# Patient Record
Sex: FEMALE | Race: WHITE | Employment: OTHER | URBAN - METROPOLITAN AREA
[De-identification: names, ages, dates, MRNs, and addresses within clinical notes are randomized per-mention and may not be internally consistent; named-entity substitution may affect disease eponyms.]

---

## 2017-07-11 ENCOUNTER — HOSPITAL ENCOUNTER (OUTPATIENT)
Dept: WOMENS IMAGING | Age: 82
Discharge: HOME | End: 2017-07-11
Attending: OBSTETRICS & GYNECOLOGY | Admitting: OBSTETRICS & GYNECOLOGY

## 2017-07-11 DIAGNOSIS — Z12.31 VISIT FOR SCREENING MAMMOGRAM: ICD-10-CM

## 2017-07-17 ENCOUNTER — OFFICE VISIT (OUTPATIENT)
Dept: FAMILY MEDICINE CLINIC | Age: 82
End: 2017-07-17

## 2017-07-17 VITALS
RESPIRATION RATE: 16 BRPM | SYSTOLIC BLOOD PRESSURE: 136 MMHG | OXYGEN SATURATION: 98 % | HEIGHT: 57 IN | BODY MASS INDEX: 22.87 KG/M2 | WEIGHT: 106 LBS | HEART RATE: 65 BPM | DIASTOLIC BLOOD PRESSURE: 70 MMHG

## 2017-07-17 DIAGNOSIS — J31.0 RHINITIS, UNSPECIFIED TYPE: Primary | ICD-10-CM

## 2017-07-17 DIAGNOSIS — M41.25 OTHER IDIOPATHIC SCOLIOSIS, THORACOLUMBAR REGION: ICD-10-CM

## 2017-07-17 DIAGNOSIS — M81.0 OSTEOPOROSIS: ICD-10-CM

## 2017-07-17 PROCEDURE — 99213 OFFICE O/P EST LOW 20 MIN: CPT | Performed by: INTERNAL MEDICINE

## 2017-07-17 PROCEDURE — 1036F TOBACCO NON-USER: CPT | Performed by: INTERNAL MEDICINE

## 2017-07-17 PROCEDURE — G8427 DOCREV CUR MEDS BY ELIG CLIN: HCPCS | Performed by: INTERNAL MEDICINE

## 2017-07-17 PROCEDURE — 4040F PNEUMOC VAC/ADMIN/RCVD: CPT | Performed by: INTERNAL MEDICINE

## 2017-07-17 PROCEDURE — G8399 PT W/DXA RESULTS DOCUMENT: HCPCS | Performed by: INTERNAL MEDICINE

## 2017-07-17 PROCEDURE — 4005F PHARM THX FOR OP RXD: CPT | Performed by: INTERNAL MEDICINE

## 2017-07-17 PROCEDURE — 1090F PRES/ABSN URINE INCON ASSESS: CPT | Performed by: INTERNAL MEDICINE

## 2017-07-17 PROCEDURE — G8420 CALC BMI NORM PARAMETERS: HCPCS | Performed by: INTERNAL MEDICINE

## 2017-07-17 PROCEDURE — 1123F ACP DISCUSS/DSCN MKR DOCD: CPT | Performed by: INTERNAL MEDICINE

## 2017-07-17 RX ORDER — FLUTICASONE PROPIONATE 50 MCG
1 SPRAY, SUSPENSION (ML) NASAL DAILY
Qty: 1 BOTTLE | Refills: 3 | Status: SHIPPED | OUTPATIENT
Start: 2017-07-17 | End: 2017-08-31

## 2017-07-17 ASSESSMENT — PATIENT HEALTH QUESTIONNAIRE - PHQ9
SUM OF ALL RESPONSES TO PHQ QUESTIONS 1-9: 0
1. LITTLE INTEREST OR PLEASURE IN DOING THINGS: 0
SUM OF ALL RESPONSES TO PHQ9 QUESTIONS 1 & 2: 0
2. FEELING DOWN, DEPRESSED OR HOPELESS: 0

## 2017-07-31 ENCOUNTER — OFFICE VISIT (OUTPATIENT)
Dept: FAMILY MEDICINE CLINIC | Age: 82
End: 2017-07-31

## 2017-07-31 VITALS
SYSTOLIC BLOOD PRESSURE: 130 MMHG | HEART RATE: 66 BPM | HEIGHT: 57 IN | DIASTOLIC BLOOD PRESSURE: 70 MMHG | WEIGHT: 105 LBS | BODY MASS INDEX: 22.65 KG/M2

## 2017-07-31 DIAGNOSIS — M81.0 AGE-RELATED OSTEOPOROSIS WITHOUT CURRENT PATHOLOGICAL FRACTURE: ICD-10-CM

## 2017-07-31 DIAGNOSIS — J30.89 NON-SEASONAL ALLERGIC RHINITIS DUE TO OTHER ALLERGIC TRIGGER: Primary | ICD-10-CM

## 2017-07-31 PROCEDURE — G8427 DOCREV CUR MEDS BY ELIG CLIN: HCPCS | Performed by: FAMILY MEDICINE

## 2017-07-31 PROCEDURE — G8399 PT W/DXA RESULTS DOCUMENT: HCPCS | Performed by: FAMILY MEDICINE

## 2017-07-31 PROCEDURE — G8420 CALC BMI NORM PARAMETERS: HCPCS | Performed by: FAMILY MEDICINE

## 2017-07-31 PROCEDURE — 4040F PNEUMOC VAC/ADMIN/RCVD: CPT | Performed by: FAMILY MEDICINE

## 2017-07-31 PROCEDURE — 99213 OFFICE O/P EST LOW 20 MIN: CPT | Performed by: FAMILY MEDICINE

## 2017-07-31 PROCEDURE — 1036F TOBACCO NON-USER: CPT | Performed by: FAMILY MEDICINE

## 2017-07-31 PROCEDURE — 4005F PHARM THX FOR OP RXD: CPT | Performed by: FAMILY MEDICINE

## 2017-07-31 PROCEDURE — 1123F ACP DISCUSS/DSCN MKR DOCD: CPT | Performed by: FAMILY MEDICINE

## 2017-07-31 PROCEDURE — 1090F PRES/ABSN URINE INCON ASSESS: CPT | Performed by: FAMILY MEDICINE

## 2017-07-31 RX ORDER — CETIRIZINE HYDROCHLORIDE 10 MG/1
10 TABLET ORAL DAILY
Qty: 30 TABLET | Refills: 0 | Status: SHIPPED | OUTPATIENT
Start: 2017-07-31 | End: 2017-08-31

## 2017-08-09 ENCOUNTER — HOSPITAL ENCOUNTER (OUTPATIENT)
Dept: WOMENS IMAGING | Age: 82
Discharge: HOME | End: 2017-08-09
Attending: FAMILY MEDICINE | Admitting: FAMILY MEDICINE

## 2017-08-09 DIAGNOSIS — M81.0 AGE-RELATED OSTEOPOROSIS WITHOUT CURRENT PATHOLOGICAL FRACTURE: ICD-10-CM

## 2017-08-10 ENCOUNTER — TELEPHONE (OUTPATIENT)
Dept: FAMILY MEDICINE CLINIC | Age: 82
End: 2017-08-10

## 2017-08-10 DIAGNOSIS — M81.0 AGE-RELATED OSTEOPOROSIS WITHOUT CURRENT PATHOLOGICAL FRACTURE: Primary | ICD-10-CM

## 2017-08-31 ENCOUNTER — OFFICE VISIT (OUTPATIENT)
Dept: ENDOCRINOLOGY | Age: 82
End: 2017-08-31

## 2017-08-31 VITALS
RESPIRATION RATE: 16 BRPM | HEART RATE: 68 BPM | SYSTOLIC BLOOD PRESSURE: 130 MMHG | WEIGHT: 105 LBS | HEIGHT: 55 IN | BODY MASS INDEX: 24.3 KG/M2 | DIASTOLIC BLOOD PRESSURE: 62 MMHG

## 2017-08-31 DIAGNOSIS — M81.0 AGE-RELATED OSTEOPOROSIS WITHOUT CURRENT PATHOLOGICAL FRACTURE: Primary | ICD-10-CM

## 2017-08-31 DIAGNOSIS — R53.83 FATIGUE, UNSPECIFIED TYPE: ICD-10-CM

## 2017-08-31 DIAGNOSIS — M81.0 AGE-RELATED OSTEOPOROSIS WITHOUT CURRENT PATHOLOGICAL FRACTURE: ICD-10-CM

## 2017-08-31 DIAGNOSIS — Z78.0 POSTMENOPAUSAL: ICD-10-CM

## 2017-08-31 LAB
A/G RATIO: 1.7 (ref 1.1–2.2)
ALBUMIN SERPL-MCNC: 4.6 G/DL (ref 3.4–5)
ALP BLD-CCNC: 84 U/L (ref 40–129)
ALT SERPL-CCNC: 23 U/L (ref 10–40)
ANION GAP SERPL CALCULATED.3IONS-SCNC: 17 MMOL/L (ref 3–16)
AST SERPL-CCNC: 26 U/L (ref 15–37)
BILIRUB SERPL-MCNC: 0.6 MG/DL (ref 0–1)
BUN BLDV-MCNC: 25 MG/DL (ref 7–20)
CALCIUM SERPL-MCNC: 10 MG/DL (ref 8.3–10.6)
CHLORIDE BLD-SCNC: 103 MMOL/L (ref 99–110)
CO2: 24 MMOL/L (ref 21–32)
CREAT SERPL-MCNC: 0.8 MG/DL (ref 0.6–1.2)
GFR AFRICAN AMERICAN: >60
GFR NON-AFRICAN AMERICAN: >60
GLOBULIN: 2.7 G/DL
GLUCOSE BLD-MCNC: 90 MG/DL (ref 70–99)
HCT VFR BLD CALC: 43.3 % (ref 36–48)
HEMOGLOBIN: 14.4 G/DL (ref 12–16)
MCH RBC QN AUTO: 30.6 PG (ref 26–34)
MCHC RBC AUTO-ENTMCNC: 33.4 G/DL (ref 31–36)
MCV RBC AUTO: 91.7 FL (ref 80–100)
PARATHYROID HORMONE INTACT: 44.5 PG/ML (ref 14–72)
PDW BLD-RTO: 12.9 % (ref 12.4–15.4)
PLATELET # BLD: 202 K/UL (ref 135–450)
PMV BLD AUTO: 8.8 FL (ref 5–10.5)
POTASSIUM SERPL-SCNC: 4.9 MMOL/L (ref 3.5–5.1)
RBC # BLD: 4.72 M/UL (ref 4–5.2)
SODIUM BLD-SCNC: 144 MMOL/L (ref 136–145)
TOTAL PROTEIN: 7.3 G/DL (ref 6.4–8.2)
TSH REFLEX: 2.17 UIU/ML (ref 0.27–4.2)
VITAMIN D 25-HYDROXY: 42.6 NG/ML
WBC # BLD: 8.4 K/UL (ref 4–11)

## 2017-08-31 PROCEDURE — 4005F PHARM THX FOR OP RXD: CPT | Performed by: INTERNAL MEDICINE

## 2017-08-31 PROCEDURE — G8427 DOCREV CUR MEDS BY ELIG CLIN: HCPCS | Performed by: INTERNAL MEDICINE

## 2017-08-31 PROCEDURE — 99204 OFFICE O/P NEW MOD 45 MIN: CPT | Performed by: INTERNAL MEDICINE

## 2017-08-31 PROCEDURE — G8399 PT W/DXA RESULTS DOCUMENT: HCPCS | Performed by: INTERNAL MEDICINE

## 2017-08-31 PROCEDURE — G8420 CALC BMI NORM PARAMETERS: HCPCS | Performed by: INTERNAL MEDICINE

## 2017-08-31 PROCEDURE — 1090F PRES/ABSN URINE INCON ASSESS: CPT | Performed by: INTERNAL MEDICINE

## 2017-08-31 PROCEDURE — 1123F ACP DISCUSS/DSCN MKR DOCD: CPT | Performed by: INTERNAL MEDICINE

## 2017-08-31 PROCEDURE — 4040F PNEUMOC VAC/ADMIN/RCVD: CPT | Performed by: INTERNAL MEDICINE

## 2017-08-31 PROCEDURE — 1036F TOBACCO NON-USER: CPT | Performed by: INTERNAL MEDICINE

## 2017-09-01 ENCOUNTER — TELEPHONE (OUTPATIENT)
Dept: ENDOCRINOLOGY | Age: 82
End: 2017-09-01

## 2017-10-09 RX ORDER — RISEDRONATE SODIUM 35 MG/1
TABLET, FILM COATED ORAL
Qty: 4 TABLET | Refills: 3 | Status: SHIPPED | OUTPATIENT
Start: 2017-10-09 | End: 2018-01-28 | Stop reason: SDUPTHER

## 2017-12-18 ENCOUNTER — OFFICE VISIT (OUTPATIENT)
Dept: FAMILY MEDICINE CLINIC | Age: 82
End: 2017-12-18

## 2017-12-18 ENCOUNTER — TELEPHONE (OUTPATIENT)
Dept: FAMILY MEDICINE CLINIC | Age: 82
End: 2017-12-18

## 2017-12-18 VITALS
HEIGHT: 57 IN | HEART RATE: 72 BPM | SYSTOLIC BLOOD PRESSURE: 126 MMHG | DIASTOLIC BLOOD PRESSURE: 66 MMHG | RESPIRATION RATE: 16 BRPM | WEIGHT: 103 LBS | BODY MASS INDEX: 22.22 KG/M2

## 2017-12-18 DIAGNOSIS — Z79.1 NSAID LONG-TERM USE: ICD-10-CM

## 2017-12-18 DIAGNOSIS — Z01.818 PRE-OP EVALUATION: Primary | ICD-10-CM

## 2017-12-18 LAB
ANION GAP SERPL CALCULATED.3IONS-SCNC: 15 MMOL/L (ref 3–16)
BUN BLDV-MCNC: 24 MG/DL (ref 7–20)
CALCIUM SERPL-MCNC: 10 MG/DL (ref 8.3–10.6)
CHLORIDE BLD-SCNC: 103 MMOL/L (ref 99–110)
CO2: 25 MMOL/L (ref 21–32)
CREAT SERPL-MCNC: 0.8 MG/DL (ref 0.6–1.2)
GFR AFRICAN AMERICAN: >60
GFR NON-AFRICAN AMERICAN: >60
GLUCOSE BLD-MCNC: 96 MG/DL (ref 70–99)
POTASSIUM SERPL-SCNC: 4.3 MMOL/L (ref 3.5–5.1)
SODIUM BLD-SCNC: 143 MMOL/L (ref 136–145)

## 2017-12-18 PROCEDURE — G8420 CALC BMI NORM PARAMETERS: HCPCS | Performed by: FAMILY MEDICINE

## 2017-12-18 PROCEDURE — 4040F PNEUMOC VAC/ADMIN/RCVD: CPT | Performed by: FAMILY MEDICINE

## 2017-12-18 PROCEDURE — 1090F PRES/ABSN URINE INCON ASSESS: CPT | Performed by: FAMILY MEDICINE

## 2017-12-18 PROCEDURE — 36415 COLL VENOUS BLD VENIPUNCTURE: CPT | Performed by: FAMILY MEDICINE

## 2017-12-18 PROCEDURE — G8484 FLU IMMUNIZE NO ADMIN: HCPCS | Performed by: FAMILY MEDICINE

## 2017-12-18 PROCEDURE — 99214 OFFICE O/P EST MOD 30 MIN: CPT | Performed by: FAMILY MEDICINE

## 2017-12-18 PROCEDURE — G8427 DOCREV CUR MEDS BY ELIG CLIN: HCPCS | Performed by: FAMILY MEDICINE

## 2017-12-18 NOTE — TELEPHONE ENCOUNTER
Please call and inform patient that all blood work is normal.    Please document call and then close encounter.   thanks

## 2017-12-18 NOTE — PROGRESS NOTES
Preoperative Consultation    Cora Hunt MD  [facility]) Physicians  [place] [address] [address]  [phone] office  [phone] fax      [place] Nurse  YOB: 1935    Date of Service:  12/18/2017    Vitals:    12/18/17 1105   BP: 126/66   Pulse: 72   Resp: 16   Weight: 103 lb (46.7 kg)   Height: 4' 9\" (1.448 m)      Wt Readings from Last 2 Encounters:   12/18/17 103 lb (46.7 kg)   08/31/17 105 lb (47.6 kg)     BP Readings from Last 3 Encounters:   12/18/17 126/66   08/31/17 130/62   07/31/17 130/70        Chief Complaint   Patient presents with    Pre-op Exam     [facility] 12/27-cataract on right eye [phone] and fax [phone]     No Known Allergies  Outpatient Prescriptions Marked as Taking for the 12/18/17 encounter (Office Visit) with Bruna Gray MD   Medication Sig Dispense Refill    risedronate (ACTONEL) 35 MG tablet Take once per week in the morning with a full glass of water 4 tablet 3    celecoxib (CELEBREX) 200 MG capsule Take 200 mg by mouth 2 times daily. This patient presents to the office today for a preoperative consultation at the request of surgeon, Dr. Lelo Duncan, who plans on performing right eye cataract on December 27 at [address] [place] [address] at [facility], and left cataract surgery a couple weeks later (not scheduled yet).      Planned anesthesia: IV sedation and Topical anesthesia   Known anesthesia problems: None   Bleeding risk: No recent or remote history of abnormal bleeding  Personal or FH of DVT/PE: No    Patient objection to receiving blood products: No    Patient Active Problem List   Diagnosis    Osteoarthritis    Osteoporosis    Allergic rhinitis    Scoliosis    Inguinal hernia unilateral, non-recurrent    Hearing loss sensory, bilateral       Past Medical History:   Diagnosis Date    Allergic rhinitis     Hearing loss sensory, bilateral     mod--severe on right, mild to reactive to light. Neck: Trachea normal and normal range of motion. Neck supple. No JVD present. Carotid bruit is not present. No mass and no thyromegaly present. Cardiovascular: Normal rate, regular rhythm, normal heart sounds and intact distal pulses. Exam reveals no gallop and no friction rub. No murmur heard. Pulmonary/Chest: Effort normal and breath sounds normal. No respiratory distress. She has no wheezes. She has no rales. Abdominal: Soft. Normal aorta and bowel sounds are normal. She exhibits no distension and no mass. There is no hepatosplenomegaly. No tenderness. Musculoskeletal: She exhibits no edema and no tenderness. Neurological: She is alert and oriented to person, place, and time. She has normal strength. No cranial nerve deficit or sensory deficit. Coordination and gait normal.   Skin: Skin is warm and dry. No rash noted. No erythema. Psychiatric: She has a normal mood and affect. Her behavior is normal.        Assessment:       81 y.o. patient with planned surgery as above. Known risk factors for perioperative complications:       chronic NSAID use:  Ok to continue prior to surgery since its cataract surgery. Checking BMP    Current medications which may produce withdrawal symptoms if withheld perioperatively: none      Plan:     1. Preoperative workup as follows: electrolytes, creatinine, glucose    2. Change in medication regimen before surgery: None    3. Prophylaxis for cardiac events with perioperative beta-blockers: Not indicated    4.   No contraindications to planned surgery/  Cleared for surgery      Ben Oswald MD

## 2017-12-18 NOTE — LETTER
Preoperative Consultation    Jimmy Huerta MD  [facility]) Physicians  [place] [address] [address]  [phone] office  [phone] fax      Rito Vásquez  YOB: 1935    Date of Service:  12/18/2017    Vitals:    12/18/17 1105   BP: 126/66   Pulse: 72   Resp: 16   Weight: 103 lb (46.7 kg)   Height: 4' 9\" (1.448 m)      Wt Readings from Last 2 Encounters:   12/18/17 103 lb (46.7 kg)   08/31/17 105 lb (47.6 kg)     BP Readings from Last 3 Encounters:   12/18/17 126/66   08/31/17 130/62   07/31/17 130/70        Chief Complaint   Patient presents with    Pre-op Exam     [facility] 12/27-cataract on right eye [phone] and fax [phone]     No Known Allergies  Outpatient Prescriptions Marked as Taking for the 12/18/17 encounter (Office Visit) with Sandra Murphy MD   Medication Sig Dispense Refill    risedronate (ACTONEL) 35 MG tablet Take once per week in the morning with a full glass of water 4 tablet 3    celecoxib (CELEBREX) 200 MG capsule Take 200 mg by mouth 2 times daily. This patient presents to the office today for a preoperative consultation at the request of surgeon, Dr. Paulino Wallace, who plans on performing right eye cataract on December 27 at [address] [place] [address] at [facility], and left cataract surgery a couple weeks later (not scheduled yet).      Planned anesthesia: IV sedation and Topical anesthesia   Known anesthesia problems: None   Bleeding risk: No recent or remote history of abnormal bleeding  Personal or FH of DVT/PE: No    Patient objection to receiving blood products: No    Patient Active Problem List   Diagnosis    Osteoarthritis    Osteoporosis    Allergic rhinitis    Scoliosis    Inguinal hernia unilateral, non-recurrent    Hearing loss sensory, bilateral       Past Medical History:   Diagnosis Date    Allergic rhinitis     Hearing loss sensory, bilateral Eyes: Conjunctivae and EOM are normal. Pupils are equal, round, and reactive to light. Neck: Trachea normal and normal range of motion. Neck supple. No JVD present. Carotid bruit is not present. No mass and no thyromegaly present. Cardiovascular: Normal rate, regular rhythm, normal heart sounds and intact distal pulses. Exam reveals no gallop and no friction rub. No murmur heard. Pulmonary/Chest: Effort normal and breath sounds normal. No respiratory distress. She has no wheezes. She has no rales. Abdominal: Soft. Normal aorta and bowel sounds are normal. She exhibits no distension and no mass. There is no hepatosplenomegaly. No tenderness. Musculoskeletal: She exhibits no edema and no tenderness. Neurological: She is alert and oriented to person, place, and time. She has normal strength. No cranial nerve deficit or sensory deficit. Coordination and gait normal.   Skin: Skin is warm and dry. No rash noted. No erythema. Psychiatric: She has a normal mood and affect. Her behavior is normal.        Assessment:       81 y.o. patient with planned surgery as above. Known risk factors for perioperative complications:       chronic NSAID use:  Ok to continue prior to surgery since its cataract surgery. Checking BMP    Current medications which may produce withdrawal symptoms if withheld perioperatively: none      Plan:     1. Preoperative workup as follows: electrolytes, creatinine, glucose    2. Change in medication regimen before surgery: None    3. Prophylaxis for cardiac events with perioperative beta-blockers: Not indicated    4.   No contraindications to planned surgery/  Cleared for surgery            Lalitha Olivera MD

## 2018-01-06 ENCOUNTER — OFFICE VISIT (OUTPATIENT)
Dept: ENDOCRINOLOGY | Age: 83
End: 2018-01-06

## 2018-01-06 VITALS
HEIGHT: 56 IN | HEART RATE: 64 BPM | DIASTOLIC BLOOD PRESSURE: 68 MMHG | RESPIRATION RATE: 16 BRPM | SYSTOLIC BLOOD PRESSURE: 120 MMHG | BODY MASS INDEX: 23.58 KG/M2 | WEIGHT: 104.8 LBS

## 2018-01-06 DIAGNOSIS — M81.0 AGE-RELATED OSTEOPOROSIS WITHOUT CURRENT PATHOLOGICAL FRACTURE: Primary | ICD-10-CM

## 2018-01-06 PROCEDURE — 1036F TOBACCO NON-USER: CPT | Performed by: INTERNAL MEDICINE

## 2018-01-06 PROCEDURE — 99213 OFFICE O/P EST LOW 20 MIN: CPT | Performed by: INTERNAL MEDICINE

## 2018-01-06 PROCEDURE — 1123F ACP DISCUSS/DSCN MKR DOCD: CPT | Performed by: INTERNAL MEDICINE

## 2018-01-06 PROCEDURE — G8484 FLU IMMUNIZE NO ADMIN: HCPCS | Performed by: INTERNAL MEDICINE

## 2018-01-06 PROCEDURE — G8427 DOCREV CUR MEDS BY ELIG CLIN: HCPCS | Performed by: INTERNAL MEDICINE

## 2018-01-06 PROCEDURE — G8399 PT W/DXA RESULTS DOCUMENT: HCPCS | Performed by: INTERNAL MEDICINE

## 2018-01-06 PROCEDURE — G8420 CALC BMI NORM PARAMETERS: HCPCS | Performed by: INTERNAL MEDICINE

## 2018-01-06 PROCEDURE — 1090F PRES/ABSN URINE INCON ASSESS: CPT | Performed by: INTERNAL MEDICINE

## 2018-01-06 PROCEDURE — 4040F PNEUMOC VAC/ADMIN/RCVD: CPT | Performed by: INTERNAL MEDICINE

## 2018-01-06 NOTE — PROGRESS NOTES
will send to get MRI to r/o acoustic neuroma    Mastoiditis     on MRI oct 2014-->dr ortiz states with no clinical findings this is considered an over-sensitivity of MRI results    Osteoarthritis     L1-L5    Osteoporosis     patient was on fosamax x 10 years and drug holiday was started on 6/21/13. (this was date of last DEXA scan)    Scoliosis      Past Surgical History:   Procedure Laterality Date    CARPAL TUNNEL RELEASE      right, 2003    COLONOSCOPY      2013, dr ben Potter       Current Outpatient Prescriptions   Medication Sig Dispense Refill    Calcium Citrate-Vitamin D (CALCIUM + D PO) Take by mouth      Cholecalciferol (VITAMIN D3 PO) Take by mouth      risedronate (ACTONEL) 35 MG tablet Take once per week in the morning with a full glass of water 4 tablet 3    celecoxib (CELEBREX) 200 MG capsule Take 200 mg by mouth 2 times daily. No current facility-administered medications for this visit. Review of Systems  Please see scanned     Objective:    /68 (Site: Right Arm, Position: Sitting, Cuff Size: Medium Adult)   Pulse 64   Resp 16   Ht 4' 7.51\" (1.41 m)   Wt 104 lb 12.8 oz (47.5 kg)   BMI 23.91 kg/m²   Wt Readings from Last 3 Encounters:   01/06/18 104 lb 12.8 oz (47.5 kg)   12/18/17 103 lb (46.7 kg)   08/31/17 105 lb (47.6 kg)       Constitutional: Well-developed, appears stated age, cooperative, in no acute distress  H/E/N/M/T:atraumatic, normocephalic, external ears, nose, lips normal without lesions  Eyes: Lids, lashes, conjunctivae and sclerae normal, No proptosis  Skin and hair texture normal  Psychiatric: Judgement and Insight:  judgement and insight appear normal  Neuro: Normal without focal findings, speech is normal normal, speech is spontaneous  Scoliosis    Lab Review  Lab Results   Component Value Date    TSH 2.63 05/10/2012     No results found for: FREET4      Assessment:     1. Osteoporosis : History

## 2018-01-29 RX ORDER — RISEDRONATE SODIUM 35 MG/1
TABLET, FILM COATED ORAL
Qty: 4 TABLET | Refills: 3 | Status: SHIPPED | OUTPATIENT
Start: 2018-01-29 | End: 2018-05-29 | Stop reason: SDUPTHER

## 2018-01-31 ENCOUNTER — OFFICE VISIT (OUTPATIENT)
Dept: FAMILY MEDICINE CLINIC | Age: 83
End: 2018-01-31

## 2018-01-31 VITALS
HEART RATE: 62 BPM | DIASTOLIC BLOOD PRESSURE: 68 MMHG | WEIGHT: 103 LBS | BODY MASS INDEX: 23.84 KG/M2 | HEIGHT: 55 IN | SYSTOLIC BLOOD PRESSURE: 148 MMHG

## 2018-01-31 DIAGNOSIS — Z51.81 ENCOUNTER FOR MONITORING CHRONIC NSAID THERAPY: ICD-10-CM

## 2018-01-31 DIAGNOSIS — Z01.818 PRE-OP EVALUATION: Primary | ICD-10-CM

## 2018-01-31 DIAGNOSIS — Z79.1 ENCOUNTER FOR MONITORING CHRONIC NSAID THERAPY: ICD-10-CM

## 2018-01-31 LAB
ANION GAP SERPL CALCULATED.3IONS-SCNC: 14 MMOL/L (ref 3–16)
BUN BLDV-MCNC: 27 MG/DL (ref 7–20)
CALCIUM SERPL-MCNC: 9.5 MG/DL (ref 8.3–10.6)
CHLORIDE BLD-SCNC: 103 MMOL/L (ref 99–110)
CO2: 26 MMOL/L (ref 21–32)
CREAT SERPL-MCNC: 0.6 MG/DL (ref 0.6–1.2)
GFR AFRICAN AMERICAN: >60
GFR NON-AFRICAN AMERICAN: >60
GLUCOSE BLD-MCNC: 88 MG/DL (ref 70–99)
POTASSIUM SERPL-SCNC: 5 MMOL/L (ref 3.5–5.1)
SODIUM BLD-SCNC: 143 MMOL/L (ref 136–145)

## 2018-01-31 PROCEDURE — G8484 FLU IMMUNIZE NO ADMIN: HCPCS | Performed by: FAMILY MEDICINE

## 2018-01-31 PROCEDURE — 99213 OFFICE O/P EST LOW 20 MIN: CPT | Performed by: FAMILY MEDICINE

## 2018-01-31 PROCEDURE — 36415 COLL VENOUS BLD VENIPUNCTURE: CPT | Performed by: FAMILY MEDICINE

## 2018-01-31 PROCEDURE — G8427 DOCREV CUR MEDS BY ELIG CLIN: HCPCS | Performed by: FAMILY MEDICINE

## 2018-01-31 PROCEDURE — G8420 CALC BMI NORM PARAMETERS: HCPCS | Performed by: FAMILY MEDICINE

## 2018-01-31 PROCEDURE — 1036F TOBACCO NON-USER: CPT | Performed by: FAMILY MEDICINE

## 2018-01-31 PROCEDURE — 1123F ACP DISCUSS/DSCN MKR DOCD: CPT | Performed by: FAMILY MEDICINE

## 2018-01-31 PROCEDURE — 4040F PNEUMOC VAC/ADMIN/RCVD: CPT | Performed by: FAMILY MEDICINE

## 2018-01-31 PROCEDURE — 1090F PRES/ABSN URINE INCON ASSESS: CPT | Performed by: FAMILY MEDICINE

## 2018-01-31 PROCEDURE — G8399 PT W/DXA RESULTS DOCUMENT: HCPCS | Performed by: FAMILY MEDICINE

## 2018-01-31 NOTE — PROGRESS NOTES
Diagnosis Date    Allergic rhinitis     Hearing loss sensory, bilateral     mod--severe on right, mild to severe on left, due to asymmetry will send to get MRI to r/o acoustic neuroma    Mastoiditis     on MRI oct 2014-->dr ortiz states with no clinical findings this is considered an over-sensitivity of MRI results    Osteoarthritis     L1-L5    Osteoporosis     patient was on fosamax x 10 years and drug holiday was started on 6/21/13. (this was date of last DEXA scan)    Scoliosis      Past Surgical History:   Procedure Laterality Date    CARPAL TUNNEL RELEASE      right, 2003    COLONOSCOPY      2013, dr ben Garcia [address]       Family History   Problem Relation Age of Onset    Diabetes Mother     Heart Disease Mother        Social History    Marital status:       Smoking status: Never Smoker    Smokeless tobacco: Never Used    Alcohol use No    Drug use: No    Sexual activity: Yes     Partners: Male      Comment: , 5 children, 15 grandchildren         Review of Systems  Constitutional:  Negative for activity or appetite change, fever or fatigue  HENT:  Negative for congestion, sinus pressure, or rhinorrhea  Eyes:  Negative for eye pain or visual changes  Resp:  Negative for SOB, chest tightness, cough  Cardiovascular: Negative for CP, palpitations, CARR, orthopnea, PND, LE edema  Gastrointestinal: Negative for abd pain, melena, BRBPR, N/V/D  Endocrine:  Negative for polydipsia and polyuria  :  Negative for dysuria, flank pain or urinary frequency  Musculoskeletal:  Negative for back pain or myalgias  Neuro:  Negative for dizziness or lightheadedness  Psych: negative for depression or anxiety       Physical Exam   Constitutional: She is oriented to person, place, and time. She appears well-developed and well-nourished. No distress. HENT:   Head: Normocephalic and atraumatic.    Mouth/Throat: Uvula is midline, oropharynx is clear and moist today, as there have been no changes, and she does not have very many chronic medical conditions that would impact the surgery.

## 2018-02-01 ENCOUNTER — TELEPHONE (OUTPATIENT)
Dept: FAMILY MEDICINE CLINIC | Age: 83
End: 2018-02-01

## 2018-05-17 ENCOUNTER — OFFICE VISIT (OUTPATIENT)
Dept: FAMILY MEDICINE CLINIC | Age: 83
End: 2018-05-17

## 2018-05-17 VITALS
DIASTOLIC BLOOD PRESSURE: 64 MMHG | HEIGHT: 55 IN | TEMPERATURE: 97.9 F | HEART RATE: 76 BPM | OXYGEN SATURATION: 99 % | WEIGHT: 104 LBS | BODY MASS INDEX: 24.07 KG/M2 | SYSTOLIC BLOOD PRESSURE: 131 MMHG | RESPIRATION RATE: 16 BRPM

## 2018-05-17 DIAGNOSIS — J30.9 ALLERGIC RHINITIS, UNSPECIFIED CHRONICITY, UNSPECIFIED SEASONALITY, UNSPECIFIED TRIGGER: Primary | ICD-10-CM

## 2018-05-17 PROCEDURE — 4040F PNEUMOC VAC/ADMIN/RCVD: CPT | Performed by: INTERNAL MEDICINE

## 2018-05-17 PROCEDURE — G8427 DOCREV CUR MEDS BY ELIG CLIN: HCPCS | Performed by: INTERNAL MEDICINE

## 2018-05-17 PROCEDURE — G8420 CALC BMI NORM PARAMETERS: HCPCS | Performed by: INTERNAL MEDICINE

## 2018-05-17 PROCEDURE — G8399 PT W/DXA RESULTS DOCUMENT: HCPCS | Performed by: INTERNAL MEDICINE

## 2018-05-17 PROCEDURE — 1090F PRES/ABSN URINE INCON ASSESS: CPT | Performed by: INTERNAL MEDICINE

## 2018-05-17 PROCEDURE — 1036F TOBACCO NON-USER: CPT | Performed by: INTERNAL MEDICINE

## 2018-05-17 PROCEDURE — 1123F ACP DISCUSS/DSCN MKR DOCD: CPT | Performed by: INTERNAL MEDICINE

## 2018-05-17 PROCEDURE — 99213 OFFICE O/P EST LOW 20 MIN: CPT | Performed by: INTERNAL MEDICINE

## 2018-05-29 RX ORDER — RISEDRONATE SODIUM 35 MG/1
TABLET, FILM COATED ORAL
Qty: 4 TABLET | Refills: 2 | Status: SHIPPED | OUTPATIENT
Start: 2018-05-29 | End: 2018-08-20 | Stop reason: SDUPTHER

## 2018-08-07 ENCOUNTER — HOSPITAL ENCOUNTER (OUTPATIENT)
Dept: WOMENS IMAGING | Age: 83
Discharge: HOME | End: 2018-08-07
Admitting: SURGERY

## 2018-08-07 DIAGNOSIS — Z12.31 VISIT FOR SCREENING MAMMOGRAM: ICD-10-CM

## 2018-08-20 RX ORDER — RISEDRONATE SODIUM 35 MG/1
TABLET, FILM COATED ORAL
Qty: 4 TABLET | Refills: 3 | Status: SHIPPED | OUTPATIENT
Start: 2018-08-20 | End: 2018-12-10 | Stop reason: SDUPTHER

## 2018-08-29 ENCOUNTER — OFFICE VISIT (OUTPATIENT)
Dept: FAMILY MEDICINE CLINIC | Age: 83
End: 2018-08-29

## 2018-08-29 ENCOUNTER — TELEPHONE (OUTPATIENT)
Dept: FAMILY MEDICINE CLINIC | Age: 83
End: 2018-08-29

## 2018-08-29 VITALS
HEIGHT: 55 IN | WEIGHT: 104 LBS | SYSTOLIC BLOOD PRESSURE: 160 MMHG | HEART RATE: 64 BPM | DIASTOLIC BLOOD PRESSURE: 77 MMHG | BODY MASS INDEX: 24.07 KG/M2 | TEMPERATURE: 97.1 F | RESPIRATION RATE: 16 BRPM

## 2018-08-29 DIAGNOSIS — B02.9 HERPES ZOSTER WITHOUT COMPLICATION: Primary | ICD-10-CM

## 2018-08-29 PROCEDURE — G8510 SCR DEP NEG, NO PLAN REQD: HCPCS | Performed by: FAMILY MEDICINE

## 2018-08-29 PROCEDURE — 99213 OFFICE O/P EST LOW 20 MIN: CPT | Performed by: FAMILY MEDICINE

## 2018-08-29 PROCEDURE — 3288F FALL RISK ASSESSMENT DOCD: CPT | Performed by: FAMILY MEDICINE

## 2018-08-29 PROCEDURE — 1123F ACP DISCUSS/DSCN MKR DOCD: CPT | Performed by: FAMILY MEDICINE

## 2018-08-29 PROCEDURE — 1090F PRES/ABSN URINE INCON ASSESS: CPT | Performed by: FAMILY MEDICINE

## 2018-08-29 PROCEDURE — G8420 CALC BMI NORM PARAMETERS: HCPCS | Performed by: FAMILY MEDICINE

## 2018-08-29 PROCEDURE — 1101F PT FALLS ASSESS-DOCD LE1/YR: CPT | Performed by: FAMILY MEDICINE

## 2018-08-29 PROCEDURE — G8399 PT W/DXA RESULTS DOCUMENT: HCPCS | Performed by: FAMILY MEDICINE

## 2018-08-29 PROCEDURE — G8427 DOCREV CUR MEDS BY ELIG CLIN: HCPCS | Performed by: FAMILY MEDICINE

## 2018-08-29 PROCEDURE — 4040F PNEUMOC VAC/ADMIN/RCVD: CPT | Performed by: FAMILY MEDICINE

## 2018-08-29 PROCEDURE — 1036F TOBACCO NON-USER: CPT | Performed by: FAMILY MEDICINE

## 2018-08-29 RX ORDER — VALACYCLOVIR HYDROCHLORIDE 1 G/1
1000 TABLET, FILM COATED ORAL 3 TIMES DAILY
Qty: 21 TABLET | Refills: 0 | Status: SHIPPED | OUTPATIENT
Start: 2018-08-29 | End: 2018-09-05

## 2018-08-29 ASSESSMENT — PATIENT HEALTH QUESTIONNAIRE - PHQ9
SUM OF ALL RESPONSES TO PHQ9 QUESTIONS 1 & 2: 0
SUM OF ALL RESPONSES TO PHQ QUESTIONS 1-9: 0
1. LITTLE INTEREST OR PLEASURE IN DOING THINGS: 0
SUM OF ALL RESPONSES TO PHQ QUESTIONS 1-9: 0
2. FEELING DOWN, DEPRESSED OR HOPELESS: 0

## 2018-08-29 NOTE — PROGRESS NOTES
Surgical History:   Procedure Laterality Date    CARPAL TUNNEL RELEASE      right, 2003    COLONOSCOPY      2013, dr ben Gonzalez    NASAL SINUS SURGERY         Social History   Substance Use Topics    Smoking status: Never Smoker    Smokeless tobacco: Never Used    Alcohol use No       Family History   Problem Relation Age of Onset    Diabetes Mother     Heart Disease Mother          Review of Systems  No abd pain, no myalgias, arthralgias  Objective:   Physical Exam   Pulmonary/Chest:           Abdominal:       SKIN:  · Erythematous macules and area marked and red on above. No blisters. Pain with touch  · Rash does not cross midline  · No induration, increased heat, or fluctuance      Constitutional:   · Reviewed vitals above  · Well Nourished, well developed, no distress       Neck:  · Symmetric and without masses  · No thyromegaly  Resp:  · Normal effort  Gastrointestinal:  · Nontender, nondistended, and no masses  · No hepatosplenomegaly  Musculoskeletal:  · Normal Gait  · All extremities without clubbing, cyanosis or edema  Psych:  · Normal mood and affect  · Normal insight and judgement    Assessment / Plan:      1. Herpes zoster without complication  Treating with the following:  - valACYclovir (VALTREX) 1 g tablet; Take 1 tablet by mouth 3 times daily for 7 days  Dispense: 21 tablet; Refill: 0    Pt told to Call or return to clinic prn if these symptoms worsen or fail to improve as anticipated.

## 2018-09-04 ENCOUNTER — TELEPHONE (OUTPATIENT)
Dept: FAMILY MEDICINE CLINIC | Age: 83
End: 2018-09-04

## 2018-09-04 NOTE — TELEPHONE ENCOUNTER
As long as patient keeps lesions covered with clothing, she will not be contagious. She needs to wash her hands if she ever touches her skin in this region. It is okay to get the shingles vaccine when it is available. Please document call and then close encounter.   thanks

## 2018-10-03 ENCOUNTER — OFFICE VISIT (OUTPATIENT)
Dept: FAMILY MEDICINE CLINIC | Age: 83
End: 2018-10-03
Payer: MEDICARE

## 2018-10-03 VITALS
RESPIRATION RATE: 16 BRPM | DIASTOLIC BLOOD PRESSURE: 74 MMHG | WEIGHT: 101 LBS | HEIGHT: 55 IN | HEART RATE: 75 BPM | SYSTOLIC BLOOD PRESSURE: 132 MMHG | BODY MASS INDEX: 23.37 KG/M2

## 2018-10-03 DIAGNOSIS — Z00.00 ROUTINE GENERAL MEDICAL EXAMINATION AT A HEALTH CARE FACILITY: Primary | ICD-10-CM

## 2018-10-03 DIAGNOSIS — Z23 NEED FOR INFLUENZA VACCINATION: ICD-10-CM

## 2018-10-03 PROCEDURE — 4040F PNEUMOC VAC/ADMIN/RCVD: CPT | Performed by: FAMILY MEDICINE

## 2018-10-03 PROCEDURE — G8482 FLU IMMUNIZE ORDER/ADMIN: HCPCS | Performed by: FAMILY MEDICINE

## 2018-10-03 PROCEDURE — 90682 RIV4 VACC RECOMBINANT DNA IM: CPT | Performed by: FAMILY MEDICINE

## 2018-10-03 PROCEDURE — G0438 PPPS, INITIAL VISIT: HCPCS | Performed by: FAMILY MEDICINE

## 2018-10-03 PROCEDURE — G0008 ADMIN INFLUENZA VIRUS VAC: HCPCS | Performed by: FAMILY MEDICINE

## 2018-10-03 ASSESSMENT — PATIENT HEALTH QUESTIONNAIRE - PHQ9
SUM OF ALL RESPONSES TO PHQ QUESTIONS 1-9: 1
SUM OF ALL RESPONSES TO PHQ QUESTIONS 1-9: 1

## 2018-10-03 ASSESSMENT — LIFESTYLE VARIABLES
HOW MANY STANDARD DRINKS CONTAINING ALCOHOL DO YOU HAVE ON A TYPICAL DAY: 0
HOW OFTEN DURING THE LAST YEAR HAVE YOU BEEN UNABLE TO REMEMBER WHAT HAPPENED THE NIGHT BEFORE BECAUSE YOU HAD BEEN DRINKING: 0
HOW OFTEN DURING THE LAST YEAR HAVE YOU NEEDED AN ALCOHOLIC DRINK FIRST THING IN THE MORNING TO GET YOURSELF GOING AFTER A NIGHT OF HEAVY DRINKING: 0
HAVE YOU OR SOMEONE ELSE BEEN INJURED AS A RESULT OF YOUR DRINKING: 0
HOW OFTEN DO YOU HAVE A DRINK CONTAINING ALCOHOL: 1
HOW OFTEN DURING THE LAST YEAR HAVE YOU HAD A FEELING OF GUILT OR REMORSE AFTER DRINKING: 0
HOW OFTEN DO YOU HAVE SIX OR MORE DRINKS ON ONE OCCASION: 0
AUDIT-C TOTAL SCORE: 1
HOW OFTEN DURING THE LAST YEAR HAVE YOU FOUND THAT YOU WERE NOT ABLE TO STOP DRINKING ONCE YOU HAD STARTED: 0
HOW OFTEN DURING THE LAST YEAR HAVE YOU FAILED TO DO WHAT WAS NORMALLY EXPECTED FROM YOU BECAUSE OF DRINKING: 0

## 2018-10-03 ASSESSMENT — ANXIETY QUESTIONNAIRES: GAD7 TOTAL SCORE: 0

## 2018-10-03 NOTE — PROGRESS NOTES
Medicare Annual Wellness Visit  Name: Hoda Grewal Date: 10/3/2018   MRN: [id number] Sex: Female   Age: 81 y.o. Ethnicity: Non-/Non    : 1935 Race: Melody Perez is here for Medicare AWV    Screenings for behavioral, psychosocial and functional/safety risks, and cognitive dysfunction are all negative except as indicated below. These results, as well as other patient data from the [address] [facility] form, are documented in Flowsheets linked to this Encounter. No Known Allergies    Prior to Visit Medications    Medication Sig Taking? Authorizing Provider   risedronate (ACTONEL) 35 MG tablet TAKE 1 TABLET BY MOUTH ONCE WEEKLY BEFORE BREAKFAST, ON AN EMPTY STOMACH: REMAIN UPRIGHT FOR 30 MINUTES:TAKE WITH 8 OUNCES OF WATER Yes Tim Fritz MD   Docusate Calcium (STOOL SOFTENER PO) Take by mouth Yes Historical Provider, MD   Calcium Citrate-Vitamin D (CALCIUM + D PO) Take by mouth Yes Historical Provider, MD   Cholecalciferol (VITAMIN D3 PO) Take by mouth Yes Historical Provider, MD   celecoxib (CELEBREX) 200 MG capsule Take 200 mg by mouth 2 times daily.    Yes Historical Provider, MD       Past Medical History:   Diagnosis Date    Allergic rhinitis     Hearing loss sensory, bilateral     mod--severe on right, mild to severe on left, due to asymmetry will send to get MRI to r/o acoustic neuroma    Mastoiditis     on MRI oct 2014-->dr ortiz states with no clinical findings this is considered an over-sensitivity of MRI results    Osteoarthritis     L1-L5    Osteoporosis     patient was on fosamax x 10 years and drug holiday was started on 13. (this was date of last DEXA scan)    Scoliosis      Past Surgical History:   Procedure Laterality Date    CARPAL TUNNEL RELEASE      right,     COLONOSCOPY      , dr Miles Galvez EYE SURGERY      2017 and 2018   [address]          NASAL SINUS SURGERY         Family History Problem Relation Age of Onset    Diabetes Mother     Heart Disease Mother        CareTeam (Including outside providers/suppliers regularly involved in providing care):   Patient Care Team:  Misti Cortes MD as PCP - General (Family Medicine)  Misti Cortes MD as PCP - [facility] Attributed Provider  Alexander Rodriguez MD (Family Medicine)  Jennifer Morfin III (Orthopedic Surgery)  Angelica Lemons MD as Consulting Physician (Obstetrics & Gynecology)    [facility] Readings from Last 3 Encounters:   10/03/18 101 lb (45.8 kg)   08/29/18 104 lb (47.2 kg)   05/17/18 104 lb (47.2 kg)     Vitals:    10/03/18 1140   BP: 132/74   Pulse: 75   Resp: 16   Weight: 101 lb (45.8 kg)   Height: 4' 7\" (1.397 m)     Body mass index is 23.47 kg/m². General Appearance: alert and oriented to person, place and time, well developed and well- nourished, in no acute distress  Skin: warm and dry, no rash or erythema  Head: normocephalic and atraumatic  Neck: supple and non-tender without mass, no thyromegaly or thyroid nodules, no cervical lymphadenopathy  Pulmonary/Chest: clear to auscultation bilaterally- no wheezes, rales or rhonchi, normal air movement, no respiratory distress  Cardiovascular: normal rate, regular rhythm, normal S1 and S2, no murmurs, rubs, clicks, or gallops, distal pulses intact, no carotid bruits  Abdomen: soft, non-tender, non-distended, normal bowel sounds, no masses or organomegaly  Extremities: no cyanosis, clubbing or edema  Musculoskeletal: moderate to severe kyphosis and scoliosis    Patient's complete Health Risk Assessment and screening values have been reviewed and are found in Flowsheets. The following problems were reviewed today and where indicated follow up appointments were made and/or referrals ordered.     Positive Risk Factor Screenings with Interventions:     Hearing/Vision:  Hearing/Vision  Do you or your family notice any trouble with your hearing?: (!) Yes  Do you have difficulty driving,

## 2018-10-18 ENCOUNTER — ANESTHESIA EVENT (OUTPATIENT)
Dept: ENDOSCOPY | Age: 83
End: 2018-10-18
Payer: MEDICARE

## 2018-10-19 ENCOUNTER — ANESTHESIA (OUTPATIENT)
Dept: ENDOSCOPY | Age: 83
End: 2018-10-19
Payer: MEDICARE

## 2018-10-19 ENCOUNTER — HOSPITAL ENCOUNTER (OUTPATIENT)
Age: 83
Setting detail: OUTPATIENT SURGERY
Discharge: HOME | End: 2018-10-19
Attending: INTERNAL MEDICINE | Admitting: INTERNAL MEDICINE
Payer: MEDICARE

## 2018-10-19 VITALS
HEIGHT: 60 IN | RESPIRATION RATE: 16 BRPM | DIASTOLIC BLOOD PRESSURE: 63 MMHG | SYSTOLIC BLOOD PRESSURE: 145 MMHG | WEIGHT: 102.6 LBS | OXYGEN SATURATION: 100 % | HEART RATE: 60 BPM | BODY MASS INDEX: 20.14 KG/M2 | TEMPERATURE: 97.1 F

## 2018-10-19 VITALS — SYSTOLIC BLOOD PRESSURE: 97 MMHG | DIASTOLIC BLOOD PRESSURE: 43 MMHG | TEMPERATURE: 98.6 F | OXYGEN SATURATION: 100 %

## 2018-10-19 DIAGNOSIS — Z86.010 HISTORY OF COLON POLYPS: ICD-10-CM

## 2018-10-19 PROCEDURE — 3609010300 HC COLONOSCOPY W/BIOPSY SINGLE/MULTIPLE: Performed by: INTERNAL MEDICINE

## 2018-10-19 PROCEDURE — 2580000003 HC RX 258: Performed by: ANESTHESIOLOGY

## 2018-10-19 PROCEDURE — 2500000003 HC RX 250 WO HCPCS: Performed by: ANESTHESIOLOGY

## 2018-10-19 PROCEDURE — 2709999900 HC NON-CHARGEABLE SUPPLY: Performed by: INTERNAL MEDICINE

## 2018-10-19 PROCEDURE — S0028 INJECTION, FAMOTIDINE, 20 MG: HCPCS | Performed by: ANESTHESIOLOGY

## 2018-10-19 PROCEDURE — 3700000000 HC ANESTHESIA ATTENDED CARE: Performed by: INTERNAL MEDICINE

## 2018-10-19 PROCEDURE — 2500000003 HC RX 250 WO HCPCS: Performed by: NURSE ANESTHETIST, CERTIFIED REGISTERED

## 2018-10-19 PROCEDURE — 3700000001 HC ADD 15 MINUTES (ANESTHESIA): Performed by: INTERNAL MEDICINE

## 2018-10-19 PROCEDURE — 88305 TISSUE EXAM BY PATHOLOGIST: CPT

## 2018-10-19 PROCEDURE — 7100000011 HC PHASE II RECOVERY - ADDTL 15 MIN: Performed by: INTERNAL MEDICINE

## 2018-10-19 PROCEDURE — 7100000010 HC PHASE II RECOVERY - FIRST 15 MIN: Performed by: INTERNAL MEDICINE

## 2018-10-19 PROCEDURE — 6360000002 HC RX W HCPCS: Performed by: NURSE ANESTHETIST, CERTIFIED REGISTERED

## 2018-10-19 RX ORDER — MORPHINE SULFATE 4 MG/ML
2 INJECTION, SOLUTION INTRAMUSCULAR; INTRAVENOUS EVERY 5 MIN PRN
Status: DISCONTINUED | OUTPATIENT
Start: 2018-10-19 | End: 2018-10-22 | Stop reason: HOSPADM

## 2018-10-19 RX ORDER — ONDANSETRON 2 MG/ML
4 INJECTION INTRAMUSCULAR; INTRAVENOUS
Status: ACTIVE | OUTPATIENT
Start: 2018-10-19 | End: 2018-10-19

## 2018-10-19 RX ORDER — PROPOFOL 10 MG/ML
INJECTION, EMULSION INTRAVENOUS PRN
Status: DISCONTINUED | OUTPATIENT
Start: 2018-10-19 | End: 2018-10-19 | Stop reason: SDUPTHER

## 2018-10-19 RX ORDER — SODIUM CHLORIDE 0.9 % (FLUSH) 0.9 %
10 SYRINGE (ML) INJECTION PRN
Status: DISCONTINUED | OUTPATIENT
Start: 2018-10-19 | End: 2018-10-22 | Stop reason: HOSPADM

## 2018-10-19 RX ORDER — OXYCODONE HYDROCHLORIDE AND ACETAMINOPHEN 5; 325 MG/1; MG/1
2 TABLET ORAL PRN
Status: ACTIVE | OUTPATIENT
Start: 2018-10-19 | End: 2018-10-19

## 2018-10-19 RX ORDER — FENTANYL CITRATE 50 UG/ML
25 INJECTION, SOLUTION INTRAMUSCULAR; INTRAVENOUS EVERY 5 MIN PRN
Status: DISCONTINUED | OUTPATIENT
Start: 2018-10-19 | End: 2018-10-22 | Stop reason: HOSPADM

## 2018-10-19 RX ORDER — MORPHINE SULFATE 4 MG/ML
1 INJECTION, SOLUTION INTRAMUSCULAR; INTRAVENOUS EVERY 5 MIN PRN
Status: DISCONTINUED | OUTPATIENT
Start: 2018-10-19 | End: 2018-10-22 | Stop reason: HOSPADM

## 2018-10-19 RX ORDER — SODIUM CHLORIDE 9 MG/ML
INJECTION, SOLUTION INTRAVENOUS CONTINUOUS
Status: DISCONTINUED | OUTPATIENT
Start: 2018-10-19 | End: 2018-10-22 | Stop reason: HOSPADM

## 2018-10-19 RX ORDER — MEPERIDINE HYDROCHLORIDE 25 MG/ML
12.5 INJECTION INTRAMUSCULAR; INTRAVENOUS; SUBCUTANEOUS EVERY 5 MIN PRN
Status: DISCONTINUED | OUTPATIENT
Start: 2018-10-19 | End: 2018-10-22 | Stop reason: HOSPADM

## 2018-10-19 RX ORDER — LIDOCAINE HYDROCHLORIDE 20 MG/ML
INJECTION, SOLUTION EPIDURAL; INFILTRATION; INTRACAUDAL; PERINEURAL PRN
Status: DISCONTINUED | OUTPATIENT
Start: 2018-10-19 | End: 2018-10-19 | Stop reason: SDUPTHER

## 2018-10-19 RX ORDER — FENTANYL CITRATE 50 UG/ML
50 INJECTION, SOLUTION INTRAMUSCULAR; INTRAVENOUS EVERY 5 MIN PRN
Status: DISCONTINUED | OUTPATIENT
Start: 2018-10-19 | End: 2018-10-22 | Stop reason: HOSPADM

## 2018-10-19 RX ORDER — SODIUM CHLORIDE 0.9 % (FLUSH) 0.9 %
10 SYRINGE (ML) INJECTION EVERY 12 HOURS SCHEDULED
Status: DISCONTINUED | OUTPATIENT
Start: 2018-10-19 | End: 2018-10-22 | Stop reason: HOSPADM

## 2018-10-19 RX ORDER — OXYCODONE HYDROCHLORIDE AND ACETAMINOPHEN 5; 325 MG/1; MG/1
1 TABLET ORAL PRN
Status: ACTIVE | OUTPATIENT
Start: 2018-10-19 | End: 2018-10-19

## 2018-10-19 RX ADMIN — SODIUM CHLORIDE: 9 INJECTION, SOLUTION INTRAVENOUS at 09:11

## 2018-10-19 RX ADMIN — PROPOFOL 50 MG: 10 INJECTION, EMULSION INTRAVENOUS at 09:27

## 2018-10-19 RX ADMIN — FAMOTIDINE 20 MG: 10 INJECTION INTRAVENOUS at 09:11

## 2018-10-19 RX ADMIN — PROPOFOL 150 MG: 10 INJECTION, EMULSION INTRAVENOUS at 09:18

## 2018-10-19 RX ADMIN — LIDOCAINE HYDROCHLORIDE 75 MG: 20 INJECTION, SOLUTION EPIDURAL; INFILTRATION; INTRACAUDAL; PERINEURAL at 09:18

## 2018-10-19 RX ADMIN — LIDOCAINE HYDROCHLORIDE 25 MG: 20 INJECTION, SOLUTION EPIDURAL; INFILTRATION; INTRACAUDAL; PERINEURAL at 09:27

## 2018-10-19 ASSESSMENT — LIFESTYLE VARIABLES: SMOKING_STATUS: 0

## 2018-10-19 ASSESSMENT — PAIN SCALES - WONG BAKER: WONGBAKER_NUMERICALRESPONSE: 0

## 2018-10-19 ASSESSMENT — PAIN - FUNCTIONAL ASSESSMENT: PAIN_FUNCTIONAL_ASSESSMENT: 0-10

## 2018-10-19 ASSESSMENT — PAIN SCALES - GENERAL
PAINLEVEL_OUTOF10: 0
PAINLEVEL_OUTOF10: 0

## 2018-10-19 NOTE — H&P
Never Smoker    Smokeless tobacco: Never Used    Alcohol use No    Drug use: No    Sexual activity: Yes     Partners: Male      Comment: , 5 children, 15 grandchildren     Other Topics Concern    Not on file     Social History Narrative    ** Merged History Encounter **          Family History   Problem Relation Age of Onset    Diabetes Mother     Heart Disease Mother          PHYSICAL EXAM:      Ht 5' (1.524 m)   Wt 102 lb (46.3 kg)   BMI 19.92 kg/m²  I        Heart:normal    Lungs: normal    Abdomen: normal      ASA Grade:  See anesthesia note      ASSESSMENT AND PLAN:    1. Procedure options, risks and benefits reviewed with patient and expresses understanding.

## 2018-10-19 NOTE — PROCEDURES
[facility]  Endoscopy Note    Patient: Nirmal Malloy  : 1935  Acct#: [de-identified]    Procedure: Colonoscopy with biopsy    Date:  10/19/2018    Surgeon:  Coleen Matute MD    Referring Physician:  Ismael Bill    Previous Colonoscopy: Yes  Date: unknown  Greater than 3 years? Yes    Preoperative Diagnosis:  surveillance    Postoperative Diagnosis:  Colon polyps    Anesthesia:  See anesthesia note    Indications: This is a 81y.o. year old female who presents today with previous adenomatous polyp. Procedure: An informed consent was obtained from the patient after explanation of indications, benefits, possible risks and complications of the procedure. The patient was then taken to the endoscopy suite, placed in the left lateral decubitus position, and the above IV anesthesia was administered. A digital rectal examination was performed and revealed negative without mass, lesions or tenderness. The Olympus CFQ-180-AL video colonoscope was placed in the patient's rectum under digital direction and advanced to the cecum. The cecum was identified by characteristic anatomy and ballottment. The ileocecal valve was identified. The preparation was excellent. The scope was then withdrawn back through the cecum, ascending, transverse, descending and sigmoid colons. Carefull circumferential examination of the mucosa in these areas demonstrated a 4 mm polyp in the proximal ascending colon that was biopsied and removed. There was a 2 mm polyp in the rectum that was biopsied and removed. The scope was then withdrawn into the rectum and retroflexed. The retroflexed view of the anal verge and rectum demonstrates no abnormalities. The scope was straightened, the colon was decompressed and the scope was withdrawn from the patient. The patient tolerated the procedure well and was taken to the PACU in good condition.     Estimated Blood Loss:  Minimal.    Impression:  Colon polyps    Recommendations: Await pathology. Repeat colonoscopy in 5 years.     Sung Veloz MD   [facility]  10/19/2018

## 2018-10-19 NOTE — ANESTHESIA PRE PROCEDURE
with patient. Patient verbalized an understanding and agrees to proceed.       Meliton Gandhi MD  October 19, 2018  5:46 AM      Meliton Gandhi MD   10/19/2018

## 2018-12-10 RX ORDER — RISEDRONATE SODIUM 35 MG/1
TABLET, FILM COATED ORAL
Qty: 4 TABLET | Refills: 3 | Status: SHIPPED | OUTPATIENT
Start: 2018-12-10 | End: 2019-03-31 | Stop reason: SDUPTHER

## 2018-12-18 ENCOUNTER — TELEPHONE (OUTPATIENT)
Dept: FAMILY MEDICINE CLINIC | Age: 83
End: 2018-12-18

## 2018-12-18 ENCOUNTER — OFFICE VISIT (OUTPATIENT)
Dept: FAMILY MEDICINE CLINIC | Age: 83
End: 2018-12-18
Payer: MEDICARE

## 2018-12-18 VITALS
BODY MASS INDEX: 20.27 KG/M2 | HEART RATE: 70 BPM | RESPIRATION RATE: 14 BRPM | SYSTOLIC BLOOD PRESSURE: 148 MMHG | OXYGEN SATURATION: 97 % | WEIGHT: 103.8 LBS | DIASTOLIC BLOOD PRESSURE: 70 MMHG | TEMPERATURE: 97.5 F

## 2018-12-18 DIAGNOSIS — L72.3 SEBACEOUS CYST: Primary | ICD-10-CM

## 2018-12-18 PROCEDURE — 99212 OFFICE O/P EST SF 10 MIN: CPT | Performed by: FAMILY MEDICINE

## 2018-12-18 PROCEDURE — G8420 CALC BMI NORM PARAMETERS: HCPCS | Performed by: FAMILY MEDICINE

## 2018-12-18 PROCEDURE — 1036F TOBACCO NON-USER: CPT | Performed by: FAMILY MEDICINE

## 2018-12-18 PROCEDURE — 1123F ACP DISCUSS/DSCN MKR DOCD: CPT | Performed by: FAMILY MEDICINE

## 2018-12-18 PROCEDURE — G8399 PT W/DXA RESULTS DOCUMENT: HCPCS | Performed by: FAMILY MEDICINE

## 2018-12-18 PROCEDURE — 4040F PNEUMOC VAC/ADMIN/RCVD: CPT | Performed by: FAMILY MEDICINE

## 2018-12-18 PROCEDURE — G8482 FLU IMMUNIZE ORDER/ADMIN: HCPCS | Performed by: FAMILY MEDICINE

## 2018-12-18 PROCEDURE — 1101F PT FALLS ASSESS-DOCD LE1/YR: CPT | Performed by: FAMILY MEDICINE

## 2018-12-18 PROCEDURE — 1090F PRES/ABSN URINE INCON ASSESS: CPT | Performed by: FAMILY MEDICINE

## 2018-12-18 PROCEDURE — G8427 DOCREV CUR MEDS BY ELIG CLIN: HCPCS | Performed by: FAMILY MEDICINE

## 2018-12-18 NOTE — PROGRESS NOTES
PROGRESS NOTE     Nelly Olivia MD  [address] [facility]  [address] [place] [address]  [phone] office  [phone] fax    Date of Service:  12/18/2018    Subjective:      Patient ID: . Josh Encinas is a 81 y.o. female      CC: \"i want to make sure I don't have shingles on my head\"    HPI    81-year-old white female presenting with a painful bump on the back of her head. She states she just noticed it today. She cannot see on the back of her head that she can't tell what it looks like. She states it's only mildly tender. It is stable with time. No radiation of pain. No pain elsewhere on her head. She states she's having a lot of people over for [place] and wants to make sure it is not shingles. Vitals:    12/18/18 1334 12/18/18 1349   BP: (!) 151/74 (!) 148/70   Pulse: 70    Resp: 14    Temp: 97.5 °F (36.4 °C)    SpO2: 97%    Weight: 103 lb 12.8 oz (47.1 kg)        Outpatient Prescriptions Marked as Taking for the 12/18/18 encounter (Office Visit) with Oretha Baumgarten, MD   Medication Sig Dispense Refill    risedronate (ACTONEL) 35 MG tablet TAKE 1 TABLET BY MOUTH ONCE WEEKLY BEFORE BREAKFAST, ON AN EMPTY STOMACH: REMAIN UPRIGHT FOR 30 MINUTES:TAKE WITH 8 OUNCES OF WATER 4 tablet 3    Docusate Calcium (STOOL SOFTENER PO) Take by mouth      Calcium Citrate-Vitamin D (CALCIUM + D PO) Take by mouth      Cholecalciferol (VITAMIN D3 PO) Take by mouth      celecoxib (CELEBREX) 200 MG capsule Take 200 mg by mouth 2 times daily.            Past Medical History:   Diagnosis Date    Allergic rhinitis     Hearing loss sensory, bilateral     mod--severe on right, mild to severe on left, due to asymmetry will send to get MRI to r/o acoustic neuroma    Mastoiditis     on MRI oct 2014-->dr ortiz states with no clinical findings this is considered an over-sensitivity of MRI results    Osteoarthritis     L1-L5    Osteoporosis     patient was on fosamax x 10 years and

## 2018-12-18 NOTE — TELEPHONE ENCOUNTER
PT called in thinking she may have shingles on the back of her head and would like to be seen today if possible.      Please call back to adv: [phone]

## 2019-01-31 ENCOUNTER — OFFICE VISIT (OUTPATIENT)
Dept: ENDOCRINOLOGY | Age: 84
End: 2019-01-31
Payer: MEDICARE

## 2019-01-31 VITALS
SYSTOLIC BLOOD PRESSURE: 150 MMHG | BODY MASS INDEX: 23.58 KG/M2 | OXYGEN SATURATION: 97 % | WEIGHT: 104.8 LBS | HEART RATE: 80 BPM | HEIGHT: 56 IN | DIASTOLIC BLOOD PRESSURE: 72 MMHG

## 2019-01-31 DIAGNOSIS — E04.1 THYROID NODULE: ICD-10-CM

## 2019-01-31 DIAGNOSIS — M81.0 AGE-RELATED OSTEOPOROSIS WITHOUT CURRENT PATHOLOGICAL FRACTURE: Primary | ICD-10-CM

## 2019-01-31 PROCEDURE — 99214 OFFICE O/P EST MOD 30 MIN: CPT | Performed by: INTERNAL MEDICINE

## 2019-02-04 ENCOUNTER — OFFICE VISIT (OUTPATIENT)
Dept: FAMILY MEDICINE CLINIC | Age: 84
End: 2019-02-04
Payer: MEDICARE

## 2019-02-04 VITALS
DIASTOLIC BLOOD PRESSURE: 68 MMHG | WEIGHT: 105 LBS | HEIGHT: 56 IN | BODY MASS INDEX: 23.62 KG/M2 | HEART RATE: 60 BPM | SYSTOLIC BLOOD PRESSURE: 133 MMHG

## 2019-02-04 DIAGNOSIS — K21.9 GASTROESOPHAGEAL REFLUX DISEASE WITHOUT ESOPHAGITIS: Primary | ICD-10-CM

## 2019-02-04 PROCEDURE — 99214 OFFICE O/P EST MOD 30 MIN: CPT | Performed by: FAMILY MEDICINE

## 2019-02-04 RX ORDER — OMEPRAZOLE 20 MG/1
20 CAPSULE, DELAYED RELEASE ORAL
Qty: 90 CAPSULE | Refills: 0 | Status: SHIPPED | OUTPATIENT
Start: 2019-02-04 | End: 2019-04-23

## 2019-02-12 ENCOUNTER — TELEPHONE (OUTPATIENT)
Dept: FAMILY MEDICINE CLINIC | Age: 84
End: 2019-02-12

## 2019-03-27 RX ORDER — RANITIDINE 150 MG/1
150 TABLET ORAL NIGHTLY
COMMUNITY
End: 2019-04-23

## 2019-03-27 NOTE — PROGRESS NOTES
[address] [facility] time___0830_________        Surgery time____0930________    Take the following medications with a sip of water:    Do not eat or drink anything after 12:00 midnight prior to your surgery. This includes water chewing gum, mints and ice chips. You may brush your teeth and gargle the morning of your surgery, but do not swallow the water      You may be asked to stop blood thinners such as Coumadin, Plavix, Fragmin, Lovenox, etc., or any anti-inflammatories such as:  Aspirin, Ibuprofen, Advil, Naproxen prior to your surgery. We also ask that you stop any OTC medications such as fish oil, vitamin E, glucosamine, garlic, Multivitamins, COQ 10, etc.    We ask that you do not smoke 24 hours prior to surgery  We ask that you do not  drink any alcoholic beverages 24 hours prior to surgery     You must make arrangements for a responsible adult to take you home after your surgery. For your safety you will not be allowed to leave alone or drive yourself home. Your surgery will be cancelled if you do not have a ride home. Also for your safety, it is strongly suggested that someone stay with you the first 24 hours after your surgery. A parent or legal guardian must accompany a child scheduled for surgery and plan to stay at the hospital until the child is discharged. Please do not bring other children with you. For your comfort, please wear simple loose fitting clothing to the hospital.  Please do not bring valuables. Do not wear any make-up or nail polish on your fingers or toes      For your safety, please do not wear any jewelry or body piercing's on the day of surgery. All jewelry must be removed. If you have dentures, they will be removed before going to operating room. For your convenience, we will provide you with a container. If you wear contact lenses or glasses, they will be removed, please bring a case for them.      If you have a living will and a durable power of  for healthcare, please bring in a copy. As part of our patient safety program to minimize surgical site infections, we ask you to do the following:    · Please notify your surgeon if you develop any illness between         now and the  day of your surgery. · This includes a cough, cold, fever, sore throat, nausea,         or vomiting, and diarrhea, etc.  ·  Please notify your surgeon if you experience dizziness, shortness         of breath or blurred vision between now and the time of your surgery. You may shower the night before surgery or the morning of   your surgery with an antibacterial soap. You will need to bring a photo ID and insurance card    [facility] has an onsite pharmacy, would you like to utilize our pharmacy     If you will be staying overnight and use a C-pap machine, please bring   your C-pap to hospital     Our goal is to provide you with excellent care, therefore, visitors will be limited to two(2) in the room at a time so that we may focus on providing this care for you. Please contact pre-admission testing if you have any further questions. [facility] phone number:  [phone]  Please note these are generalized instructions for all surgical cases, you may be provided with more specific instructions according to your surgery.

## 2019-04-01 ENCOUNTER — TELEPHONE (OUTPATIENT)
Dept: FAMILY MEDICINE CLINIC | Age: 84
End: 2019-04-01

## 2019-04-01 RX ORDER — RISEDRONATE SODIUM 35 MG/1
TABLET, FILM COATED ORAL
Qty: 4 TABLET | Refills: 3 | Status: SHIPPED | OUTPATIENT
Start: 2019-04-01 | End: 2019-04-10

## 2019-04-01 NOTE — TELEPHONE ENCOUNTER
PT called in stating that she's supposed to have an EGD tomorrow and at night she takes Tums for heartburn. PT would like to know if she is able to take that before the procedure tomorrow. She said she spoke to someone at Dr. Lino Lao office but they recommended that she call here. Best call back number: [phone].

## 2019-04-01 NOTE — TELEPHONE ENCOUNTER
I don't think there is any issue, but the GI office should know.   Please check with GI office to confirm

## 2019-04-03 ENCOUNTER — ANESTHESIA EVENT (OUTPATIENT)
Dept: ENDOSCOPY | Age: 84
End: 2019-04-03
Payer: MEDICARE

## 2019-04-04 ENCOUNTER — HOSPITAL ENCOUNTER (OUTPATIENT)
Age: 84
Setting detail: OUTPATIENT SURGERY
Discharge: HOME | End: 2019-04-04
Attending: INTERNAL MEDICINE | Admitting: INTERNAL MEDICINE
Payer: MEDICARE

## 2019-04-04 ENCOUNTER — ANESTHESIA (OUTPATIENT)
Dept: ENDOSCOPY | Age: 84
End: 2019-04-04
Payer: MEDICARE

## 2019-04-04 VITALS
WEIGHT: 104.6 LBS | BODY MASS INDEX: 23.53 KG/M2 | TEMPERATURE: 97.4 F | HEIGHT: 56 IN | OXYGEN SATURATION: 100 % | SYSTOLIC BLOOD PRESSURE: 145 MMHG | RESPIRATION RATE: 18 BRPM | HEART RATE: 83 BPM | DIASTOLIC BLOOD PRESSURE: 59 MMHG

## 2019-04-04 VITALS — DIASTOLIC BLOOD PRESSURE: 80 MMHG | OXYGEN SATURATION: 100 % | SYSTOLIC BLOOD PRESSURE: 157 MMHG

## 2019-04-04 DIAGNOSIS — R12 HEARTBURN: ICD-10-CM

## 2019-04-04 PROBLEM — K29.70 GASTRITIS: Status: ACTIVE | Noted: 2019-04-04

## 2019-04-04 PROCEDURE — 2500000003 HC RX 250 WO HCPCS: Performed by: NURSE ANESTHETIST, CERTIFIED REGISTERED

## 2019-04-04 PROCEDURE — 2580000003 HC RX 258: Performed by: ANESTHESIOLOGY

## 2019-04-04 PROCEDURE — 88305 TISSUE EXAM BY PATHOLOGIST: CPT

## 2019-04-04 PROCEDURE — 3700000001 HC ADD 15 MINUTES (ANESTHESIA): Performed by: INTERNAL MEDICINE

## 2019-04-04 PROCEDURE — 7100000011 HC PHASE II RECOVERY - ADDTL 15 MIN: Performed by: INTERNAL MEDICINE

## 2019-04-04 PROCEDURE — 6360000002 HC RX W HCPCS: Performed by: NURSE ANESTHETIST, CERTIFIED REGISTERED

## 2019-04-04 PROCEDURE — 7100000010 HC PHASE II RECOVERY - FIRST 15 MIN: Performed by: INTERNAL MEDICINE

## 2019-04-04 PROCEDURE — 3700000000 HC ANESTHESIA ATTENDED CARE: Performed by: INTERNAL MEDICINE

## 2019-04-04 PROCEDURE — 3609012400 HC EGD TRANSORAL BIOPSY SINGLE/MULTIPLE: Performed by: INTERNAL MEDICINE

## 2019-04-04 PROCEDURE — 2709999900 HC NON-CHARGEABLE SUPPLY: Performed by: INTERNAL MEDICINE

## 2019-04-04 PROCEDURE — 2580000003 HC RX 258: Performed by: NURSE ANESTHETIST, CERTIFIED REGISTERED

## 2019-04-04 RX ORDER — SODIUM CHLORIDE 9 MG/ML
INJECTION, SOLUTION INTRAVENOUS CONTINUOUS PRN
Status: DISCONTINUED | OUTPATIENT
Start: 2019-04-04 | End: 2019-04-04 | Stop reason: SDUPTHER

## 2019-04-04 RX ORDER — SODIUM CHLORIDE 9 MG/ML
INJECTION, SOLUTION INTRAVENOUS CONTINUOUS
Status: DISCONTINUED | OUTPATIENT
Start: 2019-04-04 | End: 2019-04-04 | Stop reason: HOSPADM

## 2019-04-04 RX ORDER — GLYCOPYRROLATE 0.2 MG/ML
INJECTION INTRAMUSCULAR; INTRAVENOUS PRN
Status: DISCONTINUED | OUTPATIENT
Start: 2019-04-04 | End: 2019-04-04 | Stop reason: SDUPTHER

## 2019-04-04 RX ORDER — SODIUM CHLORIDE 0.9 % (FLUSH) 0.9 %
10 SYRINGE (ML) INJECTION PRN
Status: DISCONTINUED | OUTPATIENT
Start: 2019-04-04 | End: 2019-04-04 | Stop reason: HOSPADM

## 2019-04-04 RX ORDER — LIDOCAINE HYDROCHLORIDE 20 MG/ML
INJECTION, SOLUTION EPIDURAL; INFILTRATION; INTRACAUDAL; PERINEURAL PRN
Status: DISCONTINUED | OUTPATIENT
Start: 2019-04-04 | End: 2019-04-04 | Stop reason: SDUPTHER

## 2019-04-04 RX ORDER — PROPOFOL 10 MG/ML
INJECTION, EMULSION INTRAVENOUS PRN
Status: DISCONTINUED | OUTPATIENT
Start: 2019-04-04 | End: 2019-04-04 | Stop reason: SDUPTHER

## 2019-04-04 RX ORDER — SODIUM CHLORIDE 0.9 % (FLUSH) 0.9 %
10 SYRINGE (ML) INJECTION EVERY 12 HOURS SCHEDULED
Status: DISCONTINUED | OUTPATIENT
Start: 2019-04-04 | End: 2019-04-04 | Stop reason: HOSPADM

## 2019-04-04 RX ORDER — ONDANSETRON 2 MG/ML
4 INJECTION INTRAMUSCULAR; INTRAVENOUS
Status: DISCONTINUED | OUTPATIENT
Start: 2019-04-04 | End: 2019-04-04 | Stop reason: HOSPADM

## 2019-04-04 RX ADMIN — SODIUM CHLORIDE: 9 INJECTION, SOLUTION INTRAVENOUS at 09:19

## 2019-04-04 RX ADMIN — PROPOFOL 50 MG: 10 INJECTION, EMULSION INTRAVENOUS at 09:29

## 2019-04-04 RX ADMIN — GLYCOPYRROLATE 0.2 MG: 0.2 INJECTION, SOLUTION INTRAMUSCULAR; INTRAVENOUS at 09:19

## 2019-04-04 RX ADMIN — SODIUM CHLORIDE: 0.9 INJECTION, SOLUTION INTRAVENOUS at 09:11

## 2019-04-04 RX ADMIN — LIDOCAINE HYDROCHLORIDE 125 MG: 20 INJECTION, SOLUTION EPIDURAL; INFILTRATION; INTRACAUDAL; PERINEURAL at 09:29

## 2019-04-04 ASSESSMENT — LIFESTYLE VARIABLES: SMOKING_STATUS: 0

## 2019-04-04 ASSESSMENT — PAIN SCALES - WONG BAKER: WONGBAKER_NUMERICALRESPONSE: 0

## 2019-04-04 ASSESSMENT — PAIN SCALES - GENERAL
PAINLEVEL_OUTOF10: 0

## 2019-04-04 ASSESSMENT — PAIN - FUNCTIONAL ASSESSMENT: PAIN_FUNCTIONAL_ASSESSMENT: 0-10

## 2019-04-04 ASSESSMENT — ENCOUNTER SYMPTOMS: SHORTNESS OF BREATH: 0

## 2019-04-04 NOTE — ANESTHESIA POSTPROCEDURE EVALUATION
Department of Anesthesiology  Postprocedure Note    Patient: Jamie Matias  MRN: [id number]  YOB: 1935  Date of evaluation: 4/4/2019  Time:  10:02 AM     Procedure Summary     Date:  04/04/19 Room / Location:  [facility] ENDO 02 / [facility] ENDOSCOPY    Anesthesia Start:  0919 Anesthesia Stop:  2229    Procedure:  EGD BIOPSY (N/A ) Diagnosis:       Heartburn      (HEARTBURN)    Surgeon:  Kim England MD Responsible Provider:  Alex Tesfaye MD    Anesthesia Type:  TIVA ASA Status:  2          Anesthesia Type: TIVA    Zoila Phase I: Zoila Score: 10    Zoila Phase II: Zoila Score: 10    Last vitals: Reviewed and per EMR flowsheets.        Anesthesia Post Evaluation    Patient location during evaluation: PACU  Patient participation: complete - patient participated  Level of consciousness: awake and alert  Airway patency: patent  Nausea & Vomiting: no nausea and no vomiting  Complications: no  Cardiovascular status: hemodynamically stable  Respiratory status: acceptable  Hydration status: stable

## 2019-04-04 NOTE — PROCEDURES
[facility]  Endoscopy Note    Patient: Elijah Gaspar  : 1935  Acct#: [de-identified]    Procedure: Esophagogastroduodenoscopy with biopsy    Date:  2019     Surgeon:  Kristin Moran MD    Referring Physician:  Jackelyn Naik    Preoperative Diagnosis:  GERD    Postoperative Diagnosis:  Gastritis and possible Foley's    Anesthesia: see anesthesia note. Indications: This is a 81y.o. year old female who presents today with GERD. Description of Procedure:  Informed consent was obtained from the patient after explanation of indications, benefits and possible risks and complications of the procedure. The patient was then taken to the endoscopy suite, placed in the left lateral decubitus position and the above IV sedation was administrered. The Olympus videoendoscope was placed in the patient's mouth and under direct visualization passed into the esophagus. Visualization of the esophagus demonstrated possible Foley's. This was biopsied in the distal esophagus. .     The scope was then advanced into the stomach. Visualization of the gastric body and antrum demonstrated gastritis. The antrum was biopsied. .  A retroflexed exam of the gastric cardia and fundus demonstrated normal..  The pylorus was patent and the scope was advanced into the duodenum. Visualization of the duodenal bulb demonstrated normal..  The second portion of the duodenum demonstrated normal..    The scope was then withdrawn back into the stomach, it was decompressed, and the scope was completely withdrawn. The patient tolerated the procedure well and was taken to the post anesthesia care unit in good condition. Estimated Blood loss:  Minimal.    Impression: Gastritis and possible Foley's. Recommendations:Await pathology.     Kristin Moran MD  [facility]

## 2019-04-04 NOTE — ANESTHESIA PRE PROCEDURE
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 (this was date of last DEXA scan)    Scoliosis        Past Surgical History:        Procedure Laterality Date    CARPAL TUNNEL RELEASE      right, 2003    COLONOSCOPY      2013, dr Aida Levi N/A 10/19/2018    COLONOSCOPY WITH BIOPSY performed by Maral Greer MD at [address] [place] [address]      December 27 2017 and feb 6 2018   [address]      2011    NASAL SINUS SURGERY         Social History:    Social History     Tobacco Use    Smoking status: Never Smoker    Smokeless tobacco: Never Used   Substance Use Topics    Alcohol use:  No                                Counseling given: Not Answered      Vital Signs (Current):   Vitals:    03/27/19 1340   Weight: 102 lb (46.3 kg)   Height: 4' 8\" (1.422 m)                                              BP Readings from Last 3 Encounters:   02/04/19 133/68   01/31/19 (!) 150/72   12/18/18 (!) 148/70       NPO Status: Time of last liquid consumption: 0730(sip of water)                        Time of last solid consumption: 1830                        Date of last liquid consumption: 04/04/19                        Date of last solid food consumption: 04/03/19    BMI:   Wt Readings from Last 3 Encounters:   03/27/19 102 lb (46.3 kg)   02/04/19 105 lb (47.6 kg)   01/31/19 104 lb 12.8 oz (47.5 kg)     Body mass index is 22.87 kg/m². CBC:   Lab Results   Component Value Date    WBC 8.4 08/31/2017    RBC 4.72 08/31/2017    HGB 14.4 08/31/2017    HCT 43.3 08/31/2017    MCV 91.7 08/31/2017    RDW 12.9 08/31/2017     08/31/2017       CMP:   Lab Results   Component Value Date     01/31/2018    K 5.0 01/31/2018     01/31/2018    CO2 26 01/31/2018    BUN 27 01/31/2018    CREATININE 0.6 01/31/2018    GFRAA >60 01/31/2018    GFRAA >60 05/10/2012    AGRATIO 1.7 08/31/2017    LABGLOM >60 01/31/2018    GLUCOSE 88 01/31/2018    PROT 7.3 08/31/2017    PROT 6.9 05/10/2012    CALCIUM 9.5 01/31/2018    BILITOT 0.6 08/31/2017    ALKPHOS 84 08/31/2017    AST 26 08/31/2017    ALT 23 08/31/2017       POC Tests: No results for input(s): POCGLU, POCNA, POCK, POCCL, POCBUN, POCHEMO, POCHCT in the last 72 hours.     Coags: No results found for: PROTIME, INR, APTT    HCG (If Applicable): No results found for: PREGTESTUR, PREGSERUM, HCG, HCGQUANT     ABGs: No results found for: PHART, PO2ART, MKS5BLO, KON5IDE, BEART, G5FHIGFM     Type & Screen (If Applicable):  No results found for: LABABO, [address]    Anesthesia Evaluation  Patient summary reviewed no history of anesthetic complications:   Airway: Mallampati: II  TM distance: >3 FB   Neck ROM: full  Mouth opening: > = 3 FB Dental:      Comment: bridge    Pulmonary: breath sounds clear to auscultation      (-) COPD, asthma, shortness of breath, recent URI, sleep apnea and not a current smoker                           Cardiovascular:        (-) hypertension, valvular problems/murmurs, past MI, CAD, CABG/stent, dysrhythmias,  angina and murmur      Rhythm: regular  Rate: normal                    Neuro/Psych:      (-) seizures, neuromuscular disease, TIA, CVA, headaches, psychiatric history and depression/anxiety            GI/Hepatic/Renal:   (+) GERD:,      (-) PUD, hepatitis, liver disease, no renal disease and bowel prep       Endo/Other:    (+) : arthritis:., .    (-) diabetes mellitus, hypothyroidism, hyperthyroidism, blood dyscrasia               Abdominal:           Vascular:                                      Anesthesia Plan      TIVA     ASA 2       Induction: intravenous. Anesthetic plan and risks discussed with patient. Plan discussed with CRNA. This pre-anesthesia assessment may be used as a history and physical.    DOS STAFF ADDENDUM:    Pt seen and examined, chart reviewed (including anesthesia, drug and allergy history). No interval changes to history and physical examination. Anesthetic plan, risks, benefits, alternatives, and personnel involved discussed with patient. Patient verbalized an understanding and agrees to proceed.       Cory Nolasco MD  April 4, 2019  9:13 AM        Cory Nolasco MD   4/4/2019

## 2019-04-04 NOTE — H&P
[facility]   Pre-operative History and Physical    Patient: Rakan Gomes  : 1935  Acct#: [de-identified]    History Obtained From: electronic medical record    HISTORY OF PRESENT ILLNESS  Procedure:EGD  Indications:GERD  Past Medical History:        Diagnosis Date    Allergic rhinitis     GERD (gastroesophageal reflux disease)     Hearing loss sensory, bilateral     mod--severe on right, mild to severe on left, due to asymmetry will send to get MRI to r/o acoustic neuroma    Mastoiditis     on MRI oct 2014-->dr ortiz states with no clinical findings this is considered an over-sensitivity of MRI results    Osteoarthritis     L1-L5    Osteoporosis     patient was on fosamax x 10 years and drug holiday was started on 13. (this was date of last DEXA scan)    Scoliosis      Past Surgical History:        Procedure Laterality Date    CARPAL TUNNEL RELEASE      right,     COLONOSCOPY      , dr Porter [place]   Reford Stain N/A 10/19/2018    COLONOSCOPY WITH BIOPSY performed by Rae Morris MD at [address] [place] [address]      2017 and 2018   [address]          NASAL SINUS SURGERY       Medications prior to admission:   Prior to Admission medications    Medication Sig Start Date End Date Taking? Authorizing Provider   risedronate (ACTONEL) 35 MG tablet TAKE ONE TABLET BY MOUTH ONCE WEEKLY BEFORE BREAKFAST, ON AN EMPTY STOMACH. REMAIN UPRIGHT FOR 30 MINUTES.  TAKE WITH 8 OUNCES OF WATER 19  Yes Ida Reyna MD   ranitidine (ZANTAC) 150 MG tablet Take 150 mg by mouth nightly   Yes Historical Provider, MD   Calcium Carbonate Antacid (TUMS E-X PO) Take by mouth   Yes Historical Provider, MD   omeprazole (PRILOSEC) 20 MG delayed release capsule Take 1 capsule by mouth every morning (before breakfast) 19  Yes Garrett Doe MD   Docusate Calcium (STOOL SOFTENER PO) Take by mouth   Yes Historical Provider, MD   Calcium Citrate-Vitamin D (CALCIUM + D PO) Take by mouth   Yes Historical Provider, MD   Cholecalciferol (VITAMIN D3 PO) Take by mouth   Yes Historical Provider, MD   celecoxib (CELEBREX) 200 MG capsule Take 200 mg by mouth 2 times daily. Yes Historical Provider, MD     Allergies:   Patient has no known allergies.     Social History     Socioeconomic History    Marital status:      Spouse name: Not on file    Number of children: Not on file    Years of education: Not on file    Highest education level: Not on file   Occupational History    Not on file   Social Needs    Financial resource strain: Not on file    Food insecurity:     Worry: Not on file     Inability: Not on file    Transportation needs:     Medical: Not on file     Non-medical: Not on file   Tobacco Use    Smoking status: Never Smoker    Smokeless tobacco: Never Used   Substance and Sexual Activity    Alcohol use: No    Drug use: No    Sexual activity: Yes     Partners: Male     Comment: , 5 children, 15 grandchildren   Lifestyle    Physical activity:     Days per week: Not on file     Minutes per session: Not on file    Stress: Not on file   Relationships    Social connections:     Talks on phone: Not on file     Gets together: Not on file     Attends Oriental orthodox service: Not on file     Active member of club or organization: Not on file     Attends meetings of clubs or organizations: Not on file     Relationship status: Not on file    Intimate partner violence:     Fear of current or ex partner: Not on file     Emotionally abused: Not on file     Physically abused: Not on file     Forced sexual activity: Not on file   Other Topics Concern    Not on file   Social History Narrative    ** Merged History Encounter **          Family History   Problem Relation Age of Onset    Diabetes Mother     Heart Disease Mother          PHYSICAL EXAM:      BP (!) 179/71   Pulse 68   Temp 98.6 °F (37 °C) (Tympanic)   Resp 20   Ht 4' 8\" (1.422 m)   Wt 104 lb 9.6 oz (47.4 kg)   SpO2 100%   BMI 23.45 kg/m²  I        Heart:normal    Lungs: normal    Abdomen: normal      ASA Grade:  See anesthesia note      ASSESSMENT AND PLAN:    1. Procedure options, risks and benefits reviewed with patient and expresses understanding.

## 2019-04-06 ENCOUNTER — TELEPHONE (OUTPATIENT)
Dept: FAMILY MEDICINE CLINIC | Age: 84
End: 2019-04-06

## 2019-04-06 NOTE — TELEPHONE ENCOUNTER
Pt of dr. Loli Klein  Pt has a question that she would like to discuss with dr. Dewayne Oakley is risedronate 35mg  She is problems with taking it and is wondering if she should stop taking medication  Please advise

## 2019-04-08 ENCOUNTER — TELEPHONE (OUTPATIENT)
Dept: FAMILY MEDICINE CLINIC | Age: 84
End: 2019-04-08

## 2019-04-08 NOTE — TELEPHONE ENCOUNTER
Please inquire which issues she is having.    If is noticing GI intolerance, she can then hold it and we would have to reassess for Prolia at the visit

## 2019-04-09 ENCOUNTER — TELEPHONE (OUTPATIENT)
Dept: FAMILY MEDICINE CLINIC | Age: 84
End: 2019-04-09

## 2019-04-09 ENCOUNTER — OFFICE VISIT (OUTPATIENT)
Dept: FAMILY MEDICINE CLINIC | Age: 84
End: 2019-04-09
Payer: MEDICARE

## 2019-04-09 VITALS
SYSTOLIC BLOOD PRESSURE: 157 MMHG | HEART RATE: 71 BPM | DIASTOLIC BLOOD PRESSURE: 75 MMHG | WEIGHT: 106 LBS | HEIGHT: 56 IN | BODY MASS INDEX: 23.84 KG/M2

## 2019-04-09 DIAGNOSIS — I10 ESSENTIAL HYPERTENSION: ICD-10-CM

## 2019-04-09 DIAGNOSIS — K29.70 GASTRITIS WITHOUT BLEEDING, UNSPECIFIED CHRONICITY, UNSPECIFIED GASTRITIS TYPE: ICD-10-CM

## 2019-04-09 DIAGNOSIS — R07.9 CHEST PAIN, UNSPECIFIED TYPE: Primary | ICD-10-CM

## 2019-04-09 LAB
ANION GAP SERPL CALCULATED.3IONS-SCNC: 11 MMOL/L (ref 3–16)
BASOPHILS ABSOLUTE: 0.1 K/UL (ref 0–0.2)
BASOPHILS RELATIVE PERCENT: 1.2 %
BUN BLDV-MCNC: 24 MG/DL (ref 7–20)
CALCIUM SERPL-MCNC: 9.6 MG/DL (ref 8.3–10.6)
CHLORIDE BLD-SCNC: 108 MMOL/L (ref 99–110)
CO2: 25 MMOL/L (ref 21–32)
CREAT SERPL-MCNC: 1 MG/DL (ref 0.6–1.2)
EOSINOPHILS ABSOLUTE: 0.1 K/UL (ref 0–0.6)
EOSINOPHILS RELATIVE PERCENT: 1.7 %
GFR AFRICAN AMERICAN: >60
GFR NON-AFRICAN AMERICAN: 53
GLUCOSE BLD-MCNC: 93 MG/DL (ref 70–99)
HCT VFR BLD CALC: 39.5 % (ref 36–48)
HEMOGLOBIN: 13.2 G/DL (ref 12–16)
LYMPHOCYTES ABSOLUTE: 1.1 K/UL (ref 1–5.1)
LYMPHOCYTES RELATIVE PERCENT: 14.5 %
MAGNESIUM: 2.1 MG/DL (ref 1.8–2.4)
MCH RBC QN AUTO: 30.4 PG (ref 26–34)
MCHC RBC AUTO-ENTMCNC: 33.3 G/DL (ref 31–36)
MCV RBC AUTO: 91.3 FL (ref 80–100)
MONOCYTES ABSOLUTE: 0.7 K/UL (ref 0–1.3)
MONOCYTES RELATIVE PERCENT: 10.2 %
NEUTROPHILS ABSOLUTE: 5.2 K/UL (ref 1.7–7.7)
NEUTROPHILS RELATIVE PERCENT: 72.4 %
PDW BLD-RTO: 13.1 % (ref 12.4–15.4)
PLATELET # BLD: 264 K/UL (ref 135–450)
PMV BLD AUTO: 8 FL (ref 5–10.5)
POTASSIUM SERPL-SCNC: 4.2 MMOL/L (ref 3.5–5.1)
PRO-BNP: 1078 PG/ML (ref 0–449)
RBC # BLD: 4.32 M/UL (ref 4–5.2)
SODIUM BLD-SCNC: 144 MMOL/L (ref 136–145)
WBC # BLD: 7.2 K/UL (ref 4–11)

## 2019-04-09 PROCEDURE — G8510 SCR DEP NEG, NO PLAN REQD: HCPCS | Performed by: NURSE PRACTITIONER

## 2019-04-09 PROCEDURE — 36415 COLL VENOUS BLD VENIPUNCTURE: CPT | Performed by: NURSE PRACTITIONER

## 2019-04-09 PROCEDURE — 93000 ELECTROCARDIOGRAM COMPLETE: CPT | Performed by: NURSE PRACTITIONER

## 2019-04-09 PROCEDURE — 99214 OFFICE O/P EST MOD 30 MIN: CPT | Performed by: NURSE PRACTITIONER

## 2019-04-09 RX ORDER — HYDROCHLOROTHIAZIDE 12.5 MG/1
12.5 CAPSULE, GELATIN COATED ORAL EVERY MORNING
Qty: 30 CAPSULE | Refills: 0 | Status: SHIPPED | OUTPATIENT
Start: 2019-04-09 | End: 2019-04-23

## 2019-04-09 ASSESSMENT — PATIENT HEALTH QUESTIONNAIRE - PHQ9
SUM OF ALL RESPONSES TO PHQ QUESTIONS 1-9: 1
2. FEELING DOWN, DEPRESSED OR HOPELESS: 1
SUM OF ALL RESPONSES TO PHQ9 QUESTIONS 1 & 2: 1
1. LITTLE INTEREST OR PLEASURE IN DOING THINGS: 0
SUM OF ALL RESPONSES TO PHQ QUESTIONS 1-9: 1

## 2019-04-09 NOTE — TELEPHONE ENCOUNTER
PT called in stating that she was supposed to follow up with Rhett Seals in a week however she's not getting her echo done until Thursday and then she's going out of town. She would like to know if it's okay to wait for the follow up appointment. Please give PT a call back: [phone].

## 2019-04-09 NOTE — PROGRESS NOTES
PROGRESS NOTE     [facility] ([facility]) [facility]  [address] [address]  [phone] office  [phone] fax    Date of Service:  4/9/2019    Subjective:      Patient ID: . Chrissy [place] is a 81 y.o. female      CC: chest pain    HPI  Patient complains of a 3 month history of chest pain. Pain usually occurs at night when she lays down or she notices it when she wakes up to use the restroom. Usually occurs 2-3 times per week. Associated symptoms: None. Precipitating factors: change of temperature when she walks into a cold store and bending over. Rates the pain 8-10. Tums have been effective. Patient is currently taking both omeprazole 20 mg twice daily and ranitidine 150 mg nightly. She had an EGD yesterday which was normal. She is here today for further workup. Denies shortness of breath, headache, dizziness, nausea, vomiting and diarrhea. Hypertension:   Patient's blood pressure today is elevated and review of record shows it has been elevated on multiple occasions since December. Patient does not monitor blood pressure at home. Vitals:    04/09/19 0835 04/09/19 0940   BP: (!) 151/80 (!) 157/75   Pulse: 59 71   Weight: 106 lb (48.1 kg)    Height: 4' 8\" (1.422 m)        Outpatient Medications Marked as Taking for the 4/9/19 encounter (Office Visit) with RIAZ Huggins CNP   Medication Sig Dispense Refill    hydrochlorothiazide (MICROZIDE) 12.5 MG capsule Take 1 capsule by mouth every morning 30 capsule 0    risedronate (ACTONEL) 35 MG tablet TAKE ONE TABLET BY MOUTH ONCE WEEKLY BEFORE BREAKFAST, ON AN EMPTY STOMACH. REMAIN UPRIGHT FOR 30 MINUTES.  TAKE WITH 8 OUNCES OF WATER 4 tablet 3    ranitidine (ZANTAC) 150 MG tablet Take 150 mg by mouth nightly      Calcium Carbonate Antacid (TUMS E-X PO) Take by mouth      omeprazole (PRILOSEC) 20 MG delayed release capsule Take 1 capsule by mouth every morning (before breakfast) 90 capsule 0    Docusate N/V/D  Endocrine:  Negative for polydipsia and polyuria  :  Negative for dysuria, flank pain or urinary frequency  Musculoskeletal:  Negative for back pain or myalgias  Neuro:  Negative for dizziness or lightheadedness  Psych: negative for depression or anxiety      Objective:   Constitutional:   · Reviewed vitals above  · Well Nourished, well developed, no distress       HENT:  · Normal external nose without lesions  · Bilateral TMs translucent with normal light reflex and bony landmarks  · Normal oropharynx without erythema or exudate  · Normal nasal mucosa without swelling or erythema  Neck:  · Symmetric and without masses  · No thyromegaly  Resp:  · Normal effort  · Clear to auscultation bilaterally without rhonchi, wheezing or crackles  Cardiovascular:  · On auscultation, normal S1 and S2 without murmurs, rubs or gallops  · No bruits of bilateral carotids and no JVD  · +1 pitting edema to lower extremities  Gastrointestinal:  · Nontender, nondistended, and no masses  · No hepatosplenomegaly  Musculoskeletal:  · Normal Gait  · All extremities without clubbing, cyanosis or edema  Skin:  · No rashes on inspection  · No areas of increased heat or induration on palpation  Psych:  · Normal mood and affect  · Normal insight and judgement    Assessment / Plan:     1. Chest pain, unspecified type  Patient is on both omeprazole and ranitidine with little improvement in her symptoms. She still requires Tums as needed when her chest pain occurs and she says it does seem to help. Recent EGD was negative per patient report. Possible that sympoms are related to GERD however will rule out cardiac cause. EKG today shows sinus rhythm with possible anteroseptal ischemia due to negative T waves in leads V1 and V2. No ST elevation. Given patient's symptoms of elevated blood pressure, edema and chest pain will order an Echo to evaluate EF. Patient to follow up in 1 week.  Patient states if she has chest pain that is not relieved by Tums she will call 911.   - EKG 12 Lead  - Basic Metabolic Panel  - BRAIN NATRIURETIC PEPTIDE (BNP); Future  - Magnesium  - CBC Auto Differential  - BRAIN NATRIURETIC PEPTIDE (BNP)  - Echocardiogram complete; Future    2. Essential hypertension  Patient has had elevated blood pressure readings since December. Will start HCTZ at low dose. Follow up in 1 week to ensure patient is tolerating medication and recheck blood pressure. - hydrochlorothiazide (MICROZIDE) 12.5 MG capsule; Take 1 capsule by mouth every morning  Dispense: 30 capsule; Refill: 0    3. GERD  Continue medications for now. Patient to contact GI to see if they want her continued on both omeprazole and ranitidine.

## 2019-04-10 NOTE — TELEPHONE ENCOUNTER
Patient called back and said that she has had numerous test and now they are leaning more toward a heart issue and so they are trying to schedule some more test. She is going to try to talk to her other doctors and see if it is worth stopping the actonel

## 2019-04-11 ENCOUNTER — TELEPHONE (OUTPATIENT)
Dept: FAMILY MEDICINE CLINIC | Age: 84
End: 2019-04-11

## 2019-04-16 ENCOUNTER — OFFICE VISIT (OUTPATIENT)
Dept: FAMILY MEDICINE CLINIC | Age: 84
End: 2019-04-16
Payer: MEDICARE

## 2019-04-16 VITALS
SYSTOLIC BLOOD PRESSURE: 146 MMHG | HEIGHT: 56 IN | WEIGHT: 102 LBS | DIASTOLIC BLOOD PRESSURE: 71 MMHG | HEART RATE: 78 BPM | BODY MASS INDEX: 22.95 KG/M2

## 2019-04-16 DIAGNOSIS — I10 ESSENTIAL HYPERTENSION: Primary | ICD-10-CM

## 2019-04-16 DIAGNOSIS — I50.9 CONGESTIVE HEART FAILURE, UNSPECIFIED HF CHRONICITY, UNSPECIFIED HEART FAILURE TYPE (HCC): ICD-10-CM

## 2019-04-16 LAB
ANION GAP SERPL CALCULATED.3IONS-SCNC: 13 MMOL/L (ref 3–16)
BUN BLDV-MCNC: 43 MG/DL (ref 7–20)
CALCIUM SERPL-MCNC: 9.7 MG/DL (ref 8.3–10.6)
CHLORIDE BLD-SCNC: 101 MMOL/L (ref 99–110)
CO2: 25 MMOL/L (ref 21–32)
CREAT SERPL-MCNC: 2.2 MG/DL (ref 0.6–1.2)
GFR AFRICAN AMERICAN: 26
GFR NON-AFRICAN AMERICAN: 21
GLUCOSE BLD-MCNC: 106 MG/DL (ref 70–99)
POTASSIUM SERPL-SCNC: 4.1 MMOL/L (ref 3.5–5.1)
PRO-BNP: 614 PG/ML (ref 0–449)
SODIUM BLD-SCNC: 139 MMOL/L (ref 136–145)

## 2019-04-16 PROCEDURE — 99214 OFFICE O/P EST MOD 30 MIN: CPT | Performed by: NURSE PRACTITIONER

## 2019-04-16 PROCEDURE — 36415 COLL VENOUS BLD VENIPUNCTURE: CPT | Performed by: NURSE PRACTITIONER

## 2019-04-16 RX ORDER — LISINOPRIL 5 MG/1
5 TABLET ORAL DAILY
Qty: 90 TABLET | Refills: 1 | Status: ON HOLD | OUTPATIENT
Start: 2019-04-16 | End: 2019-04-26 | Stop reason: HOSPADM

## 2019-04-16 NOTE — PROGRESS NOTES
cough  Cardiovascular: Negative for CP, palpitations, CARR, orthopnea, PND, LE edema  Gastrointestinal: Negative for abd pain, melena, BRBPR, N/V/D  Endocrine:  Negative for polydipsia and polyuria  :  Negative for dysuria, flank pain or urinary frequency  Musculoskeletal:  Negative for back pain or myalgias  Neuro:  Negative for dizziness or lightheadedness  Psych: negative for depression or anxiety      Objective:   Constitutional:   · Reviewed vitals above  · Well Nourished, well developed, no distress       HENT:  · Normal external nose without lesions  Neck:  · Symmetric and without masses  Resp:  · Normal effort  · Clear to auscultation bilaterally without rhonchi, wheezing or crackles  Cardiovascular:  · On auscultation, normal S1 and S2 without murmurs, rubs or gallops  · No bruits of bilateral carotids and no JVD  · No edema  Gastrointestinal:  · Nontender, nondistended, and no masses  · No hepatosplenomegaly  Musculoskeletal:  · Normal Gait  · All extremities without clubbing, cyanosis or edema  Skin:  · No rashes on inspection  · No areas of increased heat or induration on palpation  Psych:  · Normal mood and affect  · Normal insight and judgement    Assessment / Plan:     1. Essential hypertension  Slightly improved today but still above goal. Given patient's heart failure will start ACE inhibitor. Patient to follow up in 1 month or sooner. - lisinopril (PRINIVIL;ZESTRIL) 5 MG tablet; Take 1 tablet by mouth daily  Dispense: 90 tablet; Refill: 1    2. Congestive heart failure, unspecified HF chronicity, unspecified heart failure type ([address]  Edema is improved. Symptoms of chest pain have resolved as of now. Continue current dose of HCTZ. Will order labs today to evaluate renal function and trend BNP. If BNP is no better will switch HCTZ to Lasix as long as renal function is stable. Patient is in agreement with plan. - Basic Metabolic Panel  - BRAIN NATRIURETIC PEPTIDE (BNP);  Future

## 2019-04-18 ENCOUNTER — HOSPITAL ENCOUNTER (OUTPATIENT)
Dept: NON INVASIVE DIAGNOSTICS | Age: 84
Discharge: HOME | End: 2019-04-18
Payer: MEDICARE

## 2019-04-18 DIAGNOSIS — R07.9 CHEST PAIN, UNSPECIFIED TYPE: ICD-10-CM

## 2019-04-18 LAB
LV EF: 58 %
LVEF MODALITY: NORMAL

## 2019-04-18 PROCEDURE — C8929 TTE W OR WO FOL WCON,DOPPLER: HCPCS

## 2019-04-18 PROCEDURE — 6360000004 HC RX CONTRAST MEDICATION: Performed by: INTERNAL MEDICINE

## 2019-04-18 RX ADMIN — PERFLUTREN 1.43 MG: 6.52 INJECTION, SUSPENSION INTRAVENOUS at 15:20

## 2019-04-21 ENCOUNTER — TELEPHONE (OUTPATIENT)
Dept: FAMILY MEDICINE CLINIC | Age: 84
End: 2019-04-21

## 2019-04-22 ENCOUNTER — TELEPHONE (OUTPATIENT)
Dept: FAMILY MEDICINE CLINIC | Age: 84
End: 2019-04-22

## 2019-04-22 ENCOUNTER — NURSE ONLY (OUTPATIENT)
Dept: FAMILY MEDICINE CLINIC | Age: 84
End: 2019-04-22
Payer: MEDICARE

## 2019-04-22 ENCOUNTER — HOSPITAL ENCOUNTER (INPATIENT)
Age: 84
LOS: 6 days | Discharge: HOME HEALTH | DRG: 683 | End: 2019-04-29
Attending: INTERNAL MEDICINE | Admitting: INTERNAL MEDICINE
Payer: MEDICARE

## 2019-04-22 DIAGNOSIS — N17.9 AKI (ACUTE KIDNEY INJURY) (HCC): Primary | ICD-10-CM

## 2019-04-22 DIAGNOSIS — R79.89 ELEVATED SERUM CREATININE: Primary | ICD-10-CM

## 2019-04-22 DIAGNOSIS — N39.0 URINARY TRACT INFECTION WITHOUT HEMATURIA, SITE UNSPECIFIED: ICD-10-CM

## 2019-04-22 LAB
A/G RATIO: 1.1 (ref 1.1–2.2)
ALBUMIN SERPL-MCNC: 3.9 G/DL (ref 3.4–5)
ALP BLD-CCNC: 191 U/L (ref 40–129)
ALT SERPL-CCNC: 24 U/L (ref 10–40)
AMORPHOUS: ABNORMAL /HPF
ANION GAP SERPL CALCULATED.3IONS-SCNC: 19 MMOL/L (ref 3–16)
ANION GAP SERPL CALCULATED.3IONS-SCNC: 19 MMOL/L (ref 3–16)
AST SERPL-CCNC: 16 U/L (ref 15–37)
BACTERIA: ABNORMAL /HPF
BASOPHILS ABSOLUTE: 0.1 K/UL (ref 0–0.2)
BASOPHILS RELATIVE PERCENT: 0.9 %
BILIRUB SERPL-MCNC: 0.3 MG/DL (ref 0–1)
BILIRUBIN URINE: NEGATIVE
BLOOD, URINE: NEGATIVE
BUN BLDV-MCNC: 89 MG/DL (ref 7–20)
BUN BLDV-MCNC: 90 MG/DL (ref 7–20)
CALCIUM SERPL-MCNC: 8.8 MG/DL (ref 8.3–10.6)
CALCIUM SERPL-MCNC: 8.9 MG/DL (ref 8.3–10.6)
CASTS 2: ABNORMAL /LPF
CHLORIDE BLD-SCNC: 100 MMOL/L (ref 99–110)
CHLORIDE BLD-SCNC: 102 MMOL/L (ref 99–110)
CLARITY: ABNORMAL
CO2: 16 MMOL/L (ref 21–32)
CO2: 17 MMOL/L (ref 21–32)
COLOR: YELLOW
COMMENT UA: ABNORMAL
CREAT SERPL-MCNC: 4 MG/DL (ref 0.6–1.2)
CREAT SERPL-MCNC: 4.3 MG/DL (ref 0.6–1.2)
CREATININE URINE: 66.1 MG/DL (ref 28–259)
EOSINOPHILS ABSOLUTE: 0.1 K/UL (ref 0–0.6)
EOSINOPHILS RELATIVE PERCENT: 1 %
GFR AFRICAN AMERICAN: 12
GFR AFRICAN AMERICAN: 13
GFR NON-AFRICAN AMERICAN: 10
GFR NON-AFRICAN AMERICAN: 11
GLOBULIN: 3.5 G/DL
GLUCOSE BLD-MCNC: 108 MG/DL (ref 70–99)
GLUCOSE BLD-MCNC: 120 MG/DL (ref 70–99)
GLUCOSE URINE: NEGATIVE MG/DL
HCT VFR BLD CALC: 35.4 % (ref 36–48)
HEMOGLOBIN: 12 G/DL (ref 12–16)
KETONES, URINE: NEGATIVE MG/DL
LEUKOCYTE ESTERASE, URINE: ABNORMAL
LYMPHOCYTES ABSOLUTE: 1.1 K/UL (ref 1–5.1)
LYMPHOCYTES RELATIVE PERCENT: 13.1 %
MCH RBC QN AUTO: 30.1 PG (ref 26–34)
MCHC RBC AUTO-ENTMCNC: 33.8 G/DL (ref 31–36)
MCV RBC AUTO: 88.9 FL (ref 80–100)
MICROSCOPIC EXAMINATION: YES
MONOCYTES ABSOLUTE: 0.9 K/UL (ref 0–1.3)
MONOCYTES RELATIVE PERCENT: 10.2 %
NEUTROPHILS ABSOLUTE: 6.5 K/UL (ref 1.7–7.7)
NEUTROPHILS RELATIVE PERCENT: 74.8 %
NITRITE, URINE: NEGATIVE
PDW BLD-RTO: 12.9 % (ref 12.4–15.4)
PH UA: 6 (ref 5–8)
PLATELET # BLD: 265 K/UL (ref 135–450)
PMV BLD AUTO: 7.3 FL (ref 5–10.5)
POTASSIUM SERPL-SCNC: 4.9 MMOL/L (ref 3.5–5.1)
POTASSIUM SERPL-SCNC: 5.1 MMOL/L (ref 3.5–5.1)
PROTEIN UA: ABNORMAL MG/DL
RBC # BLD: 3.98 M/UL (ref 4–5.2)
RBC UA: ABNORMAL /HPF (ref 0–2)
SODIUM BLD-SCNC: 135 MMOL/L (ref 136–145)
SODIUM BLD-SCNC: 138 MMOL/L (ref 136–145)
SPECIFIC GRAVITY UA: 1.01 (ref 1–1.03)
TOTAL PROTEIN: 7.4 G/DL (ref 6.4–8.2)
URINE REFLEX TO CULTURE: YES
URINE TYPE: ABNORMAL
UROBILINOGEN, URINE: 0.2 E.U./DL
WBC # BLD: 8.7 K/UL (ref 4–11)
WBC UA: ABNORMAL /HPF (ref 0–5)

## 2019-04-22 PROCEDURE — 87205 SMEAR GRAM STAIN: CPT

## 2019-04-22 PROCEDURE — 81001 URINALYSIS AUTO W/SCOPE: CPT

## 2019-04-22 PROCEDURE — 36415 COLL VENOUS BLD VENIPUNCTURE: CPT | Performed by: FAMILY MEDICINE

## 2019-04-22 PROCEDURE — 80053 COMPREHEN METABOLIC PANEL: CPT

## 2019-04-22 PROCEDURE — 51702 INSERT TEMP BLADDER CATH: CPT

## 2019-04-22 PROCEDURE — 85025 COMPLETE CBC W/AUTO DIFF WBC: CPT

## 2019-04-22 PROCEDURE — 87086 URINE CULTURE/COLONY COUNT: CPT

## 2019-04-22 PROCEDURE — 84300 ASSAY OF URINE SODIUM: CPT

## 2019-04-22 PROCEDURE — 82570 ASSAY OF URINE CREATININE: CPT

## 2019-04-22 PROCEDURE — 99285 EMERGENCY DEPT VISIT HI MDM: CPT

## 2019-04-22 RX ORDER — 0.9 % SODIUM CHLORIDE 0.9 %
500 INTRAVENOUS SOLUTION INTRAVENOUS ONCE
Status: COMPLETED | OUTPATIENT
Start: 2019-04-22 | End: 2019-04-23

## 2019-04-22 NOTE — TELEPHONE ENCOUNTER
Vanessa    Can you follow up with BMP tomorrow?   Ioana said she is taking the HCTZ 3 times a week only now, but there was a little confusion of when she was suppose to get BMP, so you might want to clarify when she started the decreased frequency

## 2019-04-22 NOTE — TELEPHONE ENCOUNTER
kay    Patient was suppose to have a nurse visit scheduled for last Thursday to repeat BMP (see notes on last BMP results)      Please have pt come in Monday, (4/22/19), to get BMP. Make sure she understands how important it is      Also please make sure she is only taking her HCTZ 3 times  A week as directed.         Route back with info

## 2019-04-22 NOTE — TELEPHONE ENCOUNTER
No, she was suppose to get a kidney function last Thursday, and  Decrease her HCTZ to 3 times a week    She needs on TODAY!!!

## 2019-04-22 NOTE — TELEPHONE ENCOUNTER
Ioana,    Please see other telephone encounter: Me           4/21/19 9:31 PM   Note      ioana     Patient was suppose to have a nurse visit scheduled for last Thursday to repeat BMP (see notes on last BMP results)        Please have pt come in Monday, (4/22/19), to get BMP.   Make sure she understands how important it is        Also please make sure she is only taking her HCTZ 3 times  A week as directed.           Route back with info

## 2019-04-22 NOTE — TELEPHONE ENCOUNTER
The PT would like a call back regarding her Echo asap.      --Should PT continue taking the 2 dieretics         Best call [facility]---[phone]

## 2019-04-23 ENCOUNTER — APPOINTMENT (OUTPATIENT)
Dept: CT IMAGING | Age: 84
DRG: 683 | End: 2019-04-23
Payer: MEDICARE

## 2019-04-23 ENCOUNTER — APPOINTMENT (OUTPATIENT)
Dept: ULTRASOUND IMAGING | Age: 84
DRG: 683 | End: 2019-04-23
Payer: MEDICARE

## 2019-04-23 ENCOUNTER — TELEPHONE (OUTPATIENT)
Dept: FAMILY MEDICINE CLINIC | Age: 84
End: 2019-04-23

## 2019-04-23 PROBLEM — N17.9 AKI (ACUTE KIDNEY INJURY) (HCC): Status: ACTIVE | Noted: 2019-04-23

## 2019-04-23 LAB
ALBUMIN SERPL-MCNC: 2.8 G/DL (ref 3.4–5)
ANION GAP SERPL CALCULATED.3IONS-SCNC: 12 MMOL/L (ref 3–16)
BUN BLDV-MCNC: 82 MG/DL (ref 7–20)
CALCIUM SERPL-MCNC: 8.1 MG/DL (ref 8.3–10.6)
CHLORIDE BLD-SCNC: 110 MMOL/L (ref 99–110)
CO2: 18 MMOL/L (ref 21–32)
CREAT SERPL-MCNC: 4.1 MG/DL (ref 0.6–1.2)
EOSINOPHIL, URINE: 1 % OF WBC
EOSINOPHIL,URINE: NORMAL
GFR AFRICAN AMERICAN: 13
GFR NON-AFRICAN AMERICAN: 10
GLUCOSE BLD-MCNC: 94 MG/DL (ref 70–99)
MAGNESIUM: 2.6 MG/DL (ref 1.8–2.4)
PHOSPHORUS: 4.5 MG/DL (ref 2.5–4.9)
POTASSIUM SERPL-SCNC: 4.3 MMOL/L (ref 3.5–5.1)
SODIUM BLD-SCNC: 140 MMOL/L (ref 136–145)
SODIUM URINE: 44 MMOL/L

## 2019-04-23 PROCEDURE — 2580000003 HC RX 258: Performed by: INTERNAL MEDICINE

## 2019-04-23 PROCEDURE — 2500000003 HC RX 250 WO HCPCS: Performed by: NURSE PRACTITIONER

## 2019-04-23 PROCEDURE — 2580000003 HC RX 258: Performed by: NURSE PRACTITIONER

## 2019-04-23 PROCEDURE — 6360000002 HC RX W HCPCS: Performed by: INTERNAL MEDICINE

## 2019-04-23 PROCEDURE — 1200000000 HC SEMI PRIVATE

## 2019-04-23 PROCEDURE — 6370000000 HC RX 637 (ALT 250 FOR IP): Performed by: NURSE PRACTITIONER

## 2019-04-23 PROCEDURE — 96367 TX/PROPH/DG ADDL SEQ IV INF: CPT

## 2019-04-23 PROCEDURE — 96361 HYDRATE IV INFUSION ADD-ON: CPT

## 2019-04-23 PROCEDURE — 80069 RENAL FUNCTION PANEL: CPT

## 2019-04-23 PROCEDURE — 76770 US EXAM ABDO BACK WALL COMP: CPT

## 2019-04-23 PROCEDURE — 96365 THER/PROPH/DIAG IV INF INIT: CPT

## 2019-04-23 PROCEDURE — 51702 INSERT TEMP BLADDER CATH: CPT

## 2019-04-23 PROCEDURE — 74176 CT ABD & PELVIS W/O CONTRAST: CPT

## 2019-04-23 PROCEDURE — 83735 ASSAY OF MAGNESIUM: CPT

## 2019-04-23 PROCEDURE — 6370000000 HC RX 637 (ALT 250 FOR IP): Performed by: INTERNAL MEDICINE

## 2019-04-23 PROCEDURE — 36415 COLL VENOUS BLD VENIPUNCTURE: CPT

## 2019-04-23 PROCEDURE — 6360000002 HC RX W HCPCS: Performed by: NURSE PRACTITIONER

## 2019-04-23 RX ORDER — ONDANSETRON 2 MG/ML
4 INJECTION INTRAMUSCULAR; INTRAVENOUS EVERY 6 HOURS PRN
Status: DISCONTINUED | OUTPATIENT
Start: 2019-04-23 | End: 2019-04-29 | Stop reason: HOSPADM

## 2019-04-23 RX ORDER — RISEDRONATE SODIUM 30 MG/1
30 TABLET, FILM COATED ORAL
COMMUNITY
End: 2019-06-23

## 2019-04-23 RX ORDER — HYDROCHLOROTHIAZIDE 12.5 MG/1
12.5 TABLET ORAL
Status: ON HOLD | COMMUNITY
End: 2019-04-26 | Stop reason: HOSPADM

## 2019-04-23 RX ORDER — HEPARIN SODIUM 5000 [USP'U]/ML
5000 INJECTION, SOLUTION INTRAVENOUS; SUBCUTANEOUS EVERY 8 HOURS SCHEDULED
Status: DISCONTINUED | OUTPATIENT
Start: 2019-04-23 | End: 2019-04-29 | Stop reason: HOSPADM

## 2019-04-23 RX ORDER — PANTOPRAZOLE SODIUM 40 MG/1
40 TABLET, DELAYED RELEASE ORAL
Status: DISCONTINUED | OUTPATIENT
Start: 2019-04-23 | End: 2019-04-29 | Stop reason: HOSPADM

## 2019-04-23 RX ORDER — SODIUM CHLORIDE 0.9 % (FLUSH) 0.9 %
10 SYRINGE (ML) INJECTION PRN
Status: DISCONTINUED | OUTPATIENT
Start: 2019-04-23 | End: 2019-04-29 | Stop reason: HOSPADM

## 2019-04-23 RX ORDER — LACTOBACILLUS RHAMNOSUS GG 10B CELL
1 CAPSULE ORAL 2 TIMES DAILY WITH MEALS
Status: DISCONTINUED | OUTPATIENT
Start: 2019-04-23 | End: 2019-04-29 | Stop reason: HOSPADM

## 2019-04-23 RX ORDER — SODIUM CHLORIDE 0.9 % (FLUSH) 0.9 %
10 SYRINGE (ML) INJECTION EVERY 12 HOURS SCHEDULED
Status: DISCONTINUED | OUTPATIENT
Start: 2019-04-23 | End: 2019-04-29 | Stop reason: HOSPADM

## 2019-04-23 RX ORDER — 0.9 % SODIUM CHLORIDE 0.9 %
500 INTRAVENOUS SOLUTION INTRAVENOUS ONCE
Status: COMPLETED | OUTPATIENT
Start: 2019-04-23 | End: 2019-04-23

## 2019-04-23 RX ORDER — OMEPRAZOLE 20 MG/1
20 CAPSULE, DELAYED RELEASE ORAL 2 TIMES DAILY
COMMUNITY
End: 2019-05-10 | Stop reason: SDUPTHER

## 2019-04-23 RX ADMIN — PANTOPRAZOLE SODIUM 40 MG: 40 TABLET, DELAYED RELEASE ORAL at 10:29

## 2019-04-23 RX ADMIN — Medication 1 CAPSULE: at 16:44

## 2019-04-23 RX ADMIN — SODIUM BICARBONATE: 84 INJECTION, SOLUTION INTRAVENOUS at 15:44

## 2019-04-23 RX ADMIN — CEFTRIAXONE 1 G: 1 INJECTION, POWDER, FOR SOLUTION INTRAMUSCULAR; INTRAVENOUS at 01:13

## 2019-04-23 RX ADMIN — Medication 1 CAPSULE: at 10:29

## 2019-04-23 RX ADMIN — SODIUM BICARBONATE: 84 INJECTION, SOLUTION INTRAVENOUS at 02:38

## 2019-04-23 RX ADMIN — SODIUM CHLORIDE 500 ML: 9 INJECTION, SOLUTION INTRAVENOUS at 00:03

## 2019-04-23 RX ADMIN — HEPARIN SODIUM 5000 UNITS: 5000 INJECTION INTRAVENOUS; SUBCUTANEOUS at 20:26

## 2019-04-23 RX ADMIN — Medication 10 ML: at 11:32

## 2019-04-23 RX ADMIN — SODIUM CHLORIDE 500 ML: 9 INJECTION, SOLUTION INTRAVENOUS at 01:16

## 2019-04-23 RX ADMIN — HEPARIN SODIUM 5000 UNITS: 5000 INJECTION INTRAVENOUS; SUBCUTANEOUS at 10:29

## 2019-04-23 ASSESSMENT — PAIN SCALES - GENERAL
PAINLEVEL_OUTOF10: 0
PAINLEVEL_OUTOF10: 0

## 2019-04-23 ASSESSMENT — ENCOUNTER SYMPTOMS
VOMITING: 0
SHORTNESS OF BREATH: 0
DIARRHEA: 0
ABDOMINAL PAIN: 0
NAUSEA: 0
CONSTIPATION: 0
COLOR CHANGE: 0
ABDOMINAL DISTENTION: 0

## 2019-04-23 NOTE — TELEPHONE ENCOUNTER
Call returned to [facility] lab regarding critical lab results.  Patient is currently in the hospital.

## 2019-04-23 NOTE — ED NOTES
Patient assessment unchanged. Cardiac monitor intact and patient is sinus rhythm. Side rails up on bed and call light near.         Mat May RN  04/23/19 9024

## 2019-04-23 NOTE — CONSULTS
Nephrology Attending Progress Note      SUBJECTIVE:  We are following this patient for JERRELL. The patient was admitted with abnormal lab results that indicated worsening kidney function    Scheduled Meds:   pantoprazole  40 mg Oral QAM AC    sodium chloride flush  10 mL Intravenous 2 times per day    heparin (porcine)  5,000 Units Subcutaneous 3 times per day    [START ON 2019] cefTRIAXone (ROCEPHIN) IV  1 g Intravenous Q24H    lactobacillus  1 capsule Oral BID WC     Continuous Infusions:   IV infusion builder 100 mL/hr at 19 0238     PRN Meds:.sodium chloride flush, ondansetron    Physical Exam:    TEMPERATURE:  Current - Temp: 98 °F (36.7 °C);  Max - Temp  Av.8 °F (36.6 °C)  Min: 97.6 °F (36.4 °C)  Max: 98 °F (36.7 °C)  RESPIRATIONS RANGE: Resp  Av.9  Min: 11  Max: 20  PULSE RANGE: Pulse  Av.6  Min: 57  Max: 74  BLOOD PRESSURE RANGE:  Systolic (33NRT), NGF:765 , Min:105 , ZYB:896   ; Diastolic (76QTW), URH:38, Min:40, Max:84    24HR INTAKE/OUTPUT:      Intake/Output Summary (Last 24 hours) at 2019 1118  Last data filed at 2019 0103  Gross per 24 hour   Intake 500 ml   Output --   Net 500 ml         LAB DATA:    CBC:   Lab Results   Component Value Date    WBC 8.7 2019    RBC 3.98 2019    HGB 12.0 2019    HCT 35.4 2019    MCV 88.9 2019    MCH 30.1 2019    MCHC 33.8 2019    RDW 12.9 2019     2019    MPV 7.3 2019     BMP:    Lab Results   Component Value Date     2019    K 4.3 2019     2019    CO2 18 2019    BUN 82 2019    CREATININE 4.1 2019    CALCIUM 8.1 2019    GFRAA 13 2019    GFRAA >60 05/10/2012    LABGLOM 10 2019    GLUCOSE 94 2019     Ionized Calcium:  No results found for: IONCA  Magnesium:    Lab Results   Component Value Date    MG 2.60 2019     Phosphorus:    Lab Results   Component Value Date    PHOS 4.5 04/23/2019       IMPRESSION/RECOMMENDATIONS:      Active Problems:    JERRELL (acute kidney injury) ([facility] [address]  Resolved Problems:    * No resolved hospital problems. *    1. JERRELL - Etiology likely secondary to concurrent use of ACE/Diuretic/Celebrex. Continue IVF and monitor kidney function for recovery off offending agents. Excellent urine output - FeNa 1.9 - Prerenal vs non oliguric ATN    2.  Acidosis - continue HCO in IVF

## 2019-04-23 NOTE — ED NOTES
Patient resting in bed. Side rails up and call light near. Patient denies further needs at this time.       Mary Niño RN  04/23/19 2015

## 2019-04-23 NOTE — ED PROVIDER NOTES
**EVALUATED BY ADVANCED PRACTICE PROVIDER**        [facility]  eMERGENCY dEPARTMENT eNCOUnter      Pt Name: Ivonen GARCIAZ:[id number]  [place] 1935  Date of evaluation: 4/22/2019  Provider: RIAZ Burger CNP      Chief Complaint:    Chief Complaint   Patient presents with    Acute Renal Failure     PCP sent pt to ER for abnormal bun and creat       Nursing Notes, Past Medical Hx, Past Surgical Hx, Social Hx, Allergies, and Family Hx were all reviewed and agreed with or any disagreements were addressed in the HPI.    HPI:  (Location, Duration, Timing, Severity,Quality, Assoc Sx, Context, Modifying factors)  This is a  81 y.o. female who presents to the emergency department today with family due to acute kidney injury. She was called by her PCP tonight and advised to come to the emergency Department due to this. Patient denies any complaints. She was started on hydrochlorothiazide earlier this month, and she also reports that she took a trip to [place] last week and did not drink much because she did not want to be going to the bathroom. She denies chest pain and shortness of breath. No swelling in her legs.     PastMedical/Surgical History:      Diagnosis Date    Allergic rhinitis     GERD (gastroesophageal reflux disease)     Hearing loss sensory, bilateral     mod--severe on right, mild to severe on left, due to asymmetry will send to get MRI to r/o acoustic neuroma    Mastoiditis     on MRI oct 2014-->dr ortiz states with no clinical findings this is considered an over-sensitivity of MRI results    Osteoarthritis     L1-L5    Osteoporosis     patient was on fosamax x 10 years and drug holiday was started on 6/21/13. (this was date of last DEXA scan)    Scoliosis          Procedure Laterality Date    CARPAL TUNNEL RELEASE      right, 2003    COLONOSCOPY      2013, dr Rik Gibson Flies N/A 10/19/2018    COLONOSCOPY WITH BIOPSY performed by Cora Goldstein MD at [address] [place] [address]      December 27 2017 and feb 6 2018   [address]    NASAL SINUS SURGERY      UPPER GASTROINTESTINAL ENDOSCOPY N/A 4/4/2019    EGD BIOPSY performed by Cora Goldstein MD at [facility] ENDOSCOPY       Medications:  Previous Medications    CALCIUM CARBONATE ANTACID (TUMS E-X PO)    Take by mouth    CALCIUM CITRATE-VITAMIN D (CALCIUM + D PO)    Take by mouth    CELECOXIB (CELEBREX) 200 MG CAPSULE    Take 200 mg by mouth 2 times daily. CHOLECALCIFEROL (VITAMIN D3 PO)    Take by mouth    DOCUSATE CALCIUM (STOOL SOFTENER PO)    Take by mouth    HYDROCHLOROTHIAZIDE (MICROZIDE) 12.5 MG CAPSULE    Take 1 capsule by mouth every morning    LISINOPRIL (PRINIVIL;ZESTRIL) 5 MG TABLET    Take 1 tablet by mouth daily    OMEPRAZOLE (PRILOSEC) 20 MG DELAYED RELEASE CAPSULE    Take 1 capsule by mouth every morning (before breakfast)    RANITIDINE (ZANTAC) 150 MG TABLET    Take 150 mg by mouth nightly         Review of Systems:  Review of Systems   Constitutional: Negative for appetite change, chills, diaphoresis, fatigue, fever and unexpected weight change. Respiratory: Negative for shortness of breath. Cardiovascular: Negative for chest pain. Gastrointestinal: Negative for abdominal distention, abdominal pain, constipation, diarrhea, nausea and vomiting. Genitourinary: Negative for dysuria, hematuria and urgency. Skin: Negative for color change and rash. Allergic/Immunologic: Negative for immunocompromised state. Neurological: Negative for dizziness, syncope, weakness, light-headedness, numbness and headaches. Hematological: Negative for adenopathy. Psychiatric/Behavioral: Negative for confusion. All other systems reviewed and are negative. Positives and Pertinent negatives as per HPI. Except as noted above in the ROS, problem specific ROS was completed and is negative.     Physical Exam:  Physical Exam   Constitutional: She is oriented to person, place, and time. Vital signs are normal. She appears well-developed and well-nourished. Non-toxic appearance. No distress. HENT:   Head: Normocephalic and atraumatic. Eyes: Pupils are equal, round, and reactive to light. Conjunctivae and EOM are normal. No scleral icterus. Neck: Normal range of motion. Neck supple. No JVD present. Cardiovascular: Normal rate and regular rhythm. Exam reveals no gallop and no friction rub. No murmur heard. Pulmonary/Chest: Effort normal and breath sounds normal. No respiratory distress. Abdominal: Soft. Normal appearance. She exhibits no distension. There is no tenderness. There is no rigidity. Musculoskeletal: Normal range of motion. Neurological: She is alert and oriented to person, place, and time. No cranial nerve deficit. Skin: Skin is warm, dry and intact. Capillary refill takes less than 2 seconds. No rash noted. Psychiatric: She has a normal mood and affect. Nursing note and vitals reviewed.       MEDICAL DECISION MAKING    Vitals:    Vitals:    04/22/19 2200   BP: (!) 147/84   Pulse: 69   Resp: 16   Temp: 97.6 °F (36.4 °C)   TempSrc: Oral   SpO2: 99%   Weight: 106 lb 4.2 oz (48.2 kg)   Height: 4' 11\" (1.499 m)       LABS:  Labs Reviewed   COMPREHENSIVE METABOLIC PANEL - Abnormal; Notable for the following components:       Result Value    Sodium 135 (*)     CO2 16 (*)     Anion Gap 19 (*)     Glucose 120 (*)     BUN 89 (*)     CREATININE 4.0 (*)     GFR Non- 11 (*)     GFR African American 13 (*)     Alkaline Phosphatase 191 (*)     All other components within normal limits    Narrative:     [facility] tel. [phone],  Chemistry results called to and read back by Renard Lara, 04/22/2019 23:07,  by [employer]  Performed at:  [address]   Phone [phone]   URINE RT REFLEX TO CULTURE - Abnormal; Notable for the following components: Clarity, UA CLOUDY (*)     Protein, UA TRACE (*)     Leukocyte Esterase, Urine SMALL (*)     All other components within normal limits    Narrative:     Performed at:  [place] [address]   Phone [phone]   CBC WITH AUTO DIFFERENTIAL - Abnormal; Notable for the following components:    RBC 3.98 (*)     Hematocrit 35.4 (*)     All other components within normal limits    Narrative:     Performed at:  [place] [address]   Phone [phone]   MICROSCOPIC URINALYSIS - Abnormal; Notable for the following components:    CASTS 2 1-3 Hyaline (*)     WBC, UA 10-20 (*)     Bacteria, UA 3+ (*)     Amorphous, UA 1+ (*)     All other components within normal limits    Narrative:     Performed at:  [place] [address]   Phone [phone]   URINE CULTURE   CREATININE, RANDOM URINE    Narrative:     Performed at:  [place] [address]   Phone [phone]   BASIC METABOLIC PANEL   EOSINOPHIL SMEAR URINE   SODIUM, URINE, RANDOM        Remainder of labs reviewed and werenegative at this time or not returned at the time of this note. RADIOLOGY:   Non-plain film images such as CT, Ultrasound and MRI are read by the radiologist. RIAZ Fountain CNP have directly visualized the radiologic plain film image(s) with the below findings:        Interpretation per the Radiologist below, if available at the time of thisnote:    US RENAL COMPLETE    (Results Pending)        No results found.      MEDICAL DECISION MAKING / ED COURSE:      PROCEDURES:   Procedures    None    Patient was given:     Medications   0.9 % sodium chloride bolus (500 mLs Intravenous New Bag 4/23/19 0003)   cefTRIAXone (ROCEPHIN) 1 g IVPB in 50 mL D5W minibag (has no

## 2019-04-23 NOTE — ED NOTES
Patient c/o being cold and warm blanket placed on patient. Side rails up and call light near. Lights in room off. Denies further needs at present.       Ayan Berger RN  04/23/19 2106

## 2019-04-23 NOTE — ED NOTES
Nephrology called to check on lab results from 0400 BMP. Lab called to find out why lab was not drawn on admitted patient and state they will send someone to draw.       Stormy Gtz RN  04/23/19 0722

## 2019-04-23 NOTE — TELEPHONE ENCOUNTER
Spoke with patient about critical BMP results    Told her to go to [address] [place] [address],[address] ED. She verified that her son can take her    Pt also verified that she stopped the HCTZ for 2 days as instructed last week, then went to taking it every other day. She had only 2 doses since prior BMP.

## 2019-04-23 NOTE — H&P
[address]                              HISTORY AND PHYSICAL    PATIENT NAME: Yaneli Marinelli                   :        1935  MED REC NO:   [id number]                          ROOM:       214  ACCOUNT NO:   [de-identified]                           ADMIT DATE: 2019  PROVIDER:     Torsten Antoine MD    I obtained the history and performed the physical exam on the patient in  the emergency room on 2019. CHIEF COMPLAINT:  \"My doctor asked me to come in. \"    HISTORY OF PRESENT ILLNESS:  This 60-year-old  female who has  no complaints whatsoever, states that her doctor has been monitoring her  kidneys and asked her to come in to the hospital to the emergency room  \"immediately\" because apparently her kidney function was not well. The  patient denies any fevers or chills or weakness, or nausea or vomiting. The patient once again provides a rather diverse history and has to be  continuously redirected to provide pertinent history because does not  seem to be having any physical issues at this point in time. PAST MEDICAL/PAST SURGICAL HISTORY:  Hypertension. ALLERGIC HISTORY:  No known drug allergies. FAMILY HISTORY:  Reviewed by me and is currently noncontributory. SOCIAL HISTORY:  Lives at home. Nonsmoker. No illicit substance use. MEDICATIONS:  The patient's home medication list has been reviewed. The  patient is on calcium carbonate, vitamin D, hydrochlorothiazide,  lisinopril, omeprazole, and ranitidine. REVIEW OF SYSTEMS:  The patient's review of systems is per the history  of present illness. All other systems have been reviewed and are  negative except for the history of present illness. PHYSICAL EXAMINATION:  GENERAL:  The patient was examined by me in the emergency room.   VITAL SIGNS:  Temperature 97.6, respiratory rate 16, pulse 69, blood  pressure [email or web address]  Job#: [id number]     Doc#: [id number]    CC:  <>

## 2019-04-23 NOTE — PROGRESS NOTES
allergies. Past medical and surgical history reviewed. Any changes have been noted. Scheduled and prn Medications:    Scheduled Meds:   pantoprazole  40 mg Oral QAM AC    sodium chloride flush  10 mL Intravenous 2 times per day    heparin (porcine)  5,000 Units Subcutaneous 3 times per day    cefTRIAXone (ROCEPHIN) IV  1 g Intravenous Q24H    lactobacillus  1 capsule Oral BID WC     Continuous Infusions:   IV infusion builder 100 mL/hr at 04/23/19 0238     PRN Meds:.sodium chloride flush, ondansetron    PHYSICAL EXAM:  BP (!) 116/44   Pulse 57   Temp 98 °F (36.7 °C) (Oral)   Resp 14   Ht 4' 11\" (1.499 m)   Wt 106 lb 4.2 oz (48.2 kg)   SpO2 99%   BMI 21.46 kg/m²       Intake/Output Summary (Last 24 hours) at 4/23/2019 0910  Last data filed at 4/23/2019 0103  Gross per 24 hour   Intake 500 ml   Output --   Net 500 ml       General: Alert and oriented. Up at bedside in NAD. Pleasant and cooperative. Thin. [place]. Family at bedside. HEENT: Normocephalic. Atraumatic. Pupils equal and reactive. EOM intact. Oral mucosa pink/moist/intact. Neck: Supple. Symmetrical. Trachea midline. Lungs: Clear to auscultation bilaterally. Respirations even and unlabored. Chest: Exam unremarkable. Cardiac: S1/S2 noted. Regular Rhythm and rate. Abdomen/GI: Soft. Non-tender. Non-distended. BS+. Extremities: PP+. Atraumatic. No redness/cyanosis/edema noted. Brisk cap refill. Skin: Dry and intact. No lesions noted. Neuro: Grossly intact. No focal deficits noted.      LABS:    Lab Results   Component Value Date    WBC 8.7 04/22/2019    HGB 12.0 04/22/2019    HCT 35.4 (L) 04/22/2019    MCV 88.9 04/22/2019     04/22/2019    LYMPHOPCT 13.1 04/22/2019    RBC 3.98 (L) 04/22/2019    MCH 30.1 04/22/2019    MCHC 33.8 04/22/2019    RDW 12.9 04/22/2019       Lab Results   Component Value Date    CREATININE 4.1 (H) 04/23/2019    BUN 82 (HH) 04/23/2019     04/23/2019    K 4.3 04/23/2019     04/23/2019    CO2 18 Other:    Code Status: Full Code    ELOS: TBEDWARD Swartz, APRN - NP  04/23/19

## 2019-04-23 NOTE — PROGRESS NOTES
[facility]    Consult received. Chart reviewed. Case discussed with ED NP. Full consult to be done in am. NS 1L IVF bolus then 1/2NS with 75meq bicarb @100cc/hr. Urine lytes and urine eosinophil ordered with renal ultrasound. Please do not hesitate to call [phone] if with questions or concerns. Thank you for the consult. Nirmal Stone MD

## 2019-04-23 NOTE — ED NOTES
Patient asleep in bed with side rails up and call light near. Sinus rhythm on cardiac monitor.       Stacey Medrano RN  04/23/19 2124

## 2019-04-23 NOTE — ED NOTES
Patient resting in bed at this time, breakfast tray ordered. Daughter at bedside.       Eugenia Jaffe RN  04/23/19 0237

## 2019-04-23 NOTE — PROGRESS NOTES
Medication Reconciliation    List of medications patient is currently taking is complete. Source of information: 1. Conversation with patient and family at bedside                                      2. EPIC records      Allergies  Patient has no known allergies. Notes regarding home medications:   1. Patient's HCTZ was recently changed to 12.5 mg po Three Times Weekly on MON/WED/FRI.     Danya DelgadoD, BCPS  4/23/2019 11:54 AM

## 2019-04-24 LAB
ANION GAP SERPL CALCULATED.3IONS-SCNC: 12 MMOL/L (ref 3–16)
BASOPHILS ABSOLUTE: 0.1 K/UL (ref 0–0.2)
BASOPHILS RELATIVE PERCENT: 1.1 %
BUN BLDV-MCNC: 53 MG/DL (ref 7–20)
CALCIUM SERPL-MCNC: 7.9 MG/DL (ref 8.3–10.6)
CHLORIDE BLD-SCNC: 109 MMOL/L (ref 99–110)
CO2: 22 MMOL/L (ref 21–32)
CREAT SERPL-MCNC: 2.7 MG/DL (ref 0.6–1.2)
EOSINOPHILS ABSOLUTE: 0.1 K/UL (ref 0–0.6)
EOSINOPHILS RELATIVE PERCENT: 1.6 %
GFR AFRICAN AMERICAN: 20
GFR NON-AFRICAN AMERICAN: 17
GLUCOSE BLD-MCNC: 105 MG/DL (ref 70–99)
HCT VFR BLD CALC: 31.6 % (ref 36–48)
HEMOGLOBIN: 11 G/DL (ref 12–16)
LYMPHOCYTES ABSOLUTE: 1.4 K/UL (ref 1–5.1)
LYMPHOCYTES RELATIVE PERCENT: 19 %
MCH RBC QN AUTO: 30.7 PG (ref 26–34)
MCHC RBC AUTO-ENTMCNC: 34.7 G/DL (ref 31–36)
MCV RBC AUTO: 88.4 FL (ref 80–100)
MONOCYTES ABSOLUTE: 0.7 K/UL (ref 0–1.3)
MONOCYTES RELATIVE PERCENT: 9.8 %
NEUTROPHILS ABSOLUTE: 4.9 K/UL (ref 1.7–7.7)
NEUTROPHILS RELATIVE PERCENT: 68.5 %
PDW BLD-RTO: 12.7 % (ref 12.4–15.4)
PLATELET # BLD: 251 K/UL (ref 135–450)
PMV BLD AUTO: 7.2 FL (ref 5–10.5)
POTASSIUM REFLEX MAGNESIUM: 3.7 MMOL/L (ref 3.5–5.1)
RBC # BLD: 3.58 M/UL (ref 4–5.2)
SODIUM BLD-SCNC: 143 MMOL/L (ref 136–145)
URINE CULTURE, ROUTINE: NORMAL
WBC # BLD: 7.2 K/UL (ref 4–11)

## 2019-04-24 PROCEDURE — 85025 COMPLETE CBC W/AUTO DIFF WBC: CPT

## 2019-04-24 PROCEDURE — 6370000000 HC RX 637 (ALT 250 FOR IP): Performed by: NURSE PRACTITIONER

## 2019-04-24 PROCEDURE — 2500000003 HC RX 250 WO HCPCS: Performed by: NURSE PRACTITIONER

## 2019-04-24 PROCEDURE — 2580000003 HC RX 258: Performed by: NURSE PRACTITIONER

## 2019-04-24 PROCEDURE — 94760 N-INVAS EAR/PLS OXIMETRY 1: CPT

## 2019-04-24 PROCEDURE — 2580000003 HC RX 258: Performed by: INTERNAL MEDICINE

## 2019-04-24 PROCEDURE — 80048 BASIC METABOLIC PNL TOTAL CA: CPT

## 2019-04-24 PROCEDURE — 6360000002 HC RX W HCPCS: Performed by: INTERNAL MEDICINE

## 2019-04-24 PROCEDURE — 36415 COLL VENOUS BLD VENIPUNCTURE: CPT

## 2019-04-24 PROCEDURE — 94640 AIRWAY INHALATION TREATMENT: CPT

## 2019-04-24 PROCEDURE — 6370000000 HC RX 637 (ALT 250 FOR IP): Performed by: INTERNAL MEDICINE

## 2019-04-24 PROCEDURE — 6360000002 HC RX W HCPCS: Performed by: NURSE PRACTITIONER

## 2019-04-24 PROCEDURE — 1200000000 HC SEMI PRIVATE

## 2019-04-24 RX ORDER — ALBUTEROL SULFATE 90 UG/1
2 AEROSOL, METERED RESPIRATORY (INHALATION) EVERY 6 HOURS PRN
Status: DISCONTINUED | OUTPATIENT
Start: 2019-04-24 | End: 2019-04-29 | Stop reason: HOSPADM

## 2019-04-24 RX ORDER — ACETAMINOPHEN 500 MG
1000 TABLET ORAL EVERY 8 HOURS SCHEDULED
Status: DISCONTINUED | OUTPATIENT
Start: 2019-04-24 | End: 2019-04-29 | Stop reason: HOSPADM

## 2019-04-24 RX ORDER — DULOXETIN HYDROCHLORIDE 60 MG/1
60 CAPSULE, DELAYED RELEASE ORAL DAILY
Status: DISCONTINUED | OUTPATIENT
Start: 2019-04-24 | End: 2019-04-29 | Stop reason: HOSPADM

## 2019-04-24 RX ORDER — BISACODYL 10 MG
10 SUPPOSITORY, RECTAL RECTAL DAILY PRN
Status: DISCONTINUED | OUTPATIENT
Start: 2019-04-24 | End: 2019-04-29 | Stop reason: HOSPADM

## 2019-04-24 RX ORDER — DOCUSATE SODIUM 100 MG/1
100 CAPSULE, LIQUID FILLED ORAL DAILY
Status: DISCONTINUED | OUTPATIENT
Start: 2019-04-24 | End: 2019-04-29 | Stop reason: HOSPADM

## 2019-04-24 RX ADMIN — HEPARIN SODIUM 5000 UNITS: 5000 INJECTION INTRAVENOUS; SUBCUTANEOUS at 13:49

## 2019-04-24 RX ADMIN — ACETAMINOPHEN 1000 MG: 500 TABLET ORAL at 16:08

## 2019-04-24 RX ADMIN — Medication 10 ML: at 21:35

## 2019-04-24 RX ADMIN — ALBUTEROL SULFATE 2 PUFF: 90 AEROSOL, METERED RESPIRATORY (INHALATION) at 22:24

## 2019-04-24 RX ADMIN — Medication 10 ML: at 08:44

## 2019-04-24 RX ADMIN — SODIUM BICARBONATE: 84 INJECTION, SOLUTION INTRAVENOUS at 02:03

## 2019-04-24 RX ADMIN — Medication 1 CAPSULE: at 16:08

## 2019-04-24 RX ADMIN — HEPARIN SODIUM 5000 UNITS: 5000 INJECTION INTRAVENOUS; SUBCUTANEOUS at 06:22

## 2019-04-24 RX ADMIN — DULOXETINE HYDROCHLORIDE 60 MG: 60 CAPSULE, DELAYED RELEASE ORAL at 16:08

## 2019-04-24 RX ADMIN — Medication 1 CAPSULE: at 08:43

## 2019-04-24 RX ADMIN — SODIUM BICARBONATE: 84 INJECTION, SOLUTION INTRAVENOUS at 14:07

## 2019-04-24 RX ADMIN — HEPARIN SODIUM 5000 UNITS: 5000 INJECTION INTRAVENOUS; SUBCUTANEOUS at 21:35

## 2019-04-24 RX ADMIN — ACETAMINOPHEN 1000 MG: 500 TABLET ORAL at 21:35

## 2019-04-24 RX ADMIN — CEFTRIAXONE 1 G: 1 INJECTION, POWDER, FOR SOLUTION INTRAMUSCULAR; INTRAVENOUS at 00:20

## 2019-04-24 RX ADMIN — PANTOPRAZOLE SODIUM 40 MG: 40 TABLET, DELAYED RELEASE ORAL at 06:22

## 2019-04-24 RX ADMIN — DOCUSATE SODIUM 100 MG: 100 CAPSULE, LIQUID FILLED ORAL at 13:49

## 2019-04-24 ASSESSMENT — PAIN SCALES - GENERAL
PAINLEVEL_OUTOF10: 0
PAINLEVEL_OUTOF10: 3

## 2019-04-24 ASSESSMENT — PAIN DESCRIPTION - ORIENTATION: ORIENTATION: OTHER (COMMENT)

## 2019-04-24 ASSESSMENT — PAIN DESCRIPTION - PAIN TYPE: TYPE: CHRONIC PAIN

## 2019-04-24 ASSESSMENT — PAIN DESCRIPTION - PROGRESSION: CLINICAL_PROGRESSION: GRADUALLY WORSENING

## 2019-04-24 ASSESSMENT — PAIN DESCRIPTION - DESCRIPTORS: DESCRIPTORS: ACHING

## 2019-04-24 ASSESSMENT — PAIN DESCRIPTION - FREQUENCY: FREQUENCY: CONTINUOUS

## 2019-04-24 ASSESSMENT — PAIN DESCRIPTION - ONSET: ONSET: GRADUAL

## 2019-04-24 ASSESSMENT — PAIN DESCRIPTION - LOCATION: LOCATION: GENERALIZED

## 2019-04-24 NOTE — CONSULTS
continue the IV fluids  with the bicarb. We need to address the concomitant acidosis and  continue to hold the suspected offending agents which include Celebrex,  lisinopril, and the hydrochlorothiazide. Ultrasound and CAT scan have  been ordered, the results of which are pending. Her fractional  excretion of sodium is 1.9. RECOMMENDATIONS:  1. Continue IV fluids. 2.  Continue to hold the offending agents. 3.  No immediate indication for renal replacement therapy at this point  in time. 4.  Maintain Win. Thank you for the consultation.         Keke Watters MD    D: 04/23/2019 11:37:10       T: 04/23/2019 20:34:37     [email or web address]  Job#: [id number]     Doc#: [id number]    CC:  <>

## 2019-04-24 NOTE — PLAN OF CARE
Pain/discomfort being managed with PRN analgesics per MD orders. Pt able to express presence and absence of pain and rate pain appropriately using numerical scale. Skin assessment performed each shift per protocol. Skin care protocol in place. Pt turned and repositioned every two hours and prn with pillow support.

## 2019-04-24 NOTE — PROGRESS NOTES
Nephrology Attending Progress Note      SUBJECTIVE:  We are following this patient for JERRELL. The patient was admitted with abnormal labs. Resting in bed; NAD; No SOB; UO more than adequate. Has been having chest pain through out the night    Scheduled Meds:   pantoprazole  40 mg Oral QAM AC    sodium chloride flush  10 mL Intravenous 2 times per day    heparin (porcine)  5,000 Units Subcutaneous 3 times per day    lactobacillus  1 capsule Oral BID WC     Continuous Infusions:   IV infusion builder 100 mL/hr at 19 0203     PRN Meds:.sodium chloride flush, ondansetron    Physical Exam:    TEMPERATURE:  Current - Temp: 98.6 °F (37 °C); Max - Temp  Av.5 °F (36.9 °C)  Min: 97.9 °F (36.6 °C)  Max: 98.9 °F (37.2 °C)  RESPIRATIONS RANGE: Resp  Av.2  Min: 14  Max: 24  PULSE RANGE: Pulse  Av.4  Min: 59  Max: 90  BLOOD PRESSURE RANGE:  Systolic (52JQE), QZX:759 , Min:110 , TPE:712   ; Diastolic (72FGE), YJN:06, Min:50, Max:79    24HR INTAKE/OUTPUT:      Intake/Output Summary (Last 24 hours) at 2019 1024  Last data filed at 2019 6885  Gross per 24 hour   Intake 3769 ml   Output 5350 ml   Net -1581 ml       GENERAL:  In no apparent distress, conversant  HEENT:  Ears and nose appear externally without deformity. MMM. Normocephalic, atraumatic. Eyes:  Anicteric sclera. Moist conjunctiva. No lid lag. KIERRA  NECK:  Free range of motion. Supple. No thyromegaly. CHEST:  Clear to auscultation bilaterally with no intercostal retractions. CARDIOVASCULAR:  Regular rate and rhythm. No rubs. ABDOMEN:  Soft, nontender. Bowel sounds present. No organomegaly. EXTREMITIES:  Lower extremities:  No lower extremity edema. No extremity lymphadenopathy. PSYCHIATRIC:  Appropriate affect. Alert and oriented to time, place,  and person. Does not appear to be in any distress. NEUROLOGIC:  No asterixis.   No focal motor neurological

## 2019-04-24 NOTE — PROGRESS NOTES
[facility] Medicine Progress Note      Admit Date: 4/22/2019         Overnight Events: No    CC: F/U for Abnormal labs    HPI: The patient is an 81year old female who presented to [facility] ER after her doctor contacted her regarding abnormal labs. The patient's provider had been monitoring her renal function and notified her to report to the ER immediately, as she was in acute on chronic stage III kidney disease. The patient was admitted for further workup and treatment. Nephrology was consulted. Sodium bicarb was initiated. Creatinine began to trend down. Interval History/Subjective: No new complaints. She did have another episode of chest tightness last evening.     Review of Systems:     _____________________________________________________________________  General ROS:  []  fevers  []  chills  []  fatigue  []  weakness  []  night sweats  []  body aches [] Other:  _____________________________________________________________________  HEENT ROS:  []  trauma  []  headache  []  visual changes  []  double vision  []  blurred vision  []  tinnitus  []  vertigo  []  ear ache  []  drainage  []  bleeding gums  []  hoarseness  []  voice change  []  difficult/painful swallowing  []  stuffiness  []  rhinorrhea  []  sneezing  []  epistaxis [] Other:  _____________________________________________________________________  Respiratory ROS:  []  cough  []  SOB  []  wheezing  []  changes in sputum production or quality  []  hemoptysis  []  pleurisy  []  snoring [x] Other: Chest tightness  _____________________________________________________________________  Cardiovascular ROS:  [] palpitations  []  pain  []  CARR  []  orthopnea  []  syncope  []  lower extremity edema [] Other:  _____________________________________________________________________  Gastrointestinal ROS:  []  Dysphagia  [] ABD pain  []  nausea  []  vomiting  []  indigestion  []  diarrhea  []  constipation [] Other:  _____________________________________________________________________  Genitourinary:  []  frequency  []  polyuria  []  nocturia  []  hesitancy  []  urgency  []  hematuria  []  incontinence [] Other:  _____________________________________________________________________  Musculoskeletal ROS:  []  muscle or joint pain  []  stiffness  [] arthritis  []  gout  []  weakness  []  redness  []  swelling  []  instability [] Other:  _____________________________________________________________________  Endocrine ROS:  []  heat/cold intolerance  []  sweating  []  excessive thirst or hunger [] Other:  _____________________________________________________________________  Neurological ROS:  []  Seizures  []  numbness  []  tingling  []  fainting  []  burning  []  tremors [] Other:  _____________________________________________________________________  Psych ROS:  []  Anxiety  []  depression  []  abnormal thoughts [] Other:  _____________________________________________________________________  Dermatological ROS:  []  Rash  []  sores  []  lumps  []  skin changes  []  changes to hair or nails [] Other:  _____________________________________________________________________      Comprehensive ROS negative except as mentioned above.       Past Medical History:        Diagnosis Date    Allergic rhinitis     GERD (gastroesophageal reflux disease)     Hearing loss sensory, bilateral     mod--severe on right, mild to severe on left, due to asymmetry will send to get MRI to r/o acoustic neuroma    Mastoiditis     on MRI oct 2014-->dr ortiz states with no clinical findings this is considered an over-sensitivity of MRI results    Osteoarthritis     L1-L5    Osteoporosis     patient was on fosamax x 10 years and drug holiday was started on 6/21/13. (this was date of last DEXA scan)    Scoliosis        Past Surgical History:        Procedure Laterality Date    CARPAL TUNNEL RELEASE      right, 2003  COLONOSCOPY 2013,  ben    COLONOSCOPY N/A 10/19/2018    COLONOSCOPY WITH BIOPSY performed by Gabriela Bautista MD at [facility]      December 27 2017 and feb 6 2018   Wali Garcia      2011    NASAL SINUS SURGERY      UPPER GASTROINTESTINAL ENDOSCOPY N/A 4/4/2019    EGD BIOPSY performed by Gabriela Bautista MD at [address]:  Patient has no known allergies. Past medical and surgical history reviewed. Any changes have been noted. Scheduled and prn Medications:    Scheduled Meds:   pantoprazole  40 mg Oral QAM AC    sodium chloride flush  10 mL Intravenous 2 times per day    heparin (porcine)  5,000 Units Subcutaneous 3 times per day    lactobacillus  1 capsule Oral BID WC     Continuous Infusions:   IV infusion builder 100 mL/hr at 04/24/19 0203     PRN Meds:.sodium chloride flush, ondansetron    PHYSICAL EXAM:  /69   Pulse 59   Temp 98.6 °F (37 °C) (Oral)   Resp 16   Ht 4' 11\" (1.499 m)   Wt 114 lb 10.2 oz (52 kg)   SpO2 97%   BMI 23.15 kg/m²       Intake/Output Summary (Last 24 hours) at 4/24/2019 0935  Last data filed at 4/24/2019 2309  Gross per 24 hour   Intake 3769 ml   Output 5350 ml   Net -1581 ml       General: Alert and oriented. Up at bedside in NAD. Pleasant and cooperative. Thin. [place]. Family at bedside. HEENT: Normocephalic. Atraumatic. Pupils equal and reactive. EOM intact. Oral mucosa pink/moist/intact. Neck: Supple. Symmetrical. Trachea midline. Lungs: Clear to auscultation bilaterally. Respirations even and unlabored. Chest: Exam unremarkable. Cardiac: S1/S2 noted. Regular Rhythm and rate. Abdomen/GI: Soft. Non-tender. Non-distended. BS+. Extremities: PP+. Atraumatic. No redness/cyanosis/edema noted. Brisk cap refill. Skin: Dry and intact. No lesions noted. Neuro: Grossly intact. No focal deficits noted.      LABS:    Lab Results   Component Value Date    WBC 7.2 04/24/2019    HGB 11.0 (L) 04/24/2019    HCT 31.6 (L) 04/24/2019    MCV

## 2019-04-24 NOTE — CARE COORDINATION
INITIAL CASE MANAGEMENT ASSESSMENT    Reviewed chart, met with patient to assess possible discharge needs. Explained Case Management role/services. Living Situation: Patient lives alone in a multilevel condo with 1 step to enter from front door and 2 steps to enter from garage. Stair lift inside of home. ADLs: Independent     DME: grab bars in shower    PT/OT Recs: Ordered     Active Services: None     Transportation: Active . Reports family will transport at discharge. Medications: No barriers to taking/obtaining medications. Uses [facility] pharmacy and a pill box    PCP: Dr. Shamir Romo      HD/PD: N/A    PLAN/COMMENTS: PT/OT pending. No additional needs identified    SW/CM provided contact information for patient or family to call with any questions. SW/CM will follow and assist as needed.       Electronically signed by GIA Caballero on 4/24/2019 at 12:28 PM

## 2019-04-25 LAB
ANION GAP SERPL CALCULATED.3IONS-SCNC: 11 MMOL/L (ref 3–16)
BASOPHILS ABSOLUTE: 0 K/UL (ref 0–0.2)
BASOPHILS RELATIVE PERCENT: 0.5 %
BUN BLDV-MCNC: 31 MG/DL (ref 7–20)
CALCIUM SERPL-MCNC: 7.5 MG/DL (ref 8.3–10.6)
CHLORIDE BLD-SCNC: 102 MMOL/L (ref 99–110)
CO2: 25 MMOL/L (ref 21–32)
CREAT SERPL-MCNC: 1.9 MG/DL (ref 0.6–1.2)
EOSINOPHILS ABSOLUTE: 0 K/UL (ref 0–0.6)
EOSINOPHILS RELATIVE PERCENT: 0.3 %
GFR AFRICAN AMERICAN: 31
GFR NON-AFRICAN AMERICAN: 25
GLUCOSE BLD-MCNC: 129 MG/DL (ref 70–99)
HCT VFR BLD CALC: 32.2 % (ref 36–48)
HEMOGLOBIN: 11.1 G/DL (ref 12–16)
LYMPHOCYTES ABSOLUTE: 1.2 K/UL (ref 1–5.1)
LYMPHOCYTES RELATIVE PERCENT: 12.3 %
MAGNESIUM: 1.9 MG/DL (ref 1.8–2.4)
MCH RBC QN AUTO: 30.2 PG (ref 26–34)
MCHC RBC AUTO-ENTMCNC: 34.6 G/DL (ref 31–36)
MCV RBC AUTO: 87.4 FL (ref 80–100)
MONOCYTES ABSOLUTE: 0.9 K/UL (ref 0–1.3)
MONOCYTES RELATIVE PERCENT: 9.6 %
NEUTROPHILS ABSOLUTE: 7.4 K/UL (ref 1.7–7.7)
NEUTROPHILS RELATIVE PERCENT: 77.3 %
PDW BLD-RTO: 12.6 % (ref 12.4–15.4)
PLATELET # BLD: 257 K/UL (ref 135–450)
PMV BLD AUTO: 7.4 FL (ref 5–10.5)
POTASSIUM REFLEX MAGNESIUM: 3.3 MMOL/L (ref 3.5–5.1)
RBC # BLD: 3.69 M/UL (ref 4–5.2)
SODIUM BLD-SCNC: 138 MMOL/L (ref 136–145)
WBC # BLD: 9.5 K/UL (ref 4–11)

## 2019-04-25 PROCEDURE — 97162 PT EVAL MOD COMPLEX 30 MIN: CPT

## 2019-04-25 PROCEDURE — 6370000000 HC RX 637 (ALT 250 FOR IP): Performed by: NURSE PRACTITIONER

## 2019-04-25 PROCEDURE — 97530 THERAPEUTIC ACTIVITIES: CPT

## 2019-04-25 PROCEDURE — 97166 OT EVAL MOD COMPLEX 45 MIN: CPT

## 2019-04-25 PROCEDURE — 85025 COMPLETE CBC W/AUTO DIFF WBC: CPT

## 2019-04-25 PROCEDURE — 2500000003 HC RX 250 WO HCPCS: Performed by: NURSE PRACTITIONER

## 2019-04-25 PROCEDURE — 6370000000 HC RX 637 (ALT 250 FOR IP): Performed by: INTERNAL MEDICINE

## 2019-04-25 PROCEDURE — 2580000003 HC RX 258: Performed by: INTERNAL MEDICINE

## 2019-04-25 PROCEDURE — 6360000002 HC RX W HCPCS: Performed by: INTERNAL MEDICINE

## 2019-04-25 PROCEDURE — 36415 COLL VENOUS BLD VENIPUNCTURE: CPT

## 2019-04-25 PROCEDURE — 97116 GAIT TRAINING THERAPY: CPT

## 2019-04-25 PROCEDURE — 1200000000 HC SEMI PRIVATE

## 2019-04-25 PROCEDURE — 93971 EXTREMITY STUDY: CPT

## 2019-04-25 PROCEDURE — 83735 ASSAY OF MAGNESIUM: CPT

## 2019-04-25 PROCEDURE — 80048 BASIC METABOLIC PNL TOTAL CA: CPT

## 2019-04-25 PROCEDURE — 97535 SELF CARE MNGMENT TRAINING: CPT

## 2019-04-25 PROCEDURE — 2580000003 HC RX 258: Performed by: NURSE PRACTITIONER

## 2019-04-25 RX ORDER — POTASSIUM CHLORIDE 20 MEQ/1
20 TABLET, EXTENDED RELEASE ORAL 2 TIMES DAILY WITH MEALS
Status: DISCONTINUED | OUTPATIENT
Start: 2019-04-25 | End: 2019-04-29 | Stop reason: HOSPADM

## 2019-04-25 RX ORDER — LANOLIN ALCOHOL/MO/W.PET/CERES
400 CREAM (GRAM) TOPICAL 2 TIMES DAILY
Status: DISCONTINUED | OUTPATIENT
Start: 2019-04-25 | End: 2019-04-29 | Stop reason: HOSPADM

## 2019-04-25 RX ORDER — POLYETHYLENE GLYCOL 3350 17 G/17G
17 POWDER, FOR SOLUTION ORAL 2 TIMES DAILY
Status: DISCONTINUED | OUTPATIENT
Start: 2019-04-25 | End: 2019-04-29 | Stop reason: HOSPADM

## 2019-04-25 RX ADMIN — Medication 1 CAPSULE: at 18:16

## 2019-04-25 RX ADMIN — SODIUM BICARBONATE: 84 INJECTION, SOLUTION INTRAVENOUS at 11:49

## 2019-04-25 RX ADMIN — POLYETHYLENE GLYCOL 3350 17 G: 17 POWDER, FOR SOLUTION ORAL at 21:27

## 2019-04-25 RX ADMIN — SODIUM BICARBONATE: 84 INJECTION, SOLUTION INTRAVENOUS at 01:13

## 2019-04-25 RX ADMIN — HEPARIN SODIUM 5000 UNITS: 5000 INJECTION INTRAVENOUS; SUBCUTANEOUS at 06:53

## 2019-04-25 RX ADMIN — MAGNESIUM OXIDE TAB 400 MG (240 MG ELEMENTAL MG) 400 MG: 400 (240 MG) TAB at 11:49

## 2019-04-25 RX ADMIN — Medication 10 ML: at 21:27

## 2019-04-25 RX ADMIN — POTASSIUM CHLORIDE 20 MEQ: 20 TABLET, EXTENDED RELEASE ORAL at 18:16

## 2019-04-25 RX ADMIN — DILTIAZEM HYDROCHLORIDE 30 MG: 30 TABLET, FILM COATED ORAL at 14:14

## 2019-04-25 RX ADMIN — POLYETHYLENE GLYCOL 3350 17 G: 17 POWDER, FOR SOLUTION ORAL at 16:02

## 2019-04-25 RX ADMIN — SODIUM BICARBONATE: 84 INJECTION, SOLUTION INTRAVENOUS at 22:43

## 2019-04-25 RX ADMIN — ACETAMINOPHEN 1000 MG: 500 TABLET ORAL at 21:27

## 2019-04-25 RX ADMIN — ACETAMINOPHEN 1000 MG: 500 TABLET ORAL at 14:14

## 2019-04-25 RX ADMIN — DULOXETINE HYDROCHLORIDE 60 MG: 60 CAPSULE, DELAYED RELEASE ORAL at 08:17

## 2019-04-25 RX ADMIN — DOCUSATE SODIUM 100 MG: 100 CAPSULE, LIQUID FILLED ORAL at 08:17

## 2019-04-25 RX ADMIN — Medication 1 CAPSULE: at 08:17

## 2019-04-25 RX ADMIN — HEPARIN SODIUM 5000 UNITS: 5000 INJECTION INTRAVENOUS; SUBCUTANEOUS at 21:28

## 2019-04-25 RX ADMIN — PANTOPRAZOLE SODIUM 40 MG: 40 TABLET, DELAYED RELEASE ORAL at 06:53

## 2019-04-25 RX ADMIN — HEPARIN SODIUM 5000 UNITS: 5000 INJECTION INTRAVENOUS; SUBCUTANEOUS at 14:14

## 2019-04-25 RX ADMIN — DILTIAZEM HYDROCHLORIDE 30 MG: 30 TABLET, FILM COATED ORAL at 21:27

## 2019-04-25 RX ADMIN — POTASSIUM CHLORIDE 20 MEQ: 20 TABLET, EXTENDED RELEASE ORAL at 11:49

## 2019-04-25 RX ADMIN — ACETAMINOPHEN 1000 MG: 500 TABLET ORAL at 06:53

## 2019-04-25 RX ADMIN — MAGNESIUM OXIDE TAB 400 MG (240 MG ELEMENTAL MG) 400 MG: 400 (240 MG) TAB at 21:27

## 2019-04-25 ASSESSMENT — PAIN SCALES - GENERAL
PAINLEVEL_OUTOF10: 0
PAINLEVEL_OUTOF10: 2
PAINLEVEL_OUTOF10: 0

## 2019-04-25 ASSESSMENT — PAIN DESCRIPTION - LOCATION: LOCATION: NECK

## 2019-04-25 ASSESSMENT — PAIN DESCRIPTION - DESCRIPTORS: DESCRIPTORS: DISCOMFORT

## 2019-04-25 ASSESSMENT — PAIN - FUNCTIONAL ASSESSMENT: PAIN_FUNCTIONAL_ASSESSMENT: ACTIVITIES ARE NOT PREVENTED

## 2019-04-25 ASSESSMENT — PAIN DESCRIPTION - ORIENTATION: ORIENTATION: POSTERIOR

## 2019-04-25 ASSESSMENT — PAIN DESCRIPTION - PAIN TYPE: TYPE: ACUTE PAIN

## 2019-04-25 NOTE — PROGRESS NOTES
Physical Therapy     Initial Assessment / Treatment Note    Room Number: A8W-1239/9950-47  NAME: Bianca Griffith  : Medical Record Number: [id number]  MRN: [id number]    ASSESSMENT: Joseph Mattson is an 81 y.o. F admit 19 with JERRELL -- per nephrology \"Acute kidney injury, likely prerenal in nature although nonoliguric ATN could not be excluded at this point. \" Prior to admit pt was living in  a multi-level condo alone and ambulating with no device -- pt does all of her own IADLs and still drives. Today patient was guarded with her movements and had some tremor/gastroc clonus with ambulation, however she tolerated ambulating short distances with no device and SBA. She appears to be functioning slightly below her baseline at this time. Pt and pt's dtr expressed concern/reservation about patient returning to her condo with her present functional status -- dtr expressed interest in SNF or IP rehab (she also expressed thoughts about pt moving into assisted living unit but states family is not in agreement on this). Recommend continued skilled PT to optimize progress patient back to independent transfers and ambulation with no device. Discharge Recommendations: 3-5 sessions per week, Patient would benefit from continued therapy after discharge  Equipment Needs: Equipment Needed: No    Date of Service: 19       Patient Diagnosis(es): The primary encounter diagnosis was JERRELL (acute kidney injury) ([facility] [address] A diagnosis of Urinary tract infection without hematuria, site unspecified was also pertinent to this visit. Past Medical History:  has a past medical history of Allergic rhinitis, GERD (gastroesophageal reflux disease), Hearing loss sensory, bilateral, Mastoiditis, Osteoarthritis, Osteoporosis, and Scoliosis. Past Surgical History:  has a past surgical history that includes Carpal tunnel release; Nasal sinus surgery; hernia repair; Colonoscopy; eye surgery;  Colonoscopy (N/A, 10/19/2018); and Upper gastrointestinal endoscopy (N/A, 4/4/2019). Restrictions  Restrictions/Precautions: Fall Risk             Vision/Hearing  Vision: Within Functional Limits  Hearing: Exceptions to [facility]  Hearing Exceptions: Hard of hearing/hearing concerns, Bilateral hearing aid    SUBJECTIVE:  Chart Reviewed: Yes  Patient assessed for rehabilitation services?: Yes  Additional Pertinent Hx: Alphonso Vitale is an 81 y.o. F admit 4/23/19 with JERRELL -- per nephrology \"Acute kidney injury, likely prerenal in nature although nonoliguric ATN could not be excluded at this point. \"  Referring Practitioner: RIAZ Fallon NP  Diagnosis: JERRELL     Subjective: Pt supine in bed resting with dtr in room visiting. Pt willing to get up; pt anxious about multiple concerns (foot pain, chest \"spasms\", etc). Pt denies any pain with bed mobility, transfers or gait. Patient Currently in Pain: No  Pain Assessment: 0-10  Pain Level: 0  Patient's Stated Pain Goal: No pain  Pain Type: Chronic pain  Pain Location: Generalized  Pain Orientation: Other (Comment)(gerneralized discomfort)  Pain Descriptors: Aching  Pain Frequency: Continuous  Pain Onset: Gradual  Clinical Progression: Gradually worsening  Non-Pharmaceutical Pain Intervention(s): Rest  Response to Pain Intervention: Asleep with RR greater than 10     Orientation  Overall Orientation Status: Within Functional Limits  Cognition  WFL     Social/Functional History  Social/Functional History  Lives With: Alone  Type of Home: (condo)  Home Layout: Multi-level  Home Access: (1 step to enter from front door and 2 steps to enter from garage.)  Bathroom Shower/Tub: Tub/Shower unit, Walk-in shower(pt uses tub shower in hallway, has walk-in shower in bedroom)  Bathroom Toilet: Standard  Bathroom Equipment: Grab bars in shower(no grab bars in tub shower that pt uses, grab bars in walk-in shower.  Pt had shower chair, but it is currently at daughter's house)  Bathroom Accessibility: Not accessible  Home Equipment: Reacher, Long-handled shoehorn  ADL Assistance: Independent  Homemaking Assistance: Independent  Ambulation Assistance: Independent  Transfer Assistance: Independent  Active : Yes       OBJECTIVE:  OBSERVATIONS  Observation: Tremor in BLE with gait. ROM  RLE PROM: WFL     LLE PROM: [facility]       STRENGTH  Strength RLE: WNL  Comment: 5/5 hip flexion, 5/5 knee ext/flex, 5/5 ankle PF/DF     Strength LLE: WNL  Comment: 5/5 hip flexion, 5/5 knee ext/flex, 5/5 ankle PF/DF       Bed mobility  Supine to Sit: Stand by assistance(HOB flat)  Sit to Supine: Unable to assess(pt up in chair at end of session)       Transfers  Sit to Stand: Stand by assistance  Stand to sit: Stand by assistance    Ambulation  Ambulation  Ambulation?: Yes  More Ambulation?: Yes  Ambulation 1  Device: Hand-Held Assist  Assistance: Contact guard assistance  Quality of Gait: short steps, pt reaching for UE support with free hand, rigid trunk  Distance: ~40'  Ambulation 2  Device 2: Rolling Walker, No device  Assistance 2: Stand by assistance  Quality of Gait 2: short steps, steady, arms in low to mid-guard, no trunk rotation  Distance: 120'  Comments: rolling walker for 1st half of this distance and no device for last half with no significant change in gait quality    Stairs/Curb  Stairs/Curb  Stairs?: No     Balance  Balance  Sitting - Static: Good  Standing - Static: Fair, +        Treatment included transfer training, gait training, and patient education. This note also serves as a D/C Summary in the event that this patient is discharged prior to the next therapy session. Please refer to last PT note for goal status, discharge recommendations and functional status. ASSESSMENT:   Assessment: Joaquim Adams is an 81 y.o. F admit 4/23/19 with JERRELL -- per nephrology \"Acute kidney injury, likely prerenal in nature although nonoliguric ATN could not be excluded at this point. \" Prior to admit pt was living in  a multi-level condo alone and ambulating with no device -- pt does all of her own IADLs and still drives. Today patient was guarded with her movements and had some tremor/gastroc clonus with ambulation, however she tolerated ambulating short distances with no device and SBA. She appears to be functioning slightly below her baseline at this time. Pt and pt's dtr expressed concern/reservation about patient returning to her condo with her present functional status -- dtr expressed interest in SNF or IP rehab (she also expressed thoughts about pt moving into assisted living unit but states family is not in agreement on this). Recommend continued skilled PT to optimize progress patient back to independent transfers and ambulation with no device. Treatment Diagnosis: Decreased activity tolerance, abnormal gait  Prognosis: Good  Decision Making: Medium Complexity  History: Allergic rhinitis, GERD (gastroesophageal reflux disease), Hearing loss sensory, bilateral, Mastoiditis, Osteoarthritis, Osteoporosis, Scoliosis. Exam: Decreased activity tolerance, abnormal gait  Clinical Presentation: Evolving activity tolerance  Patient Education: Role of PT, d/c recs. She verb understanding. Barriers to Learning: Emotional  REQUIRES PT FOLLOW UP: Yes  Discharge Recommendations: 3-5 sessions per week, Patient would benefit from continued therapy after discharge    Fiona Patel scored a 20/24 on the AM-PAC short mobility form. Initial research suggests that an AM-PAC score of 18 or greater may be associated with a discharge to patient's home setting. However in this initial research, cut off scores do not perfectly predict discharge location: 25% of patients with a score of 18 or greater actually went to a rehab facility and 20% of people with a score less than 18 actually went home. Based on my clinical judgement I recommend that the patient have 3-5 sessions per week of Physical Therapy at d/c to increase the patients independence.      Safety devices:   Type of devices:  All fall risk precautions in place, Call light within reach, Chair alarm in place, Patient at risk for falls, Nurse notified, Left in chair        PLAN:  Times per week: 3-5x/wk while in the hospital;    Current Treatment Recommendations: Functional Mobility Training     OutComes Score  How much difficulty turning over in bed?: None  How much difficulty sitting down on / standing up from a chair with arms?: A Little  How much difficulty moving from lying on back to sitting on side of bed?: None  How much help from another person moving to and from a bed to a chair?: A Little  How much help from another person needed to walk in hospital room?: A Little  How much help from another person for climbing 3-5 steps with a railing?: A Little  AM-[facility] Inpatient Mobility Raw Score : 20  AM-PAC Inpatient T-Scale Score : 47.67  Mobility Inpatient CMS 0-100% Score: 35.83  Mobility Inpatient CMS G-Code Modifier : CJ       Goals  Patient Goals   Patient goals : to return home to [facility] at discharge  Time Frame for Short term goals: upon d/c  Short term goal 1: sup<>sit with Ind   Short term goal 2: sit<>stand Ind   Short term goal 3: amb 150' with no device and supervision              Therapy Time    Individual Concurrent Group Co-treatment   Time In 0815            Time Out 0910          Minutes 55             Timed Code Treatment Minutes: [address] [place]

## 2019-04-25 NOTE — CARE COORDINATION
Received call from family. Facility preferences are [facility], [facility], and [facility]. Referrals initiated via Epic. If unable to accept at these 3 locations, additional preference would be . [address]. Electronically signed by GIA Monge on 4/25/2019 at 1:52 PM        Received call from Jae Tucker ([phone]) at [facility]. They are able to accept patient. Will start [facility] precert today. HENS done.     Electronically signed by GIA Monge on 4/25/2019 at 3:22 PM

## 2019-04-25 NOTE — CARE COORDINATION
SW met with patient and family regarding therapy recs. Discussed options of ARU vs SNF. [employer] likely to deny ARU. Informed family of this and would like to pursue SNF. List of facilities provided. Advised them to explore Medicare. gov regarding specific questions about quality ratings. They will review list and inform SW of preferred facilities to initiate referrals. Also provided list of local Assisted Living facilities as a future resource. Will need [employer] precert.  Will need HENS    Electronically signed by GIA Xavier on 4/25/2019 at 12:27 PM

## 2019-04-25 NOTE — PLAN OF CARE
Problem: Falls - Risk of:  Goal: Will remain free from falls  Description  Will remain free from falls  2/73/8087 [id number] by Hortensia Nova RN  Outcome: Ongoing  4/25/2019 0054 by Suzan Corral RN  Outcome: Ongoing  Goal: Absence of physical injury  Description  Absence of physical injury  Outcome: Ongoing     Problem: SAFETY  Goal: Free from accidental physical injury  Outcome: Ongoing  Goal: Free from intentional harm  Outcome: Ongoing     Problem: DAILY CARE  Goal: Daily care needs are met  8/32/0616 6836 by Hortensia Nova RN  Outcome: Ongoing  4/25/2019 0054 by Suzan Corral RN  Outcome: Ongoing     Problem: PAIN  Goal: Patient's pain/discomfort is manageable  5/36/9016 3238 by Hortensia Nova RN  Outcome: Ongoing  4/25/2019 0054 by Suzan Corral RN  Outcome: Ongoing     Problem: SKIN INTEGRITY  Goal: Skin integrity is maintained or improved  Outcome: Ongoing     Problem: KNOWLEDGE DEFICIT  Goal: Patient/S.O. demonstrates understanding of disease process, treatment plan, medications, and discharge instructions.   5/34/9758 4628 by Hortensia Nova RN  Outcome: Ongoing  4/25/2019 0054 by Suzan Corral RN  Outcome: Ongoing     Problem: DISCHARGE BARRIERS  Goal: Patient's continuum of care needs are met  Outcome: Ongoing

## 2019-04-25 NOTE — DISCHARGE INSTR - COC
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    Thyroid nodule E04.1    Gastritis K29.70    Congestive heart failure (HCC) I50.9    JERRELL (acute kidney injury) ([facility] [address] N17.9       Isolation/Infection:   Isolation          No Isolation            Nurse Assessment:  Last Vital Signs: BP (!) 151/69   Pulse 79   Temp 97.8 °F (36.6 °C)   Resp 18   Ht 4' 11\" (1.499 m)   Wt 117 lb 11.6 oz (53.4 kg)   SpO2 95%   BMI 23.78 kg/m²     Last documented pain score (0-10 scale): Pain Level: 0  Last Weight:   Wt Readings from Last 1 Encounters:   04/25/19 117 lb 11.6 oz (53.4 kg)     Mental Status:  oriented and alert    IV Access:  - None    Nursing Mobility/ADLs:  Walking   Independent  Transfer  Independent  Bathing  Independent  Dressing  Independent  Toileting  Independent  Feeding  Independent  Med Admin  Independent  Med Delivery   whole    Wound Care Documentation and Therapy:        Elimination:  Continence:   · Bowel: Yes  · Bladder: Yes  Urinary Catheter: None   Colostomy/Ileostomy/Ileal Conduit: No       Date of Last BM: 4/29/19    Intake/Output Summary (Last 24 hours) at 4/25/2019 1525  Last data filed at 4/25/2019 1156  Gross per 24 hour   Intake 1540 ml   Output 1625 ml   Net -85 ml     I/O last 3 completed shifts: In: 6523 [P.O.:240; I.V.:1300]  Out: 1625 [Urine:1625]    Safety Concerns:      At Risk for Falls    Impairments/Disabilities:      None    Nutrition Therapy:  Current Nutrition Therapy: - Oral Diet:  Renal    Routes of Feeding: Oral  Liquids: Thin Liquids  Daily Fluid Restriction: no  Last Modified Barium Swallow with Video (Video Swallowing Test): not done    Treatments at the Time of Hospital Discharge:   Respiratory Treatments: none  Oxygen Therapy:  is not on home oxygen therapy. Ventilator:    - No ventilator support    Rehab Therapies: Physical Therapy and Occupational Therapy  Weight Bearing Status/Restrictions: No weight bearing restirctions  Other Medical Equipment (for information only, NOT a DME order):  walker  Other Treatments: ***    Patient's personal belongings (please select all that are sent with patient):  None    RN SIGNATURE:  Electronically signed by Tran Contreras RN on 4/29/19 at 11:42 AM      CASE MANAGEMENT/SOCIAL WORK SECTION    Inpatient Status Date: 4/23/19    Readmission Risk Assessment Score:  Readmission Risk              Risk of Unplanned Readmission:        12           Discharging to Facility/ Agency   · Name: [facility]  · Address:  [address] [place] [address], [address] [place] [address]  · Phone: [phone]  · Fax: [phone]      / signature: Electronically signed by GIA Andrews on 4/29/19 at 10:31 AM            PHYSICIAN SECTION    Prognosis: Fair    Condition at Discharge: Stable    Rehab Potential (if transferring to Rehab): Fair    Recommended Labs or Other Treatments After Discharge: PT/OT. Check BMP within 1 week. Physician Certification: I certify the above information and transfer of Rakan Gomes  is necessary for the continuing treatment of the diagnosis listed and that she requires Home Care for more than 30 days.      Update Admission H&P: No change in H&P    PHYSICIAN SIGNATURE:   Electronically signed by RIAZ Myles NP on 4/26/19 at 11:31 Kendall Marquez MD

## 2019-04-25 NOTE — PROGRESS NOTES
Occupational Therapy   Occupational Therapy Initial Assessment  Date: 2019   Patient Name: Rajan Solorio  MRN: [id number]     : 1935    Date of Service: 2019    Discharge Recommendations:  Patient would benefit from continued therapy after discharge, 5-7 sessions per week  OT Equipment Recommendations  Other: TBD    Assessment   Performance deficits / Impairments: Decreased functional mobility ; Decreased ADL status; Decreased endurance;Decreased balance;Decreased fine motor control;Decreased high-level IADLs;Decreased safe awareness  Assessment: Pt is a 81 y.o. female admitted with JERRELL on CKD III, Metabolic Acidosis. At baseline, pt lives alone in [facility] and independent ADLs, IADLs, and fxl mobility. Pt currently functioning below baseline, limited by generalized weakness, decreased balance/fxl mobility, decreased endurance. This date, pt needed CGA toileting and grooming, CGA fxl mobility with no AD, and anticipate pt would require overall min A for ADLs. Pt mildly unsteady and c/o feeling shaky/tremulous B LE's with standing activity. Will cont to see on acute to address the above limitations and maximize pt's independence. Pt would be unsafe to return home alone, and would benefit from skilled OT at d/c to progress pt towards independent level of function. Daughter expressing interest in IP rehab or SNF. Prognosis: Good  Decision Making: Medium Complexity  History: see above  Exam: decreased ADL status, balance/fxl mobility, endurance, strength, insight/safety judgement  Assistance / Modification: anticipate overall min A for ADLs  Patient Education: Pt educated on the role of OT, POC, safety/transfers, d/c planning  Barriers to Learning: hearing  REQUIRES OT FOLLOW UP: Yes  Activity Tolerance  Activity Tolerance: Patient limited by fatigue;Patient Tolerated treatment well  Safety Devices  Safety Devices in place: Yes  Type of devices: Call light within reach; Chair alarm in place;Gait belt;Left in chair;Nurse notified; Patient at risk for falls           Patient Diagnosis(es): The primary encounter diagnosis was JERRELL (acute kidney injury) ([facility] [address] A diagnosis of Urinary tract infection without hematuria, site unspecified was also pertinent to this visit. has a past medical history of Allergic rhinitis, GERD (gastroesophageal reflux disease), Hearing loss sensory, bilateral, Mastoiditis, Osteoarthritis, Osteoporosis, and Scoliosis. has a past surgical history that includes Carpal tunnel release; Nasal sinus surgery; hernia repair; Colonoscopy; eye surgery; Colonoscopy (N/A, 10/19/2018); and Upper gastrointestinal endoscopy (N/A, 4/4/2019). Restrictions  Restrictions/Precautions  Restrictions/Precautions: Fall Risk    Subjective   General  Chart Reviewed: Yes  Patient assessed for rehabilitation services?: Yes  Additional Pertinent Hx: Per RIAZ Finnegan NP's note 4/24/19: \"The patient is an 81year old female who presented to [facility] ER after her doctor contacted her regarding abnormal labs. The patient's provider had been monitoring her renal function and notified her to report to the ER immediately, as she was in acute on chronic stage III kidney disease. The patient was admitted for further workup and treatment. \"  Family / Caregiver Present: Yes(daughter )  Referring Practitioner: Disser  Diagnosis: JERRELL on CKD III, Metabolic Acidosis  Subjective  Subjective: Pt met b/s for OT eval/tx. Pt ambulating in hallway with PT on arrival, agreeable to participate in therapy for overlap. Pt denies pain.       Social/Functional History  Social/Functional History  Lives With: Alone  Type of Home: (condo)  Home Layout: Multi-level  Home Access: (1 step to enter from front door and 2 steps to enter from garage.)  Bathroom Shower/Tub: Tub/Shower unit, Walk-in shower(pt uses tub shower in hallway, has walk-in shower in bedroom)  Bathroom Toilet: Standard  Bathroom Equipment: Grab bars in shower(no grab bars in tub shower that pt uses, grab bars in walk-in shower. Pt had shower chair, but it is currently at daughter's house)  Bathroom Accessibility: Not accessible  Home Equipment: Reacher, Long-handled shoehorn  ADL Assistance: Independent  Homemaking Assistance: Independent  Ambulation Assistance: Independent  Transfer Assistance: Independent  Active : Yes       Objective   Vision: Within Functional Limits  Hearing: Exceptions to [facility]  Hearing Exceptions: Hard of hearing/hearing concerns;Bilateral hearing aid      Orientation  Overall Orientation Status: Within Functional Limits     Observation/Palpation  Observation: Tremor in BLE with gait. Balance  Sitting Balance: Stand by assistance  Standing Balance: Contact guard assistance  Functional Mobility  Functional - Mobility Device: No device  Activity: To/from bathroom  Assist Level: Contact guard assistance  Functional Mobility Comments: Pt completed fxl mobility back to room from hallway, to/from BR with no AD and CGA. Pt slow, guarded, reaching for handrail in hallway and objects around room for balance. Pt c/o B LE's feeling shaky/tremulous during standing grooming at sink. ADL  Feeding: Independent  Grooming: Stand by assistance(standing at sink to wash hands)  Toileting: Contact guard assistance(to void/have small BM, no clothing management, pt completed pericare in standing with CGA)  Additional Comments: Anticipate pt is CGA UB dressing/bathing, min A LB dressing/bathing based on ROM/strength, balance, endurance.       Coordination  Movements Are Fluid And Coordinated: No  Coordination and Movement description: Fine motor impairments;Right UE;Left UE;Tremors     Bed mobility  Comment: ISAI-pt seated in recliner at start/end of session      Transfers  Sit to stand: Stand by assistance  Stand to sit: Stand by assistance   Toilet Transfers  Toilet - Technique: Ambulating  Equipment Used: Grab bars  Toilet Transfer: Stand by

## 2019-04-25 NOTE — PROGRESS NOTES
[facility] Medicine Progress Note      Admit Date: 4/22/2019         Overnight Events: No    CC: F/U for Abnormal labs    HPI: The patient is an 81year old female who presented to [facility] ER after her doctor contacted her regarding abnormal labs. The patient's provider had been monitoring her renal function and notified her to report to the ER immediately, as she was in acute on chronic stage III kidney disease. The patient was admitted for further workup and treatment. Nephrology was consulted. Sodium bicarb was initiated. Creatinine trended down. It is suspected that her prior home HTN medications (lisinopril, HCTZ) in addition to celebrex were the offending agents precipitating JERRELL. The patient periodically mentioned chest pain, discribed as tightness to the center of her chest (has been occurring at home), precipitated by abrupt temperature changes. Albuterol PRN was initiated to treat for possible reactive airway. Diltiazem was initiated to treat for possible esophageal spasm. Interval History/Subjective: No new complaints. She is feeling well. Family is at bedside. She and her family did note tremor to BUE.         Review of Systems:     _____________________________________________________________________  General ROS:  []  fevers  []  chills  []  fatigue  []  weakness  []  night sweats  []  body aches [] Other:  _____________________________________________________________________  HEENT ROS:  []  trauma  []  headache  []  visual changes  []  double vision  []  blurred vision  []  tinnitus  []  vertigo  []  ear ache  []  drainage  []  bleeding gums  []  hoarseness  []  voice change  []  difficult/painful swallowing  []  stuffiness  []  rhinorrhea  []  sneezing  []  epistaxis [] Other:  _____________________________________________________________________  Respiratory ROS:  []  cough  []  SOB  []  wheezing  []  changes in sputum production or quality  []  hemoptysis  []  pleurisy  []  snoring [x] oct 2014-->dr ortiz states with no clinical findings this is considered an over-sensitivity of MRI results    Osteoarthritis     L1-L5    Osteoporosis     patient was on fosamax x 10 years and drug holiday was started on 6/21/13. (this was date of last DEXA scan)    Scoliosis        Past Surgical History:        Procedure Laterality Date    CARPAL TUNNEL RELEASE      right, 2003    COLONOSCOPY      2013, dr Aida Soto [place] N/A 10/19/2018    COLONOSCOPY WITH BIOPSY performed by Maral Greer MD at [facility]      December 27 2017 and feb 6 2018   [address]      2011    NASAL SINUS SURGERY      UPPER GASTROINTESTINAL ENDOSCOPY N/A 4/4/2019    [facility]SY [facility]:  Patient has no known allergies. Past medical and surgical history reviewed. Any changes have been noted. Scheduled and prn Medications:    Scheduled Meds:   docusate sodium  100 mg Oral Daily    DULoxetine  60 mg Oral Daily    acetaminophen  1,000 mg Oral 3 times per day    pantoprazole  40 mg Oral QAM AC    sodium chloride flush  10 mL Intravenous 2 times per day    heparin (porcine)  5,000 Units Subcutaneous 3 times per day    lactobacillus  1 capsule Oral BID WC     Continuous Infusions:   IV infusion builder 100 mL/hr at 04/25/19 0659     PRN Meds:.albuterol sulfate HFA, bisacodyl, sodium chloride flush, ondansetron    PHYSICAL EXAM:  BP (!) 157/72   Pulse 71   Temp 98 °F (36.7 °C) (Oral)   Resp 16   Ht 4' 11\" (1.499 m)   Wt 117 lb 11.6 oz (53.4 kg)   SpO2 95%   BMI 23.78 kg/m²       Intake/Output Summary (Last 24 hours) at 4/25/2019 0840  Last data filed at 4/25/2019 0702  Gross per 24 hour   Intake 640 ml   Output 1625 ml   Net -985 ml       General: Alert and oriented. Up at bedside in NAD. Pleasant and cooperative. Thin. [place]. Family at bedside. HEENT: Normocephalic. Atraumatic. Pupils equal and reactive. EOM intact.  Oral mucosa pink/moist/intact. Neck: Supple. Symmetrical. Trachea midline. Lungs: Clear to auscultation bilaterally. Respirations even and unlabored. Chest: Exam unremarkable. Cardiac: S1/S2 noted. Regular Rhythm and rate. Abdomen/GI: Soft. Non-tender. Non-distended. BS+. Extremities: PP+. Atraumatic. No redness/cyanosis. Trace edema RLE. Brisk cap refill. Skin: Dry and intact. No lesions noted. Neuro: Grossly intact. No focal deficits noted. Tremor to BUE. LABS:    Lab Results   Component Value Date    WBC 9.5 04/25/2019    HGB 11.1 (L) 04/25/2019    HCT 32.2 (L) 04/25/2019    MCV 87.4 04/25/2019     04/25/2019    LYMPHOPCT 12.3 04/25/2019    RBC 3.69 (L) 04/25/2019    MCH 30.2 04/25/2019    MCHC 34.6 04/25/2019    RDW 12.6 04/25/2019       Lab Results   Component Value Date    CREATININE 1.9 (H) 04/25/2019    BUN 31 (H) 04/25/2019     04/25/2019    K 3.3 (L) 04/25/2019     04/25/2019    CO2 25 04/25/2019       Lab Results   Component Value Date    MG 1.90 04/25/2019       Lab Results   Component Value Date    ALT 24 04/22/2019    AST 16 04/22/2019    ALKPHOS 191 (H) 04/22/2019    BILITOT 0.3 04/22/2019        No flowsheet data found. Imaging:  US RENAL COMPLETE   Final Result   Negative for obstructive uropathy. CT ABDOMEN PELVIS WO CONTRAST Additional Contrast? None   Final Result   No acute process identified. Fecalized small bowel is consistent with   prolonged small bowel transit time. VL Extremity Venous Right    (Results Pending)       Assessment & Plan:        JERRELL on CKD III  Likely 2/2 use of ACE, diuretic, celebrex. - All currently being held  Pre renal v non-oligouric ATN  Creatinine trending down  IVF - bicarb per nephrology   Avoid nephrotoxic medications (ACE-I, ARB, NSAIDs). Renally dose medications.   Monitor for electrolyte abnormalities (i.e. hyperkalemia)    Chest tightness   Worsened with exertion or sudden changes in temperature  Resolves if symptoms are noticed early enough and rest is initiated  Reactive airway? Will try albuterol inhaler  Esophageal spasm? Diltiazem. Arthritis  Celebrex discontinued on admission  Cymbalta initiated  Tylenol    BUE tremor  2/2 cymbalta? Muscular deconditioning? Monitor for now. HTN  Low dose diltiazem  Monitor BP  Initiate medications as needed. Abnormal UA  UC resulted with mixed shelby  IV rocephin discontinued  Adjust abx as indicated. Metabolic Acidosis  Bicarb in IVF  Follow labs     RLE edema  Doppler negative for DVT    Body mass index is 23.78 kg/m². The patient and / or the family were informed of the results of any tests, a time was given to answer questions, a plan was proposed and they agreed with plan.     DVT prophylaxis: [] Lovenox  [x] SQ Heparin  [] SCDs because of  [] warfarin/oral direct thrombin inhibitor [] Encourage ambulation    GI prophylaxis: [x] PPI/[id number]  [] not indicated    Probiotic if on abx: [x] Yes [] No [] Not Indicated    Diet: DIET RENAL;    Consults:  IP CONSULT TO NEPHROLOGY  IP CONSULT TO HOSPITALIST    Disposition:  [x] Home  [] Home with home health [] Rehab [] Psych [] SNF  [] LTAC  [] Long term nursing home or group home [] Transfer to ICU  [] Transfer to PCU [] Other:    Code Status: Full Code    ELOS: TBD      RIAZ Shelton NP  04/25/19

## 2019-04-25 NOTE — PROGRESS NOTES
extremity lymphadenopathy. PSYCHIATRIC:  Appropriate affect. Alert and oriented to time, place,  and person. Does not appear to be in any distress. NEUROLOGIC:  No asterixis. No focal motor neurological deficits. SKIN:  Loss of turgor. No rash. LAB DATA:    CBC:   Lab Results   Component Value Date    WBC 9.5 04/25/2019    RBC 3.69 04/25/2019    HGB 11.1 04/25/2019    HCT 32.2 04/25/2019    MCV 87.4 04/25/2019    MCH 30.2 04/25/2019    MCHC 34.6 04/25/2019    RDW 12.6 04/25/2019     04/25/2019    MPV 7.4 04/25/2019     BMP:    Lab Results   Component Value Date     04/25/2019    K 3.3 04/25/2019     04/25/2019    CO2 25 04/25/2019    BUN 31 04/25/2019    CREATININE 1.9 04/25/2019    CALCIUM 7.5 04/25/2019    GFRAA 31 04/25/2019    GFRAA >60 05/10/2012    LABGLOM 25 04/25/2019    GLUCOSE 129 04/25/2019     Magnesium:    Lab Results   Component Value Date    MG 1.90 04/25/2019     Phosphorus:    Lab Results   Component Value Date    PHOS 4.5 04/23/2019       IMPRESSION/RECOMMENDATIONS:      Active Problems:    JERRELL (acute kidney injury) ([address]  Resolved Problems:    * No resolved hospital problems. *    1. JERRELL - likely prerenal in nature although nonoliguric ATN could not be excluded at this point. The patient is making an excellent amount of urine. We will continue the IV fluids with the bicarb. We need to address the concomitant acidosis and continue to hold the suspected offending agents which include Celebrex,  lisinopril, and the hydrochlorothiazide. Ultrasound and CAT scan revealed no Hydronephrosis - Her fractional  excretion of sodium is 1.9 while on diuretic. 2. HTN - controlled - off antihypertensive medication    3. Chest pain - ? Esophageal spasm - start Diltiazem - monitor BP (holding parameters)    4.  Edema Right LE - Duplex revealed No evidence of deep vein or superficial vein thrombosis

## 2019-04-25 NOTE — PROGRESS NOTES
Notified tonight by patient's nurse regarding R foot edema, new today. Patient seen and evaluated. She reports she had difficulty ambulating earlier today due to pain but that pain improved with tylenol. She denies SOB. She does report a long car ride on 4/21/19. Right foot trace edema. Negative ameya's sign. Full range of motion of BLE without deformity. +2 palpable pulses, equal bilaterally. Capillary refill brisk,< 3 seconds. No rashes or lesions. Skin warm/dry. Venous Doppler RLE in AM. Low suspicion for DVT.      Hoda Pollock PA-C

## 2019-04-25 NOTE — PROGRESS NOTES
Chest pain resolved. Pt resting quietly in bed. RR easy on room air, lungs clear. IVF infusing at 100 cc/hr. Call light in reach. Bed alarm on. Will continue to monitor.

## 2019-04-26 LAB
ANION GAP SERPL CALCULATED.3IONS-SCNC: 9 MMOL/L (ref 3–16)
BASOPHILS ABSOLUTE: 0.1 K/UL (ref 0–0.2)
BASOPHILS RELATIVE PERCENT: 0.8 %
BUN BLDV-MCNC: 19 MG/DL (ref 7–20)
CALCIUM SERPL-MCNC: 7.2 MG/DL (ref 8.3–10.6)
CHLORIDE BLD-SCNC: 104 MMOL/L (ref 99–110)
CO2: 26 MMOL/L (ref 21–32)
CREAT SERPL-MCNC: 1.4 MG/DL (ref 0.6–1.2)
EOSINOPHILS ABSOLUTE: 0.2 K/UL (ref 0–0.6)
EOSINOPHILS RELATIVE PERCENT: 2.2 %
GFR AFRICAN AMERICAN: 43
GFR NON-AFRICAN AMERICAN: 36
GLUCOSE BLD-MCNC: 112 MG/DL (ref 70–99)
HCT VFR BLD CALC: 33.2 % (ref 36–48)
HEMOGLOBIN: 11.4 G/DL (ref 12–16)
LYMPHOCYTES ABSOLUTE: 1.6 K/UL (ref 1–5.1)
LYMPHOCYTES RELATIVE PERCENT: 17.9 %
MAGNESIUM: 1.8 MG/DL (ref 1.8–2.4)
MCH RBC QN AUTO: 30.2 PG (ref 26–34)
MCHC RBC AUTO-ENTMCNC: 34.3 G/DL (ref 31–36)
MCV RBC AUTO: 87.9 FL (ref 80–100)
MONOCYTES ABSOLUTE: 0.7 K/UL (ref 0–1.3)
MONOCYTES RELATIVE PERCENT: 7.6 %
NEUTROPHILS ABSOLUTE: 6.4 K/UL (ref 1.7–7.7)
NEUTROPHILS RELATIVE PERCENT: 71.5 %
PDW BLD-RTO: 12.9 % (ref 12.4–15.4)
PLATELET # BLD: 264 K/UL (ref 135–450)
PMV BLD AUTO: 7.2 FL (ref 5–10.5)
POTASSIUM REFLEX MAGNESIUM: 3.3 MMOL/L (ref 3.5–5.1)
RBC # BLD: 3.78 M/UL (ref 4–5.2)
SODIUM BLD-SCNC: 139 MMOL/L (ref 136–145)
WBC # BLD: 8.9 K/UL (ref 4–11)

## 2019-04-26 PROCEDURE — 6370000000 HC RX 637 (ALT 250 FOR IP): Performed by: INTERNAL MEDICINE

## 2019-04-26 PROCEDURE — 1200000000 HC SEMI PRIVATE

## 2019-04-26 PROCEDURE — 6360000002 HC RX W HCPCS: Performed by: INTERNAL MEDICINE

## 2019-04-26 PROCEDURE — 83735 ASSAY OF MAGNESIUM: CPT

## 2019-04-26 PROCEDURE — 94760 N-INVAS EAR/PLS OXIMETRY 1: CPT

## 2019-04-26 PROCEDURE — 2580000003 HC RX 258: Performed by: INTERNAL MEDICINE

## 2019-04-26 PROCEDURE — 36415 COLL VENOUS BLD VENIPUNCTURE: CPT

## 2019-04-26 PROCEDURE — 85025 COMPLETE CBC W/AUTO DIFF WBC: CPT

## 2019-04-26 PROCEDURE — 2580000003 HC RX 258: Performed by: NURSE PRACTITIONER

## 2019-04-26 PROCEDURE — 80048 BASIC METABOLIC PNL TOTAL CA: CPT

## 2019-04-26 PROCEDURE — 6370000000 HC RX 637 (ALT 250 FOR IP): Performed by: NURSE PRACTITIONER

## 2019-04-26 PROCEDURE — 2500000003 HC RX 250 WO HCPCS: Performed by: NURSE PRACTITIONER

## 2019-04-26 RX ORDER — DILTIAZEM HYDROCHLORIDE 60 MG/1
60 TABLET, FILM COATED ORAL EVERY 8 HOURS SCHEDULED
Status: DISCONTINUED | OUTPATIENT
Start: 2019-04-26 | End: 2019-04-29 | Stop reason: HOSPADM

## 2019-04-26 RX ORDER — ALBUTEROL SULFATE 90 UG/1
2 AEROSOL, METERED RESPIRATORY (INHALATION) EVERY 6 HOURS PRN
Qty: 1 INHALER | Refills: 1 | Status: SHIPPED | OUTPATIENT
Start: 2019-04-26 | End: 2019-06-20

## 2019-04-26 RX ORDER — DILTIAZEM HYDROCHLORIDE 60 MG/1
60 TABLET, FILM COATED ORAL EVERY 8 HOURS SCHEDULED
Qty: 120 TABLET | Refills: 1 | Status: ON HOLD | OUTPATIENT
Start: 2019-04-26 | End: 2019-05-06 | Stop reason: SDUPTHER

## 2019-04-26 RX ORDER — POTASSIUM CHLORIDE 20 MEQ/1
20 TABLET, EXTENDED RELEASE ORAL 2 TIMES DAILY WITH MEALS
Qty: 60 TABLET | Refills: 0 | Status: SHIPPED | OUTPATIENT
Start: 2019-04-26 | End: 2019-05-10

## 2019-04-26 RX ORDER — DULOXETIN HYDROCHLORIDE 60 MG/1
60 CAPSULE, DELAYED RELEASE ORAL DAILY
Qty: 30 CAPSULE | Refills: 0 | Status: SHIPPED | OUTPATIENT
Start: 2019-04-27 | End: 2019-05-10

## 2019-04-26 RX ADMIN — HEPARIN SODIUM 5000 UNITS: 5000 INJECTION INTRAVENOUS; SUBCUTANEOUS at 15:02

## 2019-04-26 RX ADMIN — Medication 1 CAPSULE: at 08:08

## 2019-04-26 RX ADMIN — HEPARIN SODIUM 5000 UNITS: 5000 INJECTION INTRAVENOUS; SUBCUTANEOUS at 06:34

## 2019-04-26 RX ADMIN — MAGNESIUM OXIDE TAB 400 MG (240 MG ELEMENTAL MG) 400 MG: 400 (240 MG) TAB at 21:16

## 2019-04-26 RX ADMIN — Medication 10 ML: at 21:19

## 2019-04-26 RX ADMIN — POTASSIUM CHLORIDE 20 MEQ: 20 TABLET, EXTENDED RELEASE ORAL at 08:08

## 2019-04-26 RX ADMIN — DULOXETINE HYDROCHLORIDE 60 MG: 60 CAPSULE, DELAYED RELEASE ORAL at 08:08

## 2019-04-26 RX ADMIN — DILTIAZEM HYDROCHLORIDE 60 MG: 60 TABLET, FILM COATED ORAL at 21:16

## 2019-04-26 RX ADMIN — DILTIAZEM HYDROCHLORIDE 60 MG: 60 TABLET, FILM COATED ORAL at 15:21

## 2019-04-26 RX ADMIN — POTASSIUM CHLORIDE 20 MEQ: 20 TABLET, EXTENDED RELEASE ORAL at 16:48

## 2019-04-26 RX ADMIN — DILTIAZEM HYDROCHLORIDE 30 MG: 30 TABLET, FILM COATED ORAL at 06:34

## 2019-04-26 RX ADMIN — Medication 1 CAPSULE: at 16:48

## 2019-04-26 RX ADMIN — HEPARIN SODIUM 5000 UNITS: 5000 INJECTION INTRAVENOUS; SUBCUTANEOUS at 21:16

## 2019-04-26 RX ADMIN — ACETAMINOPHEN 1000 MG: 500 TABLET ORAL at 15:02

## 2019-04-26 RX ADMIN — ACETAMINOPHEN 1000 MG: 500 TABLET ORAL at 21:15

## 2019-04-26 RX ADMIN — POLYETHYLENE GLYCOL 3350 17 G: 17 POWDER, FOR SOLUTION ORAL at 21:20

## 2019-04-26 RX ADMIN — SODIUM BICARBONATE: 84 INJECTION, SOLUTION INTRAVENOUS at 08:27

## 2019-04-26 RX ADMIN — DOCUSATE SODIUM 100 MG: 100 CAPSULE, LIQUID FILLED ORAL at 08:08

## 2019-04-26 RX ADMIN — ACETAMINOPHEN 1000 MG: 500 TABLET ORAL at 06:34

## 2019-04-26 RX ADMIN — PANTOPRAZOLE SODIUM 40 MG: 40 TABLET, DELAYED RELEASE ORAL at 06:34

## 2019-04-26 RX ADMIN — MAGNESIUM OXIDE TAB 400 MG (240 MG ELEMENTAL MG) 400 MG: 400 (240 MG) TAB at 08:08

## 2019-04-26 ASSESSMENT — PAIN SCALES - GENERAL
PAINLEVEL_OUTOF10: 0
PAINLEVEL_OUTOF10: 1
PAINLEVEL_OUTOF10: 0

## 2019-04-26 NOTE — DISCHARGE SUMMARY
Hospital Medicine Discharge Summary      Patient ID: Elijah Gaspar      Patient's PCP: Steven Fan MD    Admit Date: 4/22/2019     Discharge Date: 4/26/19    Admitting Physician: Verito Villarreal MD     Discharge Provider: RIAZ Shelton NP     Discharge Diagnoses: Active Hospital Problems    Diagnosis Date Noted    JERRELL (acute kidney injury) ([facility] [address] [N17.9] 04/23/2019       Disposition:  [] Home  [] Home with home health [] Rehab [] Psych [x] SNF  [] LTAC  [] Long term nursing home or group home [] Transfer to ICU  [] Transfer to PCU [] Other:    Discharge Instructions/Follow-up:      Please call and schedule an appointment with your Primary Care Provider Dr. Steven Fan MD at [phone] for a follow up visit within 1 week. - Please check BMP within 1 week of discharge  - NO ACE/ARB/Diuretic    SNF for continued therapies prior to return home. Take medications as prescribed. Return to the emergency room for fever, cough, chest pain or worsening symptoms.     PCP/SNF to follow up: As above    Discharge Condition: Stable      Code Status:  Full Code     Activity: activity as tolerated    Diet: DIET RENAL;  Dietary Nutrition Supplements: Renal Oral Supplement     Discharge Medications:     Current Discharge Medication List           Details   albuterol sulfate  (90 Base) MCG/ACT inhaler Inhale 2 puffs into the lungs every 6 hours as needed for Wheezing (Chest tightness)  Qty: 1 Inhaler, Refills: 1      DULoxetine (CYMBALTA) 60 MG extended release capsule Take 1 capsule by mouth daily  Qty: 30 capsule, Refills: 0      diltiazem (CARDIZEM) 60 MG tablet Take 1 tablet by mouth every 8 hours  Qty: 120 tablet, Refills: 1      potassium chloride (KLOR-CON M) 20 MEQ extended release tablet Take 1 tablet by mouth 2 times daily (with meals)  Qty: 60 tablet, Refills: 0              Details   omeprazole (PRILOSEC) 20 MG delayed release capsule Take 20 mg by mouth 2 times daily      risedronate (ACTONEL) 30 MG tablet Take 30 mg by mouth every 7 days      vitamin D (CHOLECALCIFEROL) 1000 units TABS tablet Take 2,000 Units by mouth daily       Docusate Calcium (STOOL SOFTENER PO) Take 100 mg by mouth 2 times daily       Calcium Citrate-Vitamin D (CALCIUM + D PO) Take 500 mg by mouth daily              The patient was seen and examined on day of discharge and this discharge summary is in conjunction with any daily progress note from day of discharge. Hospital Course: The patient is an 81year old female who presented to [facility] ER after her doctor contacted her regarding abnormal labs. The patient's provider had been monitoring her renal function and notified her to report to the ER immediately, as she was in acute on chronic stage III kidney disease. The patient was admitted for further workup and treatment. Nephrology was consulted. Sodium bicarb was initiated. Creatinine trended down. It is suspected that her prior home HTN medications (lisinopril, HCTZ) in addition to celebrex were the offending agents precipitating JERRELL.      The patient periodically mentioned chest pain, discribed as tightness to the center of her chest (has been occurring at home), precipitated by abrupt temperature changes. Albuterol PRN was initiated to treat for possible reactive airway. Diltiazem was initiated to treat for possible esophageal spasm. At this time, the patient is feeling well. Creatinine has continued to improve. She is able to discharge to SNF in stable condition for continued therapies prior to discharge home. Exam:     BP (!) 151/74   Pulse 60   Temp 98.4 °F (36.9 °C) (Oral)   Resp 16   Ht 4' 11\" (1.499 m)   Wt 116 lb 13.5 oz (53 kg)   SpO2 94%   BMI 23.60 kg/m²     General: Alert and oriented. Up at bedside in NAD. Pleasant and cooperative. Thin. [place]. Family at bedside. HEENT: Normocephalic. Atraumatic. Pupils equal and reactive. EOM intact.  Oral mucosa pink/moist/intact. Neck: Supple. Symmetrical. Trachea midline. Lungs: Clear to auscultation bilaterally. Respirations even and unlabored. Chest: Exam unremarkable. Cardiac: S1/S2 noted. Regular Rhythm and rate. Abdomen/GI: Soft. Non-tender. Non-distended. BS+. Extremities: PP+. Atraumatic. No redness/cyanosis. Trace edema RLE. Brisk cap refill. Skin: Dry and intact. No lesions noted. Neuro: Grossly intact. No focal deficits noted. Consults:     IP CONSULT TO NEPHROLOGY  IP CONSULT TO HOSPITALIST    Significant Diagnostic Studies:       VL Extremity Venous Right         US RENAL COMPLETE   Final Result   Negative for obstructive uropathy. CT ABDOMEN PELVIS WO CONTRAST Additional Contrast? None   Final Result   No acute process identified. Fecalized small bowel is consistent with   prolonged small bowel transit time. Labs: For convenience and continuity at follow-up the following most recent labs are provided:    CBC:    Lab Results   Component Value Date    WBC 8.9 04/26/2019    HGB 11.4 04/26/2019    HCT 33.2 04/26/2019     04/26/2019       Renal:    Lab Results   Component Value Date     04/26/2019    K 3.3 04/26/2019     04/26/2019    CO2 26 04/26/2019    BUN 19 04/26/2019    CREATININE 1.4 04/26/2019    CALCIUM 7.2 04/26/2019    PHOS 4.5 04/23/2019       Future Appointments   Date Time Provider Nataliia Arnold   5/14/2019  9:30 AM RIAZ Mcmullen - CNP [facility]   8/8/2019  2:20 PM Suzen Krabbe, MD  [facility]       Time Spent on discharge is more than 45 minutes in the examination, evaluation, counseling and review of medications and discharge plan. RX monitoring reviewed N/A    Signed:    RIAZ Lund NP   4/26/2019      Thank you Candace Guevara MD for the opportunity to be involved in this patient's care. If you have any questions or concerns please feel free to contact me at [phone].

## 2019-04-26 NOTE — PROGRESS NOTES
Pt alert and oriented. Morning mediations given. Denies pain at this time. Eating breakfast. Family at bedside. Resting in bed with call light in reach.

## 2019-04-26 NOTE — PLAN OF CARE
Problem: Falls - Risk of:  Goal: Will remain free from falls  Description  Will remain free from falls  Outcome: Ongoing  Note:   Pt remains free from falls this shift. Goal: Absence of physical injury  Description  Absence of physical injury  Outcome: Ongoing     Problem: SAFETY  Goal: Free from accidental physical injury  Outcome: Ongoing  Goal: Free from intentional harm  Outcome: Ongoing     Problem: DAILY CARE  Goal: Daily care needs are met  Outcome: Ongoing     Problem: PAIN  Goal: Patient's pain/discomfort is manageable  Outcome: Ongoing     Problem: SKIN INTEGRITY  Goal: Skin integrity is maintained or improved  Outcome: Ongoing     Problem: KNOWLEDGE DEFICIT  Goal: Patient/S.O. demonstrates understanding of disease process, treatment plan, medications, and discharge instructions.   Outcome: Ongoing     Problem: DISCHARGE BARRIERS  Goal: Patient's continuum of care needs are met  Outcome: Ongoing

## 2019-04-27 PROCEDURE — 6370000000 HC RX 637 (ALT 250 FOR IP): Performed by: NURSE PRACTITIONER

## 2019-04-27 PROCEDURE — 6370000000 HC RX 637 (ALT 250 FOR IP): Performed by: INTERNAL MEDICINE

## 2019-04-27 PROCEDURE — 6360000002 HC RX W HCPCS: Performed by: INTERNAL MEDICINE

## 2019-04-27 PROCEDURE — 1200000000 HC SEMI PRIVATE

## 2019-04-27 PROCEDURE — 2580000003 HC RX 258: Performed by: INTERNAL MEDICINE

## 2019-04-27 RX ADMIN — ACETAMINOPHEN 1000 MG: 500 TABLET ORAL at 16:06

## 2019-04-27 RX ADMIN — HEPARIN SODIUM 5000 UNITS: 5000 INJECTION INTRAVENOUS; SUBCUTANEOUS at 16:06

## 2019-04-27 RX ADMIN — MAGNESIUM OXIDE TAB 400 MG (240 MG ELEMENTAL MG) 400 MG: 400 (240 MG) TAB at 21:26

## 2019-04-27 RX ADMIN — DILTIAZEM HYDROCHLORIDE 60 MG: 60 TABLET, FILM COATED ORAL at 06:30

## 2019-04-27 RX ADMIN — PANTOPRAZOLE SODIUM 40 MG: 40 TABLET, DELAYED RELEASE ORAL at 06:30

## 2019-04-27 RX ADMIN — HEPARIN SODIUM 5000 UNITS: 5000 INJECTION INTRAVENOUS; SUBCUTANEOUS at 06:30

## 2019-04-27 RX ADMIN — Medication 10 ML: at 08:53

## 2019-04-27 RX ADMIN — HEPARIN SODIUM 5000 UNITS: 5000 INJECTION INTRAVENOUS; SUBCUTANEOUS at 21:29

## 2019-04-27 RX ADMIN — DILTIAZEM HYDROCHLORIDE 60 MG: 60 TABLET, FILM COATED ORAL at 16:06

## 2019-04-27 RX ADMIN — Medication 1 CAPSULE: at 08:50

## 2019-04-27 RX ADMIN — POLYETHYLENE GLYCOL 3350 17 G: 17 POWDER, FOR SOLUTION ORAL at 08:50

## 2019-04-27 RX ADMIN — POTASSIUM CHLORIDE 20 MEQ: 20 TABLET, EXTENDED RELEASE ORAL at 08:50

## 2019-04-27 RX ADMIN — DOCUSATE SODIUM 100 MG: 100 CAPSULE, LIQUID FILLED ORAL at 08:50

## 2019-04-27 RX ADMIN — DULOXETINE HYDROCHLORIDE 60 MG: 60 CAPSULE, DELAYED RELEASE ORAL at 08:50

## 2019-04-27 RX ADMIN — MAGNESIUM OXIDE TAB 400 MG (240 MG ELEMENTAL MG) 400 MG: 400 (240 MG) TAB at 08:50

## 2019-04-27 RX ADMIN — ACETAMINOPHEN 1000 MG: 500 TABLET ORAL at 21:26

## 2019-04-27 RX ADMIN — ACETAMINOPHEN 1000 MG: 500 TABLET ORAL at 06:34

## 2019-04-27 RX ADMIN — POTASSIUM CHLORIDE 20 MEQ: 20 TABLET, EXTENDED RELEASE ORAL at 18:04

## 2019-04-27 RX ADMIN — Medication 1 CAPSULE: at 18:04

## 2019-04-27 RX ADMIN — DILTIAZEM HYDROCHLORIDE 60 MG: 60 TABLET, FILM COATED ORAL at 21:26

## 2019-04-27 ASSESSMENT — PAIN DESCRIPTION - DESCRIPTORS
DESCRIPTORS: ACHING
DESCRIPTORS: DISCOMFORT

## 2019-04-27 ASSESSMENT — PAIN - FUNCTIONAL ASSESSMENT
PAIN_FUNCTIONAL_ASSESSMENT: PREVENTS OR INTERFERES SOME ACTIVE ACTIVITIES AND ADLS
PAIN_FUNCTIONAL_ASSESSMENT: ACTIVITIES ARE NOT PREVENTED

## 2019-04-27 ASSESSMENT — PAIN SCALES - GENERAL
PAINLEVEL_OUTOF10: 0
PAINLEVEL_OUTOF10: 1
PAINLEVEL_OUTOF10: 1
PAINLEVEL_OUTOF10: 0
PAINLEVEL_OUTOF10: 0
PAINLEVEL_OUTOF10: 3

## 2019-04-27 ASSESSMENT — PAIN DESCRIPTION - LOCATION
LOCATION: NECK
LOCATION: GENERALIZED

## 2019-04-27 ASSESSMENT — PAIN DESCRIPTION - PAIN TYPE
TYPE: CHRONIC PAIN
TYPE: ACUTE PAIN

## 2019-04-27 ASSESSMENT — PAIN DESCRIPTION - ONSET
ONSET: ON-GOING
ONSET: ON-GOING

## 2019-04-27 ASSESSMENT — PAIN DESCRIPTION - PROGRESSION
CLINICAL_PROGRESSION: NOT CHANGED
CLINICAL_PROGRESSION: NOT CHANGED

## 2019-04-27 ASSESSMENT — PAIN DESCRIPTION - ORIENTATION: ORIENTATION: POSTERIOR

## 2019-04-27 ASSESSMENT — PAIN DESCRIPTION - FREQUENCY
FREQUENCY: INTERMITTENT
FREQUENCY: INTERMITTENT

## 2019-04-27 NOTE — PLAN OF CARE
Constantine Olivier pre-admission called and Patient needs cardiac clearance. Patient has an appointment today and he needs cardiac clearance faxed to [phone]. He is having a precautions nephrolithotomy. Its a 2 hour procedure. Pt free from falls this shift. Fall precautions in place at all times. Call light always within reach. Pt able and agreeable to contact for safety appropriately. Pt free from falls this shift. Fall precautions in place at all times. Call light always withinreach. Pt able and agreeable to contact for safety appropriately. Bed alarm on.

## 2019-04-27 NOTE — PLAN OF CARE
Problem: Falls - Risk of:  Goal: Will remain free from falls  Description  Will remain free from falls  4/27/2019 1013 by Columba Reno RN  Outcome: Ongoing  4/27/2019 0007 by Amisha Gant RN  Outcome: Ongoing  4/26/2019 2256 by Gali Barnhart RN  Outcome: Ongoing  Note:   D: PT. Alert and oriented, calls for assist as needed, call light in reach and bed alarm on   A: encouraged to continue calling for assist as needed  R: without falls  Goal: Absence of physical injury  Description  Absence of physical injury  Outcome: Ongoing

## 2019-04-27 NOTE — PROGRESS NOTES
Nephrology Attending Progress Note      SUBJECTIVE:  We are following this patient for JERRELL. The patient was admitted with abnormal labs. Resting in recliner; NAD. No SOB; UO record incomplete. Notes she is feeling better. Visitor at bedside    Scheduled Meds:   diltiazem  60 mg Oral 3 times per day    magnesium oxide  400 mg Oral BID    potassium chloride  20 mEq Oral BID WC    polyethylene glycol  17 g Oral BID    docusate sodium  100 mg Oral Daily    DULoxetine  60 mg Oral Daily    acetaminophen  1,000 mg Oral 3 times per day    pantoprazole  40 mg Oral QAM AC    sodium chloride flush  10 mL Intravenous 2 times per day    heparin (porcine)  5,000 Units Subcutaneous 3 times per day    lactobacillus  1 capsule Oral BID WC     Continuous Infusions:    PRN Meds:.albuterol sulfate HFA, bisacodyl, sodium chloride flush, ondansetron    Physical Exam:    TEMPERATURE:  Current - Temp: 98.1 °F (36.7 °C); Max - Temp  Av °F (36.7 °C)  Min: 97.8 °F (36.6 °C)  Max: 98.3 °F (36.8 °C)  RESPIRATIONS RANGE: Resp  Av  Min: 16  Max: 18  PULSE RANGE: Pulse  Av.3  Min: 52  Max: 86  BLOOD PRESSURE RANGE:  Systolic (45LQZ), ORV:874 , Min:131 , EUC:299   ; Diastolic (37OET), FBN:50, Min:52, Max:69    24HR INTAKE/OUTPUT:      Intake/Output Summary (Last 24 hours) at 2019 1555  Last data filed at 2019 1616  Gross per 24 hour   Intake 100 ml   Output --   Net 100 ml       GENERAL:  In no apparent distress, conversant  HEENT: Normocephalic, atraumatic. Eyes:  Anicteric sclera. Ears and nose appear externally without deformity. NECK:  Free range of motion. Supple. No thyromegaly. CHEST:  Clear to auscultation bilaterally with no intercostal retractions. CARDIOVASCULAR:  Regular rate and rhythm. No rubs. ABDOMEN:  Soft, nontender. Bowel sounds present. No organomegaly. EXTREMITIES:  Lower extremities:  Trace edema right lower extremity. No extremity lymphadenopathy. PSYCHIATRIC:  Appropriate affect. Alert and oriented to time, place,  and person. NEUROLOGIC:  No asterixis. No focal motor neurological deficits. SKIN:  Loss of turgor. No rash. LAB DATA:    CBC:   Lab Results   Component Value Date    WBC 8.9 04/26/2019    RBC 3.78 04/26/2019    HGB 11.4 04/26/2019    HCT 33.2 04/26/2019    MCV 87.9 04/26/2019    MCH 30.2 04/26/2019    MCHC 34.3 04/26/2019    RDW 12.9 04/26/2019     04/26/2019    MPV 7.2 04/26/2019     BMP:    Lab Results   Component Value Date     04/26/2019    K 3.3 04/26/2019     04/26/2019    CO2 26 04/26/2019    BUN 19 04/26/2019    CREATININE 1.4 04/26/2019    CALCIUM 7.2 04/26/2019    GFRAA 43 04/26/2019    GFRAA >60 05/10/2012    LABGLOM 36 04/26/2019    GLUCOSE 112 04/26/2019     Magnesium:    Lab Results   Component Value Date    MG 1.80 04/26/2019     Phosphorus:    Lab Results   Component Value Date    PHOS 4.5 04/23/2019       IMPRESSION/RECOMMENDATIONS:      Active Problems:    JERRELL (acute kidney injury) ([address]  Resolved Problems:    * No resolved hospital problems. *    1. JERRELL (baseline 1-1.1 mg/dl) - likely prerenal in nature although nonoliguric ATN could not be excluded at this point. The patient is making an excellent amount of urine;; off IVF. Ultrasound and CAT scan revealed no hydronephrosis - Her fractional excretion of sodium is 1.9 while on diuretic.  - Creat 2.7--> 1.9 --> 1.4 (4/26)  - Check BMP in am    2. HTN   - BP nl to mildly high  - On diltiazem alone    3. Chest pain   - No recurrence    4. Edema Right LE   - Duplex revealed no evidence of deep vein or superficial vein thrombosis     5.  Hypokalemia   - K+ 3.3 (4/26)  - Receiving KCl supplement  - Check K+ and Mg++ in am    No ACE/ARB/Diuretics or NSAIDS/Celebrex on discharge

## 2019-04-27 NOTE — PLAN OF CARE
Problem: Falls - Risk of:  Goal: Will remain free from falls  Description  Will remain free from falls  Outcome: Ongoing  Note:   D: PT. Alert and oriented, calls for assist as needed, call light in reach and bed alarm on   A: encouraged to continue calling for assist as needed  R: without falls

## 2019-04-27 NOTE — PROGRESS NOTES
Other:  _____________________________________________________________________  Genitourinary:  [x] N/A [] Other:  _____________________________________________________________________  Musculoskeletal ROS:  [x] N/A [] Other:  _____________________________________________________________________  Endocrine ROS:  [x] N/A [] Other:  _____________________________________________________________________  Neurological ROS:  [x] N/A [] Other:  _____________________________________________________________________  Psych ROS:  [x] N/A [] Other:  _____________________________________________________________________  Dermatological ROS:  [x] N/A [] Other:  _____________________________________________________________________      Comprehensive ROS negative except as mentioned above. Past Medical History:        Diagnosis Date    Allergic rhinitis     GERD (gastroesophageal reflux disease)     Hearing loss sensory, bilateral     mod--severe on right, mild to severe on left, due to asymmetry will send to get MRI to r/o acoustic neuroma    Mastoiditis     on MRI oct 2014-->dr ortiz states with no clinical findings this is considered an over-sensitivity of MRI results    Osteoarthritis     L1-L5    Osteoporosis     patient was on fosamax x 10 years and drug holiday was started on 6/21/13. (this was date of last DEXA scan)    Scoliosis        Past Surgical History:        Procedure Laterality Date    CARPAL TUNNEL RELEASE      right, 2003    COLONOSCOPY      2013, dr Beni Love Loser N/A 10/19/2018    COLONOSCOPY WITH BIOPSY performed by Kristin Moran MD at [address] [place] [address]      December 27 2017 and feb 6 2018   [address]      2011    NASAL SINUS SURGERY      UPPER GASTROINTESTINAL ENDOSCOPY N/A 4/4/2019    EGD BIOPSY performed by Kristin Moran MD at [address] [place], [place] [address]leif:  Patient has no known allergies. Past medical and surgical history reviewed.  Any changes have Component Value Date    CREATININE 1.4 (H) 04/26/2019    BUN 19 04/26/2019     04/26/2019    K 3.3 (L) 04/26/2019     04/26/2019    CO2 26 04/26/2019       Lab Results   Component Value Date    MG 1.80 04/26/2019       Lab Results   Component Value Date    ALT 24 04/22/2019    AST 16 04/22/2019    ALKPHOS 191 (H) 04/22/2019    BILITOT 0.3 04/22/2019        No flowsheet data found. Imaging:  VL Extremity Venous Right   Final Result      US RENAL COMPLETE   Final Result   Negative for obstructive uropathy. CT ABDOMEN PELVIS WO CONTRAST Additional Contrast? None   Final Result   No acute process identified. Fecalized small bowel is consistent with   prolonged small bowel transit time. Assessment & Plan:        JERRELL on CKD III  Likely 2/2 use of ACE, diuretic, celebrex. - All currently being held  Pre renal v non-oligouric ATN  Creatinine noted to be trending down at last lab check  IVF - bicarb per nephrology   Avoid nephrotoxic medications (ACE-I, ARB, NSAIDs). Renally dose medications. Monitor for electrolyte abnormalities (i.e. hyperkalemia)    Chest tightness   Worsened with exertion or sudden changes in temperature  Resolves if symptoms are noticed early enough and rest is initiated  Reactive airway? Will try albuterol inhaler  Esophageal spasm? Diltiazem. No further complaints after initiation of diltiazem    Arthritis  Celebrex discontinued on admission  Cymbalta initiated  Tylenol    BUE tremor  2/2 cymbalta? Muscular deconditioning? Now improved    HTN  Low dose diltiazem  Monitor BP  Initiate medications as needed. Abnormal UA  UC resulted with mixed shelby  IV rocephin discontinued  Adjust abx as indicated. Metabolic Acidosis  Bicarb in IVF  Follow labs     RLE edema  Doppler negative for DVT    Body mass index is 22.26 kg/m².     The patient and / or the family were informed of the results of any tests, a time was given to answer questions, a plan was proposed

## 2019-04-27 NOTE — PROGRESS NOTES
Patient in bed resting comfortably. Respirations steady and unlabored. No signs of respiratory or cardiac distress. No complaints of pain at this time. No needs at this time. Call light in reach and bed alarm in place. Will continue to monitor.   Electronically signed by Sunny Alex RN on 4/27/2019 at 1:58 AM

## 2019-04-28 LAB
ANION GAP SERPL CALCULATED.3IONS-SCNC: 10 MMOL/L (ref 3–16)
BUN BLDV-MCNC: 18 MG/DL (ref 7–20)
CALCIUM SERPL-MCNC: 8.5 MG/DL (ref 8.3–10.6)
CHLORIDE BLD-SCNC: 108 MMOL/L (ref 99–110)
CO2: 23 MMOL/L (ref 21–32)
CREAT SERPL-MCNC: 1.2 MG/DL (ref 0.6–1.2)
GFR AFRICAN AMERICAN: 52
GFR NON-AFRICAN AMERICAN: 43
GLUCOSE BLD-MCNC: 100 MG/DL (ref 70–99)
MAGNESIUM: 1.8 MG/DL (ref 1.8–2.4)
POTASSIUM SERPL-SCNC: 3.7 MMOL/L (ref 3.5–5.1)
SODIUM BLD-SCNC: 141 MMOL/L (ref 136–145)

## 2019-04-28 PROCEDURE — 6360000002 HC RX W HCPCS: Performed by: INTERNAL MEDICINE

## 2019-04-28 PROCEDURE — 6370000000 HC RX 637 (ALT 250 FOR IP): Performed by: INTERNAL MEDICINE

## 2019-04-28 PROCEDURE — 80048 BASIC METABOLIC PNL TOTAL CA: CPT

## 2019-04-28 PROCEDURE — 83735 ASSAY OF MAGNESIUM: CPT

## 2019-04-28 PROCEDURE — 6370000000 HC RX 637 (ALT 250 FOR IP): Performed by: NURSE PRACTITIONER

## 2019-04-28 PROCEDURE — 1200000000 HC SEMI PRIVATE

## 2019-04-28 PROCEDURE — 36415 COLL VENOUS BLD VENIPUNCTURE: CPT

## 2019-04-28 RX ADMIN — HEPARIN SODIUM 5000 UNITS: 5000 INJECTION INTRAVENOUS; SUBCUTANEOUS at 06:11

## 2019-04-28 RX ADMIN — ACETAMINOPHEN 1000 MG: 500 TABLET ORAL at 13:58

## 2019-04-28 RX ADMIN — Medication 1 CAPSULE: at 18:08

## 2019-04-28 RX ADMIN — HEPARIN SODIUM 5000 UNITS: 5000 INJECTION INTRAVENOUS; SUBCUTANEOUS at 14:00

## 2019-04-28 RX ADMIN — Medication 1 CAPSULE: at 08:41

## 2019-04-28 RX ADMIN — MAGNESIUM OXIDE TAB 400 MG (240 MG ELEMENTAL MG) 400 MG: 400 (240 MG) TAB at 08:41

## 2019-04-28 RX ADMIN — DOCUSATE SODIUM 100 MG: 100 CAPSULE, LIQUID FILLED ORAL at 08:41

## 2019-04-28 RX ADMIN — POTASSIUM CHLORIDE 20 MEQ: 20 TABLET, EXTENDED RELEASE ORAL at 08:41

## 2019-04-28 RX ADMIN — MAGNESIUM OXIDE TAB 400 MG (240 MG ELEMENTAL MG) 400 MG: 400 (240 MG) TAB at 21:19

## 2019-04-28 RX ADMIN — ACETAMINOPHEN 1000 MG: 500 TABLET ORAL at 21:19

## 2019-04-28 RX ADMIN — POLYETHYLENE GLYCOL 3350 17 G: 17 POWDER, FOR SOLUTION ORAL at 21:20

## 2019-04-28 RX ADMIN — DULOXETINE HYDROCHLORIDE 60 MG: 60 CAPSULE, DELAYED RELEASE ORAL at 08:41

## 2019-04-28 RX ADMIN — DILTIAZEM HYDROCHLORIDE 60 MG: 60 TABLET, FILM COATED ORAL at 21:20

## 2019-04-28 RX ADMIN — DILTIAZEM HYDROCHLORIDE 60 MG: 60 TABLET, FILM COATED ORAL at 13:59

## 2019-04-28 RX ADMIN — HEPARIN SODIUM 5000 UNITS: 5000 INJECTION INTRAVENOUS; SUBCUTANEOUS at 21:37

## 2019-04-28 RX ADMIN — PANTOPRAZOLE SODIUM 40 MG: 40 TABLET, DELAYED RELEASE ORAL at 06:11

## 2019-04-28 RX ADMIN — ACETAMINOPHEN 1000 MG: 500 TABLET ORAL at 06:10

## 2019-04-28 RX ADMIN — DILTIAZEM HYDROCHLORIDE 60 MG: 60 TABLET, FILM COATED ORAL at 06:11

## 2019-04-28 RX ADMIN — POTASSIUM CHLORIDE 20 MEQ: 20 TABLET, EXTENDED RELEASE ORAL at 18:08

## 2019-04-28 ASSESSMENT — PAIN DESCRIPTION - ONSET
ONSET: ON-GOING
ONSET: ON-GOING

## 2019-04-28 ASSESSMENT — PAIN DESCRIPTION - DESCRIPTORS
DESCRIPTORS: ACHING

## 2019-04-28 ASSESSMENT — PAIN - FUNCTIONAL ASSESSMENT
PAIN_FUNCTIONAL_ASSESSMENT: ACTIVITIES ARE NOT PREVENTED
PAIN_FUNCTIONAL_ASSESSMENT: ACTIVITIES ARE NOT PREVENTED

## 2019-04-28 ASSESSMENT — PAIN DESCRIPTION - LOCATION
LOCATION: NECK

## 2019-04-28 ASSESSMENT — PAIN DESCRIPTION - PROGRESSION: CLINICAL_PROGRESSION: NOT CHANGED

## 2019-04-28 ASSESSMENT — PAIN SCALES - GENERAL
PAINLEVEL_OUTOF10: 1
PAINLEVEL_OUTOF10: 1
PAINLEVEL_OUTOF10: 0
PAINLEVEL_OUTOF10: 3

## 2019-04-28 ASSESSMENT — PAIN DESCRIPTION - ORIENTATION
ORIENTATION: POSTERIOR

## 2019-04-28 ASSESSMENT — PAIN DESCRIPTION - PAIN TYPE
TYPE: CHRONIC PAIN

## 2019-04-28 ASSESSMENT — PAIN DESCRIPTION - FREQUENCY
FREQUENCY: INTERMITTENT
FREQUENCY: INTERMITTENT

## 2019-04-28 ASSESSMENT — PAIN SCALES - WONG BAKER: WONGBAKER_NUMERICALRESPONSE: 0

## 2019-04-28 NOTE — PROGRESS NOTES
[facility] Medicine Progress Note      Admit Date: 4/22/2019         Overnight Events: No    CC: F/U for Abnormal labs    HPI: The patient is an 81year old female who presented to [facility] ER after her doctor contacted her regarding abnormal labs. The patient's provider had been monitoring her renal function and notified her to report to the ER immediately, as she was in acute on chronic stage III kidney disease. The patient was admitted for further workup and treatment. Nephrology was consulted. Sodium bicarb was initiated. Creatinine trended down. It is suspected that her prior home HTN medications (lisinopril, HCTZ) in addition to celebrex were the offending agents precipitating JERRELL.      The patient periodically mentioned chest pain, discribed as tightness to the center of her chest (has been occurring at home), precipitated by abrupt temperature changes. Albuterol PRN was initiated to treat for possible reactive airway. Diltiazem was initiated to treat for possible esophageal spasm.     At this time, the patient is feeling well. Creatinine has continued to improve. She is able to discharge to SNF in stable condition for continued therapies prior to discharge home. Discharge has been delayed due to pending pre-cert. Interval History/Subjective: No new complaints. The patient is feeling well.       Review of Systems:     _____________________________________________________________________  General ROS:  [x] N/A [] Other:  _____________________________________________________________________  HEENT ROS:  [x] N/A [] Other:  _____________________________________________________________________  Respiratory ROS:  [x] N/A [] Other:  _____________________________________________________________________  Cardiovascular ROS:  [x] N/A [] Other:  _____________________________________________________________________  Gastrointestinal ROS:  [x] N/A [] been noted. Scheduled and prn Medications:    Scheduled Meds:   diltiazem  60 mg Oral 3 times per day    magnesium oxide  400 mg Oral BID    potassium chloride  20 mEq Oral BID WC    polyethylene glycol  17 g Oral BID    docusate sodium  100 mg Oral Daily    DULoxetine  60 mg Oral Daily    acetaminophen  1,000 mg Oral 3 times per day    pantoprazole  40 mg Oral QAM AC    sodium chloride flush  10 mL Intravenous 2 times per day    heparin (porcine)  5,000 Units Subcutaneous 3 times per day    lactobacillus  1 capsule Oral BID WC     Continuous Infusions:    PRN Meds:.albuterol sulfate HFA, bisacodyl, sodium chloride flush, ondansetron    PHYSICAL EXAM:  BP (!) 145/68   Pulse 69   Temp 97.9 °F (36.6 °C) (Oral)   Resp 16   Ht 4' 11\" (1.499 m)   Wt 106 lb 14.8 oz (48.5 kg)   SpO2 96%   BMI 21.60 kg/m²       Intake/Output Summary (Last 24 hours) at 4/28/2019 0918  Last data filed at 4/27/2019 1524  Gross per 24 hour   Intake 600 ml   Output --   Net 600 ml       General: Alert and oriented. Sitting up at bedside in NAD. Pleasant and cooperative. Thin. Inaja. HEENT: Normocephalic. Atraumatic. Pupils equal and reactive. EOM intact. Oral mucosa pink/moist/intact. Neck: Supple. Symmetrical. Trachea midline. Lungs: Clear to auscultation bilaterally. Respirations even and unlabored. Chest: Exam unremarkable. Cardiac: S1/S2 noted. Regular Rhythm and rate. Abdomen/GI: Soft. Non-tender. Non-distended. BS+. Extremities: PP+. Atraumatic. No redness/cyanosis/edema. Compression stocking to RLE. Brisk cap refill. Skin: Dry and intact. No lesions noted. Neuro: Grossly intact. No focal deficits noted.      LABS:    Lab Results   Component Value Date    WBC 8.9 04/26/2019    HGB 11.4 (L) 04/26/2019    HCT 33.2 (L) 04/26/2019    MCV 87.9 04/26/2019     04/26/2019    LYMPHOPCT 17.9 04/26/2019    RBC 3.78 (L) 04/26/2019    MCH 30.2 04/26/2019    MCHC 34.3 04/26/2019    RDW 12.9 04/26/2019       Lab Results   Component Value Date    CREATININE 1.2 04/28/2019    BUN 18 04/28/2019     04/28/2019    K 3.7 04/28/2019     04/28/2019    CO2 23 04/28/2019       Lab Results   Component Value Date    MG 1.80 04/28/2019       Lab Results   Component Value Date    ALT 24 04/22/2019    AST 16 04/22/2019    ALKPHOS 191 (H) 04/22/2019    BILITOT 0.3 04/22/2019        No flowsheet data found. Imaging:  VL Extremity Venous Right   Final Result      US RENAL COMPLETE   Final Result   Negative for obstructive uropathy. CT ABDOMEN PELVIS WO CONTRAST Additional Contrast? None   Final Result   No acute process identified. Fecalized small bowel is consistent with   prolonged small bowel transit time. Assessment & Plan:        JERRELL on CKD III  - Now resolved  Likely 2/2 use of ACE, diuretic, celebrex. - All currently being held  Pre renal v non-oligouric ATN  Creatinine noted to be trending down at last lab check  IVF - bicarb per nephrology   Avoid nephrotoxic medications (ACE-I, ARB, NSAIDs). Renally dose medications. Monitor for electrolyte abnormalities (i.e. hyperkalemia)    Chest tightness   - No further complaints after initiation of diltiazem  Worsened with exertion or sudden changes in temperature  Resolves if symptoms are noticed early enough and rest is initiated  Reactive airway? PRN albuterol inhaler  Esophageal spasm? Diltiazem. Arthritis  Celebrex discontinued on admission  Cymbalta initiated  Tylenol    BUE tremor  Resolved    HTN  Diltiazem  Monitor BP  Adjust medications as needed. Abnormal UA  UC resulted with mixed shelby  IV rocephin discontinued    Metabolic Acidosis  Resolved    RLE edema  Doppler negative for DVT  Compression stocking    Body mass index is 21.6 kg/m². The patient and / or the family were informed of the results of any tests, a time was given to answer questions, a plan was proposed and they agreed with plan.     DVT prophylaxis: [] Lovenox  [x] SQ Heparin  [] SCDs because of  [] warfarin/oral direct thrombin inhibitor [] Encourage ambulation    GI prophylaxis: [x] PPI/[id number]  [] not indicated    Probiotic if on abx: [x] Yes [] No [] Not Indicated    Diet: Dietary Nutrition Supplements: Renal Oral Supplement  DIET GENERAL;    Consults:  IP CONSULT TO NEPHROLOGY  IP CONSULT TO HOSPITALIST    Disposition:  [] Home  [] Home with home health [] Rehab [] Psych [x] SNF  [] LTAC  [] Long term nursing home or group home [] Transfer to ICU  [] Transfer to PCU [] Other:    Code Status: Full Code    ELOS: TBD      RIAZ Ramos NP  04/28/19

## 2019-04-28 NOTE — PROGRESS NOTES
lower extremity. No extremity lymphadenopathy. PSYCHIATRIC:  Appropriate affect. Alert and oriented to time, place,  and person. NEUROLOGIC:  No asterixis. No focal motor neurological deficits. SKIN:  Loss of turgor. No rash. LAB DATA:    CBC:   Lab Results   Component Value Date    WBC 8.9 04/26/2019    RBC 3.78 04/26/2019    HGB 11.4 04/26/2019    HCT 33.2 04/26/2019    MCV 87.9 04/26/2019    MCH 30.2 04/26/2019    MCHC 34.3 04/26/2019    RDW 12.9 04/26/2019     04/26/2019    MPV 7.2 04/26/2019     BMP:    Lab Results   Component Value Date     04/28/2019    K 3.7 04/28/2019    K 3.3 04/26/2019     04/28/2019    CO2 23 04/28/2019    BUN 18 04/28/2019    CREATININE 1.2 04/28/2019    CALCIUM 8.5 04/28/2019    GFRAA 52 04/28/2019    GFRAA >60 05/10/2012    LABGLOM 43 04/28/2019    GLUCOSE 100 04/28/2019     Magnesium:    Lab Results   Component Value Date    MG 1.80 04/28/2019     Phosphorus:    Lab Results   Component Value Date    PHOS 4.5 04/23/2019       IMPRESSION/RECOMMENDATIONS:      Active Problems:    JERRELL (acute kidney injury) ([facility] [address]  Resolved Problems:    * No resolved hospital problems. *    1. JERRELL (baseline 1-1.1 mg/dl) - likely prerenal in nature although nonoliguric ATN could not be excluded at this point. The patient is making an excellent amount of urine;; off IVF. Ultrasound and CAT scan revealed no hydronephrosis - Her fractional excretion of sodium was 1.9 while on diuretic.  - Creat 2.7--> 1.9 --> 1.4 --> 1.2    2. HTN   - BP nl to mildly high  - On diltiazem alone    3. Chest pain   - No recurrence    4. Edema Right LE   - Duplex revealed no evidence of deep vein or superficial vein thrombosis     5.  Hypokalemia   - K+ 3.7  - Mg 1.8  - Receiving KCl supplement (may need to decrease)    No ACE/ARB or NSAIDS/Celebrex on discharge

## 2019-04-28 NOTE — PLAN OF CARE
Problem: PAIN  Goal: Patient's pain/discomfort is manageable  Outcome: Met This Shift  Note:   Pt rates generalized pain 1/10, states that she has various intermittent pains, but that nothing is really bothering her right now. Scheduled Tylenol given per MAR.

## 2019-04-28 NOTE — PLAN OF CARE
Problem: Falls - Risk of:  Goal: Will remain free from falls  Description  Will remain free from falls  4/28/2019 0943 by Monica Mclain RN  Outcome: Ongoing  4/28/2019 0101 by Christ Adhikari RN  Outcome: Ongoing  Note:   D: Pt at risk for falls d/t weakness. A: Instructed pt to use call button to request assistance with ambulation. Fall sign posted. Non-skid socks on. Bed alarm on.   R: Pt verbalized understanding to call for assistance with ambulation, demonstrates understanding. Pt remains free from falls.     Goal: Absence of physical injury  Description  Absence of physical injury  Outcome: Ongoing

## 2019-04-29 VITALS
BODY MASS INDEX: 21.64 KG/M2 | HEART RATE: 70 BPM | WEIGHT: 107.36 LBS | OXYGEN SATURATION: 97 % | RESPIRATION RATE: 16 BRPM | TEMPERATURE: 98.1 F | HEIGHT: 59 IN | DIASTOLIC BLOOD PRESSURE: 74 MMHG | SYSTOLIC BLOOD PRESSURE: 152 MMHG

## 2019-04-29 PROCEDURE — 6370000000 HC RX 637 (ALT 250 FOR IP): Performed by: NURSE PRACTITIONER

## 2019-04-29 PROCEDURE — 6360000002 HC RX W HCPCS: Performed by: INTERNAL MEDICINE

## 2019-04-29 PROCEDURE — 97530 THERAPEUTIC ACTIVITIES: CPT

## 2019-04-29 PROCEDURE — 97116 GAIT TRAINING THERAPY: CPT

## 2019-04-29 PROCEDURE — 6370000000 HC RX 637 (ALT 250 FOR IP): Performed by: INTERNAL MEDICINE

## 2019-04-29 PROCEDURE — 97535 SELF CARE MNGMENT TRAINING: CPT

## 2019-04-29 RX ORDER — LANOLIN ALCOHOL/MO/W.PET/CERES
400 CREAM (GRAM) TOPICAL 2 TIMES DAILY
Qty: 30 TABLET | Refills: 0 | Status: SHIPPED | OUTPATIENT
Start: 2019-04-29 | End: 2019-05-10

## 2019-04-29 RX ADMIN — HEPARIN SODIUM 5000 UNITS: 5000 INJECTION INTRAVENOUS; SUBCUTANEOUS at 05:27

## 2019-04-29 RX ADMIN — DULOXETINE HYDROCHLORIDE 60 MG: 60 CAPSULE, DELAYED RELEASE ORAL at 08:32

## 2019-04-29 RX ADMIN — ACETAMINOPHEN 1000 MG: 500 TABLET ORAL at 05:27

## 2019-04-29 RX ADMIN — Medication 1 CAPSULE: at 08:32

## 2019-04-29 RX ADMIN — POTASSIUM CHLORIDE 20 MEQ: 20 TABLET, EXTENDED RELEASE ORAL at 08:32

## 2019-04-29 RX ADMIN — DILTIAZEM HYDROCHLORIDE 60 MG: 60 TABLET, FILM COATED ORAL at 15:02

## 2019-04-29 RX ADMIN — PANTOPRAZOLE SODIUM 40 MG: 40 TABLET, DELAYED RELEASE ORAL at 05:26

## 2019-04-29 RX ADMIN — DOCUSATE SODIUM 100 MG: 100 CAPSULE, LIQUID FILLED ORAL at 08:33

## 2019-04-29 RX ADMIN — MAGNESIUM OXIDE TAB 400 MG (240 MG ELEMENTAL MG) 400 MG: 400 (240 MG) TAB at 08:32

## 2019-04-29 RX ADMIN — DILTIAZEM HYDROCHLORIDE 60 MG: 60 TABLET, FILM COATED ORAL at 05:27

## 2019-04-29 ASSESSMENT — PAIN SCALES - GENERAL
PAINLEVEL_OUTOF10: 0
PAINLEVEL_OUTOF10: 2

## 2019-04-29 NOTE — PROGRESS NOTES
Physical Therapy    Daily Treatment Note    Patient Name: [facility] Record Number: [id number]  Room Number: V2J-2148/9515-03    ASSESSMENT: Pt ambulating much steadier today: able to cover 100' with no device and SBA steadily. Pt no longer has LE tremors and expresses confidence in her ambulation. Pt does express some concern about being home alone but her sister was in the room talking with her about staying for the weekend. Recommend home with assist from family and level 1 home PT. Will follow. Discharge Recommendations: Patient would benefit from continued therapy after discharge, S Level 1   [facility]: LEVEL 1 STANDARD   -Initial home health evaluation to occur within 24-48 hours, in patient home    -Home health agency to establish plan of care for patient over 60 day period    -Medication Reconciliation    -PCP Visit scheduled within seven days of discharge    -PT/OT to evaluate with goal of regaining prior level of functioning    -OT to evaluate if patient has [address] needs for personal care   Equipment Needs: Equipment Needed: No    Admission Date: 4/22/2019  9:57 PM    Additional Pertinent Hx: Keo Escobar is an 81 y.o. F admit 4/23/19 with JERRELL -- per nephrology \"Acute kidney injury, likely prerenal in nature although nonoliguric ATN could not be excluded at this point. \"  Diagnosis: JERRELL  Restrictions/Precautions: Fall Risk       PMHx:  has a past medical history of Allergic rhinitis, GERD (gastroesophageal reflux disease), Hearing loss sensory, bilateral, Mastoiditis, Osteoarthritis, Osteoporosis, and Scoliosis.   PSgHx:   Past Surgical History:   Procedure Laterality Date    CARPAL TUNNEL RELEASE      right, 2003    COLONOSCOPY      2013, dr Talib Tillman    COLONOSCOPY N/A 10/19/2018    COLONOSCOPY WITH BIOPSY performed by Vinay Solis MD at [address] [place] [address]      December 27 2017 and feb 6 2018   [address]    NASAL SINUS SURGERY      UPPER GASTROINTESTINAL ENDOSCOPY N/A 4/4/2019    EGD BIOPSY performed by Kim England MD at [address] / Functional History  Social/Functional History  Lives With: Alone  Type of Home: (condo)  Home Layout: Multi-level  Home Access: (1 step to enter from front door and 2 steps to enter from garage.)  Bathroom Shower/Tub: Tub/Shower unit, Walk-in shower(pt uses tub shower in hallway, has walk-in shower in bedroom)  Bathroom Toilet: Standard  Bathroom Equipment: Grab bars in shower(no grab bars in tub shower that pt uses, grab bars in walk-in shower. Pt had shower chair, but it is currently at daughter's house)  Bathroom Accessibility: Not accessible  Home Equipment: Reacher, Long-handled shoehorn  ADL Assistance: Independent  Homemaking Assistance: Independent  Ambulation Assistance: Independent  Transfer Assistance: Independent  Active : Yes    Restrictions  Restrictions/Precautions: Fall Risk    SUBJECTIVE: Pt semi-fowlers in bed resting quietly. Pt pleasant adn willing to get up. Pt states she feels much better. Pain: Patient Currently in Pain: No      OBJECTIVE:    OBSERVATIONS  Observation: No longer having tremors in BLE. Bed mobility  Supine to Sit: Supervision  Sit to Supine: Supervision    Transfers  Sit to Stand: Supervision  Stand to sit: Supervision    Ambulation  Ambulation  Ambulation?: Yes  More Ambulation?: Yes  Ambulation 1  Device: Rolling Walker  Assistance: Supervision  Quality of Gait: short steps, rigid trunk, steady  Distance: 100'  Ambulation 2  Device 2: No device  Assistance 2: Stand by assistance  Quality of Gait 2: short steps, rigid trunk, steady, kophyscolotic posture (left rib hump)  Distance: 100'  Comments: .    Stairs  Stairs/Curb  Stairs?: No    Neuro/balance  Balance  Sitting - Static: Good  Standing - Static: Fair, +      Patient Education: Role of PT, d/c recs. She verb understanding. Safety Devices: Type of devices:  All fall risk precautions in place, Call light within reach, Chair alarm in place, Patient at risk for falls, Nurse notified, Left in chair      ASSESSMENT:   Pt ambulating much steadier today: able to cover 100' with no device and SBA steadily. Pt no longer has LE tremors and expresses confidence in her ambulation. Pt does express some concern about being home alone but her sister was in the room talking with her about staying for the weekend. Recommend home with assist from family and level 1 home PT. Will follow. Fiona Patel scored a 20/24 on the AM-PAC short mobility form. Initial research suggests that an AM-PAC score of 18 or greater may be associated with a discharge to patient's home setting. However in this initial research, cut off scores do not perfectly predict discharge location: 25% of patients with a score of 18 or greater actually went to a rehab facility and 20% of people with a score less than 18 actually went home. Based on my clinical judgement I recommend that the patient have level 1 home PT sessions per week of Physical Therapy at d/c to increase the patients independence. Goals:  Time Frame for Short term goals: upon d/c - all goals ongoing 04/29/19 except as noted below  Short term goal 1: sup<>sit with Ind   Short term goal 2: sit<>stand Ind   Short term goal 3: amb 150' with no device and supervision            Safety devices:   Type of devices: All fall risk precautions in place, Call light within reach, Chair alarm in place, Patient at risk for falls, Nurse notified, Left in chair        PLAN:  Times per week: 3-5x/wk while in the hospital;    Current Treatment Recommendations: Functional Mobility Training    If patient is discharged prior to next treatment, this note will serve as the discharge summary.     Therapy Time    Individual Concurrent Group Co-treatment   Time In 2998            Time Out 0935          Minutes 26             Timed Code Treatment Minutes: [address], PT

## 2019-04-29 NOTE — CARE COORDINATION
Call to Naomi Remy at [facility] for update regarding precert. Left VM requesting call back.     Electronically signed by GIA Anderson on 4/29/2019 at 8:40 AM

## 2019-04-29 NOTE — PROGRESS NOTES
Occupational Therapy  Facility/Department: [address] MED SURG  Daily Treatment Note  Let this serve as discharge note if patient is discharged prior to next OT session  NAME: Alex Bah  : 1935  MRN: [id number]    Date of Service: 2019    Discharge Recommendations:  Home with assist PRN S Level 1(family is coordinating initial 24 hour supervision/assist for patient )    [address] [place] [address]: LEVEL 1 STANDARD   -Initial home health evaluation to occur within 24-48 hours, in patient home    -Home health agency to establish plan of care for patient over 60 day period    -Medication Reconciliation    -PCP Visit scheduled within seven days of discharge    -PT/OT to evaluate with goal of regaining prior level of functioning    -OT to evaluate if patient has [address] needs for personal care       AM-PAC Score  Alex Bah scored a 21/24 on the AM-PAC ADL Inpatient form. Current research shows that an AM-PAC score of 18 or greater is typically associated with a discharge to the patient's home setting. Based on the patients AM-PAC score and their current ADL deficits, it is recommended that the patient have 2-3 sessions per week of Occupational Therapy at d/c to increase the patients independence. Assessment   Performance deficits / Impairments: Decreased functional mobility ; Decreased ADL status; Decreased endurance;Decreased balance;Decreased fine motor control;Decreased high-level IADLs;Decreased safe awareness  Assessment: Patient pleasant and motivated. Patient has made significant improvements since eval. Patient completed ADLs and transfers with no more than Supervision and fxl mobility without AD with SBA. Cont per POC.    Prognosis: Good  Patient Education: ADL retraining, safety for home-patient going to use shower chair (arriving on Friday) and grab bars, progress made   REQUIRES OT FOLLOW UP: Yes  Activity Tolerance: Patient Tolerated treatment well  Safety Devices in place: Yes  Type of devices: Call light within reach; Left in chair;Nurse notified(RN Cathy Rivas notified, family has been assisting with mobility in room )         Patient Diagnosis(es): The primary encounter diagnosis was JERRELL (acute kidney injury) ([facility] [address] A diagnosis of Urinary tract infection without hematuria, site unspecified was also pertinent to this visit. has a past medical history of Allergic rhinitis, GERD (gastroesophageal reflux disease), Hearing loss sensory, bilateral, Mastoiditis, Osteoarthritis, Osteoporosis, and Scoliosis. has a past surgical history that includes Carpal tunnel release; Nasal sinus surgery; hernia repair; Colonoscopy; eye surgery; Colonoscopy (N/A, 10/19/2018); and Upper gastrointestinal endoscopy (N/A, 4/4/2019). Restrictions  Restrictions/Precautions: Fall Risk    Subjective   Chart Reviewed: Yes  Patient assessed for rehabilitation services?: Yes  Additional Pertinent Hx: Per RIAZ Gaviria NP's note 4/24/19: \"The patient is an 81year old female who presented to [facility] ER after her doctor contacted her regarding abnormal labs. The patient's provider had been monitoring her renal function and notified her to report to the ER immediately, as she was in acute on chronic stage III kidney disease. The patient was admitted for further workup and treatment.    Response to previous treatment: Patient with no complaints from previous session  Family / Caregiver Present: Yes(sister present )  Referring Practitioner: Miguel  Diagnosis: JERRELL on CKD III, Metabolic Acidosis  Subjective: Patient met b/s, sitting in recliner, pleasant and agreeable to OT ADL shower session, does not report or indicate any pain     Orientation  Overall Orientation Status: Within Normal Limits    Objective    ADL  Grooming: Supervision;Modified independent (seated and standing )  UE Bathing: Modified independent (seated )  LE Bathing: Supervision(use of grab bars when standing for ramona-area/buttocks )  UE

## 2019-04-29 NOTE — CARE COORDINATION
Received call from [facility] at [facility]. Aetludmila denied SNF stay. PT saw patient this AM and rec home with home care. Discussed with patient's sister in room and call to son Julia Grossman ([phone]). Home care agency list, COA, and private pay info left in room. Son would like to review with patient's dtg before determining home care preference. Family to transport. Electronically signed by GIA Deleon on 4/29/2019 at 10:35 AM      Received call from son requesting [facility]. Referral made and [facility] is out of network with insurance. He would like referral made to [facility] and [facility] as second options. Referral initiated via [facility] and call to [facility]. They are able to accept. Perfect serve to NP requesting Home Care orders and DEVON complete.     Electronically signed by GIA Deleon on 4/29/2019 at 11:22 AM

## 2019-04-29 NOTE — PLAN OF CARE
Problem: Falls - Risk of:  Goal: Will remain free from falls  Description  Will remain free from falls  4/29/2019 0900 by Irma Anthony RN  Outcome: Ongoing  4/29/2019 0023 by Jai Guallpa RN  Outcome: Ongoing     Problem: PAIN  Goal: Patient's pain/discomfort is manageable  4/29/2019 0900 by Irma Anthony RN  Outcome: Ongoing  4/29/2019 0023 by Jai Guallpa RN  Outcome: Ongoing     Problem: SKIN INTEGRITY  Goal: Skin integrity is maintained or improved  4/29/2019 0900 by Irma Anthony RN  Outcome: Ongoing  4/29/2019 0023 by Jai Guallpa RN  Outcome: Ongoing

## 2019-04-29 NOTE — DISCHARGE SUMMARY
Details   omeprazole (PRILOSEC) 20 MG delayed release capsule Take 20 mg by mouth 2 times daily      risedronate (ACTONEL) 30 MG tablet Take 30 mg by mouth every 7 days      vitamin D (CHOLECALCIFEROL) 1000 units TABS tablet Take 2,000 Units by mouth daily       Docusate Calcium (STOOL SOFTENER PO) Take 100 mg by mouth 2 times daily       Calcium Citrate-Vitamin D (CALCIUM + D PO) Take 500 mg by mouth daily              The patient was seen and examined on day of discharge and this discharge summary is in conjunction with any daily progress note from day of discharge. Hospital Course: This is a n 82 y/o female with a h/o OA, GERD, DHF, Osteoporosis who presented to the ED w/ c/o abnormal blood test. She was told her kidney function was not well. She was found to have JERRELL. Likely d/t recent medication adjustments. She was started on fluids and hydrated. Nephrology was consulted. U/s of kidneys benign and renal function responded to IV fluids. Lisinopril and HCTZ were stopped at discharge. She also c/o cp that was thought to be related to GERD with spasms. She was started on Diltiazem with resolution in her sx. Her electrolytes were also noted to be low and replaced. These were given at d/c to have repeat blood studies performed later this week. Please have them sent to her PCP. ECHO showing diastolic dysfunction and normal EF. If her cp returns she was instructed to follow up with PCP regarding possible stress testing. PT/OT were ordered d/t decrease use in her right foot however, her insurance company did not approve rehab and she was d/c with home care. She will follow up with PCP in 1 week and get blood tests drawn later this week regarding ongoing supplementing with Mg and K. Exam:     BP (!) 152/74   Pulse 70   Temp 98.1 °F (36.7 °C) (Oral)   Resp 16   Ht 4' 11\" (1.499 m)   Wt 107 lb 5.8 oz (48.7 kg)   SpO2 97%   BMI 21.68 kg/m²     General: Resting in bed in no apparent distress.    HEENT: Normocephalic. Atraumatic. Pupils equal and reactive. EOM intact. Oral mucosa pink/moist/intact. Neck: Supple. Symmetrical. Trachea midline. Lungs: Clear to auscultation bilaterally. Respirations even and unlabored. Chest: Exam unremarkable. Cardiac: S1/S2 noted. Regular Rhythm and rate. Abdomen/GI: Soft. Non-tender. Non-distended. BS+. Extremities: PP+. Atraumatic. No redness/cyanosis/edema noted. Brisk cap refill. Skin: Dry and intact. No lesions noted. Neuro: Grossly intact. No focal deficits noted. Consults:     IP CONSULT TO NEPHROLOGY  IP CONSULT TO HOSPITALIST  IP CONSULT TO HOME CARE NEEDS    Significant Diagnostic Studies:   ECHO      Labs: For convenience and continuity at follow-up the following most recent labs are provided:    CBC:    Lab Results   Component Value Date    WBC 8.9 04/26/2019    HGB 11.4 04/26/2019    HCT 33.2 04/26/2019     04/26/2019       Renal:    Lab Results   Component Value Date     04/28/2019    K 3.7 04/28/2019    K 3.3 04/26/2019     04/28/2019    CO2 23 04/28/2019    BUN 18 04/28/2019    CREATININE 1.2 04/28/2019    CALCIUM 8.5 04/28/2019    PHOS 4.5 04/23/2019       Future Appointments   Date Time Provider Nataliia Arnold   5/2/2019 12:30 PM RIAZ Tapia CNP [facility]   5/14/2019  9:30 AM RIAZ Tapia CNP [facility]   8/8/2019  2:20 PM Alanna Potter MD [facility] Eugenio Fune[facility]       Time Spent on discharge is more than 30 minutes in the examination, evaluation, counseling and review of medications and discharge plan. RX monitoring reviewed     Signed:    RIAZ Ryan CNP   4/29/2019      Thank you Valencia Byrd MD for the opportunity to be involved in this patient's care. If you have any questions or concerns please feel free to contact me at [phone].

## 2019-05-01 ENCOUNTER — TELEPHONE (OUTPATIENT)
Dept: FAMILY MEDICINE CLINIC | Age: 84
End: 2019-05-01

## 2019-05-02 ENCOUNTER — TELEPHONE (OUTPATIENT)
Dept: FAMILY MEDICINE CLINIC | Age: 84
End: 2019-05-02

## 2019-05-02 ENCOUNTER — OFFICE VISIT (OUTPATIENT)
Dept: FAMILY MEDICINE CLINIC | Age: 84
End: 2019-05-02
Payer: MEDICARE

## 2019-05-02 VITALS
HEART RATE: 67 BPM | WEIGHT: 103.6 LBS | DIASTOLIC BLOOD PRESSURE: 60 MMHG | BODY MASS INDEX: 23.3 KG/M2 | HEIGHT: 56 IN | SYSTOLIC BLOOD PRESSURE: 144 MMHG

## 2019-05-02 DIAGNOSIS — N17.9 AKI (ACUTE KIDNEY INJURY) (HCC): Primary | ICD-10-CM

## 2019-05-02 DIAGNOSIS — E83.42 HYPOMAGNESEMIA: ICD-10-CM

## 2019-05-02 DIAGNOSIS — K29.70 GASTRITIS WITHOUT BLEEDING, UNSPECIFIED CHRONICITY, UNSPECIFIED GASTRITIS TYPE: ICD-10-CM

## 2019-05-02 DIAGNOSIS — I10 ESSENTIAL HYPERTENSION: ICD-10-CM

## 2019-05-02 LAB
ALBUMIN SERPL-MCNC: 3.7 G/DL (ref 3.4–5)
ANION GAP SERPL CALCULATED.3IONS-SCNC: 12 MMOL/L (ref 3–16)
BUN BLDV-MCNC: 39 MG/DL (ref 7–20)
CALCIUM SERPL-MCNC: 9.8 MG/DL (ref 8.3–10.6)
CHLORIDE BLD-SCNC: 106 MMOL/L (ref 99–110)
CO2: 20 MMOL/L (ref 21–32)
CREAT SERPL-MCNC: 2.2 MG/DL (ref 0.6–1.2)
GFR AFRICAN AMERICAN: 26
GFR NON-AFRICAN AMERICAN: 21
GLUCOSE BLD-MCNC: 113 MG/DL (ref 70–99)
MAGNESIUM: 2.2 MG/DL (ref 1.8–2.4)
PHOSPHORUS: 3.2 MG/DL (ref 2.5–4.9)
POTASSIUM SERPL-SCNC: 5.4 MMOL/L (ref 3.5–5.1)
SODIUM BLD-SCNC: 138 MMOL/L (ref 136–145)

## 2019-05-02 PROCEDURE — 1111F DSCHRG MED/CURRENT MED MERGE: CPT | Performed by: NURSE PRACTITIONER

## 2019-05-02 PROCEDURE — 99214 OFFICE O/P EST MOD 30 MIN: CPT | Performed by: NURSE PRACTITIONER

## 2019-05-02 NOTE — PROGRESS NOTES
PROGRESS NOTE     [facility]  [address] [facility]  [place] [address]  [phone] office  [phone] fax    Date of Service:  5/2/2019    Subjective:      Patient ID: . Marcela Webber is a 81 y.o. female      CC: Hospital follow up    HPI  Patient was admitted to the hospital with JERRELL. She was directed to go to the ER when lab results from this office showed Bun/creat of 90/4.3. Renal failure was likely related to recent medication adjustments. Patient was recently started on lisinopril and hydrochlorothiazide related to hypertension and elevated BNP. Patient was treated with IV fluids and nephrology was consulted. Renal function returned to normal with IV fluids. Ultrasound of kidneys was unremarkable. Patient also had complaints of chest pain which was thought to be related to GERD with spasms. Patient was started on diltiazem which has relieved her symptoms. Echo was done during hospitalization which showed diastolic dysfunction with normal EF. It was recommended if patient continues to have chest pain to have stress testing done. Patient was discharged home with home health and PT. Of note patient was also started on Cymbalta as a new medication during her hospitalization related to anxiety. Upon discharge patient's hydrochlorothiazide, lisinopril and Celebrex were all discontinued. Patient is following up today for repeat lab work. Patient has magnesium and BMP order pending. Patient is tolerating Cymbalta and Cardizem well. No reported side effects. Hypertension:  Home blood pressure monitoring: No.  She is adherent to a low sodium diet. Patient denies chest pain, shortness of breath, headache, lightheadedness, palpitations and fatigue. Antihypertensive medication side effects: no medication side effects noted. Use of agents associated with hypertension: none.                                         Sodium (mmol/L)   Date Value   04/28/2019 141    BUN (mg/dL)   Date Value   04/28/2019 18    Glucose (mg/dL)   Date Value   04/28/2019 100 (H)      Potassium (mmol/L)   Date Value   04/28/2019 3.7     Potassium reflex Magnesium (mmol/L)   Date Value   04/26/2019 3.3 (L)    CREATININE (mg/dL)   Date Value   04/28/2019 1.2                 Vitals:    05/02/19 1230   BP: (!) 144/60   Pulse: 67   Weight: 103 lb 9.6 oz (47 kg)   Height: 4' 8\" (1.422 m)       Outpatient Medications Marked as Taking for the 5/2/19 encounter (Office Visit) with Dorena Merlin, APRN - CNP   Medication Sig Dispense Refill    magnesium oxide (MAG-OX) 400 (240 Mg) MG tablet Take 1 tablet by mouth 2 times daily 30 tablet 0    albuterol sulfate  (90 Base) MCG/ACT inhaler Inhale 2 puffs into the lungs every 6 hours as needed for Wheezing (Chest tightness) 1 Inhaler 1    DULoxetine (CYMBALTA) 60 MG extended release capsule Take 1 capsule by mouth daily 30 capsule 0    diltiazem (CARDIZEM) 60 MG tablet Take 1 tablet by mouth every 8 hours 120 tablet 1    potassium chloride (KLOR-CON M) 20 MEQ extended release tablet Take 1 tablet by mouth 2 times daily (with meals) 60 tablet 0    omeprazole (PRILOSEC) 20 MG delayed release capsule Take 20 mg by mouth 2 times daily      risedronate (ACTONEL) 30 MG tablet Take 30 mg by mouth every 7 days      vitamin D (CHOLECALCIFEROL) 1000 units TABS tablet Take 2,000 Units by mouth daily       Docusate Calcium (STOOL SOFTENER PO) Take 100 mg by mouth 2 times daily       Calcium Citrate-Vitamin D (CALCIUM + D PO) Take 500 mg by mouth daily          Past Medical History:   Diagnosis Date    Allergic rhinitis     GERD (gastroesophageal reflux disease)     Hearing loss sensory, bilateral     mod--severe on right, mild to severe on left, due to asymmetry will send to get MRI to r/o acoustic neuroma    Mastoiditis     on MRI oct 2014-->dr ortiz states with no clinical findings this is considered an over-sensitivity of MRI results    Osteoarthritis L1-L5    Osteoporosis     patient was on fosamax x 10 years and drug holiday was started on 6/21/13. (this was date of last DEXA scan)    Scoliosis        Past Surgical History:   Procedure Laterality Date    CARPAL TUNNEL RELEASE      right, 2003    COLONOSCOPY      2013, dr German [place]   Reford Stain N/A 10/19/2018    COLONOSCOPY WITH BIOPSY performed by Rae Morris MD at [address] [place] [address]      December 27 2017 and feb 6 2018   [address]      2011    NASAL SINUS SURGERY      UPPER GASTROINTESTINAL ENDOSCOPY N/A 4/4/2019    EGD BIOPSY performed by Rae Morris MD at [address] [place] [address] History     Tobacco Use    Smoking status: Never Smoker    Smokeless tobacco: Never Used   Substance Use Topics    Alcohol use: No       Family History   Problem Relation Age of Onset    Diabetes Mother     Heart Disease Mother            Review of Systems  Constitutional:  Negative for activity or appetite change, fever or fatigue  HENT:  Negative for congestion,sinus pressure, or rhinorrhea  Eyes:  Negative for eye pain or visual changes  Resp:  Negative for SOB, chest tightness, cough  Cardiovascular: Negative for CP, palpitations, CARR, orthopnea, PND, LE edema  Gastrointestinal: Negative for abd pain, melena, BRBPR, N/V/D  Endocrine:  Negative for polydipsia and polyuria  :  Negative for dysuria, flank pain or urinary frequency  Musculoskeletal:  Negative for back pain or myalgias  Neuro:  Negative for dizziness or lightheadedness  Psych: negative for depression or anxiety      Objective:   Constitutional:   · Reviewed vitals above  · Well Nourished, well developed, no distress       HENT:  · Normal external nose without lesions  · Bilateral TMs translucent with normal light reflex and bony landmarks  · Normal oropharynx without erythema or exudate  · Normal nasal mucosa without swelling or erythema  Neck:  · Symmetric and without masses  · No thyromegaly  Resp:  · Normal

## 2019-05-02 NOTE — TELEPHONE ENCOUNTER
Zhane with [facility] called stating that she saw PT today for an OT evaluation only, Pt has no OT concerns at this time. Best call back number: [phone].

## 2019-05-03 ENCOUNTER — HOSPITAL ENCOUNTER (INPATIENT)
Age: 84
LOS: 3 days | Discharge: HOME HEALTH | DRG: 683 | End: 2019-05-06
Attending: INTERNAL MEDICINE | Admitting: INTERNAL MEDICINE
Payer: MEDICARE

## 2019-05-03 ENCOUNTER — TELEPHONE (OUTPATIENT)
Dept: FAMILY MEDICINE CLINIC | Age: 84
End: 2019-05-03

## 2019-05-03 ENCOUNTER — TELEPHONE (OUTPATIENT)
Dept: OTHER | Facility: CLINIC | Age: 84
End: 2019-05-03

## 2019-05-03 DIAGNOSIS — E87.5 HYPERKALEMIA: ICD-10-CM

## 2019-05-03 DIAGNOSIS — N17.9 ACUTE KIDNEY INJURY (HCC): Primary | ICD-10-CM

## 2019-05-03 PROBLEM — R53.1 GENERALIZED WEAKNESS: Status: ACTIVE | Noted: 2019-05-03

## 2019-05-03 PROBLEM — I50.32 CHRONIC DIASTOLIC (CONGESTIVE) HEART FAILURE (HCC): Status: ACTIVE | Noted: 2019-05-03

## 2019-05-03 PROBLEM — N18.30 ACUTE RENAL FAILURE SUPERIMPOSED ON STAGE 3 CHRONIC KIDNEY DISEASE (HCC): Status: ACTIVE | Noted: 2019-05-03

## 2019-05-03 PROBLEM — E86.0 DEHYDRATION: Status: ACTIVE | Noted: 2019-05-03

## 2019-05-03 PROBLEM — N19 ACUTE PRERENAL AZOTEMIA: Status: ACTIVE | Noted: 2019-05-03

## 2019-05-03 LAB
ANION GAP SERPL CALCULATED.3IONS-SCNC: 11 MMOL/L (ref 3–16)
ANION GAP SERPL CALCULATED.3IONS-SCNC: 13 MMOL/L (ref 3–16)
BASOPHILS ABSOLUTE: 0.1 K/UL (ref 0–0.2)
BASOPHILS RELATIVE PERCENT: 0.9 %
BILIRUBIN URINE: NEGATIVE
BLOOD, URINE: NEGATIVE
BUN BLDV-MCNC: 35 MG/DL (ref 7–20)
BUN BLDV-MCNC: 38 MG/DL (ref 7–20)
CALCIUM SERPL-MCNC: 10.3 MG/DL (ref 8.3–10.6)
CALCIUM SERPL-MCNC: 9.1 MG/DL (ref 8.3–10.6)
CHLORIDE BLD-SCNC: 103 MMOL/L (ref 99–110)
CHLORIDE BLD-SCNC: 108 MMOL/L (ref 99–110)
CLARITY: CLEAR
CO2: 18 MMOL/L (ref 21–32)
CO2: 21 MMOL/L (ref 21–32)
COLOR: YELLOW
CREAT SERPL-MCNC: 1.8 MG/DL (ref 0.6–1.2)
CREAT SERPL-MCNC: 2 MG/DL (ref 0.6–1.2)
EOSINOPHILS ABSOLUTE: 0.1 K/UL (ref 0–0.6)
EOSINOPHILS RELATIVE PERCENT: 1.4 %
GFR AFRICAN AMERICAN: 29
GFR AFRICAN AMERICAN: 33
GFR NON-AFRICAN AMERICAN: 24
GFR NON-AFRICAN AMERICAN: 27
GLUCOSE BLD-MCNC: 122 MG/DL (ref 70–99)
GLUCOSE BLD-MCNC: 97 MG/DL (ref 70–99)
GLUCOSE URINE: NEGATIVE MG/DL
HCT VFR BLD CALC: 36.9 % (ref 36–48)
HEMOGLOBIN: 12.2 G/DL (ref 12–16)
KETONES, URINE: NEGATIVE MG/DL
LEUKOCYTE ESTERASE, URINE: NEGATIVE
LYMPHOCYTES ABSOLUTE: 1 K/UL (ref 1–5.1)
LYMPHOCYTES RELATIVE PERCENT: 10.6 %
MAGNESIUM: 2.2 MG/DL (ref 1.8–2.4)
MCH RBC QN AUTO: 29.8 PG (ref 26–34)
MCHC RBC AUTO-ENTMCNC: 33.1 G/DL (ref 31–36)
MCV RBC AUTO: 89.8 FL (ref 80–100)
MICROSCOPIC EXAMINATION: NORMAL
MONOCYTES ABSOLUTE: 0.8 K/UL (ref 0–1.3)
MONOCYTES RELATIVE PERCENT: 8.6 %
NEUTROPHILS ABSOLUTE: 7.5 K/UL (ref 1.7–7.7)
NEUTROPHILS RELATIVE PERCENT: 78.5 %
NITRITE, URINE: NEGATIVE
PDW BLD-RTO: 13.1 % (ref 12.4–15.4)
PH UA: 6.5 (ref 5–8)
PLATELET # BLD: 323 K/UL (ref 135–450)
PMV BLD AUTO: 7.5 FL (ref 5–10.5)
POTASSIUM REFLEX MAGNESIUM: 6 MMOL/L (ref 3.5–5.1)
POTASSIUM SERPL-SCNC: 5.2 MMOL/L (ref 3.5–5.1)
PROTEIN UA: NEGATIVE MG/DL
RBC # BLD: 4.11 M/UL (ref 4–5.2)
SODIUM BLD-SCNC: 137 MMOL/L (ref 136–145)
SODIUM BLD-SCNC: 137 MMOL/L (ref 136–145)
SPECIFIC GRAVITY UA: 1.02 (ref 1–1.03)
URINE REFLEX TO CULTURE: NORMAL
URINE TYPE: NORMAL
UROBILINOGEN, URINE: 0.2 E.U./DL
WBC # BLD: 9.6 K/UL (ref 4–11)

## 2019-05-03 PROCEDURE — 93005 ELECTROCARDIOGRAM TRACING: CPT | Performed by: PHYSICIAN ASSISTANT

## 2019-05-03 PROCEDURE — 96360 HYDRATION IV INFUSION INIT: CPT

## 2019-05-03 PROCEDURE — 6370000000 HC RX 637 (ALT 250 FOR IP): Performed by: INTERNAL MEDICINE

## 2019-05-03 PROCEDURE — 36415 COLL VENOUS BLD VENIPUNCTURE: CPT

## 2019-05-03 PROCEDURE — 2580000003 HC RX 258: Performed by: INTERNAL MEDICINE

## 2019-05-03 PROCEDURE — 99284 EMERGENCY DEPT VISIT MOD MDM: CPT

## 2019-05-03 PROCEDURE — 93010 ELECTROCARDIOGRAM REPORT: CPT | Performed by: INTERNAL MEDICINE

## 2019-05-03 PROCEDURE — 85025 COMPLETE CBC W/AUTO DIFF WBC: CPT

## 2019-05-03 PROCEDURE — 2580000003 HC RX 258: Performed by: PHYSICIAN ASSISTANT

## 2019-05-03 PROCEDURE — 6360000002 HC RX W HCPCS: Performed by: INTERNAL MEDICINE

## 2019-05-03 PROCEDURE — 80048 BASIC METABOLIC PNL TOTAL CA: CPT

## 2019-05-03 PROCEDURE — 1200000000 HC SEMI PRIVATE

## 2019-05-03 PROCEDURE — 83735 ASSAY OF MAGNESIUM: CPT

## 2019-05-03 PROCEDURE — 96361 HYDRATE IV INFUSION ADD-ON: CPT

## 2019-05-03 PROCEDURE — 81003 URINALYSIS AUTO W/O SCOPE: CPT

## 2019-05-03 RX ORDER — DILTIAZEM HYDROCHLORIDE 120 MG/1
120 CAPSULE, COATED, EXTENDED RELEASE ORAL DAILY
Status: DISCONTINUED | OUTPATIENT
Start: 2019-05-03 | End: 2019-05-06

## 2019-05-03 RX ORDER — SODIUM CHLORIDE 9 MG/ML
INJECTION, SOLUTION INTRAVENOUS CONTINUOUS
Status: DISCONTINUED | OUTPATIENT
Start: 2019-05-03 | End: 2019-05-03

## 2019-05-03 RX ORDER — ACETAMINOPHEN 325 MG/1
650 TABLET ORAL EVERY 4 HOURS PRN
Status: DISCONTINUED | OUTPATIENT
Start: 2019-05-03 | End: 2019-05-06 | Stop reason: HOSPADM

## 2019-05-03 RX ORDER — DULOXETIN HYDROCHLORIDE 60 MG/1
60 CAPSULE, DELAYED RELEASE ORAL DAILY
Status: DISCONTINUED | OUTPATIENT
Start: 2019-05-04 | End: 2019-05-04

## 2019-05-03 RX ORDER — SODIUM CHLORIDE 0.9 % (FLUSH) 0.9 %
10 SYRINGE (ML) INJECTION PRN
Status: DISCONTINUED | OUTPATIENT
Start: 2019-05-03 | End: 2019-05-06 | Stop reason: HOSPADM

## 2019-05-03 RX ORDER — ONDANSETRON 2 MG/ML
4 INJECTION INTRAMUSCULAR; INTRAVENOUS EVERY 4 HOURS PRN
Status: DISCONTINUED | OUTPATIENT
Start: 2019-05-03 | End: 2019-05-06 | Stop reason: HOSPADM

## 2019-05-03 RX ORDER — ALBUTEROL SULFATE 90 UG/1
2 AEROSOL, METERED RESPIRATORY (INHALATION) EVERY 6 HOURS PRN
Status: DISCONTINUED | OUTPATIENT
Start: 2019-05-03 | End: 2019-05-06 | Stop reason: HOSPADM

## 2019-05-03 RX ORDER — SODIUM CHLORIDE 0.9 % (FLUSH) 0.9 %
10 SYRINGE (ML) INJECTION EVERY 12 HOURS SCHEDULED
Status: DISCONTINUED | OUTPATIENT
Start: 2019-05-03 | End: 2019-05-06 | Stop reason: HOSPADM

## 2019-05-03 RX ORDER — DILTIAZEM HYDROCHLORIDE 60 MG/1
60 TABLET, FILM COATED ORAL EVERY 8 HOURS SCHEDULED
Status: DISCONTINUED | OUTPATIENT
Start: 2019-05-03 | End: 2019-05-03

## 2019-05-03 RX ORDER — PANTOPRAZOLE SODIUM 40 MG/1
40 TABLET, DELAYED RELEASE ORAL
Status: DISCONTINUED | OUTPATIENT
Start: 2019-05-04 | End: 2019-05-06 | Stop reason: HOSPADM

## 2019-05-03 RX ORDER — HEPARIN SODIUM 5000 [USP'U]/ML
5000 INJECTION, SOLUTION INTRAVENOUS; SUBCUTANEOUS EVERY 8 HOURS SCHEDULED
Status: DISCONTINUED | OUTPATIENT
Start: 2019-05-03 | End: 2019-05-06 | Stop reason: HOSPADM

## 2019-05-03 RX ORDER — SODIUM CHLORIDE 9 MG/ML
INJECTION, SOLUTION INTRAVENOUS CONTINUOUS
Status: ACTIVE | OUTPATIENT
Start: 2019-05-03 | End: 2019-05-03

## 2019-05-03 RX ADMIN — SODIUM CHLORIDE: 9 INJECTION, SOLUTION INTRAVENOUS at 20:14

## 2019-05-03 RX ADMIN — ACETAMINOPHEN 650 MG: 325 TABLET ORAL at 20:24

## 2019-05-03 RX ADMIN — HEPARIN SODIUM 5000 UNITS: 5000 INJECTION INTRAVENOUS; SUBCUTANEOUS at 20:14

## 2019-05-03 RX ADMIN — SODIUM CHLORIDE: 9 INJECTION, SOLUTION INTRAVENOUS at 17:35

## 2019-05-03 RX ADMIN — SODIUM CHLORIDE: 9 INJECTION, SOLUTION INTRAVENOUS at 12:15

## 2019-05-03 RX ADMIN — DILTIAZEM HYDROCHLORIDE 60 MG: 60 TABLET, FILM COATED ORAL at 17:35

## 2019-05-03 ASSESSMENT — ENCOUNTER SYMPTOMS
SORE THROAT: 0
VOMITING: 0
SHORTNESS OF BREATH: 0
BACK PAIN: 0
ABDOMINAL PAIN: 0
NAUSEA: 0

## 2019-05-03 ASSESSMENT — PAIN DESCRIPTION - ORIENTATION: ORIENTATION: POSTERIOR

## 2019-05-03 ASSESSMENT — PAIN DESCRIPTION - PROGRESSION: CLINICAL_PROGRESSION: NOT CHANGED

## 2019-05-03 ASSESSMENT — PAIN SCALES - GENERAL
PAINLEVEL_OUTOF10: 0
PAINLEVEL_OUTOF10: 3
PAINLEVEL_OUTOF10: 0

## 2019-05-03 ASSESSMENT — PAIN DESCRIPTION - ONSET: ONSET: ON-GOING

## 2019-05-03 ASSESSMENT — PAIN - FUNCTIONAL ASSESSMENT: PAIN_FUNCTIONAL_ASSESSMENT: ACTIVITIES ARE NOT PREVENTED

## 2019-05-03 ASSESSMENT — PAIN DESCRIPTION - LOCATION: LOCATION: FOOT;NECK

## 2019-05-03 ASSESSMENT — PAIN SCALES - WONG BAKER: WONGBAKER_NUMERICALRESPONSE: 0

## 2019-05-03 ASSESSMENT — PAIN DESCRIPTION - FREQUENCY: FREQUENCY: INTERMITTENT

## 2019-05-03 ASSESSMENT — PAIN DESCRIPTION - PAIN TYPE: TYPE: CHRONIC PAIN

## 2019-05-03 ASSESSMENT — PAIN DESCRIPTION - DESCRIPTORS: DESCRIPTORS: ACHING

## 2019-05-03 NOTE — TELEPHONE ENCOUNTER
We were able to get appointment for patient with Dr. Jessie España on 10am on Monday    I did have Dr. Jessie España paged and discussed case with him. He advised me to have patient had straight to the emergency room because he is concerned the kidney function will continue to decline over the weekend, and is not safe to wait until Monday. Patient confirmed that she has not had any fluid loss: No vomiting, diarrhea, excessive sweating. She has remained off her lisinopril, hydrochlorothiazide, and Celebrex. She's not had any other over-the-counter NSAIDs. She is still taking potassium supplementation. Patient told to go straight to [facility] ED. She expressed understanding and states she will do so today. We'll call ED to inform them we are sending her back.

## 2019-05-03 NOTE — TELEPHONE ENCOUNTER
Patient was just in the hospital for kidney failure. Unfortunately her kidney function has decreased again on blood work. Notified patient already. Please call to kidney doctor that fall or the hospital, Dr Esther Mustafa, [phone] and get an appointment for her early next week. Route back to confirm appointment, and call patient with information as well.

## 2019-05-03 NOTE — ED NOTES
Pharmacy Medication Reconciliation Note     List of medications patient is currently taking is complete. Source of information:   1. Patient, daugther, and list brought from home. 2. EMR    No Known Allergies    1. Patient was recently taken off Celebrex, Zantac, hydrochlorothiazide, and lisinopril when she was discharged from her recent hospital stay here at [facility] 4/22/19-4/29/-19.     Kristie Melgar PharmD, BCPS  5/3/2019  3:42 PM

## 2019-05-03 NOTE — ED NOTES
Report given to [facility] on 3W. No further questions at this time.       Cydney Aponte RN  05/03/19 1921

## 2019-05-03 NOTE — H&P
Hospital Medicine  History and Physical    PCP: Yazan Giron MD  Patient Name: Hali Arguelles    Date of Service: Pt seen/examined on 05/03/2019 and Admitted to Inpatient with expected LOS greater than two midnights due to medical therapy    CHIEF COMPLAINT:  Pt c/o abnormal labs  HISTORY OF PRESENT ILLNESS: Patient is an 52-year-old woman with a history of chronic renal failure and chronic diastolic congestive heart failure. She was discharged from this facility on 4/29/2019 after being treated for acute renal failure which was thought to be secondary to recent medication adjustments. At that time, lisinopril and hydrochlorothiazide were discontinued. She presents to the emergency room at the request of her PCP after lab test today revealed dehydration and acute on chronic kidney failure. A family member in the room states that she has been keeping up with her fluid intake as instructed, and has been weighing herself daily but has not been eating much at all. Pt reports a 3 lb weight loss in the past day. She is being admitted for further evaluation and treatment. Associated signs and symptoms do not include fever or chills, nausea, vomiting, abdominal pain, hemoptysis, hematochezia, diarrhea, constipation or urinary symptoms.         Past Medical History:        Diagnosis Date    Allergic rhinitis     CHF (congestive heart failure) (HCC)     GERD (gastroesophageal reflux disease)     Hearing loss sensory, bilateral     mod--severe on right, mild to severe on left, due to asymmetry will send to get MRI to r/o acoustic neuroma    Mastoiditis     on MRI oct 2014-->dr ortiz states with no clinical findings this is considered an over-sensitivity of MRI results    Osteoarthritis     L1-L5    Osteoporosis     patient was on fosamax x 10 years and drug holiday was started on 6/21/13. (this was date of last DEXA scan)    Scoliosis        Past Surgical History:        Procedure Laterality Date    CARPAL TUNNEL RELEASE      right, 2003    COLONOSCOPY      2013, dr Martin Guerra    COLONOSCOPY N/A 10/19/2018    COLONOSCOPY WITH BIOPSY performed by Luis Brantley MD at [facility]      December 27 2017 and feb 6 2018   [address]      2011    NASAL SINUS SURGERY      UPPER GASTROINTESTINAL ENDOSCOPY N/A 4/4/2019    [facility]  Patient has no known allergies. Medications Prior to Admission:    Prior to Admission medications    Medication Sig Start Date End Date Taking?  Authorizing Provider   magnesium oxide (MAG-OX) 400 (240 Mg) MG tablet Take 1 tablet by mouth 2 times daily 4/29/19  Yes RIAZ Valenzuela CNP   DULoxetine (CYMBALTA) 60 MG extended release capsule Take 1 capsule by mouth daily 4/27/19  Yes RIAZ Degroot NP   diltiazem (CARDIZEM) 60 MG tablet Take 1 tablet by mouth every 8 hours 4/26/19  Yes RIAZ Degroot NP   potassium chloride (KLOR-CON M) 20 MEQ extended release tablet Take 1 tablet by mouth 2 times daily (with meals) 4/26/19  Yes RIAZ Degroot NP   omeprazole (PRILOSEC) 20 MG delayed release capsule Take 20 mg by mouth 2 times daily   Yes Historical Provider, MD   risedronate (ACTONEL) 30 MG tablet Take 30 mg by mouth every 7 days Saturdays   Yes Historical Provider, MD   vitamin D (CHOLECALCIFEROL) 1000 units TABS tablet Take 2,000 Units by mouth daily    Yes Historical Provider, MD   Docusate Calcium (STOOL SOFTENER PO) Take 100 mg by mouth daily    Yes Historical Provider, MD   Calcium Citrate-Vitamin D (CALCIUM + D PO) Take 500 mg by mouth daily    Yes Historical Provider, MD   albuterol sulfate  (90 Base) MCG/ACT inhaler Inhale 2 puffs into the lungs every 6 hours as needed for Wheezing (Chest tightness) 4/26/19   RIAZ Degroot NP       Family History:       Problem Relation Age of Onset    Diabetes Mother     Heart Disease Mother      Social History: TOBACCO:   reports that she has never smoked. She has never used smokeless tobacco.  ETOH:   reports that she does not drink alcohol. OCCUPATION:      REVIEW OF SYSTEMS:  A full review of systems was performed and is negative except for that which appears in the HPI    Physical Exam:    Vitals: /83   Pulse 62   Temp 97.9 °F (36.6 °C) (Oral)   Resp 16   Ht 4' 8\" (1.422 m) Comment: stated  Wt 105 lb (47.6 kg)   SpO2 98%   BMI 23.54 kg/m²   General appearance: WD/WN 81y.o. year-old  female who is alert, appears stated age and is cooperative  HEENT: Head: Normocephalic, no lesions, without obvious abnormality. Eye: Normal external eye, conjunctiva, lids cornea, KIERRA. Ears: Normal external ears. Non-tender. Nose: Normal external nose, mucus membranes and septum. Pharynx: Dental Hygiene adequate. Normal buccal mucosa. Normal pharynx. Neck: no adenopathy, no carotid bruit, no JVD, supple, symmetrical, trachea midline and thyroid not enlarged, symmetric, no tenderness/mass/nodules  Lungs: clear to auscultation bilaterally and no use of accessory muscles. Heart: regular rate and rhythm, S1, S2 normal, no murmur, click, rub or gallop and normal apical impulse  Abdomen: soft, non-tender; bowel sounds normal; no masses,  no organomegaly  Extremities: extremities atraumatic, no cyanosis or edema and Homans sign is negative, no sign of DVT. Capillary Refill: Acceptable < 3 seconds  Peripheral Pulses: +3 easily felt, not easily obliterated with pressures  Skin: Skin color, texture, turgor normal. No rashes or lesions on exposed skin  Neurologic: Neurovascularly intact without any focal sensory/motor deficits. Cranial nerves: II-XII intact, grossly non-focal. Gait was not tested.   Psychiatric: Alert and oriented, thought content appropriate, normal insight    CBC:   Recent Labs     05/03/19  1209   WBC 9.6   HGB 12.2        BMP:    Recent Labs     05/02/19  1303 05/03/19  1209    137 K 5.4* 6.0*    103   CO2 20* 21   BUN 39* 38*   CREATININE 2.2* 1.8*   GLUCOSE 113* 97     Troponin: No results for input(s): TROPONINI in the last 72 hours. PT/INR:  No results found for: PTINR  U/A:    Lab Results   Component Value Date    LEUKOCYTESUR Negative 05/03/2019    RBCUA 0-2 04/22/2019    SPECGRAV 1.016 05/03/2019    UROBILINOGEN 0.2 05/03/2019    BILIRUBINUR Negative 05/03/2019    BLOODU Negative 05/03/2019    GLUCOSEU Negative 05/03/2019    PROTEINU Negative 05/03/2019         RAD:   I have independently reviewed and interpreted the imaging studies below and based my recommendations to the patient on those findings. Ct Abdomen Pelvis Wo Contrast Additional Contrast? None    Result Date: 4/23/2019  EXAMINATION: CT OF THE ABDOMEN AND PELVIS WITHOUT CONTRAST 4/23/2019 12:46 am TECHNIQUE: CT of the abdomen and pelvis was performed without the administration of intravenous contrast. Multiplanar reformatted images are provided for review. Dose modulation, iterative reconstruction, and/or weight based adjustment of the mA/kV was utilized to reduce the radiation dose to as low as reasonably achievable. COMPARISON: 03/27/2013 HISTORY: ORDERING SYSTEM PROVIDED HISTORY: lola. TECHNOLOGIST PROVIDED HISTORY: Additional Contrast?->None Ordering Physician Provided Reason for Exam: lola. Acuity: Acute Relevant Medical/Surgical History: hx gerd, hernia repair FINDINGS: Lower Chest: There is bibasilar atelectasis. Organs: The liver, spleen, pancreas, gallbladder, adrenal glands and kidneys are grossly negative given the limitations of the noncontrast technique. GI/Bowel: Small bowel caliber is normal.  Fecalization of small bowel is noted. The appendix is normal.  The colon is unremarkable. Pelvis: A bladder catheter is in place. The uterus is grossly negative. Peritoneum/Retroperitoneum: No adenopathy, mesenteric stranding or free fluid. Aortic caliber is normal. Bones/Soft Tissues: No acute findings. Lumbar scoliosis is identified with secondary spondylosis. No acute process identified. Fecalized small bowel is consistent with prolonged small bowel transit time. Us Renal Complete    Result Date: 4/23/2019  EXAMINATION: RETROPERITONEAL ULTRASOUND OF THE KIDNEYS AND URINARY BLADDER 4/23/2019 COMPARISON: None HISTORY: ORDERING SYSTEM PROVIDED HISTORY: RENAL FAILURE, ACUTE (KIDNEY INJURY) FINDINGS: Kidneys: The right kidney measures 11 cm in length and the left kidney measures 11.1 cm in length. Kidneys demonstrate normal cortical echogenicity. No evidence of hydronephrosis or intrarenal stones. A small amount of nonspecific right perinephric fluid is noted. Bladder: The bladder is decompressed by Win catheter and thus not evaluated. Negative for obstructive uropathy. Vl Extremity Venous Right    Result Date: 4/26/2019  Lower Extremities DVT Study  Demographics   Patient Name       Cydney Cabrales   Date of Study      04/25/2019         Gender              Female   Patient Number     [id number]         Date of Birth       1935   Visit Number       [id number]          Age                 81 year(s)   Accession Number   [id number]          Room Number         5260   Corporate ID       [id number]            Jameson Siu RVT                                                            CCT   Ordering Physician Juwan West, PA             Physician           MD  Procedure Type of Study:   Veins:Lower Extremities DVT Study, VL EXTREMITY VENOUS DUPLEX RIGHT. Vascular Sonographer Report  Indications for Study:Swelling. Impressions Right Impression No evidence of deep vein or superficial vein thrombosis involving the right lower extremity and the left common femoral vein.  Conclusions   Summary   No evidence of deep vein or superficial vein thrombosis involving the right  lower extremity and the left common femoral

## 2019-05-03 NOTE — CONSULTS
Nephrology ([facility]) Consult Note    Holley Suazo is a 81 y.o. female whom we were asked to see for hyperkalemia. We had seen the patient ~ 2 weeks ago for JERRELL attributed to celebrex, lisinopril, and diuretics. Her lisinopril was switched to diltiazem; the short-acting version was elected because the literature recommends it for esophageal spasms. At the time of discharge, her serum creatinine has improved to 1.2. She reports only taking the new medications, avoiding NSAIDS, and eating/drining/urinating fine. She does report increased urinating yesterday, but I suspect she had a high sodium diet on Tuesday (I.e. pizza), and she likely retained volume, and her kidneys were correcting the situation yesterday. She did labs work for PCP yesterday, which showed hyperkalemia and JERRELL. The only possible risk factor that I can identify is that she may have been hypotensive. She was not taking the diltiazem q8h with the afternoon and evening doses too close together. She was also noted to have  earlier today. Her weight was 103 lbs usually, and she is 105 lbs today, so she is not volume depleted. Past Medical History:  HTN  scoliosis  GERD  osteoporosis    Review of System:  Otherwise unremarkable    Allergies:  Patient has no known allergies. Medications:  Current medications reviewed. Social History:  no tobacco  Family Medical History:  Negative for kidney disase    Physical Exam:  Blood pressure 103/69, pulse 67, temperature 97.4 °F (36.3 °C), temperature source Oral, resp. rate 20, weight 105 lb 2.6 oz (47.7 kg), SpO2 96 %, not currently breastfeeding.     General:  NAD, A+Ox3, ill-appearing, normal body habitus  HEENT:  PERRL, EOMI  Neck:  Supple, normal range of movement, thyroid unremarkable  Chest:  CTAB, good respiratory effort, good air movement  CV:  RRR, no murmurs or rubs, no carotid bruit, no abdominal bruits  Abdomen:  NTND, soft, +BS, no hepatosplenomegaly  Extremities:  No peripheral edema  Neurological:  Moving all four extremities, CN II-XII grossly intact  Lymphatics:  No palpable lymph nodes  Skin:  No rash, no jaundice  Psychiatric:  Normal insight and judgement, good recall    Laboratory Studies:  Lab Results   Component Value Date/Time     05/03/2019 12:09 PM    K 6.0 (HH) 05/03/2019 12:09 PM     05/03/2019 12:09 PM    CO2 21 05/03/2019 12:09 PM    BUN 38 (H) 05/03/2019 12:09 PM    CREATININE 1.8 (H) 05/03/2019 12:09 PM    CALCIUM 10.3 05/03/2019 12:09 PM    PHOS 3.2 05/02/2019 01:03 PM    MG 2.20 05/02/2019 01:03 PM     Lab Results   Component Value Date/Time    WBC 9.6 05/03/2019 12:09 PM    HGB 12.2 05/03/2019 12:09 PM    HCT 36.9 05/03/2019 12:09 PM     05/03/2019 12:09 PM       Assessment/Plan:  Reviewed old records and labs.     1) hyperkalemia   - suspect secondary to potassium supplementation and JERRELL   - will stop potassium supplement   - no EKG changes   - low potassium diet   - observe    2) JERRELL on CKD   - secondary to renal underperfusion due to low SBP?   - she is eating/drinking, so I will d/c IVF   - check urine studies    3) CV   - will switch diltiazem to diltiazem  mg (lower dose)   - will need to see if diltiazem CD works for her suspected esophageal spasms    4) HTN   - goal -130's

## 2019-05-03 NOTE — ED PROVIDER NOTES
are negative. Except as noted above the remainder ofthe review of systems was reviewed and negative.        PAST MEDICALHISTORY         Diagnosis Date    Allergic rhinitis     GERD (gastroesophageal reflux disease)     Hearing loss sensory, bilateral     mod--severe on right, mild to severe on left, due to asymmetry will send to get MRI to r/o acoustic neuroma    Mastoiditis     on MRI oct 2014-->dr ortiz states with no clinical findings this is considered an over-sensitivity of MRI results    Osteoarthritis     L1-L5    Osteoporosis     patient was on fosamax x 10 years and drug holiday was started on 6/21/13. (this was date of last DEXA scan)    Scoliosis        SURGICAL HISTORY           Procedure Laterality Date    CARPAL TUNNEL RELEASE      right, 2003    COLONOSCOPY      2013, dr Itzel Busby Apple N/A 10/19/2018    COLONOSCOPY WITH BIOPSY performed by Evelio Byrd MD at [address] [place] [address]      December 27 2017 and feb 6 2018   [address]      2011    NASAL SINUS SURGERY      UPPER GASTROINTESTINAL ENDOSCOPY N/A 4/4/2019    EGD BIOPSY performed by Eveilo Byrd MD at [facility]       Previous Medications    ALBUTEROL SULFATE  (90 BASE) MCG/ACT INHALER    Inhale 2 puffs into the lungs every 6 hours as needed for Wheezing (Chest tightness)    CALCIUM CITRATE-VITAMIN D (CALCIUM + D PO)    Take 500 mg by mouth daily     DILTIAZEM (CARDIZEM) 60 MG TABLET    Take 1 tablet by mouth every 8 hours    DOCUSATE CALCIUM (STOOL SOFTENER PO)    Take 100 mg by mouth 2 times daily     DULOXETINE (CYMBALTA) 60 MG EXTENDED RELEASE CAPSULE    Take 1 capsule by mouth daily    MAGNESIUM OXIDE (MAG-OX) 400 (240 MG) MG TABLET    Take 1 tablet by mouth 2 times daily    OMEPRAZOLE (PRILOSEC) 20 MG DELAYED RELEASE CAPSULE    Take 20 mg by mouth 2 times daily    POTASSIUM CHLORIDE (KLOR-CON M) 20 MEQ EXTENDED RELEASE TABLET    Take 1 tablet by mouth 2 times daily (with meals)    RISEDRONATE (ACTONEL) 30 MG TABLET    Take 30 mg by mouth every 7 days    VITAMIN D (CHOLECALCIFEROL) 1000 UNITS TABS TABLET    Take 2,000 Units by mouth daily        ALLERGIES     Patient has no known allergies. FAMILY HISTORY           Problem Relation Age of Onset    Diabetes Mother     Heart Disease Mother      Family Status   Relation Name Status    Mother      Father          SOCIAL HISTORY    reports that she has never smoked. She has never used smokeless tobacco. She reports that she does not drink alcohol or use drugs. PHYSICAL EXAM    (up to 7 for level 4, 8 or more for level 5)     ED Triage Vitals [19 1131]   BP Temp Temp Source Pulse Resp SpO2 Height Weight   125/60 97.4 °F (36.3 °C) Oral 57 16 99 % -- 105 lb 2.6 oz (47.7 kg)       Physical Exam   Constitutional: She is oriented to person, place, and time. She appears well-developed and well-nourished. No distress. HENT:   Head: Normocephalic and atraumatic. Neck: Neck supple. Cardiovascular: Normal rate, regular rhythm and normal heart sounds. Pulmonary/Chest: Effort normal and breath sounds normal. No respiratory distress. Abdominal: Soft. There is no tenderness. Musculoskeletal: Normal range of motion. Neurological: She is alert and oriented to person, place, and time. Skin: Skin is warm and dry. Psychiatric: She has a normal mood and affect. Her behavior is normal.   Nursing note and vitals reviewed.          DIAGNOSTIC RESULTS       LABS:  Labs Reviewed   BASIC METABOLIC PANEL W/ REFLEX TO MG FOR LOW K - Abnormal; Notable for the following components:       Result Value    Potassium reflex Magnesium 6.0 (*)     BUN 38 (*)     CREATININE 1.8 (*)     GFR Non- 27 (*)     GFR  33 (*)     All other components within normal limits    Narrative:     Maren Devine tel. [id number],  Chemistry results called to and read back by Eduardo Lagos 05/03/2019 13:03,  by MAEVE  Performed at:  [facility]  [address] [facility] [address]   Phone [phone]   CBC WITH AUTO DIFFERENTIAL    Narrative:     Performed at:  [facility]  [address] [place], [place] [address]   Phone [phone]   URINE RT REFLEX TO CULTURE    Narrative:     Performed at:  [facility]  [address] [facility] [address]   Phone [phone]   MAGNESIUM   BASIC METABOLIC PANEL       All other labs were within normal range or not returned as of this dictation. EMERGENCY DEPARTMENT COURSE and DIFFERENTIAL DIAGNOSIS/MDM:   Vitals:    Vitals:    05/03/19 1131 05/03/19 1358   BP: 125/60 (!) 146/52   Pulse: 57 55   Resp: 16 18   Temp: 97.4 °F (36.3 °C)    TempSrc: Oral    SpO2: 99% 95%   Weight: 105 lb 2.6 oz (47.7 kg)         I have evaluated this patient. My supervising physician was available for consultation. The patient is nontoxic and afebrile. Urinalysis is clear without evidence for infection. The patient's BUN and creatinine are slightly improved from yesterday but her potassium has worsened and was 6. I discussed presentation and workup with Dr. Sarah Christie, nephrologist, who advised no urgent intervention for hyperkalemia as the patient has no acute EKG changes and he has recommended admission for further inpatient management. I did start the patient on fluids at 125 an hour. Results and plan for admission discussed the patient and her daughter who expressed understanding. Dr. Jaiden Young has agreed to admit for further acre. PROCEDURES:  None    FINAL IMPRESSION      1. Acute kidney injury ([facility] [address]    2.  Hyperkalemia          DISPOSITION/PLAN   DISPOSITION Admitted 05/03/2019 02:33:52 PM      PATIENT REFERRED TO:  Garrett Doe MD  [address] [place] [id number]  [phone]            DISCHARGE MEDICATIONS:  New Prescriptions

## 2019-05-03 NOTE — PROGRESS NOTES
Clinical Pharmacy Note  Renal Dose Adjustment  Mac Parker was ordered  Magnesium Hydroxide. This medication is renally eliminated. Based on the patient's estimated crcl = 18ml/min and urine output, the medication has been discontinued per protocol. Pharmacy will continue to monitor and adjust dose as needed for changes in renal function.    Marya Weldon RPh 5/3/2019 4:24 PM

## 2019-05-03 NOTE — PROGRESS NOTES
Pt arrived to floor from ER at 1604 via stretcher. Pt oriented to room, call light, policies and procedures, the menu and ordering. Call light within reach. Bed in lowest position, bed alarm on, and wheels locked. Pt verbalized understanding. No complaints, questions or concerns at this time.   Electronically signed by Alexander Thurman RN on 5/3/2019

## 2019-05-03 NOTE — PROGRESS NOTES
Patient A&O. Tolerating PO. Patient denies pain at this time. Call light within reach. Will continue to monitor and reassess.  Electronically signed by Hallie Rodgers RN on 5/3/2019

## 2019-05-04 LAB
ANION GAP SERPL CALCULATED.3IONS-SCNC: 10 MMOL/L (ref 3–16)
BASOPHILS ABSOLUTE: 0.1 K/UL (ref 0–0.2)
BASOPHILS RELATIVE PERCENT: 0.8 %
BUN BLDV-MCNC: 39 MG/DL (ref 7–20)
CALCIUM SERPL-MCNC: 9.1 MG/DL (ref 8.3–10.6)
CHLORIDE BLD-SCNC: 109 MMOL/L (ref 99–110)
CO2: 19 MMOL/L (ref 21–32)
CREAT SERPL-MCNC: 2.3 MG/DL (ref 0.6–1.2)
CREATININE URINE: 20.3 MG/DL (ref 28–259)
EOSINOPHILS ABSOLUTE: 0.2 K/UL (ref 0–0.6)
EOSINOPHILS RELATIVE PERCENT: 2.6 %
GFR AFRICAN AMERICAN: 25
GFR NON-AFRICAN AMERICAN: 20
GLUCOSE BLD-MCNC: 99 MG/DL (ref 70–99)
HCT VFR BLD CALC: 32.3 % (ref 36–48)
HEMOGLOBIN: 10.8 G/DL (ref 12–16)
LYMPHOCYTES ABSOLUTE: 1.3 K/UL (ref 1–5.1)
LYMPHOCYTES RELATIVE PERCENT: 18.4 %
MCH RBC QN AUTO: 30.2 PG (ref 26–34)
MCHC RBC AUTO-ENTMCNC: 33.3 G/DL (ref 31–36)
MCV RBC AUTO: 90.6 FL (ref 80–100)
MONOCYTES ABSOLUTE: 0.8 K/UL (ref 0–1.3)
MONOCYTES RELATIVE PERCENT: 11 %
NEUTROPHILS ABSOLUTE: 4.9 K/UL (ref 1.7–7.7)
NEUTROPHILS RELATIVE PERCENT: 67.2 %
PDW BLD-RTO: 13.6 % (ref 12.4–15.4)
PLATELET # BLD: 304 K/UL (ref 135–450)
PMV BLD AUTO: 7.3 FL (ref 5–10.5)
POTASSIUM REFLEX MAGNESIUM: 5.2 MMOL/L (ref 3.5–5.1)
RBC # BLD: 3.57 M/UL (ref 4–5.2)
SODIUM BLD-SCNC: 138 MMOL/L (ref 136–145)
SODIUM URINE: 59 MMOL/L
UREA NITROGEN, UR: 194.5 MG/DL (ref 800–1666)
WBC # BLD: 7.3 K/UL (ref 4–11)

## 2019-05-04 PROCEDURE — 80048 BASIC METABOLIC PNL TOTAL CA: CPT

## 2019-05-04 PROCEDURE — 6360000002 HC RX W HCPCS: Performed by: INTERNAL MEDICINE

## 2019-05-04 PROCEDURE — 84540 ASSAY OF URINE/UREA-N: CPT

## 2019-05-04 PROCEDURE — 97530 THERAPEUTIC ACTIVITIES: CPT

## 2019-05-04 PROCEDURE — 6370000000 HC RX 637 (ALT 250 FOR IP): Performed by: INTERNAL MEDICINE

## 2019-05-04 PROCEDURE — 97535 SELF CARE MNGMENT TRAINING: CPT

## 2019-05-04 PROCEDURE — 84300 ASSAY OF URINE SODIUM: CPT

## 2019-05-04 PROCEDURE — 1200000000 HC SEMI PRIVATE

## 2019-05-04 PROCEDURE — 97166 OT EVAL MOD COMPLEX 45 MIN: CPT

## 2019-05-04 PROCEDURE — 36415 COLL VENOUS BLD VENIPUNCTURE: CPT

## 2019-05-04 PROCEDURE — 82570 ASSAY OF URINE CREATININE: CPT

## 2019-05-04 PROCEDURE — 2580000003 HC RX 258: Performed by: INTERNAL MEDICINE

## 2019-05-04 PROCEDURE — 97116 GAIT TRAINING THERAPY: CPT

## 2019-05-04 PROCEDURE — 97162 PT EVAL MOD COMPLEX 30 MIN: CPT

## 2019-05-04 PROCEDURE — 85025 COMPLETE CBC W/AUTO DIFF WBC: CPT

## 2019-05-04 RX ORDER — SODIUM BICARBONATE 650 MG/1
650 TABLET ORAL DAILY
Status: COMPLETED | OUTPATIENT
Start: 2019-05-04 | End: 2019-05-06

## 2019-05-04 RX ADMIN — HEPARIN SODIUM 5000 UNITS: 5000 INJECTION INTRAVENOUS; SUBCUTANEOUS at 05:18

## 2019-05-04 RX ADMIN — HEPARIN SODIUM 5000 UNITS: 5000 INJECTION INTRAVENOUS; SUBCUTANEOUS at 13:56

## 2019-05-04 RX ADMIN — ACETAMINOPHEN 650 MG: 325 TABLET ORAL at 21:01

## 2019-05-04 RX ADMIN — HEPARIN SODIUM 5000 UNITS: 5000 INJECTION INTRAVENOUS; SUBCUTANEOUS at 21:00

## 2019-05-04 RX ADMIN — DILTIAZEM HYDROCHLORIDE 120 MG: 120 CAPSULE, COATED, EXTENDED RELEASE ORAL at 09:28

## 2019-05-04 RX ADMIN — ACETAMINOPHEN 650 MG: 325 TABLET ORAL at 05:20

## 2019-05-04 RX ADMIN — Medication 10 ML: at 21:02

## 2019-05-04 RX ADMIN — DULOXETINE HYDROCHLORIDE 60 MG: 60 CAPSULE, DELAYED RELEASE ORAL at 09:29

## 2019-05-04 RX ADMIN — PANTOPRAZOLE SODIUM 40 MG: 40 TABLET, DELAYED RELEASE ORAL at 05:18

## 2019-05-04 RX ADMIN — SODIUM BICARBONATE 650 MG: 650 TABLET ORAL at 17:31

## 2019-05-04 RX ADMIN — Medication 10 ML: at 09:29

## 2019-05-04 ASSESSMENT — PAIN DESCRIPTION - PAIN TYPE
TYPE: CHRONIC PAIN
TYPE: CHRONIC PAIN

## 2019-05-04 ASSESSMENT — PAIN DESCRIPTION - ORIENTATION
ORIENTATION: POSTERIOR
ORIENTATION: POSTERIOR

## 2019-05-04 ASSESSMENT — PAIN SCALES - GENERAL
PAINLEVEL_OUTOF10: 0
PAINLEVEL_OUTOF10: 1
PAINLEVEL_OUTOF10: 4
PAINLEVEL_OUTOF10: 0
PAINLEVEL_OUTOF10: 0

## 2019-05-04 ASSESSMENT — PAIN DESCRIPTION - DESCRIPTORS
DESCRIPTORS: ACHING
DESCRIPTORS: ACHING

## 2019-05-04 ASSESSMENT — PAIN DESCRIPTION - PROGRESSION: CLINICAL_PROGRESSION: GRADUALLY WORSENING

## 2019-05-04 ASSESSMENT — PAIN DESCRIPTION - FREQUENCY
FREQUENCY: INTERMITTENT
FREQUENCY: INTERMITTENT

## 2019-05-04 ASSESSMENT — PAIN DESCRIPTION - LOCATION
LOCATION: NECK
LOCATION: NECK

## 2019-05-04 ASSESSMENT — PAIN DESCRIPTION - ONSET: ONSET: GRADUAL

## 2019-05-04 NOTE — PROGRESS NOTES
Nephrology ([facility]) Progress Note    CC: JERRELL and hyperkalemia    Subjective:    HPI:  Breathing comfortably. No CP. Renal function worse. Potassium level better. ROS:  In bed. No fever. [address]:  medications reviewed. Objective:  Blood pressure (!) 163/69, pulse 65, temperature 97.8 °F (36.6 °C), temperature source Oral, resp. rate 18, height 4' 8\" (1.422 m), weight 105 lb 13.1 oz (48 kg), SpO2 96 %, not currently breastfeeding. Intake/Output Summary (Last 24 hours) at 5/4/2019 1635  Last data filed at 5/4/2019 1354  Gross per 24 hour   Intake 1522 ml   Output 500 ml   Net 1022 ml     General:  NAD, A+OX3  Chest:  CTAB  CVS:  RRR  Abdominal:  NTND, soft, +BS  Extremities:  no edema  Skin:  no rash    Labs:  Renal panel:  Lab Results   Component Value Date/Time     05/04/2019 05:28 AM    K 5.2 (H) 05/04/2019 05:28 AM    CO2 19 (L) 05/04/2019 05:28 AM    BUN 39 (H) 05/04/2019 05:28 AM    CREATININE 2.3 (H) 05/04/2019 05:28 AM    CALCIUM 9.1 05/04/2019 05:28 AM    PHOS 3.2 05/02/2019 01:03 PM    MG 2.20 05/03/2019 12:09 PM     CBC:  Lab Results   Component Value Date/Time    WBC 7.3 05/04/2019 05:29 AM    HGB 10.8 (L) 05/04/2019 05:29 AM    HCT 32.3 (L) 05/04/2019 05:29 AM     05/04/2019 05:29 AM       Assessment/Plan:  Reviewed old records and labs.     1) hyperkalemia              - suspect secondary to potassium supplementation and JERRELL              - off potassium supplement              - low potassium diet              - observe      2) JERRELL on CKD              - secondary to renal underperfusion due to low SBP?              - suspect mild ATN      3) CV              - switched diltiazem to diltiazem  mg (lower dose)              - will need to see if diltiazem CD works for her suspected esophageal spasms       4) HTN              - goal minimum -130's   - would not adjust anti-hypertensive medication to achieve tighter control for now    5) metabolic acidosis   - will start sodium bicarbonate

## 2019-05-04 NOTE — PROGRESS NOTES
a condo and was independent with ADLs, IADLs, fxl transfers/mobility without device, (though slight difficulty getting in/out of bed but family has made adjustments for this). PMH includes: CHF, GERD, allergic rhinitis, bilat hearing loss sensory, mastoiditis, OA L1-L5, osteoporosis, scoliosis, right carpal tunnel release 2003, eye surgery, hernia repair, nasal sinus surgery, upper GI endoscopy 4/4/19, colonoscopy 2013, 10/2018. Exam: impaired ADLs, IADLs, fxl transfers/mobility, endurance  Assistance / Modification: SBA ADLs, CGA-SBA fxl mobility without device, SBA fxl transfers. Patient Education: OT role, POC, d/c needs, ADL retraining, fxl transfers/mobility without device, family can transfer pt with gait belt (RN aware)  Barriers to Learning: hearing  REQUIRES OT FOLLOW UP: Yes  Activity Tolerance  Activity Tolerance: Patient Tolerated treatment well  Safety Devices  Safety Devices in place: Yes  Type of devices: Call light within reach; Chair alarm in place;Gait belt;Nurse notified; Left in chair(RN Cecille Cowden) aware, 2 daughters in room. RN aware family can transfer pt)           Patient Diagnosis(es): The primary encounter diagnosis was Acute kidney injury ([address] A diagnosis of Hyperkalemia was also pertinent to this visit. has a past medical history of Allergic rhinitis, CHF (congestive heart failure) ([address] GERD (gastroesophageal reflux disease), Hearing loss sensory, bilateral, Mastoiditis, Osteoarthritis, Osteoporosis, and Scoliosis. has a past surgical history that includes Carpal tunnel release; Nasal sinus surgery; hernia repair; Colonoscopy; eye surgery; Colonoscopy (N/A, 10/19/2018); and Upper gastrointestinal endoscopy (N/A, 4/4/2019).     Treatment Diagnosis: impaired ADLs, IADLs, fxl transfers/mobility, endurance      Restrictions  Restrictions/Precautions  Restrictions/Precautions: Fall Risk  Position Activity Restriction  Other position/activity restrictions: diet: renal. IV (currently from garage  Bathroom Shower/Tub: Tub only, Walk-in shower, Doors(pt uses tub shower in hallway, has walk-in shower in bedroom. Trying to transition to walk-in shower, daughter purchased shower chair)  Bathroom Toilet: Standard(family considering bars around toilet)  Bathroom Equipment: Grab bars in shower(no grab bars in tub shower that pt uses, grab bars in walk-in shower. Daughter just purchased shower chair. Plans to get low  for soap to decrease reaching)  Bathroom Accessibility: Walker accessible  Home Equipment: Reacher, Long-handled shoehorn(possibly has [facility], [address], needs to look. 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 Pt had home PT 1x, was going to come back 1 more time to evaluate stair lift. Pt seen by home OT 1x and d/c'd. Objective   Vision: Impaired  Vision Exceptions: Wears glasses at all times  Hearing: Exceptions to [facility]  Hearing Exceptions: Bilateral hearing aid;Hard of hearing/hearing concerns(mod--severe on right, mild to severe on left, due to asymmetry will send to get MRI to r/o acoustic neuroma)            Balance  Sitting Balance: (independent static, SBA/supervision dynamic)  Standing Balance: (CGA-SBA)    Functional Mobility  Functional - Mobility Device: No device  Activity: Other; To/from bathroom  Assist Level: (cga-sba)  Functional Mobility Comments: short distances in room bed > toilet > sink > bed, then longer distances in hallway to couch in solarium > recliner in room ~75' x2.  Pt with tendency to lean to the left (h/o scoliosis), yet no LOB. Pt reports she has tried a RW in the past and would veer to the left d/t scoliosis and feels more comfortable without device. Toilet Transfers  Toilet - Technique: Ambulating  Equipment Used: Grab bars  Toilet Transfer: Stand by assistance  Toilet Transfers Comments: no AD: comfort height commode, grab bar on right    Wheelchair Bed Transfers  Wheelchair/Bed - Technique: Ambulating  Equipment Used: Other;Bed  Level of Asssistance: Stand by assistance  Wheelchair Transfers Comments: no AD: sit>stand from bed, sit<>stand low couch, stand>sit recliner    ADL  Feeding: Independent(breakfast tray arrived end of session)  Grooming: Stand by assistance(stance at sink for hand hygiene)  LE Dressing: Stand by assistance(seated EOB to doff/don socks & don pants, seated on toilet to doff depends & don personal underwear)  Toileting: Stand by assistance(voids urine)  Additional Comments: Anticipate CGA-SBA LB bathing, setup UB bathing, setup UB dressing as demo'd by pt's PLOF, endurance, ROM, strength, and motivation.      Tone RUE  RUE Tone: Normotonic  Tone LUE  LUE Tone: Normotonic  Coordination  Movements Are Fluid And Coordinated: Yes        Bed mobility  Supine to Sit: Stand by assistance(HOB elevated, used rail)  Sit to Supine: Unable to assess(up in chair at end of session)        Vision - Basic Assessment  Prior Vision: Wears glasses all the time  Visual History: Surgical intervention(December 27 2017 and feb 6 2018)     Cognition  Overall Cognitive Status: WFL     Perception  Overall Perceptual Status: WFL        Sensation  Overall Sensation Status: WFL(denies N/T to BUEs)           LUE AROM (degrees)  LUE AROM : WFL  Left Hand AROM (degrees)  Left Hand AROM: WFL  RUE AROM (degrees)  RUE AROM : WFL  Right Hand AROM (degrees)  Right Hand AROM: WFL     LUE Strength  Gross LUE Strength: WFL(generalized weakness, but WFL for age grossly 4- to 4/5 throughout)  L Hand Time Out 0845         Minutes 50         Timed Code Treatment Minutes: 35 Minutes        Let this serve as discharge note if discharged prior to next OT session      Nan Sever, OTR/L #[id number]

## 2019-05-04 NOTE — PROGRESS NOTES
Physical Therapy    Facility/Department: [address] [facility]THOPEDICS  Initial Assessment  This note serves as D/C summary if patient is discharged prior to next treatment session. NAME: Chrissy Washington  : 1935  MRN: [id number]    Date of Service: 2019    Discharge Recommendations:  Home with Home health PT, S Level 1, Patient would benefit from continued therapy after discharge, Home with assist PRN(family plans to provide initial 24/7 sup for transition to home)   PT Equipment Recommendations  Equipment Needed: No     HOME HEALTH CARE: LEVEL 1 STANDARD     -Initial home health evaluation to occur within 24-48 hours, in patient home    -Home health agency to establish plan of care for patient over 60 day period    -Medication Reconciliation    -PCP Visit scheduled within seven days of discharge    -PT/OT to evaluate with goal of regaining prior level of functioning    -OT to evaluate if patient has [address] needs for personal care   Assessment   Body structures, Functions, Activity limitations: Decreased functional mobility ; Decreased endurance  Assessment: Per Dr. Luann Reynolds H&P 5/3/19: \"Patient is an 28-year-old woman with a history of chronic renal failure and chronic diastolic congestive heart failure. She was discharged from this facility on 2019 after being treated for acute renal failure which was thought to be secondary to recent medication adjustments. At that time, lisinopril and hydrochlorothiazide were discontinued. She presents to the emergency room at the request of her PCP after lab test today revealed dehydration and acute on chronic kidney failure. A family member in the room states that she has been keeping up with her fluid intake as instructed, and has been weighing herself daily but has not been eating much at all. Pt reports a 3 lb weight loss in the past day. She is being admitted for further evaluation and treatment.  Associated signs and symptoms do not include fever or chills, nausea, vomiting, abdominal pain, hemoptysis, hematochezia, diarrhea, constipation or urinary symptoms. \" PMH includes: CHF, GERD, allergic rhinitis, bilat hearing loss sensory, mastoiditis, OA L1-L5, osteoporosis, scoliosis, right carpal tunnel release 2003, eye surgery, hernia repair, nasal sinus surgery, upper GI endoscopy 4/4/19, colonoscopy 2013, 10/2018. Exam: functional mobility, ROM  Clinical Presentation: evolving  Patient Education: role of acute care PT, POC, d/c rec, use of call light for OOB, okay for family to ambulate with pt in hallway with gait belt  Barriers to Learning: hearing  REQUIRES PT FOLLOW UP: Yes  Activity Tolerance  Activity Tolerance: Patient Tolerated treatment well       Patient Diagnosis(es): The primary encounter diagnosis was Acute kidney injury ([facility] A diagnosis of Hyperkalemia was also pertinent to this visit. has a past medical history of Allergic rhinitis, CHF (congestive heart failure) ([address] GERD (gastroesophageal reflux disease), Hearing loss sensory, bilateral, Mastoiditis, Osteoarthritis, Osteoporosis, and Scoliosis. has a past surgical history that includes Carpal tunnel release; Nasal sinus surgery; hernia repair; Colonoscopy; eye surgery; Colonoscopy (N/A, 10/19/2018); and Upper gastrointestinal endoscopy (N/A, 4/4/2019).     Restrictions  Restrictions/Precautions  Restrictions/Precautions: Fall Risk  Position Activity Restriction  Other position/activity restrictions: diet: renal. IV (currently disconnected), telemetry     Vision/Hearing  Vision: Impaired  Vision Exceptions: Wears glasses at all times  Hearing: Exceptions to [facility]  Hearing Exceptions: Bilateral hearing aid;Hard of hearing/hearing concerns(mod--severe on right, mild to severe on left, due to asymmetry will send to get MRI to r/o acoustic neuroma)       Subjective  General  Chart Reviewed: Yes  Patient assessed for rehabilitation services?: Yes  Additional Pertinent Hx: Per Dr. Fabiola Hickman H&P 5/3/19: \"Patient is an 23-year-old woman with a history of chronic renal failure and chronic diastolic congestive heart failure. She was discharged from this facility on 4/29/2019 after being treated for acute renal failure which was thought to be secondary to recent medication adjustments. At that time, lisinopril and hydrochlorothiazide were discontinued. She presents to the emergency room at the request of her PCP after lab test today revealed dehydration and acute on chronic kidney failure. A family member in the room states that she has been keeping up with her fluid intake as instructed, and has been weighing herself daily but has not been eating much at all. Pt reports a 3 lb weight loss in the past day. She is being admitted for further evaluation and treatment. Associated signs and symptoms do not include fever or chills, nausea, vomiting, abdominal pain, hemoptysis, hematochezia, diarrhea, constipation or urinary symptoms. \" PMH includes: CHF, GERD, allergic rhinitis, bilat hearing loss sensory, mastoiditis, OA L1-L5, osteoporosis, scoliosis, right carpal tunnel release 2003, eye surgery, hernia repair, nasal sinus surgery, upper GI endoscopy 4/4/19, colonoscopy 2013, 10/2018. Response To Previous Treatment: Not applicable  Family / Caregiver Present: Yes(daughter)  Referring Practitioner: Kalen Garay MD  Referral Date : 05/03/19  Follows Commands: Within Functional Limits  Subjective  Subjective: Pt supine in bed sleeping upon arrival sleeping. Awoken easily and agreeable to PT eval/treat.   Pain Screening  Patient Currently in Pain: No        Orientation  Orientation  Overall Orientation Status: Within Functional Limits     Social/Functional History  Social/Functional History  Lives With: Alone  Type of Home: (condo)  Home Layout: Two level, Able to Live on Main level with bedroom/bathroom, Laundry in basement  Home Access: Stairs to enter without rails(has stairlift to basement for laundry)  Entrance Ambulation  Ambulation?: Yes  More Ambulation?: No  Ambulation 1  Surface: level tile  Device: No Device  Assistance: Stand by assistance;Contact guard assistance  Quality of Gait: slow pace, decreased foot clearance and step length, severe kyphosis, occasional mild L lean but no LOB  Distance: multiple short distances in the room; 75'x2 in hallway with seated rest break between trials  Stairs/Curb  Stairs?: No            Plan   Plan  Times per week: 3-5x/wk  Specific instructions for Next Treatment: increase activity as tolerated  Current Treatment Recommendations: Strengthening, ROM, Balance Training, Functional Mobility Training, Transfer Training, Endurance Training, Gait Training, Stair training, Equipment Evaluation, Education, & procurement, Home Exercise Program, Safety Education & Training  Safety Devices  Type of devices: All fall risk precautions in place, Call light within reach, Chair alarm in place, Gait belt, Left in chair, Nurse notified(ANALY Suarez notified that family ok to ambulate pt with gait belt and no device)  Restraints  Initially in place: No       OutComes Score    Duy Sanabria scored a 19/24 on the -PAC short mobility form. Current research shows that an AM-PAC score of 18 or greater is typically associated with a discharge to the patient's home setting. Based on the patients AM-PAC score and their current functional mobility deficits, it is recommended that the patient have 2-3 sessions per week of Physical Therapy at d/c to increase the patients independence.         AM-PAC Score  AM-PAC Inpatient Mobility Raw Score : 19  AM-PAC Inpatient T-Scale Score : 45.44  Mobility Inpatient CMS 0-100% Score: 41.77  Mobility Inpatient CMS G-Code Modifier : CK          Goals  Short term goals  Time Frame for Short term goals: upon d/c  Short term goal 1: bed mobility mod I  Short term goal 2: transfers mod I  Short term goal 3: ambulate 100' indep  Long term goals  Time Frame for Long term goals : LTG=STG  Patient Goals   Patient goals : \"to go home\"       Therapy Time   Individual Concurrent Group Co-treatment   Time In 6716         Time Out 0845         Minutes 50         Timed Code Treatment Minutes: 50 Minutes         Electronically signed by Juliann Dai, [place] [id number] on 5/4/2019 at 8:56 AM

## 2019-05-04 NOTE — PROGRESS NOTES
Patient is resting in bed, eating dinner. Family is at bedside. Room air. Side rails are up x2. Fall precautions are in place. Bed is in lowest position. Call light is in reach. Patient denies any needs at this time. Will continue to monitor patient per unit protocols.  Electronically signed by Mariposa Wright RN on 5/4/2019 at 5:41 PM

## 2019-05-04 NOTE — PLAN OF CARE
Problem: Falls - Risk of:  Goal: Will remain free from falls  Description  Will remain free from falls  5/3/2019 2327 by Mars Heath RN  Outcome: Ongoing  5/3/2019 1703 by Familia Heart RN  Outcome: Ongoing  Note:   Fall risk assessment completed. Fall precautions in place. Call light within reach. Pt educated on calling for assistance before getting up. Walkway free of clutter. Will continue to monitor. Electronically signed by Familia Heart RN on 5/3/2019 at 5:03 PM    Goal: Absence of physical injury  Description  Absence of physical injury  5/3/2019 2327 by Mars Heath RN  Outcome: Ongoing  5/3/2019 1703 by Familia Heart RN  Outcome: Ongoing  Note:   Fall risk assessment completed. Fall precautions in place. Call light within reach. Pt educated on calling for assistance before getting up. Walkway free of clutter. Will continue to monitor.   Electronically signed by Familia Heart RN on 5/3/2019 at 5:03 PM

## 2019-05-04 NOTE — PLAN OF CARE
Problem: Falls - Risk of:  Goal: Will remain free from falls  Description  Will remain free from falls  5/4/2019 0726 by Marjorie Mejía RN  Outcome: Ongoing   Will remain free from falls   Problem: Falls - Risk of:  Goal: Absence of physical injury  Description  Absence of physical injury  5/4/2019 0726 by Marjorie Mejía RN  Outcome: Ongoing   Absence of physical injury   Problem: Risk for Impaired Skin Integrity  Goal: Tissue integrity - skin and mucous membranes  Description  Structural intactness and normal physiological function of skin and  mucous membranes. Outcome: Ongoing   Will monitor skin and mucous members. Will turn patient every 2 hours, monitor for friction and sheering, and change dressings as needed. Will preform skin assessment every shift.

## 2019-05-04 NOTE — PROGRESS NOTES
Hospitalist Progress Note      PCP: Bouchra Avila MD    Date of Admission: 5/3/2019    Chief Complaint: hyperkalemia, JERRELL    Subjective: Pt seen and examined. Feels well overall, has no acute complaints. Denies fever, chills, chest pain, shortness of breath, abdominal pain, nausea, vomiting, constipation, diarrhea, and dysuria. Medications:  Reviewed    Infusion Medications   Scheduled Medications    pantoprazole  40 mg Oral QAM AC    DULoxetine  60 mg Oral Daily    sodium chloride flush  10 mL Intravenous 2 times per day    heparin (porcine)  5,000 Units Subcutaneous 3 times per day    diltiazem  120 mg Oral Daily     PRN Meds: albuterol sulfate HFA, sodium chloride flush, ondansetron, acetaminophen      Intake/Output Summary (Last 24 hours) at 5/4/2019 0925  Last data filed at 5/4/2019 0413  Gross per 24 hour   Intake 1162 ml   Output 500 ml   Net 662 ml       Exam:    BP (!) 163/69   Pulse 76   Temp 97.8 °F (36.6 °C) (Oral)   Resp 18   Ht 4' 8\" (1.422 m) Comment: stated  Wt 105 lb 13.1 oz (48 kg)   SpO2 96%   BMI 23.72 kg/m²     General appearance: No apparent distress, appears stated age and cooperative. HEENT: Pupils equal, round, and reactive to light. Conjunctivae/corneas clear. Neck: Supple, with full range of motion. No jugular venous distention. Trachea midline. Respiratory:  Normal respiratory effort. Clear to auscultation, bilaterally without Rales/Wheezes/Rhonchi. Cardiovascular: Regular rate and rhythm with normal S1/S2 without murmurs, rubs or gallops. Abdomen: Soft, non-tender, non-distended with normal bowel sounds. Musculoskeletal: No clubbing, cyanosis or edema bilaterally. Full range of motion without deformity. Skin: Skin color, texture, turgor normal.  No rashes or lesions. Neurologic:  Neurovascularly intact without any focal sensory/motor deficits.  Cranial nerves: II-XII intact, grossly non-focal.  Psychiatric: Alert and oriented, thought content appropriate, normal insight  Capillary Refill: Brisk,< 3 seconds   Peripheral Pulses: +2 palpable, equal bilaterally       Labs:   Recent Labs     05/03/19  1209 05/04/19  0529   WBC 9.6 7.3   HGB 12.2 10.8*   HCT 36.9 32.3*    304     Recent Labs     05/02/19  1303 05/03/19  1209 05/03/19  1915 05/04/19  0528    137 137 138   K 5.4* 6.0* 5.2* 5.2*    103 108 109   CO2 20* 21 18* 19*   BUN 39* 38* 35* 39*   CREATININE 2.2* 1.8* 2.0* 2.3*   CALCIUM 9.8 10.3 9.1 9.1   PHOS 3.2  --   --   --      No results for input(s): AST, ALT, BILIDIR, BILITOT, ALKPHOS in the last 72 hours. No results for input(s): INR in the last 72 hours. No results for input(s): Delcie Lerna in the last 72 hours. Urinalysis:      Lab Results   Component Value Date    NITRU Negative 05/03/2019    WBCUA 10-20 04/22/2019    BACTERIA 3+ 04/22/2019    RBCUA 0-2 04/22/2019    BLOODU Negative 05/03/2019    SPECGRAV 1.016 05/03/2019    GLUCOSEU Negative 05/03/2019       Radiology:  No orders to display           Assessment/Plan:    Active Hospital Problems    Diagnosis Date Noted    Hyperkalemia [E87.5] 05/03/2019    Acute renal failure superimposed on stage 3 chronic kidney disease ([facility] [address] [N17.9, N18.3] 05/03/2019    Chronic diastolic (congestive) heart failure (HCC) [I50.32] 05/03/2019    Dehydration [E86.0] 05/03/2019    Acute prerenal azotemia [R79.89] 05/03/2019    Generalized weakness [R53.1] 05/03/2019       Hyperkalemia - likely hemoconcentration due to dehydration and the patient was also taking potassium supplementation from prior hospitalization. She also has an JERRELL. - Nephrology consult appreciated  - avoid nephrotoxic meds  - urine studies     Dehydration - likely secondary to poor oral intake and/or increased stool output possibly from magnesium supplementation.  Hydrated with IV Normal Saline.   - nephrology consulted, further fluid management per nephrology     Acute renal failure superimposed on stage 3 chronic kidney disease ([address] - due to dehydration. Will hydrate, consult Nephrology to help in his evaluation and treatment and avoid nephrotoxic agents.      Prerenal azotemia - likely due to dehydration and ARF. Will monitor and expect to improve with hydration.       Chronic diastolic (congestive) heart failure (HCC) - A 2D Echocardiogram on 04/18/2019 shows an EF of 55-60% and grade I diastolic dysfunction has been ordered to assess cardiac function. Not on home diuretics.  Monitor strict I&Os and daily weights     Generalized weakness - Will ask PT/OT to evaluate and treat patient, and if necessary to provide recommendations for post hospital therapy    Essential hypertension  -continue PO diltiazem        DVT Prophylaxis: heparin  Diet: DIET RENAL;  Code Status: Full Code    PT/OT Eval Status: ordered    Dispo - continue care    Eli Godwin MD

## 2019-05-05 LAB
ANION GAP SERPL CALCULATED.3IONS-SCNC: 14 MMOL/L (ref 3–16)
BASOPHILS ABSOLUTE: 0.1 K/UL (ref 0–0.2)
BASOPHILS RELATIVE PERCENT: 1.1 %
BUN BLDV-MCNC: 40 MG/DL (ref 7–20)
CALCIUM SERPL-MCNC: 9.4 MG/DL (ref 8.3–10.6)
CHLORIDE BLD-SCNC: 107 MMOL/L (ref 99–110)
CO2: 19 MMOL/L (ref 21–32)
CREAT SERPL-MCNC: 2.2 MG/DL (ref 0.6–1.2)
EOSINOPHILS ABSOLUTE: 0.2 K/UL (ref 0–0.6)
EOSINOPHILS RELATIVE PERCENT: 2.5 %
GFR AFRICAN AMERICAN: 26
GFR NON-AFRICAN AMERICAN: 21
GLUCOSE BLD-MCNC: 101 MG/DL (ref 70–99)
HCT VFR BLD CALC: 34.5 % (ref 36–48)
HEMOGLOBIN: 11.4 G/DL (ref 12–16)
LYMPHOCYTES ABSOLUTE: 1.6 K/UL (ref 1–5.1)
LYMPHOCYTES RELATIVE PERCENT: 22.4 %
MCH RBC QN AUTO: 29.8 PG (ref 26–34)
MCHC RBC AUTO-ENTMCNC: 33.1 G/DL (ref 31–36)
MCV RBC AUTO: 89.9 FL (ref 80–100)
MONOCYTES ABSOLUTE: 0.6 K/UL (ref 0–1.3)
MONOCYTES RELATIVE PERCENT: 8.6 %
NEUTROPHILS ABSOLUTE: 4.5 K/UL (ref 1.7–7.7)
NEUTROPHILS RELATIVE PERCENT: 65.4 %
PDW BLD-RTO: 13 % (ref 12.4–15.4)
PLATELET # BLD: 328 K/UL (ref 135–450)
PMV BLD AUTO: 6.9 FL (ref 5–10.5)
POTASSIUM REFLEX MAGNESIUM: 4.5 MMOL/L (ref 3.5–5.1)
RBC # BLD: 3.84 M/UL (ref 4–5.2)
SODIUM BLD-SCNC: 140 MMOL/L (ref 136–145)
WBC # BLD: 7 K/UL (ref 4–11)

## 2019-05-05 PROCEDURE — 2580000003 HC RX 258: Performed by: INTERNAL MEDICINE

## 2019-05-05 PROCEDURE — 36415 COLL VENOUS BLD VENIPUNCTURE: CPT

## 2019-05-05 PROCEDURE — 6370000000 HC RX 637 (ALT 250 FOR IP): Performed by: INTERNAL MEDICINE

## 2019-05-05 PROCEDURE — 85025 COMPLETE CBC W/AUTO DIFF WBC: CPT

## 2019-05-05 PROCEDURE — 6360000002 HC RX W HCPCS: Performed by: INTERNAL MEDICINE

## 2019-05-05 PROCEDURE — 80048 BASIC METABOLIC PNL TOTAL CA: CPT

## 2019-05-05 PROCEDURE — 1200000000 HC SEMI PRIVATE

## 2019-05-05 RX ORDER — DOCUSATE SODIUM 100 MG/1
100 CAPSULE, LIQUID FILLED ORAL DAILY
Status: DISCONTINUED | OUTPATIENT
Start: 2019-05-05 | End: 2019-05-06 | Stop reason: HOSPADM

## 2019-05-05 RX ADMIN — DOCUSATE SODIUM 100 MG: 100 CAPSULE, LIQUID FILLED ORAL at 10:29

## 2019-05-05 RX ADMIN — Medication 10 ML: at 22:27

## 2019-05-05 RX ADMIN — HEPARIN SODIUM 5000 UNITS: 5000 INJECTION INTRAVENOUS; SUBCUTANEOUS at 06:46

## 2019-05-05 RX ADMIN — ACETAMINOPHEN 650 MG: 325 TABLET ORAL at 10:29

## 2019-05-05 RX ADMIN — SODIUM BICARBONATE 650 MG: 650 TABLET ORAL at 10:13

## 2019-05-05 RX ADMIN — Medication 10 ML: at 10:13

## 2019-05-05 RX ADMIN — HEPARIN SODIUM 5000 UNITS: 5000 INJECTION INTRAVENOUS; SUBCUTANEOUS at 13:35

## 2019-05-05 RX ADMIN — PANTOPRAZOLE SODIUM 40 MG: 40 TABLET, DELAYED RELEASE ORAL at 06:46

## 2019-05-05 RX ADMIN — HEPARIN SODIUM 5000 UNITS: 5000 INJECTION INTRAVENOUS; SUBCUTANEOUS at 22:26

## 2019-05-05 RX ADMIN — DILTIAZEM HYDROCHLORIDE 120 MG: 120 CAPSULE, COATED, EXTENDED RELEASE ORAL at 10:13

## 2019-05-05 RX ADMIN — ACETAMINOPHEN 650 MG: 325 TABLET ORAL at 19:47

## 2019-05-05 ASSESSMENT — PAIN DESCRIPTION - DESCRIPTORS
DESCRIPTORS: ACHING;CONSTANT
DESCRIPTORS: ACHING;CONSTANT
DESCRIPTORS: ACHING
DESCRIPTORS: ACHING

## 2019-05-05 ASSESSMENT — PAIN DESCRIPTION - ORIENTATION
ORIENTATION: OTHER (COMMENT)
ORIENTATION: LEFT
ORIENTATION: OTHER (COMMENT)
ORIENTATION: LEFT

## 2019-05-05 ASSESSMENT — PAIN DESCRIPTION - PROGRESSION
CLINICAL_PROGRESSION: GRADUALLY IMPROVING
CLINICAL_PROGRESSION: NOT CHANGED
CLINICAL_PROGRESSION: NOT CHANGED
CLINICAL_PROGRESSION: GRADUALLY WORSENING

## 2019-05-05 ASSESSMENT — PAIN DESCRIPTION - ONSET
ONSET: ON-GOING
ONSET: GRADUAL
ONSET: ON-GOING
ONSET: ON-GOING

## 2019-05-05 ASSESSMENT — PAIN DESCRIPTION - LOCATION
LOCATION: GENERALIZED
LOCATION: NECK
LOCATION: NECK
LOCATION: GENERALIZED

## 2019-05-05 ASSESSMENT — PAIN SCALES - GENERAL
PAINLEVEL_OUTOF10: 0
PAINLEVEL_OUTOF10: 2
PAINLEVEL_OUTOF10: 0
PAINLEVEL_OUTOF10: 0
PAINLEVEL_OUTOF10: 5
PAINLEVEL_OUTOF10: 0
PAINLEVEL_OUTOF10: 1

## 2019-05-05 ASSESSMENT — PAIN DESCRIPTION - FREQUENCY
FREQUENCY: CONTINUOUS
FREQUENCY: INTERMITTENT
FREQUENCY: INTERMITTENT
FREQUENCY: CONTINUOUS

## 2019-05-05 ASSESSMENT — PAIN DESCRIPTION - PAIN TYPE
TYPE: ACUTE PAIN

## 2019-05-05 ASSESSMENT — PAIN - FUNCTIONAL ASSESSMENT
PAIN_FUNCTIONAL_ASSESSMENT: ACTIVITIES ARE NOT PREVENTED

## 2019-05-05 ASSESSMENT — PAIN SCALES - WONG BAKER
WONGBAKER_NUMERICALRESPONSE: 0
WONGBAKER_NUMERICALRESPONSE: 0

## 2019-05-05 NOTE — PROGRESS NOTES
Pt A&O, doing well this morning. Pt up ambulating through the halls with her daughter. Pt is up as tolerated, SBA. Able to perform most ADL's independently. Pt denies pain. No questions or concerns at this time. Will continue to monitor.  Electronically signed by Mar Banegas RN on 5/5/2019 at 11:58 AM

## 2019-05-05 NOTE — PLAN OF CARE
Problem: Falls - Risk of:  Goal: Will remain free from falls  Description  Will remain free from falls  Outcome: Ongoing  Note:   Fall risk assessment completed. Fall precautions in place. Call light within reach. Pt educated on calling for assistance before getting up. Walkway free of clutter. Will continue to monitor. Electronically signed by Bronson Black RN on 5/5/2019 at 7:36 AM       Problem: Falls - Risk of:  Goal: Absence of physical injury  Description  Absence of physical injury  Outcome: Ongoing  Note:   Pt is free of injury. No injury noted. Fall precautions in place. Call light within reach. Will monitor. Electronically signed by Bronson Black RN on 5/5/2019 at 7:37 AM       Problem: Risk for Impaired Skin Integrity  Goal: Tissue integrity - skin and mucous membranes  Description  Structural intactness and normal physiological function of skin and  mucous membranes. Outcome: Ongoing  Note:   Skin assessment completed. No skin breakdown noted. Preventative dressing placed on sacrum. Pt reminded to turn and reposition frequently. Will continue to monitor and reassess.  Electronically signed by Bronson Black RN on 5/5/2019 at 7:40 AM

## 2019-05-05 NOTE — PROGRESS NOTES
Tele monitor show 8 beats of VT. Pt found up to bathroom. Denied complaint of chest pain. No sob noted.

## 2019-05-05 NOTE — PROGRESS NOTES
Hospitalist Progress Note      PCP: Yazan Giron MD    Date of Admission: 5/3/2019    Chief Complaint: hyperkalemia, JERRELL    Subjective: Pt seen and examined. Feels well overall, has no acute complaints. Denies fever, chills, chest pain, shortness of breath, abdominal pain, nausea, vomiting, constipation, diarrhea, and dysuria. Medications:  Reviewed    Infusion Medications   Scheduled Medications    sodium bicarbonate  650 mg Oral Daily    pantoprazole  40 mg Oral QAM AC    sodium chloride flush  10 mL Intravenous 2 times per day    heparin (porcine)  5,000 Units Subcutaneous 3 times per day    diltiazem  120 mg Oral Daily     PRN Meds: albuterol sulfate HFA, sodium chloride flush, ondansetron, acetaminophen      Intake/Output Summary (Last 24 hours) at 5/5/2019 1021  Last data filed at 5/5/2019 0955  Gross per 24 hour   Intake 920 ml   Output 1075 ml   Net -155 ml       Exam:    BP (!) 155/73   Pulse 66   Temp 97.7 °F (36.5 °C) (Oral)   Resp 18   Ht 4' 8\" (1.422 m) Comment: stated  Wt 104 lb 0.9 oz (47.2 kg)   SpO2 96%   BMI 23.33 kg/m²     General appearance: No apparent distress, appears stated age and cooperative. HEENT: Pupils equal, round, and reactive to light. Conjunctivae/corneas clear. Neck: Supple, with full range of motion. No jugular venous distention. Trachea midline. Respiratory:  Normal respiratory effort. Clear to auscultation, bilaterally without Rales/Wheezes/Rhonchi. Cardiovascular: Regular rate and rhythm with normal S1/S2 without murmurs, rubs or gallops. Abdomen: Soft, non-tender, non-distended with normal bowel sounds. Musculoskeletal: No clubbing, cyanosis or edema bilaterally. Full range of motion without deformity. Skin: Skin color, texture, turgor normal.  No rashes or lesions. Neurologic:  Neurovascularly intact without any focal sensory/motor deficits.  Cranial nerves: II-XII intact, grossly non-focal.  Psychiatric: Alert and oriented, thought Saline.   - nephrology consulted, further fluid management per nephrology     Acute renal failure superimposed on stage 3 chronic kidney disease ([facility] [address] - due to dehydration. Will hydrate, consult Nephrology to help in his evaluation and treatment and avoid nephrotoxic agents. Stable today.     Prerenal azotemia - likely due to dehydration and ARF. Will monitor and expect to improve with hydration.       Chronic diastolic (congestive) heart failure (HCC) - A 2D Echocardiogram on 04/18/2019 shows an EF of 55-60% and grade I diastolic dysfunction has been ordered to assess cardiac function. Not on home diuretics.  Monitor strict I&Os and daily weights     Generalized weakness - Will ask PT/OT to evaluate and treat patient, and if necessary to provide recommendations for post hospital therapy    Essential hypertension  -continue PO diltiazem        DVT Prophylaxis: heparin  Diet: DIET RENAL;  Code Status: Full Code    PT/OT Eval Status: ordered    Dispo - continue care, anticipate discharge in 1-2 days    Renny Romero MD

## 2019-05-05 NOTE — PROGRESS NOTES
Pt medicated with tylenol for complaints of back of the neck pain. She states this pain is not new, and tylenol helps.

## 2019-05-05 NOTE — PROGRESS NOTES
Pt resting in bed this afternoon. Pt was active for most of the day, ambulating and exercising. Pt tolerating renal diet. Daughters at bedside. No questions or concerns at this time. Will continue to monitor.  Electronically signed by Jamin Leroy RN on 5/5/2019 at 6:55 PM

## 2019-05-06 ENCOUNTER — TELEPHONE (OUTPATIENT)
Dept: FAMILY MEDICINE CLINIC | Age: 84
End: 2019-05-06

## 2019-05-06 VITALS
TEMPERATURE: 97.7 F | HEART RATE: 59 BPM | SYSTOLIC BLOOD PRESSURE: 112 MMHG | WEIGHT: 101.4 LBS | DIASTOLIC BLOOD PRESSURE: 72 MMHG | HEIGHT: 56 IN | OXYGEN SATURATION: 96 % | RESPIRATION RATE: 16 BRPM | BODY MASS INDEX: 22.81 KG/M2

## 2019-05-06 LAB
ANION GAP SERPL CALCULATED.3IONS-SCNC: 10 MMOL/L (ref 3–16)
BASOPHILS ABSOLUTE: 0.1 K/UL (ref 0–0.2)
BASOPHILS RELATIVE PERCENT: 1 %
BUN BLDV-MCNC: 45 MG/DL (ref 7–20)
CALCIUM SERPL-MCNC: 9.2 MG/DL (ref 8.3–10.6)
CHLORIDE BLD-SCNC: 108 MMOL/L (ref 99–110)
CO2: 20 MMOL/L (ref 21–32)
CREAT SERPL-MCNC: 1.7 MG/DL (ref 0.6–1.2)
EOSINOPHILS ABSOLUTE: 0.2 K/UL (ref 0–0.6)
EOSINOPHILS RELATIVE PERCENT: 2.5 %
GFR AFRICAN AMERICAN: 35
GFR NON-AFRICAN AMERICAN: 29
GLUCOSE BLD-MCNC: 98 MG/DL (ref 70–99)
HCT VFR BLD CALC: 33.5 % (ref 36–48)
HEMOGLOBIN: 11.3 G/DL (ref 12–16)
LYMPHOCYTES ABSOLUTE: 1.7 K/UL (ref 1–5.1)
LYMPHOCYTES RELATIVE PERCENT: 25 %
MCH RBC QN AUTO: 30.2 PG (ref 26–34)
MCHC RBC AUTO-ENTMCNC: 33.7 G/DL (ref 31–36)
MCV RBC AUTO: 89.8 FL (ref 80–100)
MONOCYTES ABSOLUTE: 0.7 K/UL (ref 0–1.3)
MONOCYTES RELATIVE PERCENT: 9.7 %
NEUTROPHILS ABSOLUTE: 4.2 K/UL (ref 1.7–7.7)
NEUTROPHILS RELATIVE PERCENT: 61.8 %
PDW BLD-RTO: 13.2 % (ref 12.4–15.4)
PLATELET # BLD: 356 K/UL (ref 135–450)
PMV BLD AUTO: 6.9 FL (ref 5–10.5)
POTASSIUM REFLEX MAGNESIUM: 4.2 MMOL/L (ref 3.5–5.1)
RBC # BLD: 3.73 M/UL (ref 4–5.2)
SODIUM BLD-SCNC: 138 MMOL/L (ref 136–145)
WBC # BLD: 6.8 K/UL (ref 4–11)

## 2019-05-06 PROCEDURE — 6370000000 HC RX 637 (ALT 250 FOR IP): Performed by: INTERNAL MEDICINE

## 2019-05-06 PROCEDURE — 36415 COLL VENOUS BLD VENIPUNCTURE: CPT

## 2019-05-06 PROCEDURE — 85025 COMPLETE CBC W/AUTO DIFF WBC: CPT

## 2019-05-06 PROCEDURE — 97535 SELF CARE MNGMENT TRAINING: CPT

## 2019-05-06 PROCEDURE — 80048 BASIC METABOLIC PNL TOTAL CA: CPT

## 2019-05-06 PROCEDURE — 6360000002 HC RX W HCPCS: Performed by: INTERNAL MEDICINE

## 2019-05-06 PROCEDURE — 97530 THERAPEUTIC ACTIVITIES: CPT

## 2019-05-06 RX ADMIN — HEPARIN SODIUM 5000 UNITS: 5000 INJECTION INTRAVENOUS; SUBCUTANEOUS at 07:02

## 2019-05-06 RX ADMIN — SODIUM BICARBONATE 650 MG: 650 TABLET ORAL at 09:35

## 2019-05-06 RX ADMIN — PANTOPRAZOLE SODIUM 40 MG: 40 TABLET, DELAYED RELEASE ORAL at 07:01

## 2019-05-06 RX ADMIN — ACETAMINOPHEN 650 MG: 325 TABLET ORAL at 07:06

## 2019-05-06 RX ADMIN — DOCUSATE SODIUM 100 MG: 100 CAPSULE, LIQUID FILLED ORAL at 09:35

## 2019-05-06 RX ADMIN — DILTIAZEM HYDROCHLORIDE 120 MG: 120 CAPSULE, COATED, EXTENDED RELEASE ORAL at 09:35

## 2019-05-06 ASSESSMENT — PAIN DESCRIPTION - ORIENTATION: ORIENTATION: LEFT

## 2019-05-06 ASSESSMENT — PAIN DESCRIPTION - FREQUENCY: FREQUENCY: INTERMITTENT

## 2019-05-06 ASSESSMENT — PAIN SCALES - GENERAL
PAINLEVEL_OUTOF10: 0
PAINLEVEL_OUTOF10: 4
PAINLEVEL_OUTOF10: 2
PAINLEVEL_OUTOF10: 0

## 2019-05-06 ASSESSMENT — PAIN DESCRIPTION - PAIN TYPE: TYPE: ACUTE PAIN

## 2019-05-06 ASSESSMENT — PAIN SCALES - WONG BAKER
WONGBAKER_NUMERICALRESPONSE: 0
WONGBAKER_NUMERICALRESPONSE: 0

## 2019-05-06 ASSESSMENT — PAIN DESCRIPTION - LOCATION: LOCATION: NECK

## 2019-05-06 ASSESSMENT — PAIN DESCRIPTION - PROGRESSION: CLINICAL_PROGRESSION: GRADUALLY IMPROVING

## 2019-05-06 ASSESSMENT — PAIN DESCRIPTION - ONSET: ONSET: ON-GOING

## 2019-05-06 ASSESSMENT — PAIN - FUNCTIONAL ASSESSMENT: PAIN_FUNCTIONAL_ASSESSMENT: ACTIVITIES ARE NOT PREVENTED

## 2019-05-06 ASSESSMENT — PAIN DESCRIPTION - DESCRIPTORS: DESCRIPTORS: ACHING

## 2019-05-06 NOTE — CARE COORDINATION
Fran spoke with patient and daughter, Iam Bradford, present in room regarding dc needs and IMM letter. Patient reported that she lives alone and is pretty independent. She has a stair list and a walk in shower. She has a supportive family she is active with [facility]. Patient plans to return home and resume home care. Fran sent message to attending Md requesting home care orders/DEVON. Fran called [facility] and spoke with Pérez Fletcher regarding dc and requested she pull orders from epic once completed. Rn aware of above.    Electronically signed by Tanmay Nunn on 5/6/2019 at 11:29 AM

## 2019-05-06 NOTE — DISCHARGE INSTR - COC
Continuity of Care Form    Patient Name: Bianca Griffith   :  1935  MRN:  [id number]    Admit date:  5/3/2019  Discharge date:  19    Code Status Order: Full Code   Advance Directives:   [facility] Documentation     Date/Time Healthcare Directive Type of Healthcare Directive Copy in [address] [facility] [address] Agent's Name Healthcare Agent's Phone Number    [phone]  Yes, patient has an advance directive for healthcare treatment -- -- --  --  --          Admitting Physician:  Juan Gutierrez MD  PCP: Debra Minaya MD    Discharging Nurse: [address] [place] [address] Unit/Room#: X6X-8225/3113-01  Discharging Unit Phone Number: [phone]    Emergency Contact:   Extended Emergency Contact Information  [facility] [address]  Phone: [phone]  Relation: Child  Secondary Emergency Contact: Yanna Green  Address: (DAUGHTER IN LAW)   [address] Phone: [phone]  Relation: Other   needed?  No    Past Surgical History:  Past Surgical History:   Procedure Laterality Date    CARPAL TUNNEL RELEASE      right,     COLONOSCOPY      , dr Alex Da Silva N/A 10/19/2018    COLONOSCOPY WITH BIOPSY performed by Cora Goldstein MD at [address] [place] [address]      2017 and 2018   [facility]      2011    NASAL SINUS SURGERY      UPPER GASTROINTESTINAL ENDOSCOPY N/A 2019    EGD BIOPSY performed by Cora Goldstein MD at [place] [address]       Immunization History:   Immunization History   Administered Date(s) Administered    Influenza Vaccine, unspecified formulation 10/08/2015    Influenza, High Dose (Fluzone 65 yrs and older) 10/25/2016, 10/12/2017    Influenza, Clifm Gens, Recombinant, IM PF (Flublok 18 yrs and older) 10/03/2018    Pneumococcal 13-valent Conjugate ([id number]) 10/05/2014    Pneumococcal Polysaccharide ([id number])

## 2019-05-06 NOTE — TELEPHONE ENCOUNTER
Schedule with myself either on Wednesday morning for hospital f/u. If she can't make that, ok to schedule with vero on Thursday    Please document call and then close encounter.   thanks

## 2019-05-06 NOTE — PLAN OF CARE
Problem: Falls - Risk of:  Goal: Will remain free from falls  Description  Will remain free from falls  5/6/2019 0745 by Lindsay Carrasco RN  Outcome: Ongoing  Note:   Will remain free from falls. Electronically signed by Lindsay Carrasco RN on 5/6/2019 at 7:45 AM    5/6/2019 0108 by Alexandre Edwards RN  Outcome: Ongoing  Note:   Fall risk assessment completed every shift. All precautions in place. Pt has call light within reach at all times. Room clear of clutter. Pt aware to call for assistance when getting up. Pt calls appropriately. Goal: Absence of physical injury  Description  Absence of physical injury  5/6/2019 0745 by Lindsay Carrasco RN  Outcome: Ongoing  Note:   Absence of physical injury. Electronically signed by Lindsay Carrasco RN on 5/6/2019 at 7:45 AM    5/6/2019 0108 by Alexandre Edwards RN  Outcome: Ongoing     Problem: Risk for Impaired Skin Integrity  Goal: Tissue integrity - skin and mucous membranes  Description  Structural intactness and normal physiological function of skin and  mucous membranes. 5/6/2019 0745 by Lindsay Carrasco RN  Outcome: Ongoing  Note:   Structural intactness of skin and mucous membranes. Electronically signed by Lindsay Carrasco RN on 5/6/2019 at 7:45 AM    5/6/2019 0108 by Alexandre Edwards RN  Outcome: Ongoing  Note:   Skin assessment complete. No new signs of skin breakdown noted. Assistance provided with repositioning while in bed. Problem: Pain:  Goal: Pain level will decrease  Description  Pain level will decrease  5/6/2019 0745 by Lindsay Carrasco RN  Outcome: Ongoing  Note:   Pain level will decrease. Electronically signed by Lindsay Carrasco RN on 5/6/2019 at 7:45 AM    5/6/2019 0108 by Alexandre Edwards RN  Outcome: Ongoing  Note:   Pt educated on informing staff when pain is present and requires interventions.  Pt educated on rating pain on 0-10 scale, educated to reach out to staff when pain in greater than or equal to a 5/10, or no longer tolerable Goal: Control of acute pain  Description  Control of acute pain  5/6/2019 0745 by Dee Dickey RN  Outcome: Ongoing  Note:   Control of acute pain. Electronically signed by Dee Dickey RN on 5/6/2019 at 7:45 AM    5/6/2019 0108 by Sudeep Booth RN  Outcome: Ongoing     Problem: OXYGENATION/RESPIRATORY FUNCTION  Goal: Patient will maintain patent airway  Outcome: Ongoing  Note:   Patient will maintain patent airway. Electronically signed by Dee Dickey RN on 5/6/2019 at 7:46 AM    Goal: Patient will achieve/maintain normal respiratory rate/effort  Description  Respiratory rate and effort will be within normal limits for the patient  Outcome: Ongoing  Note:   Patient will achieve/maintain normal respiratory rate/effort. Electronically signed by Dee Dickey RN on 5/6/2019 at 7:46 AM       Problem: FLUID AND ELECTROLYTE IMBALANCE  Goal: Fluid and electrolyte balance are achieved/maintained  Outcome: Ongoing  Note:   Fluid and electrolyte balance are achieved/maintained. Electronically signed by Dee Dickey RN on 5/6/2019 at 7:46 AM       Problem: ACTIVITY INTOLERANCE/IMPAIRED MOBILITY  Goal: Mobility/activity is maintained at optimum level for patient  Outcome: Ongoing  Note:   Mobility/activity is maintained at optimum level for patient.   Electronically signed by Dee Dickey RN on 5/6/2019 at 7:47 AM

## 2019-05-06 NOTE — PLAN OF CARE
Problem: Risk for Impaired Skin Integrity  Goal: Tissue integrity - skin and mucous membranes  Description  Structural intactness and normal physiological function of skin and  mucous membranes. Outcome: Ongoing  Note:   Skin assessment complete. No new signs of skin breakdown noted. Assistance provided with repositioning while in bed. Problem: Pain:  Goal: Pain level will decrease  Description  Pain level will decrease  Outcome: Ongoing  Note:   Pt educated on informing staff when pain is present and requires interventions.  Pt educated on rating pain on 0-10 scale, educated to reach out to staff when pain in greater than or equal to a 5/10, or no longer tolerable      Problem: Pain:  Goal: Control of acute pain  Description  Control of acute pain  Outcome: Ongoing

## 2019-05-06 NOTE — PROGRESS NOTES
Occupational Therapy  Facility/Department: [address] ORTHOPEDICS  Daily Treatment Note  NAME: Fiona Patel  : 1935  MRN: [id number]    Date of Service: 2019    Discharge Recommendations:  24 hour supervision or assist       Assessment: Discussed with OTR am pac score is 21. Anticipate patient is safe for discharge to home with family to provide 24 hour assistance. Completes bed mobility with Supervision, functional mobility without device and transfer with Supervision. Continue with POC. Patient Diagnosis(es): The primary encounter diagnosis was Acute kidney injury ([address] A diagnosis of Hyperkalemia was also pertinent to this visit. has a past medical history of Allergic rhinitis, CHF (congestive heart failure) ([address] GERD (gastroesophageal reflux disease), Hearing loss sensory, bilateral, Mastoiditis, Osteoarthritis, Osteoporosis, and Scoliosis. has a past surgical history that includes Carpal tunnel release; Nasal sinus surgery; hernia repair; Colonoscopy; eye surgery; Colonoscopy (N/A, 10/19/2018); and Upper gastrointestinal endoscopy (N/A, 2019). Restrictions  Restrictions/Precautions  Restrictions/Precautions: Fall Risk  Position Activity Restriction  Other position/activity restrictions: diet: renal. IV (currently disconnected), telemetry  Subjective   General  Chart Reviewed: Yes  Patient assessed for rehabilitation services?: Yes  Additional Pertinent Hx: Per Dr. Ge Carnes H&P 5/3/19: \"Patient is an 62-year-old woman with a history of chronic renal failure and chronic diastolic congestive heart failure. She was discharged from this facility on 2019 after being treated for acute renal failure which was thought to be secondary to recent medication adjustments. At that time, lisinopril and hydrochlorothiazide were discontinued. She presents to the emergency room at the request of her PCP after lab test today revealed dehydration and acute on chronic kidney failure.  A with SBA/Supervision           Cognition  Overall Cognitive Status: WFL     Assessment   Performance deficits / Impairments: Decreased functional mobility ; Decreased ADL status; Decreased endurance;Decreased balance;Decreased high-level IADLs  Assessment: Discussed with OTR am pac score is 21. Anticipate patient is safe for discharge to home with family to provide 24 hour assistance. Completes bed mobility with Supervision, functional mobility without device and transfer with Supervision. Continue with POC. Patient Education: Role of OT and POC. REQUIRES OT FOLLOW UP: Yes  Activity Tolerance  Activity Tolerance: Patient Tolerated treatment well  Safety Devices  Safety Devices in place: Yes(Patient with PCA at end of session)          Plan   Plan  Times per week: 3-5x  Times per day: Daily  Current Treatment Recommendations: Strengthening, Balance Training, Functional Mobility Training, Endurance Training, Safety Education & Training, Patient/Caregiver Education & Training, Equipment Evaluation, Education, & procurement, Home Management Training, Self-Care / ADL    AM-PAC Score        AM-[facility] Inpatient Daily Activity Raw Score: 21  AM-PAC Inpatient ADL T-Scale Score : 44.27  ADL Inpatient CMS 0-100% Score: 32.79  ADL Inpatient CMS G-Code Modifier : CJ    Goals  Short term goals  Time Frame for Short term goals: to be met by d/c: All goals ongoing   Short term goal 1: Pt will complete fxl transfers/mobility mod I to participate in ADLs. Short term goal 2: Pt will complete toileting mod I. Short term goal 3: Pt will complete bathing with supervision/mod I. Short term goal 4: Pt will complete dressing mod I. Short term goal 5: Pt will tolerate standing x5-6 mins to increase endurance/balance to participate in ADLs.   Long term goals  Time Frame for Long term goals : same as STGs  Patient Goals   Patient goals : \"to get home and get back to walking outside\"       Therapy Time   Individual Concurrent Group Co-treatment   Time In 0815         Time Out 0840         Minutes 25                 Electronically signed by Ngozi Mancuso, [id number] on 5/6/2019 at 9:00 AM    If discharged prior to next OT session please see last daily note for discharge status

## 2019-05-06 NOTE — PROGRESS NOTES
Pt resting in bed, family in room, no s/s distress, denies pain at this time, call light in reach.   Electronically signed by Tia Kaur RN on 5/6/2019 at 12:38 PM

## 2019-05-06 NOTE — PROGRESS NOTES
Patient alert and oriented x4. Pt daughter at bedside. Pt denies needs at this time. Pt up ambulating, no sob noted with this activity. Will continue to monitor.

## 2019-05-06 NOTE — DISCHARGE SUMMARY
Hospital Medicine Discharge Summary    Patient ID: Klever Bhakta      Patient's PCP: Janice Olivia MD    Admit Date: 5/3/2019     Discharge Date: 5/6/2019     Admitting Physician: Yan Guerrero MD     Discharge Physician: Lindsey Dubois MD     Discharge Diagnoses: Active Hospital Problems    Diagnosis Date Noted    Hyperkalemia [E87.5] 05/03/2019    Acute renal failure superimposed on stage 3 chronic kidney disease ([facility] [address] [N17.9, N18.3] 05/03/2019    Chronic diastolic (congestive) heart failure (HCC) [I50.32] 05/03/2019    Dehydration [E86.0] 05/03/2019    Acute prerenal azotemia [R79.89] 05/03/2019    Generalized weakness [R53.1] 05/03/2019       The patient was seen and examined on day of discharge and this discharge summary is in conjunction with any daily progress note from day of discharge. Hospital Course: 80yo woman w hx of CKDIII, chronic dCHF, presented referred by PCP due to abnormal lab. Hyperkalemia - resolved - due to JERRELL and possibly Potassium supplement at home   - Nephrology consult appreciated  - avoid nephrotoxic meds  - urine studies       Dehydration - resolved with IVF - likely secondary to poor oral intake and/or increased stool output possibly from magnesium supplementation. Acute renal failure superimposed on stage 3 chronic kidney disease (HCC) - due to dehydration. Improved  Cr 1.7 today. Cr 2.2 on admit.         Chronic diastolic (congestive) heart failure (HCC) - stable. A 2D Echocardiogram on 04/18/2019 shows an EF of 55-60% and grade I diastolic dysfunction      Generalized weakness - Will ask PT/OT to evaluate and treat patient, and if necessary to provide recommendations for post hospital therapy       Essential hypertension - actually borderline low at times with HR running mid 50s. Per family BP low at home after she took diltiazem 60mg back to back. Off dilt scheduled - see below.        Diffuse Esophageal Spasm Chest Pain.   Has been on dilt for this - chest pain has resolved ever since dilt was started. Plan:    - switch diltiazem to 30mg PRN for esophageal spasm pain at the first onset of symptoms. May take up to 3 times a day. Physical Exam Performed:     /72   Pulse 59   Temp 97.7 °F (36.5 °C) (Oral)   Resp 16   Ht 4' 8\" (1.422 m) Comment: stated  Wt 101 lb 6.4 oz (46 kg)   SpO2 96%   BMI 22.73 kg/m²       General appearance:  No apparent distress, appears stated age and cooperative. HEENT:  Normal cephalic, atraumatic without obvious deformity. Pupils equal, round, and reactive to light. Extra ocular muscles intact. Conjunctivae/corneas clear. Neck: Supple, with full range of motion. No jugular venous distention. Trachea midline. Respiratory:  Normal respiratory effort. Clear to auscultation, bilaterally without Rales/Wheezes/Rhonchi. Cardiovascular:  Regular rate and rhythm with normal S1/S2 without murmurs, rubs or gallops. Abdomen: Soft, non-tender, non-distended with normal bowel sounds. Musculoskeletal:  No clubbing, cyanosis or edema bilaterally. Full range of motion without deformity. Skin: Skin color, texture, turgor normal.  No rashes or lesions. Neurologic:  Neurovascularly intact without any focal sensory/motor deficits. Cranial nerves: II-XII intact, grossly non-focal.  Psychiatric:  Alert and oriented, thought content appropriate, normal insight  Capillary Refill: Brisk,< 3 seconds   Peripheral Pulses: +2 palpable, equal bilaterally       Labs:  For convenience and continuity at follow-up the following most recent labs are provided:      CBC:    Lab Results   Component Value Date    WBC 6.8 05/06/2019    HGB 11.3 05/06/2019    HCT 33.5 05/06/2019     05/06/2019       Renal:    Lab Results   Component Value Date     05/06/2019    K 4.2 05/06/2019     05/06/2019    CO2 20 05/06/2019    BUN 45 05/06/2019    CREATININE 1.7 05/06/2019    CALCIUM 9.2

## 2019-05-06 NOTE — PROGRESS NOTES
Pt being dc'd home, instructions read and understood by both pt and daughter, iv removed earlier abbocath intact.   Electronically signed by Trisha Broges RN on 5/6/2019 at 12:34 PM

## 2019-05-06 NOTE — PROGRESS NOTES
Nephrology ([facility]) Progress Note    CC: JERRELL and hyperkalemia    Subjective:    HPI:  Breathing comfortably. No CP. Renal function worse. Potassium level better. ROS:  In bed. No fever. [address]:  medications reviewed. Objective:  Blood pressure 112/72, pulse 59, temperature 97.7 °F (36.5 °C), temperature source Oral, resp. rate 16, height 4' 8\" (1.422 m), weight 101 lb 6.4 oz (46 kg), SpO2 96 %, not currently breastfeeding. Intake/Output Summary (Last 24 hours) at 5/6/2019 0911  Last data filed at 5/6/2019 0844  Gross per 24 hour   Intake 1300 ml   Output --   Net 1300 ml     General:  NAD, A+OX3  Chest:  CTAB  CVS:  RRR  Abdominal:  NTND, soft, +BS  Extremities:  no edema  Skin:  no rash    Labs:  Renal panel:  Lab Results   Component Value Date/Time     05/06/2019 05:28 AM    K 4.2 05/06/2019 05:28 AM    CO2 20 (L) 05/06/2019 05:28 AM    BUN 45 (H) 05/06/2019 05:28 AM    CREATININE 1.7 (H) 05/06/2019 05:28 AM    CALCIUM 9.2 05/06/2019 05:28 AM    PHOS 3.2 05/02/2019 01:03 PM    MG 2.20 05/03/2019 12:09 PM     CBC:  Lab Results   Component Value Date/Time    WBC 6.8 05/06/2019 05:29 AM    HGB 11.3 (L) 05/06/2019 05:29 AM    HCT 33.5 (L) 05/06/2019 05:29 AM     05/06/2019 05:29 AM       Assessment/Plan:  Reviewed old records and labs.     1) hyperkalemia              - suspect secondary to potassium supplementation and JERRELL              - off potassium supplement              - low potassium diet      2) JERRELL              - secondary to renal underperfusion due to low SBP?              - suspect mild ATN    - serum creatinine is improved     3) CV              - switched diltiazem to diltiazem  mg (lower dose)              - will need to see if diltiazem CD works for her suspected esophageal spasms     4) HTN              - goal minimum -130's   - BP controlled    5) metabolic acidosis   - d/c sodium bicarbonate    Patient can be discharged from renal

## 2019-05-07 ENCOUNTER — TELEPHONE (OUTPATIENT)
Dept: FAMILY MEDICINE CLINIC | Age: 84
End: 2019-05-07

## 2019-05-07 ENCOUNTER — CARE COORDINATION (OUTPATIENT)
Dept: CASE MANAGEMENT | Age: 84
End: 2019-05-07

## 2019-05-07 DIAGNOSIS — N17.9 AKI (ACUTE KIDNEY INJURY) (HCC): Primary | ICD-10-CM

## 2019-05-07 LAB
EKG ATRIAL RATE: 55 BPM
EKG DIAGNOSIS: NORMAL
EKG P AXIS: 25 DEGREES
EKG P-R INTERVAL: 124 MS
EKG Q-T INTERVAL: 410 MS
EKG QRS DURATION: 70 MS
EKG QTC CALCULATION (BAZETT): 392 MS
EKG R AXIS: 7 DEGREES
EKG T AXIS: 39 DEGREES
EKG VENTRICULAR RATE: 55 BPM

## 2019-05-07 PROCEDURE — 1111F DSCHRG MED/CURRENT MED MERGE: CPT | Performed by: FAMILY MEDICINE

## 2019-05-08 ENCOUNTER — TELEPHONE (OUTPATIENT)
Dept: FAMILY MEDICINE CLINIC | Age: 84
End: 2019-05-08

## 2019-05-08 NOTE — TELEPHONE ENCOUNTER
[facility] Transitions Initial Follow Up Call    Outreach made within 2 business days of discharge: Yes    Patient: Lexi Green Patient : 1935   MRN: [id number]  Reason for Admission: There are no discharge diagnoses documented for the most recent discharge. Discharge Date: 19       Spoke with: patient    Discharge department/facility: [address] [place] [address] Interactive Patient Contact:  Was patient able to fill all prescriptions: Yes  Was patient instructed to bring all medications to the follow-up visit: Yes  Is patient taking all medications as directed in the discharge summary?  Yes  Does patient understand their discharge instructions: Yes  Does patient have questions or concerns that need addressed prior to 7-14 day follow up office visit: no    Scheduled appointment with PCP within 7-14 days    Follow Up  Future Appointments   Date Time Provider Nataliia Arnold   5/10/2019  2:40 PM MD DEYSI [facility]   2019  9:30 AM RIAZ Farias CNP [facility]   2019  2:20 PM Shay Garcia MD [facility]

## 2019-05-08 NOTE — CARE COORDINATION
medication review list). Patient stated took one cardizem at 2am last night for chest pain and it must have worked because she then went to sleep. Patient also stated no longer taking Mag. Ox and Klor-con per Dr. Cristine Mcpherson and no longer taking Actonel prescribed by Dr. Dejon Akbar but that may be resumed. Agreeable to review medications on Friday with Dr. Michelle Mesa. Patient stated has had EGD in the past with Dr. Herbert Almeida and it came back fine. Life line was   installed today and patient stated she also has a stair lift. Patient also stated [facility] been there. Patient ended the conversation by saying \"so far, so good\". Patient agreeable to follow up phone calls. Patient reminded can contact PCP at any time as they have a doctor on call 24/7 or home care for needs. Patient has writer's contact information.     Follow Up  Future Appointments   Date Time Provider Nataliia Arnold   5/10/2019  2:40 PM MD DEYSI [facility]   5/14/2019  9:30 AM RIAZ Mohan - CNP [facility]   8/8/2019  2:20 PM Krupa Oliveros MD [address] [facility]       Edilma Mckeon RN

## 2019-05-10 ENCOUNTER — OFFICE VISIT (OUTPATIENT)
Dept: FAMILY MEDICINE CLINIC | Age: 84
End: 2019-05-10
Payer: MEDICARE

## 2019-05-10 VITALS
BODY MASS INDEX: 23.14 KG/M2 | SYSTOLIC BLOOD PRESSURE: 131 MMHG | HEIGHT: 55 IN | HEART RATE: 73 BPM | WEIGHT: 100 LBS | DIASTOLIC BLOOD PRESSURE: 72 MMHG

## 2019-05-10 DIAGNOSIS — I10 ESSENTIAL HYPERTENSION: ICD-10-CM

## 2019-05-10 DIAGNOSIS — R53.1 GENERALIZED WEAKNESS: ICD-10-CM

## 2019-05-10 DIAGNOSIS — R07.89 OTHER CHEST PAIN: ICD-10-CM

## 2019-05-10 DIAGNOSIS — N17.9 AKI (ACUTE KIDNEY INJURY) (HCC): Primary | ICD-10-CM

## 2019-05-10 DIAGNOSIS — N17.9 AKI (ACUTE KIDNEY INJURY) (HCC): ICD-10-CM

## 2019-05-10 DIAGNOSIS — M15.9 PRIMARY OSTEOARTHRITIS INVOLVING MULTIPLE JOINTS: ICD-10-CM

## 2019-05-10 DIAGNOSIS — Z09 HOSPITAL DISCHARGE FOLLOW-UP: Primary | ICD-10-CM

## 2019-05-10 PROBLEM — E86.0 DEHYDRATION: Status: RESOLVED | Noted: 2019-05-03 | Resolved: 2019-05-10

## 2019-05-10 PROBLEM — I50.32 CHRONIC DIASTOLIC (CONGESTIVE) HEART FAILURE (HCC): Status: RESOLVED | Noted: 2019-05-03 | Resolved: 2019-05-10

## 2019-05-10 PROBLEM — I50.9 CONGESTIVE HEART FAILURE (HCC): Status: RESOLVED | Noted: 2019-04-16 | Resolved: 2019-05-10

## 2019-05-10 PROBLEM — E87.5 HYPERKALEMIA: Status: RESOLVED | Noted: 2019-05-03 | Resolved: 2019-05-10

## 2019-05-10 LAB
ANION GAP SERPL CALCULATED.3IONS-SCNC: 15 MMOL/L (ref 3–16)
BUN BLDV-MCNC: 56 MG/DL (ref 7–20)
CALCIUM SERPL-MCNC: 9.9 MG/DL (ref 8.3–10.6)
CHLORIDE BLD-SCNC: 102 MMOL/L (ref 99–110)
CO2: 18 MMOL/L (ref 21–32)
CREAT SERPL-MCNC: 2.4 MG/DL (ref 0.6–1.2)
GFR AFRICAN AMERICAN: 23
GFR NON-AFRICAN AMERICAN: 19
GLUCOSE BLD-MCNC: 98 MG/DL (ref 70–99)
POTASSIUM SERPL-SCNC: 5.2 MMOL/L (ref 3.5–5.1)
SODIUM BLD-SCNC: 135 MMOL/L (ref 136–145)

## 2019-05-10 PROCEDURE — 1111F DSCHRG MED/CURRENT MED MERGE: CPT | Performed by: FAMILY MEDICINE

## 2019-05-10 PROCEDURE — 99215 OFFICE O/P EST HI 40 MIN: CPT | Performed by: FAMILY MEDICINE

## 2019-05-10 RX ORDER — DULOXETIN HYDROCHLORIDE 20 MG/1
20 CAPSULE, DELAYED RELEASE ORAL DAILY
Qty: 30 CAPSULE | Refills: 0 | Status: SHIPPED | OUTPATIENT
Start: 2019-05-10 | End: 2019-06-20

## 2019-05-10 RX ORDER — OMEPRAZOLE 20 MG/1
20 CAPSULE, DELAYED RELEASE ORAL
Qty: 30 CAPSULE | Refills: 0 | Status: ON HOLD
Start: 2019-05-10 | End: 2019-05-15 | Stop reason: HOSPADM

## 2019-05-10 RX ORDER — OMEPRAZOLE 20 MG/1
40 CAPSULE, DELAYED RELEASE ORAL DAILY
Qty: 90 CAPSULE | Refills: 0
Start: 2019-05-10 | End: 2019-05-10

## 2019-05-10 NOTE — PROGRESS NOTES
Hospital Follow Up      Kelle Buckner   YOB: 1935    Date of Office Visit:  5/10/2019  Date of Hospital Admission: 5/3/19  Date of Hospital Discharge: 5/6/19  Readmission Risk Score(high >=14%.  Medium >=10%):Readmission Risk Score: 17      Care management risk score Rising risk (score 2-5) and Complex Care (Scores >=6): 7     Non face to face  following discharge, date last encounter closed (first attempt may have been earlier): 5/8/2019  9:11 AM 5/8/2019  9:11 AM    Call initiated 2 business days of discharge: Yes     Patient Active Problem List   Diagnosis    Osteoarthritis    Osteoporosis    Allergic rhinitis    Scoliosis    Hearing loss sensory, bilateral    Asymmetrical sensorineural hearing loss    Cerumen impaction    Chronic mastoiditis    Thyroid nodule    Gastritis    JERRELL (acute kidney injury) ([address]    Acute renal failure superimposed on stage 3 chronic kidney disease (HCC)    Acute prerenal azotemia    Generalized weakness       No Known Allergies    Medications listed as ordered at the time of discharge from hospital   Lester THOMAS   Home Medication Instructions BRE:    Printed on:05/10/19 6146   Medication Information                      albuterol sulfate  (90 Base) MCG/ACT inhaler  Inhale 2 puffs into the lungs every 6 hours as needed for Wheezing (Chest tightness)             Calcium Citrate-Vitamin D (CALCIUM + D PO)  Take 500 mg by mouth daily              Docusate Calcium (STOOL SOFTENER PO)  Take 100 mg by mouth daily              DULoxetine (CYMBALTA) 20 MG extended release capsule  Take 1 capsule by mouth daily             omeprazole (PRILOSEC) 20 MG delayed release capsule  Take 1 capsule by mouth every morning (before breakfast)             risedronate (ACTONEL) 30 MG tablet  Take 30 mg by mouth every 7 days Saturdays             vitamin D (CHOLECALCIFEROL) 1000 units TABS tablet  Take 2,000 Units by mouth daily                    Medications marked \"taking\" at this time  Outpatient Medications Marked as Taking for the 5/10/19 encounter (Office Visit) with Nicole Kenney MD   Medication Sig Dispense Refill    DULoxetine (CYMBALTA) 60 MG extended release capsule Take 1 capsule by mouth daily 30 capsule 0    omeprazole (PRILOSEC) 20 MG delayed release capsule Take 1 capsule BID 30 capsule 0    cardizem 30mg One po prn chest tightness      albuterol sulfate  (90 Base) MCG/ACT inhaler Inhale 2 puffs into the lungs every 6 hours as needed for  1 Inhaler 1    risedronate (ACTONEL) 30 MG tablet Take 30 mg by mouth every 7 days Saturdays      vitamin D (CHOLECALCIFEROL) 1000 units TABS tablet Take 2,000 Units by mouth daily       Docusate Calcium (STOOL SOFTENER PO) Take 100 mg by mouth daily       Calcium Citrate-Vitamin D (CALCIUM + D PO) Take 500 mg by mouth daily           Medications patient taking as of now reconciled against medications ordered at time of hospital discharge: Yes    Chief Complaint   Patient presents with    Follow-Up from Hospital       HPI    Inpatient course: Discharge summary reviewed- see chart. Patient initially admitted to [facility] on 4/22/19 and was discharged on 4/29/19 for acute kidney injury. During this hospitalization, JERRELL was thought to be secondary to prerenal causes, as lisinopril and hydrochlorothiazide were recently added to her medication regimen. Both of these medications were stopped, as well as her chronic Celebrex. Creatinine was 4.3 and BUN 90, with a GFR of 10 on admission. By discharge, BUN was 18, creatinine 1.2, and GFR 43. Only other medication changes during this hospitalization was the addition of diltiazem for HTN and secondary for potential esophageal spasms (as pt had chronic substernal chest pain nightly for 5 months), and addition of cymbalta for OA pain since celebrex was stopped. Patient was seen in our office for hospital follow-up on 5/2/19.   BMP was rechecked and kidney function again was found to be decreasing. Creatinine was 2.2, BUN 39, GFR 21. I discussed the case with nephrology, who recommended patient return to hospital for further evaluation. Patient was once again admitted on 5/3/19 and was discharged on 5/6/19, again for JERRELL. Again they felt the majority of the kidney injury was due to prerenal (possible low BP due to diltiazem) + possible ATN. Patient was having some diarrhea with the magnesium supplementation she was on, but she states this was very mild and she was drinking 48 ounces of water daily. Her blood pressure also had some borderline low at times with heart rate running in the mid 50s. Diltiazem was changed to prn esophageal spasm instead of scheduled by hospitalist at d/c. They gave her IV fluids for only one day during hospitalization. Creatinine decreased to 1.7 by discharge. Physical therapy evaluated patient for some generalized weakness and she was d/c with home PT and home nurse. Interval history/Current status:   Patient here today with her son, and her daughter called in via telephone, to be able to listen during visit and give input. Patient states she is asymptomatic except she still having a squeezing pain below her distal sternum in the middle of the night. This has been going on since January. The only time it stopped with when she was on the diltiazem around-the-clock between the first and second hospitalization. The pain always only last for 15 minutes. She is taking the diltiazem now after the pain occurs. She had a once in the middle the night 2 days ago, and again this morning at 6 AM.  There was some discrepancy between the kidney doctor who stated she could remain on the long-acting diltiazem daily and the hospitalist to recommended taking the short acting p.r.n. Only (this was according to the daughter)    Patient not feeling the Cymbalta is helping.   Patient's daughter and son state she was swelling or erythema  Neck:  · Symmetric and without masses  · No thyromegaly  Resp:  · Normal effort  · Clear to auscultation bilaterally without rhonchi, wheezing or crackles  Cardiovascular:  · On auscultation, normal S1 and S2 without murmurs, rubs or gallops  · No bruits of bilateral carotids and no JVD  Gastrointestinal:  · Nontender, nondistended, and no masses  · No hepatosplenomegaly  Musculoskeletal:  · Normal Gait  · All extremities without clubbing, cyanosis or edema  · +significant kyphosis  Skin:  · No rashes on inspection  · No areas of increased heat or induration on palpation  Psych:  · Normal mood and affect  · Normal insight and judgement        Assessment/Plan:  1. Hospital discharge follow-up  As stated above reviewed both hospital discharge summaries, all laboratory work, and consult notes    2. JERRELL (acute kidney injury) ([address]  Likely ATN, with a questionable prerenal cause from intermittent low blood pressure from diltiazem. No blood pressures that were severely low recorded however. Patient was well hydrated during prior to admission from this most recent hospitalization. Repeating BMP stat, to ensure still improving. Follow-up on results and act accordingly. 3. Other chest pain  Most likely esophageal spasm as patient has had these symptoms for 5 months and the only time that it resolved completely with when she was taking long-acting diltiazem daily. Because omeprazole has not helped at all, we'll decrease dose from b.i.d. to 20 mg daily. Might stop this completely at follow-up visit next week if no signs of worsening pain. Chest pain certainly is not consistent with a cardiac cause, is only occurring when laying down and at rest.    4. Primary osteoarthritis involving multiple joints  Can't take Celebrex due to recent kidney issues. Tylenol is helping. Okay to continue. As Cymbalta is not helping pain at all, we'll slowly wean.   She is likely having so many symptoms because she was started at 60 mg.  Giving patient 20 mg dosage to take daily for the next week, will wean further from there. Instructed son and daughter that if she starts to have withdrawal symptoms, such as dizziness, she can take the 20 mg b.i.d. for the first week before weaning further. 5. Generalized weakness  Patient already had a home physical therapy come, and has already been discharged. Son and daughter feel that she has regained her strength and patient agrees. 6. Essential hypertension  Within normal limits today, has had a short acting diltiazem earlier this morning. It should still be in her system. Patient to record home blood pressures t.i.d. for the next week and not take any diltiazem. We'll review blood pressures at follow-up appointment next week and determine if patient should just take a short acting diltiazem at bedtime to help with her esophageal spasm, or she needs it around the clock to help with blood pressure as well.         Medical Decision Making: high complexity

## 2019-05-11 ENCOUNTER — HOSPITAL ENCOUNTER (OUTPATIENT)
Age: 84
Discharge: HOME | DRG: 683 | End: 2019-05-11
Payer: MEDICARE

## 2019-05-11 ENCOUNTER — HOSPITAL ENCOUNTER (INPATIENT)
Age: 84
LOS: 6 days | Discharge: ANOTHER HOSPITAL | DRG: 683 | End: 2019-05-17
Attending: EMERGENCY MEDICINE | Admitting: INTERNAL MEDICINE
Payer: MEDICARE

## 2019-05-11 DIAGNOSIS — N28.9 RENAL INSUFFICIENCY: Primary | ICD-10-CM

## 2019-05-11 DIAGNOSIS — N17.9 AKI (ACUTE KIDNEY INJURY) (HCC): ICD-10-CM

## 2019-05-11 DIAGNOSIS — N18.3 ACUTE RENAL FAILURE SUPERIMPOSED ON STAGE 3 CHRONIC KIDNEY DISEASE, UNSPECIFIED ACUTE RENAL FAILURE TYPE: ICD-10-CM

## 2019-05-11 DIAGNOSIS — N17.9 ACUTE RENAL FAILURE SUPERIMPOSED ON STAGE 3 CHRONIC KIDNEY DISEASE, UNSPECIFIED ACUTE RENAL FAILURE TYPE: ICD-10-CM

## 2019-05-11 PROBLEM — N18.9 CKD (CHRONIC KIDNEY DISEASE): Status: ACTIVE | Noted: 2019-05-11

## 2019-05-11 LAB
A/G RATIO: 0.8 (ref 1.1–2.2)
ALBUMIN SERPL-MCNC: 3.7 G/DL (ref 3.4–5)
ALP BLD-CCNC: 114 U/L (ref 40–129)
ALT SERPL-CCNC: 13 U/L (ref 10–40)
ANION GAP SERPL CALCULATED.3IONS-SCNC: 11 MMOL/L (ref 3–16)
ANION GAP SERPL CALCULATED.3IONS-SCNC: 15 MMOL/L (ref 3–16)
AST SERPL-CCNC: 13 U/L (ref 15–37)
BASOPHILS ABSOLUTE: 0.1 K/UL (ref 0–0.2)
BASOPHILS RELATIVE PERCENT: 1.2 %
BILIRUB SERPL-MCNC: <0.2 MG/DL (ref 0–1)
BILIRUBIN URINE: NEGATIVE
BLOOD, URINE: NEGATIVE
BUN BLDV-MCNC: 63 MG/DL (ref 7–20)
BUN BLDV-MCNC: 65 MG/DL (ref 7–20)
CALCIUM SERPL-MCNC: 10 MG/DL (ref 8.3–10.6)
CALCIUM SERPL-MCNC: 9.7 MG/DL (ref 8.3–10.6)
CHLORIDE BLD-SCNC: 102 MMOL/L (ref 99–110)
CHLORIDE BLD-SCNC: 104 MMOL/L (ref 99–110)
CLARITY: CLEAR
CO2: 19 MMOL/L (ref 21–32)
CO2: 19 MMOL/L (ref 21–32)
COLOR: YELLOW
CREAT SERPL-MCNC: 2.4 MG/DL (ref 0.6–1.2)
CREAT SERPL-MCNC: 3 MG/DL (ref 0.6–1.2)
EOSINOPHILS ABSOLUTE: 0.1 K/UL (ref 0–0.6)
EOSINOPHILS RELATIVE PERCENT: 1.4 %
EPITHELIAL CELLS, UA: 3 /HPF (ref 0–5)
GFR AFRICAN AMERICAN: 18
GFR AFRICAN AMERICAN: 23
GFR NON-AFRICAN AMERICAN: 15
GFR NON-AFRICAN AMERICAN: 19
GLOBULIN: 4.5 G/DL
GLUCOSE BLD-MCNC: 110 MG/DL (ref 70–99)
GLUCOSE BLD-MCNC: 87 MG/DL (ref 70–99)
GLUCOSE URINE: NEGATIVE MG/DL
HCT VFR BLD CALC: 34 % (ref 36–48)
HEMOGLOBIN: 11.5 G/DL (ref 12–16)
HYALINE CASTS: 2 /LPF (ref 0–8)
KETONES, URINE: NEGATIVE MG/DL
LEUKOCYTE ESTERASE, URINE: ABNORMAL
LYMPHOCYTES ABSOLUTE: 1.3 K/UL (ref 1–5.1)
LYMPHOCYTES RELATIVE PERCENT: 18.4 %
MCH RBC QN AUTO: 30.9 PG (ref 26–34)
MCHC RBC AUTO-ENTMCNC: 33.9 G/DL (ref 31–36)
MCV RBC AUTO: 91 FL (ref 80–100)
MICROSCOPIC EXAMINATION: YES
MONOCYTES ABSOLUTE: 0.8 K/UL (ref 0–1.3)
MONOCYTES RELATIVE PERCENT: 11 %
NEUTROPHILS ABSOLUTE: 4.9 K/UL (ref 1.7–7.7)
NEUTROPHILS RELATIVE PERCENT: 68 %
NITRITE, URINE: NEGATIVE
PDW BLD-RTO: 13.3 % (ref 12.4–15.4)
PH UA: 6.5 (ref 5–8)
PLATELET # BLD: 443 K/UL (ref 135–450)
PMV BLD AUTO: 6.9 FL (ref 5–10.5)
POTASSIUM REFLEX MAGNESIUM: 4.9 MMOL/L (ref 3.5–5.1)
POTASSIUM SERPL-SCNC: 4.8 MMOL/L (ref 3.5–5.1)
PROTEIN UA: ABNORMAL MG/DL
RBC # BLD: 3.74 M/UL (ref 4–5.2)
RBC UA: 1 /HPF (ref 0–4)
SODIUM BLD-SCNC: 134 MMOL/L (ref 136–145)
SODIUM BLD-SCNC: 136 MMOL/L (ref 136–145)
SPECIFIC GRAVITY UA: 1.01 (ref 1–1.03)
TOTAL PROTEIN: 8.2 G/DL (ref 6.4–8.2)
URINE REFLEX TO CULTURE: YES
URINE TYPE: ABNORMAL
UROBILINOGEN, URINE: 0.2 E.U./DL
WBC # BLD: 7.2 K/UL (ref 4–11)
WBC UA: 26 /HPF (ref 0–5)

## 2019-05-11 PROCEDURE — G0378 HOSPITAL OBSERVATION PER HR: HCPCS

## 2019-05-11 PROCEDURE — 84155 ASSAY OF PROTEIN SERUM: CPT

## 2019-05-11 PROCEDURE — 1200000000 HC SEMI PRIVATE

## 2019-05-11 PROCEDURE — 99284 EMERGENCY DEPT VISIT MOD MDM: CPT

## 2019-05-11 PROCEDURE — 80048 BASIC METABOLIC PNL TOTAL CA: CPT

## 2019-05-11 PROCEDURE — 94760 N-INVAS EAR/PLS OXIMETRY 1: CPT

## 2019-05-11 PROCEDURE — 85025 COMPLETE CBC W/AUTO DIFF WBC: CPT

## 2019-05-11 PROCEDURE — 6360000002 HC RX W HCPCS: Performed by: INTERNAL MEDICINE

## 2019-05-11 PROCEDURE — 2580000003 HC RX 258: Performed by: EMERGENCY MEDICINE

## 2019-05-11 PROCEDURE — 80053 COMPREHEN METABOLIC PANEL: CPT

## 2019-05-11 PROCEDURE — 96360 HYDRATION IV INFUSION INIT: CPT

## 2019-05-11 PROCEDURE — 87086 URINE CULTURE/COLONY COUNT: CPT

## 2019-05-11 PROCEDURE — 6370000000 HC RX 637 (ALT 250 FOR IP): Performed by: PHYSICIAN ASSISTANT

## 2019-05-11 PROCEDURE — 93005 ELECTROCARDIOGRAM TRACING: CPT | Performed by: PHYSICIAN ASSISTANT

## 2019-05-11 PROCEDURE — 36415 COLL VENOUS BLD VENIPUNCTURE: CPT

## 2019-05-11 PROCEDURE — 96361 HYDRATE IV INFUSION ADD-ON: CPT

## 2019-05-11 PROCEDURE — 81001 URINALYSIS AUTO W/SCOPE: CPT

## 2019-05-11 PROCEDURE — 84165 PROTEIN E-PHORESIS SERUM: CPT

## 2019-05-11 RX ORDER — DOCUSATE SODIUM 100 MG/1
100 CAPSULE, LIQUID FILLED ORAL 2 TIMES DAILY
Status: DISCONTINUED | OUTPATIENT
Start: 2019-05-11 | End: 2019-05-17 | Stop reason: HOSPADM

## 2019-05-11 RX ORDER — ACETAMINOPHEN 500 MG
1000 TABLET ORAL 2 TIMES DAILY WITH MEALS
Status: DISCONTINUED | OUTPATIENT
Start: 2019-05-12 | End: 2019-05-11

## 2019-05-11 RX ORDER — PANTOPRAZOLE SODIUM 40 MG/1
40 TABLET, DELAYED RELEASE ORAL
Status: DISCONTINUED | OUTPATIENT
Start: 2019-05-12 | End: 2019-05-12

## 2019-05-11 RX ORDER — DOCUSATE SODIUM 100 MG/1
100 CAPSULE, LIQUID FILLED ORAL DAILY
Status: DISCONTINUED | OUTPATIENT
Start: 2019-05-12 | End: 2019-05-11

## 2019-05-11 RX ORDER — POLYETHYLENE GLYCOL 3350 17 G/17G
17 POWDER, FOR SOLUTION ORAL DAILY PRN
Status: DISCONTINUED | OUTPATIENT
Start: 2019-05-11 | End: 2019-05-17 | Stop reason: HOSPADM

## 2019-05-11 RX ORDER — HEPARIN SODIUM 5000 [USP'U]/ML
5000 INJECTION, SOLUTION INTRAVENOUS; SUBCUTANEOUS EVERY 8 HOURS SCHEDULED
Status: DISCONTINUED | OUTPATIENT
Start: 2019-05-11 | End: 2019-05-15 | Stop reason: ALTCHOICE

## 2019-05-11 RX ORDER — ACETAMINOPHEN 500 MG
1000 TABLET ORAL 2 TIMES DAILY
Status: DISCONTINUED | OUTPATIENT
Start: 2019-05-11 | End: 2019-05-17 | Stop reason: HOSPADM

## 2019-05-11 RX ORDER — ACETAMINOPHEN 500 MG
1000 TABLET ORAL 2 TIMES DAILY
COMMUNITY

## 2019-05-11 RX ORDER — ONDANSETRON 2 MG/ML
4 INJECTION INTRAMUSCULAR; INTRAVENOUS EVERY 6 HOURS PRN
Status: DISCONTINUED | OUTPATIENT
Start: 2019-05-11 | End: 2019-05-17 | Stop reason: HOSPADM

## 2019-05-11 RX ORDER — SODIUM CHLORIDE 9 MG/ML
INJECTION, SOLUTION INTRAVENOUS CONTINUOUS
Status: DISCONTINUED | OUTPATIENT
Start: 2019-05-11 | End: 2019-05-13

## 2019-05-11 RX ORDER — SODIUM CHLORIDE 0.9 % (FLUSH) 0.9 %
10 SYRINGE (ML) INJECTION PRN
Status: DISCONTINUED | OUTPATIENT
Start: 2019-05-11 | End: 2019-05-17 | Stop reason: HOSPADM

## 2019-05-11 RX ORDER — 0.9 % SODIUM CHLORIDE 0.9 %
1000 INTRAVENOUS SOLUTION INTRAVENOUS ONCE
Status: COMPLETED | OUTPATIENT
Start: 2019-05-11 | End: 2019-05-12

## 2019-05-11 RX ORDER — ALBUTEROL SULFATE 90 UG/1
2 AEROSOL, METERED RESPIRATORY (INHALATION) EVERY 6 HOURS PRN
Status: DISCONTINUED | OUTPATIENT
Start: 2019-05-11 | End: 2019-05-17 | Stop reason: HOSPADM

## 2019-05-11 RX ORDER — SODIUM CHLORIDE 0.9 % (FLUSH) 0.9 %
10 SYRINGE (ML) INJECTION EVERY 12 HOURS SCHEDULED
Status: DISCONTINUED | OUTPATIENT
Start: 2019-05-11 | End: 2019-05-17 | Stop reason: HOSPADM

## 2019-05-11 RX ORDER — POLYETHYLENE GLYCOL 3350 17 G/17G
17 POWDER, FOR SOLUTION ORAL DAILY PRN
COMMUNITY

## 2019-05-11 RX ADMIN — SODIUM CHLORIDE 1000 ML: 9 INJECTION, SOLUTION INTRAVENOUS at 16:27

## 2019-05-11 RX ADMIN — DOCUSATE SODIUM 100 MG: 100 CAPSULE, LIQUID FILLED ORAL at 23:20

## 2019-05-11 RX ADMIN — ACETAMINOPHEN 1000 MG: 500 TABLET ORAL at 23:20

## 2019-05-11 RX ADMIN — HEPARIN SODIUM 5000 UNITS: 5000 INJECTION INTRAVENOUS; SUBCUTANEOUS at 23:20

## 2019-05-11 ASSESSMENT — PAIN SCALES - GENERAL
PAINLEVEL_OUTOF10: 0
PAINLEVEL_OUTOF10: 3
PAINLEVEL_OUTOF10: 0

## 2019-05-11 ASSESSMENT — PAIN DESCRIPTION - DESCRIPTORS: DESCRIPTORS: ACHING

## 2019-05-11 ASSESSMENT — ENCOUNTER SYMPTOMS
SHORTNESS OF BREATH: 0
BACK PAIN: 0
VOMITING: 0
ABDOMINAL PAIN: 0
DIARRHEA: 0

## 2019-05-11 ASSESSMENT — PAIN DESCRIPTION - PAIN TYPE: TYPE: CHRONIC PAIN

## 2019-05-11 ASSESSMENT — PAIN DESCRIPTION - LOCATION: LOCATION: BACK

## 2019-05-11 ASSESSMENT — PAIN DESCRIPTION - FREQUENCY: FREQUENCY: CONTINUOUS

## 2019-05-11 NOTE — ED PROVIDER NOTES
[facility]  eMERGENCY dEPARTMENT eNCOUnter      Pt Name: Estefani hTornton  MRN: [id number]  [place] 1935  Date of evaluation: 5/11/2019  Provider: Romel Baumann Dr       Chief Complaint   Patient presents with    Abnormal Lab     elevated creatinine. sent by pcp. HISTORY OF PRESENT ILLNESS  (Location/Symptom, Timing/Onset, Context/Setting, Quality, Duration, Modifying Factors, Severity.)   Estefani Thornton is a 81 y.o. female who presents to the emergency department accompanied by her son. Patient was instructed to come by the primary care physician. Over the past couple of months she's been in and out of the hospital with \"kidney issues. \"  The last time she was in the hospital week ago they felt that her elevated potassium and \"kidney injury\" was related to the fact that she was taking potassium and magnesium supplements and having loose stools. She had a normal bowel movement yesterday and has not had diarrhea. She has been drinking boost.  She denies vomiting. Denies fevers. Denies abdominal pain. She's been having intermittent \"spasms in her chest.\"  She states this has been going on since January and she was diagnosed with esophageal spasms. She is on Cardizem to help with this but this was stopped last week. Nursing Notes were reviewed and I agree. REVIEW OF SYSTEMS    (2-9 systems for level 4, 10 or more for level 5)     Review of Systems   Constitutional: Negative for fatigue and fever. Respiratory: Negative for shortness of breath. Cardiovascular: Negative for chest pain. Gastrointestinal: Negative for abdominal pain, diarrhea and vomiting. Genitourinary: Negative for dysuria. Musculoskeletal: Negative for back pain. Skin: Negative for rash and wound. Neurological: Negative for weakness and numbness. Psychiatric/Behavioral: Negative for agitation, behavioral problems and confusion.        Except as noted above the remainder of the review of systems was reviewed and negative.        PAST MEDICAL HISTORY         Diagnosis Date    Acute [facility] ([facility] [address]     Allergic rhinitis     CHF [facility]congestive heart failure) ([facility])     GERD (gastroesophageal reflux disease)     Hearing loss sensory, bilateral     mod--severe on right, mild to severe on left, due to asymmetry will send to get MRI to r/o acoustic neuroma    Mastoiditis     on MRI oct 2014-->dr ortiz states with no clinical findings this is considered an over-sensitivity of MRI results    Osteoarthritis     L1-L5    Osteoporosis     patient was on fosamax x 10 years and drug holiday was started on 6/21/13. (this was date of last DEXA scan)    Scoliosis        SURGICAL HISTORY           Procedure Laterality Date    CARPAL TUNNEL RELEASE      right, 2003    COLONOSCOPY      2013, dr Preeti Cook N/A 10/19/2018    COLONOSCOPY WITH BIOPSY performed by Dillon Calles MD at [address] [place] [address]      December 27 2017 and feb 6 2018   [address]      2011    NASAL SINUS SURGERY      UPPER GASTROINTESTINAL ENDOSCOPY N/A 4/4/2019    EGD BIOPSY performed by Dillon Calles MD at [facility]       Previous Medications    ALBUTEROL SULFATE  (90 BASE) MCG/ACT INHALER    Inhale 2 puffs into the lungs every 6 hours as needed for Wheezing (Chest tightness)    CALCIUM CITRATE-VITAMIN D (CALCIUM + D PO)    Take 500 mg by mouth daily     DOCUSATE CALCIUM (STOOL SOFTENER PO)    Take 100 mg by mouth daily     DULOXETINE (CYMBALTA) 20 MG EXTENDED RELEASE CAPSULE    Take 1 capsule by mouth daily    OMEPRAZOLE (PRILOSEC) 20 MG DELAYED RELEASE CAPSULE    Take 1 capsule by mouth every morning (before breakfast)    RISEDRONATE (ACTONEL) 30 MG TABLET    Take 30 mg by mouth every 7 days Saturdays    VITAMIN D (CHOLECALCIFEROL) 1000 UNITS TABS TABLET    Take 2,000 Units by mouth daily        ALLERGIES     Patient (*)     Albumin/Globulin Ratio 0.8 (*)     AST 13 (*)     All other components within normal limits    Narrative:     Performed at:  [facility]  [address] [address]   Phone [phone]   URINE RT REFLEX TO CULTURE - Abnormal; Notable for the following components:    Protein, UA TRACE (*)     Leukocyte Esterase, Urine SMALL (*)     All other components within normal limits    Narrative:     Performed at:  [facility]  [address] [address]   Phone [phone]   MICROSCOPIC URINALYSIS - Abnormal; Notable for the following components:    WBC, UA 26 (*)     All other components within normal limits    Narrative:     Performed at:  [address] [address]   Phone [phone]   URINE CULTURE       All other labs were within normal range or not returned as of this dictation. EMERGENCY DEPARTMENT COURSE and DIFFERENTIAL DIAGNOSIS/MDM:   Vitals:    Vitals:    05/11/19 1519   BP: 111/60   Pulse: 79   Resp: 16   Temp: 97.4 °F (36.3 °C)   TempSrc: Oral   SpO2: 97%   Weight: 103 lb 6.3 oz (46.9 kg)   Height: 4' 8\" (1.422 m)   NIKO negron was told to come in by the primary care physician after her creatinine was elevated to 3 outpatient today. She is not hypertensive, hypotensive, tachycardic or febrile. Not hypoxic. She denies vomiting diarrhea or over-the-counter supplements other than calcium and vitamin D. Her creatinine is 2.4 in the emergency department with no severe electrolyte abnormality. Fluids were started in the emergency department and per the attending physician he'll consult the hospitalist to discuss admission for renal insufficiency. She is not having urinary symptoms so Dr. April Pham advised to send the urine for culture at this time.     This patient was seen by the attending physician and they agreed with the assessment and

## 2019-05-11 NOTE — ED NOTES
Report called to [facility] on 4N. Denies questions at this time. Transportation arranged.       Sindy Feliz RN  05/11/19 0059

## 2019-05-11 NOTE — ED PROVIDER NOTES
[facility]  eMERGENCY dEPARTMENT eNCOUnter   Physician Attestation    Pt Name: Rakan Gomes  MRN: [id number]  [place] 1935  Date of evaluation: 5/11/19        Physician Note:    I havepersonally performed and/or participated in the history, exam and medical decision making and agree with all pertinent clinical information. I have also reviewed and agree with the past medical, family and social historyunless otherwise noted. I have personally performed a face to face diagnostic evaluation onthis patient. I have reviewed the mid-levels findings and agree. History: Patient is sent in because of elevated creatinine. This is a patient who was admitted with acute renal insufficiency with a creatinine of 4 at the end of April. Creatinine came down to 1.2 with hydration. However, in follow-up it went back up to 3.0. She had been readmitted and the creatinine came back down to 1.7. She had outpatient labs showed a creatinine had gone back up to 3.0 so she was sent in. She previously been on a combination of various possible nephrotoxic medications which included lisinopril hydrochlorothiazide and Celebrex. All those medications have been stopped. Patient was told to come back to the ER today for probable admission by the primary care provider. Physical Exam   Constitutional: She is oriented to person, place, and time. She appears well-developed and well-nourished. HENT:   Head: Normocephalic and atraumatic. Right Ear: External ear normal.   Left Ear: External ear normal.   Eyes: Conjunctivae are normal. Right eye exhibits no discharge. Left eye exhibits no discharge. No scleral icterus. Neck: Normal range of motion. No tracheal deviation present. Pulmonary/Chest: Effort normal. No stridor. No respiratory distress. Musculoskeletal: Normal range of motion. Neurological: She is alert and oriented to person, place, and time.  Coordination normal.   Skin: Skin is warm and dry. She is not diaphoretic. Psychiatric: She has a normal mood and affect. Her behavior is normal.   Nursing note and vitals reviewed. EKG visualized preliminarily interpreted by myself shows sinus rhythm at a rate of 75 with an axis of 33. Electrical interference does affect the limb lead interpretation. Overall this appears to be a normal EKG. Questionable U wave in lead V2 but I do not see this on any other leads.     1. Renal insufficiency          DISPOSITION/PLAN  PATIENT REFERRED TO:  Theresa Spain MD  [address] [place] [address]  [phone]          DISCHARGE MEDICATIONS:  New Prescriptions    No medications on file         MD Hazel Pineda MD  05/11/19 0325 [facility] Dukes MD  05/11/19 4331

## 2019-05-12 LAB
A/G RATIO: 0.8 (ref 1.1–2.2)
ALBUMIN SERPL-MCNC: 3.2 G/DL (ref 3.4–5)
ALP BLD-CCNC: 97 U/L (ref 40–129)
ALT SERPL-CCNC: 10 U/L (ref 10–40)
ANION GAP SERPL CALCULATED.3IONS-SCNC: 11 MMOL/L (ref 3–16)
AST SERPL-CCNC: 11 U/L (ref 15–37)
BASOPHILS ABSOLUTE: 0.1 K/UL (ref 0–0.2)
BASOPHILS RELATIVE PERCENT: 1.4 %
BILIRUB SERPL-MCNC: <0.2 MG/DL (ref 0–1)
BUN BLDV-MCNC: 54 MG/DL (ref 7–20)
CALCIUM SERPL-MCNC: 9.1 MG/DL (ref 8.3–10.6)
CHLORIDE BLD-SCNC: 112 MMOL/L (ref 99–110)
CO2: 18 MMOL/L (ref 21–32)
CREAT SERPL-MCNC: 2.1 MG/DL (ref 0.6–1.2)
CREATININE URINE: 18.4 MG/DL (ref 28–259)
EOSINOPHIL,URINE: NORMAL
EOSINOPHILS ABSOLUTE: 0.1 K/UL (ref 0–0.6)
EOSINOPHILS RELATIVE PERCENT: 2.1 %
GFR AFRICAN AMERICAN: 27
GFR NON-AFRICAN AMERICAN: 23
GLOBULIN: 4 G/DL
GLUCOSE BLD-MCNC: 103 MG/DL (ref 70–99)
HCT VFR BLD CALC: 33.5 % (ref 36–48)
HEMOGLOBIN: 11.5 G/DL (ref 12–16)
LYMPHOCYTES ABSOLUTE: 1.3 K/UL (ref 1–5.1)
LYMPHOCYTES RELATIVE PERCENT: 21.1 %
MCH RBC QN AUTO: 31.4 PG (ref 26–34)
MCHC RBC AUTO-ENTMCNC: 34.3 G/DL (ref 31–36)
MCV RBC AUTO: 91.4 FL (ref 80–100)
MONOCYTES ABSOLUTE: 0.6 K/UL (ref 0–1.3)
MONOCYTES RELATIVE PERCENT: 9.1 %
NEUTROPHILS ABSOLUTE: 4 K/UL (ref 1.7–7.7)
NEUTROPHILS RELATIVE PERCENT: 66.3 %
PDW BLD-RTO: 13.7 % (ref 12.4–15.4)
PLATELET # BLD: 403 K/UL (ref 135–450)
PMV BLD AUTO: 7.1 FL (ref 5–10.5)
POTASSIUM REFLEX MAGNESIUM: 4.6 MMOL/L (ref 3.5–5.1)
PROTEIN PROTEIN: 0.01 G/DL
PROTEIN PROTEIN: 5 MG/DL
RBC # BLD: 3.67 M/UL (ref 4–5.2)
SODIUM BLD-SCNC: 141 MMOL/L (ref 136–145)
SODIUM URINE: 59 MMOL/L
TOTAL PROTEIN: 7.2 G/DL (ref 6.4–8.2)
WBC # BLD: 6.1 K/UL (ref 4–11)

## 2019-05-12 PROCEDURE — 1200000000 HC SEMI PRIVATE

## 2019-05-12 PROCEDURE — 84300 ASSAY OF URINE SODIUM: CPT

## 2019-05-12 PROCEDURE — 6370000000 HC RX 637 (ALT 250 FOR IP): Performed by: NURSE PRACTITIONER

## 2019-05-12 PROCEDURE — G0378 HOSPITAL OBSERVATION PER HR: HCPCS

## 2019-05-12 PROCEDURE — 93010 ELECTROCARDIOGRAM REPORT: CPT | Performed by: INTERNAL MEDICINE

## 2019-05-12 PROCEDURE — 2580000003 HC RX 258: Performed by: INTERNAL MEDICINE

## 2019-05-12 PROCEDURE — 6370000000 HC RX 637 (ALT 250 FOR IP): Performed by: INTERNAL MEDICINE

## 2019-05-12 PROCEDURE — 94760 N-INVAS EAR/PLS OXIMETRY 1: CPT

## 2019-05-12 PROCEDURE — 82570 ASSAY OF URINE CREATININE: CPT

## 2019-05-12 PROCEDURE — 6370000000 HC RX 637 (ALT 250 FOR IP): Performed by: PHYSICIAN ASSISTANT

## 2019-05-12 PROCEDURE — 87205 SMEAR GRAM STAIN: CPT

## 2019-05-12 PROCEDURE — 84156 ASSAY OF PROTEIN URINE: CPT

## 2019-05-12 PROCEDURE — 36415 COLL VENOUS BLD VENIPUNCTURE: CPT

## 2019-05-12 PROCEDURE — 80053 COMPREHEN METABOLIC PANEL: CPT

## 2019-05-12 PROCEDURE — 84166 PROTEIN E-PHORESIS/URINE/CSF: CPT

## 2019-05-12 PROCEDURE — 85025 COMPLETE CBC W/AUTO DIFF WBC: CPT

## 2019-05-12 PROCEDURE — 6360000002 HC RX W HCPCS: Performed by: INTERNAL MEDICINE

## 2019-05-12 RX ORDER — CLONIDINE HYDROCHLORIDE 0.1 MG/1
0.1 TABLET ORAL EVERY 4 HOURS PRN
Status: DISCONTINUED | OUTPATIENT
Start: 2019-05-12 | End: 2019-05-17 | Stop reason: HOSPADM

## 2019-05-12 RX ORDER — DULOXETIN HYDROCHLORIDE 20 MG/1
20 CAPSULE, DELAYED RELEASE ORAL DAILY
Status: DISCONTINUED | OUTPATIENT
Start: 2019-05-12 | End: 2019-05-17 | Stop reason: HOSPADM

## 2019-05-12 RX ADMIN — SODIUM CHLORIDE: 9 INJECTION, SOLUTION INTRAVENOUS at 19:53

## 2019-05-12 RX ADMIN — HEPARIN SODIUM 5000 UNITS: 5000 INJECTION INTRAVENOUS; SUBCUTANEOUS at 06:53

## 2019-05-12 RX ADMIN — SODIUM CHLORIDE: 9 INJECTION, SOLUTION INTRAVENOUS at 00:27

## 2019-05-12 RX ADMIN — PANTOPRAZOLE SODIUM 40 MG: 40 TABLET, DELAYED RELEASE ORAL at 06:53

## 2019-05-12 RX ADMIN — VITAMIN D, TAB 1000IU (100/BT) 2000 UNITS: 25 TAB at 09:22

## 2019-05-12 RX ADMIN — DOCUSATE SODIUM 100 MG: 100 CAPSULE, LIQUID FILLED ORAL at 09:22

## 2019-05-12 RX ADMIN — SODIUM CHLORIDE: 9 INJECTION, SOLUTION INTRAVENOUS at 09:37

## 2019-05-12 RX ADMIN — OYSTER SHELL CALCIUM WITH VITAMIN D 1 TABLET: 500; 200 TABLET, FILM COATED ORAL at 09:22

## 2019-05-12 RX ADMIN — ACETAMINOPHEN 1000 MG: 500 TABLET ORAL at 06:57

## 2019-05-12 RX ADMIN — DULOXETINE HYDROCHLORIDE 20 MG: 20 CAPSULE, DELAYED RELEASE ORAL at 10:14

## 2019-05-12 RX ADMIN — HEPARIN SODIUM 5000 UNITS: 5000 INJECTION INTRAVENOUS; SUBCUTANEOUS at 14:34

## 2019-05-12 RX ADMIN — CLONIDINE HYDROCHLORIDE 0.1 MG: 0.1 TABLET ORAL at 14:46

## 2019-05-12 RX ADMIN — ACETAMINOPHEN 1000 MG: 500 TABLET ORAL at 21:23

## 2019-05-12 RX ADMIN — HEPARIN SODIUM 5000 UNITS: 5000 INJECTION INTRAVENOUS; SUBCUTANEOUS at 21:23

## 2019-05-12 ASSESSMENT — PAIN SCALES - GENERAL
PAINLEVEL_OUTOF10: 0
PAINLEVEL_OUTOF10: 0
PAINLEVEL_OUTOF10: 3
PAINLEVEL_OUTOF10: 0

## 2019-05-12 ASSESSMENT — PAIN DESCRIPTION - PAIN TYPE: TYPE: CHRONIC PAIN

## 2019-05-12 ASSESSMENT — PAIN DESCRIPTION - LOCATION: LOCATION: BACK

## 2019-05-12 ASSESSMENT — PAIN SCALES - WONG BAKER: WONGBAKER_NUMERICALRESPONSE: 0

## 2019-05-12 NOTE — PLAN OF CARE
Problem: Safety:  Goal: Free from accidental physical injury  Description  Free from accidental physical injury  Note:   D: Pt at low risk for falls, BLE weakness, up with SBA. A: Instructed pt to use call button to request assistance with ambulation. Fall sign posted. Non-skid socks on. Bed alarm on.   R: Pt verbalized understanding to call for assistance with ambulation. Problem: Pain:  Goal: Patient's pain/discomfort is manageable  Description  Patient's pain/discomfort is manageable  Note:   Pt denies any pain upon admission, but states she has been taking ES Tylenol BID to help with ongoing pain. Secure message sent to SHEILA Ledesma requesting home dose ES Tylenol BID, new order placed, administered with evening medications.

## 2019-05-12 NOTE — PLAN OF CARE
Problem: Safety:  Goal: Free from accidental physical injury  Description  Free from accidental physical injury  5/12/2019 1458 by Kamila Dubois RN  Outcome: Ongoing   Pt free of accidental physical injury. Bed in lowest position, wheels locked, ID band correct and in place, call light within reach. Problem: Daily Care:  Goal: Daily care needs are met  Description  Daily care needs are met  Outcome: Ongoing   Patient able to meet own daily needs at this time with minimal assist.     Problem: Pain:  Goal: Patient's pain/discomfort is manageable  Description  Patient's pain/discomfort is manageable  5/12/2019 1458 by Kamila Dubois RN  Outcome: Ongoing   Pain/Discomfort is being managed with PRN analgesics per MD orders (See MAR). Patient is able to express and rate pain using numerical scale.

## 2019-05-12 NOTE — CONSULTS
prior to encounter. Current Outpatient Medications on File Prior to Encounter   Medication Sig Dispense Refill    acetaminophen (TYLENOL) 500 MG tablet Take 1,000 mg by mouth 2 times daily       polyethylene glycol (MIRALAX) packet Take 17 g by mouth daily as needed for Constipation      DULoxetine (CYMBALTA) 20 MG extended release capsule Take 1 capsule by mouth daily 30 capsule 0    omeprazole (PRILOSEC) 20 MG delayed release capsule Take 1 capsule by mouth every morning (before breakfast) 30 capsule 0    risedronate (ACTONEL) 30 MG tablet Take 30 mg by mouth every 7 days Saturdays      vitamin D (CHOLECALCIFEROL) 1000 units TABS tablet Take 2,000 Units by mouth daily       Docusate Calcium (STOOL SOFTENER PO) Take 100 mg by mouth 2 times daily       Calcium Citrate-Vitamin D (CALCIUM + D PO) Take 500 mg by mouth daily       albuterol sulfate  (90 Base) MCG/ACT inhaler Inhale 2 puffs into the lungs every 6 hours as needed for Wheezing (Chest tightness) 1 Inhaler 1       Allergies:  Patient has no known allergies. Social History:    Social History     Socioeconomic History    Marital status:       Spouse name: Not on file    Number of children: Not on file    Years of education: Not on file    Highest education level: Not on file   Occupational History    Not on file   Social Needs    Financial resource strain: Not on file    Food insecurity:     Worry: Not on file     Inability: Not on file    Transportation needs:     Medical: Not on file     Non-medical: Not on file   Tobacco Use    Smoking status: Never Smoker    Smokeless tobacco: Never Used   Substance and Sexual Activity    Alcohol use: No    Drug use: No    Sexual activity: Yes     Partners: Male     Comment: , 5 children, 15 grandchildren   Lifestyle    Physical activity:     Days per week: Not on file     Minutes per session: Not on file    Stress: Not on file   Relationships    Social connections: Talks on phone: Not on file     Gets together: Not on file     Attends Congregational service: Not on file     Active member of club or organization: Not on file     Attends meetings of clubs or organizations: Not on file     Relationship status: Not on file    Intimate partner violence:     Fear of current or ex partner: Not on file     Emotionally abused: Not on file     Physically abused: Not on file     Forced sexual activity: Not on file   Other Topics Concern    Not on file   Social History Narrative    ** Merged History Encounter **            Family History:   Family History   Problem Relation Age of Onset    Diabetes Mother     Heart Disease Mother        Review of Systems:   Pertinent positives stated above in HPI. All other systems were reviewed and were negative.     Physical exam:   Constitutional:  VITALS:  BP (!) 164/74   Pulse 63   Temp 97.7 °F (36.5 °C) (Oral)   Resp 16   Ht 4' 8\" (1.422 m)   Wt 107 lb 9.4 oz (48.8 kg)   SpO2 98%   BMI 24.12 kg/m²   Gen: alert, awake, nad  Skin: no rash, turgor wnl  Heent:  eomi, mmm  Neck: no bruits or jvd noted, thyroid normal  Cardiovascular:  S1, S2 without m/r/g  Respiratory: CTA B without w/r/r; respiratory effort normal  Abdomen:  +bs, soft, nt, nd, no hepatosplenomegaly  Ext: no lower extremity edema  Psychiatric: mood and affect appropriate; judgement and insight intact  Musculoskeletal:  Rom, muscular strength intact; digits, nails normal    Data/  CBC:   Lab Results   Component Value Date    WBC 6.1 05/12/2019    RBC 3.67 05/12/2019    HGB 11.5 05/12/2019    HCT 33.5 05/12/2019    MCV 91.4 05/12/2019    MCH 31.4 05/12/2019    MCHC 34.3 05/12/2019    RDW 13.7 05/12/2019     05/12/2019    MPV 7.1 05/12/2019     BMP:    Lab Results   Component Value Date     05/12/2019    K 4.6 05/12/2019     05/12/2019    CO2 18 05/12/2019    BUN 54 05/12/2019    LABALBU 3.2 05/12/2019    CREATININE 2.1 05/12/2019    CALCIUM 9.1 05/12/2019    GFRAA 27 05/12/2019    GFRAA >60 05/10/2012    LABGLOM 23 05/12/2019    GLUCOSE 103 05/12/2019         Assessment/  1-JERRELL with recent episodes of JERRELL suspected to be ATN Cr was 1.7 mg on discharge Etiology of JERRELL unclear Does not appear to be volume depletion U/A benign with small LE and pyuria No blood US in 4/19 was unremarkable R/O Interstitial nephritis as on PPI  2-HTN off of Cardizem   3-Esophageal spasms  4 Ch diastolic CHF compensated    Plan/  1-Continue IVF's  2-Check urine eosinophils  3-Stop PPI  4 Check SPEP, UPEP  5 Await urine CX    Thank you for the consultation. Please do not hesitate to call with questions.     David Youssef MD, [address]

## 2019-05-12 NOTE — PROGRESS NOTES
Dr. Maurisio Kwok notified of elevated BP after PO clonidine. Will recheck BP again at 1845 when Clonidine is available again.

## 2019-05-12 NOTE — PROGRESS NOTES
Pt resting in bed with respirations even and unlabored. Denies pain at this time. Reports pain comes and goes and only lasts about 10 minutes. IV fluids infusing as ordered. Daughter at bedside and helping with transfers. Pt ambulating with walker and steady gait. No needs at this time. Will continue to monitor.

## 2019-05-13 LAB
A/G RATIO: 0.8 (ref 1.1–2.2)
ALBUMIN SERPL-MCNC: 2.8 G/DL (ref 3.4–5)
ALBUMIN SERPL-MCNC: 3.8 G/DL (ref 3.1–4.9)
ALP BLD-CCNC: 86 U/L (ref 40–129)
ALPHA-1-GLOBULIN: 0.4 G/DL (ref 0.2–0.4)
ALPHA-2-GLOBULIN: 1.2 G/DL (ref 0.4–1.1)
ALT SERPL-CCNC: 12 U/L (ref 10–40)
ANION GAP SERPL CALCULATED.3IONS-SCNC: 7 MMOL/L (ref 3–16)
AST SERPL-CCNC: 15 U/L (ref 15–37)
BASOPHILS ABSOLUTE: 0.1 K/UL (ref 0–0.2)
BASOPHILS RELATIVE PERCENT: 1.2 %
BETA GLOBULIN: 1.2 G/DL (ref 0.9–1.6)
BILIRUB SERPL-MCNC: <0.2 MG/DL (ref 0–1)
BUN BLDV-MCNC: 43 MG/DL (ref 7–20)
CALCIUM SERPL-MCNC: 8.8 MG/DL (ref 8.3–10.6)
CHLORIDE BLD-SCNC: 118 MMOL/L (ref 99–110)
CO2: 17 MMOL/L (ref 21–32)
CREAT SERPL-MCNC: 1.9 MG/DL (ref 0.6–1.2)
EOSINOPHILS ABSOLUTE: 0.2 K/UL (ref 0–0.6)
EOSINOPHILS RELATIVE PERCENT: 2.9 %
GAMMA GLOBULIN: 1.7 G/DL (ref 0.6–1.8)
GFR AFRICAN AMERICAN: 31
GFR NON-AFRICAN AMERICAN: 25
GLOBULIN: 3.5 G/DL
GLUCOSE BLD-MCNC: 107 MG/DL (ref 70–99)
HCT VFR BLD CALC: 29 % (ref 36–48)
HEMOGLOBIN: 9.8 G/DL (ref 12–16)
LYMPHOCYTES ABSOLUTE: 1.4 K/UL (ref 1–5.1)
LYMPHOCYTES RELATIVE PERCENT: 23.7 %
MCH RBC QN AUTO: 30.7 PG (ref 26–34)
MCHC RBC AUTO-ENTMCNC: 33.7 G/DL (ref 31–36)
MCV RBC AUTO: 91.2 FL (ref 80–100)
MONOCYTES ABSOLUTE: 0.5 K/UL (ref 0–1.3)
MONOCYTES RELATIVE PERCENT: 8.4 %
NEUTROPHILS ABSOLUTE: 3.7 K/UL (ref 1.7–7.7)
NEUTROPHILS RELATIVE PERCENT: 63.8 %
PDW BLD-RTO: 13.8 % (ref 12.4–15.4)
PLATELET # BLD: 341 K/UL (ref 135–450)
PMV BLD AUTO: 6.8 FL (ref 5–10.5)
POTASSIUM REFLEX MAGNESIUM: 4.5 MMOL/L (ref 3.5–5.1)
RBC # BLD: 3.17 M/UL (ref 4–5.2)
SODIUM BLD-SCNC: 142 MMOL/L (ref 136–145)
SPE/IFE INTERPRETATION: NORMAL
TOTAL PROTEIN: 6.3 G/DL (ref 6.4–8.2)
TOTAL PROTEIN: 8.2 G/DL (ref 6.4–8.2)
URINE CULTURE, ROUTINE: NORMAL
WBC # BLD: 5.7 K/UL (ref 4–11)

## 2019-05-13 PROCEDURE — 80053 COMPREHEN METABOLIC PANEL: CPT

## 2019-05-13 PROCEDURE — 94760 N-INVAS EAR/PLS OXIMETRY 1: CPT

## 2019-05-13 PROCEDURE — 6360000002 HC RX W HCPCS: Performed by: INTERNAL MEDICINE

## 2019-05-13 PROCEDURE — 36415 COLL VENOUS BLD VENIPUNCTURE: CPT

## 2019-05-13 PROCEDURE — 85025 COMPLETE CBC W/AUTO DIFF WBC: CPT

## 2019-05-13 PROCEDURE — 2580000003 HC RX 258: Performed by: INTERNAL MEDICINE

## 2019-05-13 PROCEDURE — 6370000000 HC RX 637 (ALT 250 FOR IP): Performed by: NURSE PRACTITIONER

## 2019-05-13 PROCEDURE — 6370000000 HC RX 637 (ALT 250 FOR IP): Performed by: PHYSICIAN ASSISTANT

## 2019-05-13 PROCEDURE — 6370000000 HC RX 637 (ALT 250 FOR IP): Performed by: INTERNAL MEDICINE

## 2019-05-13 PROCEDURE — G0378 HOSPITAL OBSERVATION PER HR: HCPCS

## 2019-05-13 PROCEDURE — 1200000000 HC SEMI PRIVATE

## 2019-05-13 RX ORDER — DILTIAZEM HYDROCHLORIDE 180 MG/1
180 CAPSULE, COATED, EXTENDED RELEASE ORAL DAILY
Status: DISCONTINUED | OUTPATIENT
Start: 2019-05-13 | End: 2019-05-13

## 2019-05-13 RX ORDER — CALCIUM CARBONATE 200(500)MG
500 TABLET,CHEWABLE ORAL 3 TIMES DAILY PRN
Status: DISCONTINUED | OUTPATIENT
Start: 2019-05-13 | End: 2019-05-17 | Stop reason: HOSPADM

## 2019-05-13 RX ORDER — DILTIAZEM HYDROCHLORIDE 120 MG/1
120 CAPSULE, COATED, EXTENDED RELEASE ORAL DAILY
Status: DISCONTINUED | OUTPATIENT
Start: 2019-05-13 | End: 2019-05-15

## 2019-05-13 RX ADMIN — VITAMIN D, TAB 1000IU (100/BT) 2000 UNITS: 25 TAB at 08:47

## 2019-05-13 RX ADMIN — SODIUM CHLORIDE: 9 INJECTION, SOLUTION INTRAVENOUS at 06:17

## 2019-05-13 RX ADMIN — ACETAMINOPHEN 1000 MG: 500 TABLET ORAL at 22:47

## 2019-05-13 RX ADMIN — HEPARIN SODIUM 5000 UNITS: 5000 INJECTION INTRAVENOUS; SUBCUTANEOUS at 06:16

## 2019-05-13 RX ADMIN — Medication 10 ML: at 22:56

## 2019-05-13 RX ADMIN — ACETAMINOPHEN 1000 MG: 500 TABLET ORAL at 08:47

## 2019-05-13 RX ADMIN — HEPARIN SODIUM 5000 UNITS: 5000 INJECTION INTRAVENOUS; SUBCUTANEOUS at 14:55

## 2019-05-13 RX ADMIN — CLONIDINE HYDROCHLORIDE 0.1 MG: 0.1 TABLET ORAL at 04:25

## 2019-05-13 RX ADMIN — OYSTER SHELL CALCIUM WITH VITAMIN D 1 TABLET: 500; 200 TABLET, FILM COATED ORAL at 08:48

## 2019-05-13 RX ADMIN — ANTACID TABLETS 500 MG: 500 TABLET, CHEWABLE ORAL at 06:17

## 2019-05-13 RX ADMIN — DULOXETINE HYDROCHLORIDE 20 MG: 20 CAPSULE, DELAYED RELEASE ORAL at 08:48

## 2019-05-13 RX ADMIN — HEPARIN SODIUM 5000 UNITS: 5000 INJECTION INTRAVENOUS; SUBCUTANEOUS at 22:47

## 2019-05-13 ASSESSMENT — PAIN SCALES - WONG BAKER

## 2019-05-13 ASSESSMENT — PAIN SCALES - GENERAL
PAINLEVEL_OUTOF10: 0

## 2019-05-13 NOTE — PROGRESS NOTES
[facility]  Nephrology   Progress Note        We are following the patient for JERRELL  82 y/o WF with h/o recent episodes of JERRELL in  and again last week when she was suspected to have ATN Cr had improved to 1.7 mg at the time of discharge on 19  She saw her PCP and labs done showed Cr had increased to 3 mg She was asked to come to the ER where Cr was 2.4 mg      SUBJECTIVE:  Had esophageal \"spasm\" last night Improved with TUMS  Voiding OK  Family at bedside    OBJECTIVE:     PHYSICAL:    TEMPERATURE:  Current - Temp: 97.4 °F (36.3 °C);  Max - Temp  Av.5 °F (36.4 °C)  Min: 97.4 °F (36.3 °C)  Max: 97.6 °F (36.4 °C)  RESPIRATIONS RANGE: Resp  Av.5  Min: 16  Max: 18  PULSE RANGE: Pulse  Av.3  Min: 67  Max: 74  BLOOD PRESSURE RANGE:  Systolic (00YRF), YHB:657 , Min:119 , TEU:894   ; Diastolic (53EFY), HVD:21, Min:63, Max:72    PULSE OXIMETRY RANGE: SpO2  Av %  Min: 96 %  Max: 100 %  24HR INTAKE/OUTPUT:      Intake/Output Summary (Last 24 hours) at 2019 1223  Last data filed at 2019 1136  Gross per 24 hour   Intake 4620.16 ml   Output 3250 ml   Net 1370.16 ml       CONSTITUTIONAL:  awake, alert, cooperative, no apparent distress, and appears stated age  HEENT:  Lids and lashes normal, pupils equal, round and reactive to light, extra ocular muscles intact, sclera clear, conjunctiva normal  NECK:  supple, symmetrical, trachea midline  LUNGS:  No increased work of breathing, good air exchange, clear to auscultation bilaterally, no crackles or wheezing  CARDIOVASCULAR:  Normal apical impulse, regular rate and rhythm, normal S1 and S2  ABDOMEN:  No scars, normal bowel sounds, soft, non-distended  NEUROLOGIC:  alert, oriented, normal speech, no focal findings or movement disorder noted  SKIN: no bruising or bleeding  EXTREMITIES: no edema    Medications         Data      CBC:   Recent Labs     19  1550 19  0927 19  0742   WBC 7.2 6.1 5.7   RBC 3.74* 3.67* 3.17*   HGB 11.5* 11.5* 9.8*   HCT 34.0* 33.5* 29.0*    403 341     BMP:    Recent Labs     05/11/19  1550 05/12/19  0927 05/13/19  0742   * 141 142   K 4.9 4.6 4.5    112* 118*   CO2 19* 18* 17*   BUN 65* 54* 43*   CREATININE 2.4* 2.1* 1.9*   CALCIUM 10.0 9.1 8.8   GLUCOSE 110* 103* 107*     Phosphorus:  No results for input(s): PHOS in the last 72 hours. Magnesium:  No results for input(s): MG in the last 72 hours.       ASSESSMENT     Patient Active Problem List   Diagnosis    Osteoarthritis    Osteoporosis    Allergic rhinitis    Scoliosis    Hearing loss sensory, bilateral    Asymmetrical sensorineural hearing loss    Cerumen impaction    Chronic mastoiditis    Thyroid nodule    Gastritis    [facility]RELL [facility]) [facility] [address]    Acute renal failure superimposed on stage 3 chronic kidney disease (HCC)    Acute prerenal azotemia    Generalized weakness    CKD (chronic kidney disease)       PLAN    1-JERRELL with recent episodes of JERRELL suspected to be ATN on last admission  Cr was 1.7 mg on discharge on 5/6/19  Etiology of JERRELL unclear Does not appear to be volume depletion U/A benign with small LE and pyuria  CX skin shelby only No blood, US in 4/19 was unremarkable Urine Na+ 59 Urine eosinophils negative SPEP pending Cr improving, 1.9 mg today Good UOP D/W pt Continue to monitor Encouraged PO Fluids May need more time for recovery from ATN   2-HTN off of Cardizem BP is elevated Will restart Ca channel blocker   3-Esophageal spasms improved with TUMS restart ca++ channel blocker  4 Ch diastolic CHF compensated      Dinora Arriaza MD, Bess Mccullough

## 2019-05-13 NOTE — PLAN OF CARE
Problem: Safety:  Goal: Free from accidental physical injury  Description  Free from accidental physical injury  5/13/2019 1705 by Crispin Snow RN  Outcome: Ongoing   Pt free of accidental physical injury. Bed in lowest position, wheels locked, ID band correct and in place, call light within reach. Problem: Daily Care:  Goal: Daily care needs are met  Description  Daily care needs are met  Outcome: Ongoing   Patient able to meet own daily needs at this time with minimal assist.     Problem: Pain:  Goal: Patient's pain/discomfort is manageable  Description  Patient's pain/discomfort is manageable  5/13/2019 1705 by Crispin Snow RN  Outcome: Ongoing   Pain/Discomfort is being managed with PRN analgesics per MD orders (See MAR). Patient is able to express and rate pain using numerical scale.

## 2019-05-13 NOTE — PLAN OF CARE
Pt free from falls this shift. Fall precautions in place at all times. Call light always within reach. Pt able and agreeable to contact for safety appropriately. Skin assessment performed each shift per protocol. Skin care protocol in place. Pt turned and repositioned every two hours and prn with pillow support. Specialty mattress in place. Pain/discomfort being managed with PRN analgesics per MD orders. Pt able to express presence and absence of pain and rate pain appropriately using numerical scale.

## 2019-05-13 NOTE — PROGRESS NOTES
Hospital Medicine Progress Note      Admit Date: 5/11/2019         Overnight Events: none    CC: F/U for JERRELL    HPI:   This is an 60-year-old female with a history of CK 80, GERD, diastolic heart failure who presented to the ED with request from her PCP for abnormal blood tests. Patient recently admitted and discharged by myself secondary to AK I thought to be related to hypotension and ATN. Numbers improved after IV hydration and she followed up with her primary care physician. Her creatinine worsened. Nephrology is following closely. She continues to complain of some chest pains today but describes them as a burning sensation and gets relief with Tums. At her last hospitalization she was discharged on Cardizem however this had been stopped due to mild hypotension. Interval History/Subjective:   Denies fevers, chills, chest pain, shortness breath, nausea or vomiting. Does complain of reflux intermittently    Review of Systems:     Comprehensive ROS negative except as mentioned above.         Past Medical History:        Diagnosis Date    Acute [facility] ([facility] [address]     Allergic rhinitis     [facility]     GERD (gastroesophageal reflux disease)     Hearing loss sensory, bilateral     mod--severe on right, mild to severe on left, due to asymmetry will send to get MRI to r/o acoustic neuroma    Mastoiditis     on MRI oct 2014-->dr ortiz states with no clinical findings this is considered an over-sensitivity of MRI results    Osteoarthritis     L1-L5    Osteoporosis     patient was on fosamax x 10 years and drug holiday was started on 6/21/13. (this was date of last DEXA scan)    Scoliosis        Past Surgical History:        Procedure Laterality Date    CARPAL TUNNEL RELEASE      right, 2003    COLONOSCOPY      2013, dr Itzel Bose    COLONOSCOPY N/A 10/19/2018    COLONOSCOPY WITH BIOPSY performed by Evelio Byrd MD at [address] [place] [address]      December 27 2017 and feb 6 2018   [address]      2011    NASAL SINUS SURGERY      UPPER GASTROINTESTINAL ENDOSCOPY N/A 4/4/2019    EGD BIOPSY performed by Annamarie Mcnamara MD at [facility]:  Patient has no known allergies. Past medical and surgical history reviewed. Any changes have been noted. Scheduled and prn Medications:    Scheduled Meds:   diltiazem  120 mg Oral Daily    DULoxetine  20 mg Oral Daily    calcium-vitamin D  1 tablet Oral Daily    vitamin D  2,000 Units Oral Daily    sodium chloride flush  10 mL Intravenous 2 times per day    heparin (porcine)  5,000 Units Subcutaneous 3 times per day    docusate sodium  100 mg Oral BID    acetaminophen  1,000 mg Oral BID     Continuous Infusions:  PRN Meds:.calcium carbonate, GI cocktail, cloNIDine, albuterol sulfate HFA, sodium chloride flush, ondansetron, polyethylene glycol    PHYSICAL EXAM:  BP (!) 149/61   Pulse 54   Temp 97.6 °F (36.4 °C) (Oral)   Resp 16   Ht 4' 8\" (1.422 m)   Wt 104 lb 4.4 oz (47.3 kg)   SpO2 98%   BMI 23.38 kg/m²       Intake/Output Summary (Last 24 hours) at 5/13/2019 1614  Last data filed at 5/13/2019 1454  Gross per 24 hour   Intake 4740.16 ml   Output 2950 ml   Net 1790.16 ml       General: Alert and oriented. Sitting in no apparent distress. HEENT: Normocephalic. Atraumatic. Pupils equal and reactive. EOM intact. Oral mucosa pink/moist/intact. Neck: Supple. Symmetrical. Trachea midline. Lungs: Clear to auscultation bilaterally. Respirations even and unlabored. Chest: Exam unremarkable. Cardiac: S1/S2 noted. Regular Rhythm and rate. Abdomen/GI: Soft. Non-tender. Non-distended. BS+. Extremities: PP+. Atraumatic. No redness/cyanosis/edema noted. Brisk cap refill. Skin: Dry and intact. No lesions noted. Neuro: Grossly intact. No focal deficits noted.      LABS:    Lab Results   Component Value Date    WBC 5.7 05/13/2019    HGB 9.8 (L) 05/13/2019    HCT 29.0 (L) 05/13/2019    MCV 91.2 05/13/2019 Not Indicated    Diet: DIET RENAL;    Consults:  IP CONSULT TO NEPHROLOGY    Disposition:  [] Home  [] Home with home health [] Rehab [] Psych [] SNF  [] LTAC  [] Long term nursing home or group home [] Transfer to ICU  [] Transfer to PCU [] Other:    Code Status: DNR-CCA    ELOS: RIAZ Last CNP  05/13/19

## 2019-05-13 NOTE — CARE COORDINATION
Patient known to SW from previous admission in April. Confirms she lives alone in a condo with family assist if needed  Active with [facility] for RN, PT. Confirmed with [facility] and informed of OBS status. If remains OBS, does not need new home care orders. Family to transport.      Electronically signed by GIA Anderson on 5/13/2019 at 4:31 PM

## 2019-05-13 NOTE — PROGRESS NOTES
Pt up to chair with respirations even and unlabored. Denies pain at this time. Reports two episodes of pain overnight. Sister at bedside asking many questions. Pt reports she normally doesn't have chest pain twice overnight. Reviewed labs, vitals, and medications. No needs at this time. Will continue to monitor.

## 2019-05-14 LAB
ALBUMIN SERPL-MCNC: 3.4 G/DL (ref 3.4–5)
ANION GAP SERPL CALCULATED.3IONS-SCNC: 11 MMOL/L (ref 3–16)
BUN BLDV-MCNC: 45 MG/DL (ref 7–20)
CALCIUM SERPL-MCNC: 9.5 MG/DL (ref 8.3–10.6)
CHLORIDE BLD-SCNC: 114 MMOL/L (ref 99–110)
CO2: 16 MMOL/L (ref 21–32)
CREAT SERPL-MCNC: 1.7 MG/DL (ref 0.6–1.2)
GFR AFRICAN AMERICAN: 35
GFR NON-AFRICAN AMERICAN: 29
GLUCOSE BLD-MCNC: 103 MG/DL (ref 70–99)
HCT VFR BLD CALC: 33.3 % (ref 36–48)
HEMOGLOBIN: 11.1 G/DL (ref 12–16)
MAGNESIUM: 2 MG/DL (ref 1.8–2.4)
PHOSPHORUS: 3.1 MG/DL (ref 2.5–4.9)
POTASSIUM SERPL-SCNC: 4 MMOL/L (ref 3.5–5.1)
SODIUM BLD-SCNC: 141 MMOL/L (ref 136–145)

## 2019-05-14 PROCEDURE — 6370000000 HC RX 637 (ALT 250 FOR IP): Performed by: INTERNAL MEDICINE

## 2019-05-14 PROCEDURE — 85014 HEMATOCRIT: CPT

## 2019-05-14 PROCEDURE — 2580000003 HC RX 258: Performed by: INTERNAL MEDICINE

## 2019-05-14 PROCEDURE — 94760 N-INVAS EAR/PLS OXIMETRY 1: CPT

## 2019-05-14 PROCEDURE — 85018 HEMOGLOBIN: CPT

## 2019-05-14 PROCEDURE — 80069 RENAL FUNCTION PANEL: CPT

## 2019-05-14 PROCEDURE — 6360000002 HC RX W HCPCS: Performed by: INTERNAL MEDICINE

## 2019-05-14 PROCEDURE — 6370000000 HC RX 637 (ALT 250 FOR IP): Performed by: NURSE PRACTITIONER

## 2019-05-14 PROCEDURE — 1200000000 HC SEMI PRIVATE

## 2019-05-14 PROCEDURE — 83735 ASSAY OF MAGNESIUM: CPT

## 2019-05-14 PROCEDURE — G0378 HOSPITAL OBSERVATION PER HR: HCPCS

## 2019-05-14 PROCEDURE — 6370000000 HC RX 637 (ALT 250 FOR IP): Performed by: PHYSICIAN ASSISTANT

## 2019-05-14 PROCEDURE — 36415 COLL VENOUS BLD VENIPUNCTURE: CPT

## 2019-05-14 RX ORDER — PANTOPRAZOLE SODIUM 40 MG/1
40 TABLET, DELAYED RELEASE ORAL EVERY EVENING
Status: DISCONTINUED | OUTPATIENT
Start: 2019-05-14 | End: 2019-05-17 | Stop reason: HOSPADM

## 2019-05-14 RX ADMIN — LIDOCAINE HYDROCHLORIDE: 20 SOLUTION ORAL; TOPICAL at 01:56

## 2019-05-14 RX ADMIN — PANTOPRAZOLE SODIUM 40 MG: 40 TABLET, DELAYED RELEASE ORAL at 20:18

## 2019-05-14 RX ADMIN — ANTACID TABLETS 500 MG: 500 TABLET, CHEWABLE ORAL at 04:56

## 2019-05-14 RX ADMIN — ACETAMINOPHEN 1000 MG: 500 TABLET ORAL at 20:18

## 2019-05-14 RX ADMIN — VITAMIN D, TAB 1000IU (100/BT) 2000 UNITS: 25 TAB at 09:02

## 2019-05-14 RX ADMIN — DILTIAZEM HYDROCHLORIDE 120 MG: 120 CAPSULE, COATED, EXTENDED RELEASE ORAL at 09:01

## 2019-05-14 RX ADMIN — DOCUSATE SODIUM 100 MG: 100 CAPSULE, LIQUID FILLED ORAL at 09:01

## 2019-05-14 RX ADMIN — OYSTER SHELL CALCIUM WITH VITAMIN D 1 TABLET: 500; 200 TABLET, FILM COATED ORAL at 09:01

## 2019-05-14 RX ADMIN — HEPARIN SODIUM 5000 UNITS: 5000 INJECTION INTRAVENOUS; SUBCUTANEOUS at 14:28

## 2019-05-14 RX ADMIN — Medication 10 ML: at 20:27

## 2019-05-14 RX ADMIN — DOCUSATE SODIUM 100 MG: 100 CAPSULE, LIQUID FILLED ORAL at 20:19

## 2019-05-14 RX ADMIN — DULOXETINE HYDROCHLORIDE 20 MG: 20 CAPSULE, DELAYED RELEASE ORAL at 09:01

## 2019-05-14 RX ADMIN — HEPARIN SODIUM 5000 UNITS: 5000 INJECTION INTRAVENOUS; SUBCUTANEOUS at 20:19

## 2019-05-14 RX ADMIN — Medication 10 ML: at 14:24

## 2019-05-14 RX ADMIN — ACETAMINOPHEN 1000 MG: 500 TABLET ORAL at 09:02

## 2019-05-14 RX ADMIN — HEPARIN SODIUM 5000 UNITS: 5000 INJECTION INTRAVENOUS; SUBCUTANEOUS at 05:02

## 2019-05-14 ASSESSMENT — PAIN SCALES - WONG BAKER
WONGBAKER_NUMERICALRESPONSE: 0

## 2019-05-14 ASSESSMENT — PAIN SCALES - GENERAL
PAINLEVEL_OUTOF10: 0

## 2019-05-14 NOTE — PLAN OF CARE
Problem: Safety:  Goal: Free from intentional harm  Description  Free from intentional harm  Outcome: Ongoing  Note:   Pt free from falls this shift. Fall precautions in place at all times. Call light always withinreach. Pt able and agreeable to contact for safety appropriately. Bed alarm on. Problem: Pain:  Goal: Control of acute pain  Description  Control of acute pain  5/14/2019 1047 by Steve Shultz RN  Outcome: Ongoing  Note:   Pain/discomfort being managed with PRN analgesics per MD orders. Pt able to express presence and absence of pain and rate pain appropriately using numerical scale. Problem: Skin Integrity:  Goal: Skin integrity will stabilize  Description  Skin integrity will stabilize  Outcome: Ongoing  Note:   Skin assessment performed each shift and as needed. Pt will be enc to turn, reposition, and ambulate. Skin will be kept clean and dry. Problem: Discharge Planning:  Goal: Patients continuum of care needs are met  Description  Patients continuum of care needs are met  Outcome: Ongoing  Note:   Case management and or  will be consulted to assist pt with any discharge needs for home.

## 2019-05-14 NOTE — PROGRESS NOTES
Pt alert and oriented. Pt still with c/o epigastric pain. Tums at bedside per orders. Family at bedside. Fall precautions in place. Pt enc to cough, deep breath, and call for assistance when needed. Call light with in reach. Will cont to monitor. Electronically signed by Coreen Castillo RN on 5/14/2019 at 9:06 AM

## 2019-05-14 NOTE — PROGRESS NOTES
[facility]  Nephrology   Progress Note        We are following the patient for JERRELL  80 y/o WF with h/o recent episodes of JERRELL in  and again last week when she was suspected to have ATN Cr had improved to 1.7 mg at the time of discharge on 19  She saw her PCP and labs done showed Cr had increased to 3 mg She was asked to come to the ER where Cr was 2.4 mg      SUBJECTIVE:  Had some self limiting CP last night. Voiding OK  BP  Variable Started Cardizem today due to bradycardia yesterday    Family at bedside    OBJECTIVE:     PHYSICAL:    TEMPERATURE:  Current - Temp: 97.6 °F (36.4 °C);  Max - Temp  Av.5 °F (36.4 °C)  Min: 97.3 °F (36.3 °C)  Max: 97.6 °F (36.4 °C)  RESPIRATIONS RANGE: Resp  Av.2  Min: 16  Max: 20  PULSE RANGE: Pulse  Av  Min: 61  Max: 66  BLOOD PRESSURE RANGE:  Systolic (74URR), AQM:692 , Min:145 , TBZ:584   ; Diastolic (96ZRS), DZR:24, Min:58, Max:83    PULSE OXIMETRY RANGE: SpO2  Av %  Min: 96 %  Max: 100 %  24HR INTAKE/OUTPUT:      Intake/Output Summary (Last 24 hours) at 2019 1612  Last data filed at 2019 1421  Gross per 24 hour   Intake 480 ml   Output 1650 ml   Net -1170 ml       CONSTITUTIONAL:  awake, alert, cooperative, no apparent distress, and appears stated age  HEENT:  Lids and lashes normal, pupils equal, round and reactive to light, extra ocular muscles intact, sclera clear, conjunctiva normal  NECK:  supple, symmetrical, trachea midline  LUNGS:  No increased work of breathing, good air exchange, clear to auscultation bilaterally, no crackles or wheezing  CARDIOVASCULAR:  Normal apical impulse, regular rate and rhythm, normal S1 and S2  ABDOMEN:  No scars, normal bowel sounds, soft, non-distended  NEUROLOGIC:  alert, oriented, normal speech, no focal findings  SKIN: no bruising or bleeding  EXTREMITIES: no edema    Medications         Data      CBC:   Recent Labs     19  0927 19  0742 19  0709   WBC 6.1 5.7

## 2019-05-14 NOTE — PROGRESS NOTES
History:        Procedure Laterality Date    CARPAL TUNNEL RELEASE      right, 2003    COLONOSCOPY      2013, dr German Kuo    COLONOSCOPY N/A 10/19/2018    COLONOSCOPY WITH BIOPSY performed by Rae Morris MD at [facility]      December 27 2017 and feb 6 2018   [address]      2011    NASAL SINUS SURGERY      UPPER GASTROINTESTINAL ENDOSCOPY N/A 4/4/2019    [facility]GD BIOPSY [facility]:  Patient has no known allergies. Past medical and surgical history reviewed. Any changes have been noted. Scheduled and prn Medications:    Scheduled Meds:   pantoprazole  40 mg Oral QPM    diltiazem  120 mg Oral Daily    DULoxetine  20 mg Oral Daily    calcium-vitamin D  1 tablet Oral Daily    vitamin D  2,000 Units Oral Daily    sodium chloride flush  10 mL Intravenous 2 times per day    heparin (porcine)  5,000 Units Subcutaneous 3 times per day    docusate sodium  100 mg Oral BID    acetaminophen  1,000 mg Oral BID     Continuous Infusions:  PRN Meds:.calcium carbonate, cloNIDine, albuterol sulfate HFA, sodium chloride flush, ondansetron, polyethylene glycol    PHYSICAL EXAM:  BP (!) 145/58   Pulse 66   Temp 97.6 °F (36.4 °C) (Oral)   Resp 16   Ht 4' 8\" (1.422 m)   Wt 103 lb 9.9 oz (47 kg)   SpO2 96%   BMI 23.23 kg/m²       Intake/Output Summary (Last 24 hours) at 5/14/2019 1524  Last data filed at 5/14/2019 1421  Gross per 24 hour   Intake 480 ml   Output 1650 ml   Net -1170 ml       General: Alert and oriented. Sitting in no apparent distress. Shorthair  HEENT: Normocephalic. Atraumatic. Pupils equal and reactive. EOM intact. Oral mucosa pink/moist/intact. Neck: Supple. Symmetrical. Trachea midline. Lungs: Clear to auscultation bilaterally. Respirations even and unlabored. Chest: Exam unremarkable. Cardiac: S1/S2 noted. Regular Rhythm and rate. Abdomen/GI: Soft. Non-tender. Non-distended. BS+. Extremities: PP+. Atraumatic. No redness/cyanosis/edema noted. Brisk cap refill. Skin: Dry and intact. No lesions noted. Neuro: Grossly intact. No focal deficits noted. LABS:    Lab Results   Component Value Date    WBC 5.7 05/13/2019    HGB 11.1 (L) 05/14/2019    HCT 33.3 (L) 05/14/2019    MCV 91.2 05/13/2019     05/13/2019    LYMPHOPCT 23.7 05/13/2019    RBC 3.17 (L) 05/13/2019    MCH 30.7 05/13/2019    MCHC 33.7 05/13/2019    RDW 13.8 05/13/2019       Lab Results   Component Value Date    CREATININE 1.7 (H) 05/14/2019    BUN 45 (H) 05/14/2019     05/14/2019    K 4.0 05/14/2019     (H) 05/14/2019    CO2 16 (L) 05/14/2019       Lab Results   Component Value Date    MG 2.00 05/14/2019       Lab Results   Component Value Date    ALT 12 05/13/2019    AST 15 05/13/2019    ALKPHOS 86 05/13/2019    BILITOT <0.2 05/13/2019        No flowsheet data found. Imaging:  NM MYOCARDIAL SPECT REST EXERCISE OR RX    (Results Pending)       Assessment & Plan:      JERRELL on Chronic Kidney Disease stage 3  - Avoid nephrotoxic medications as able. Renally dose medications.   - thought to be related to ATN with multiple insults over the past few weeks  - Nephrology following closely  - now with metabolic acidosis, related to NS vs worsening function  - improved today, D/ Dr. Franck Goldberg, will dc tomorrow if continues to trend down      Asymptomatic bacteruria   - pyuria noted on intial UA, no fevers and no leukocytosis and no sx     Chronic Diastolic Heart Failure with preserved EF of 55-60%  - Daily weights  - Strict intake/output     Atypical chest pain, thought to be related to Gastritis with GERD  - initial thought to be related to esophageal spasms, started on CCB but bp became low with this  - states that tums helps \"every time\"  - most recent EGD with possible barrets and gastritis  - needs PPI but would wait until kidney function improved- ordered at night   - cont tums and prn gi cocktail ordered- cocktail did not help   - stress test ordered at patient/family request     Osteoporosis  - Vit D/Calcium        Body mass index is 23.23 kg/m². The patient and / or the family were informed of the results of any tests, a time was given to answer questions, a plan was proposed and they agreed with plan.     DVT prophylaxis: [] Lovenox  [] SQ Heparin  [] SCDs because of  [] warfarin/oral direct thrombin inhibitor [] Encourage ambulation    GI prophylaxis: [] PPI/H7desoucs  [] not indicated    Probiotic if on abx: [] Yes [] No [] Not Indicated    Diet: DIET RENAL;  Diet NPO, After Midnight    Consults:  IP CONSULT TO NEPHROLOGY  IP CONSULT TO DIETITIAN    Disposition:  [] Home  [] Home with home health [] Rehab [] Psych [] SNF  [] LTAC  [] Long term nursing home or group home [] Transfer to ICU  [] Transfer to PCU [] Other:    Code Status: DNR-CCA    ELOS: RIAZ Last - CNP  05/14/19

## 2019-05-14 NOTE — PLAN OF CARE
Problem: Pain:  Goal: Control of acute pain  Description  Control of acute pain  Note:   D: PT. With complaint of chest pain  A: medicated with GI cocktail as ordered  R: resting quietly afterwards  D: PT. Again with complaint of chest pain and requesting TUMS  A: medicated with TUMS @ 0456  R: verbalizes relief afterwards

## 2019-05-15 LAB
ALBUMIN SERPL-MCNC: 3.4 G/DL (ref 3.4–5)
ANION GAP SERPL CALCULATED.3IONS-SCNC: 9 MMOL/L (ref 3–16)
APTT: 67.7 SEC (ref 26–36)
BUN BLDV-MCNC: 46 MG/DL (ref 7–20)
CALCIUM SERPL-MCNC: 9.9 MG/DL (ref 8.3–10.6)
CHLORIDE BLD-SCNC: 115 MMOL/L (ref 99–110)
CO2: 19 MMOL/L (ref 21–32)
CREAT SERPL-MCNC: 1.6 MG/DL (ref 0.6–1.2)
GFR AFRICAN AMERICAN: 37
GFR NON-AFRICAN AMERICAN: 31
GLUCOSE BLD-MCNC: 104 MG/DL (ref 70–99)
HCT VFR BLD CALC: 35.1 % (ref 36–48)
HEMOGLOBIN: 11.7 G/DL (ref 12–16)
LV EF: 70 %
LVEF MODALITY: NORMAL
MCH RBC QN AUTO: 29.8 PG (ref 26–34)
MCHC RBC AUTO-ENTMCNC: 33.2 G/DL (ref 31–36)
MCV RBC AUTO: 89.7 FL (ref 80–100)
PDW BLD-RTO: 13.7 % (ref 12.4–15.4)
PHOSPHORUS: 3 MG/DL (ref 2.5–4.9)
PLATELET # BLD: 456 K/UL (ref 135–450)
PMV BLD AUTO: 6.9 FL (ref 5–10.5)
POTASSIUM SERPL-SCNC: 4.2 MMOL/L (ref 3.5–5.1)
RBC # BLD: 3.92 M/UL (ref 4–5.2)
SODIUM BLD-SCNC: 143 MMOL/L (ref 136–145)
TROPONIN: <0.01 NG/ML
TROPONIN: <0.01 NG/ML
URINE ELECTROPHORESIS INTERP: NORMAL
WBC # BLD: 10.4 K/UL (ref 4–11)

## 2019-05-15 PROCEDURE — 2580000003 HC RX 258: Performed by: INTERNAL MEDICINE

## 2019-05-15 PROCEDURE — A9502 TC99M TETROFOSMIN: HCPCS | Performed by: NURSE PRACTITIONER

## 2019-05-15 PROCEDURE — 6370000000 HC RX 637 (ALT 250 FOR IP): Performed by: PHYSICIAN ASSISTANT

## 2019-05-15 PROCEDURE — 99223 1ST HOSP IP/OBS HIGH 75: CPT | Performed by: INTERNAL MEDICINE

## 2019-05-15 PROCEDURE — 3430000000 HC RX DIAGNOSTIC RADIOPHARMACEUTICAL: Performed by: FAMILY MEDICINE

## 2019-05-15 PROCEDURE — 3430000000 HC RX DIAGNOSTIC RADIOPHARMACEUTICAL: Performed by: NURSE PRACTITIONER

## 2019-05-15 PROCEDURE — 6370000000 HC RX 637 (ALT 250 FOR IP): Performed by: NURSE PRACTITIONER

## 2019-05-15 PROCEDURE — 93017 CV STRESS TEST TRACING ONLY: CPT

## 2019-05-15 PROCEDURE — 84484 ASSAY OF TROPONIN QUANT: CPT

## 2019-05-15 PROCEDURE — 6360000002 HC RX W HCPCS: Performed by: INTERNAL MEDICINE

## 2019-05-15 PROCEDURE — 94760 N-INVAS EAR/PLS OXIMETRY 1: CPT

## 2019-05-15 PROCEDURE — 6370000000 HC RX 637 (ALT 250 FOR IP): Performed by: INTERNAL MEDICINE

## 2019-05-15 PROCEDURE — 78452 HT MUSCLE IMAGE SPECT MULT: CPT

## 2019-05-15 PROCEDURE — 80069 RENAL FUNCTION PANEL: CPT

## 2019-05-15 PROCEDURE — 85027 COMPLETE CBC AUTOMATED: CPT

## 2019-05-15 PROCEDURE — 6360000002 HC RX W HCPCS: Performed by: NURSE PRACTITIONER

## 2019-05-15 PROCEDURE — 1200000000 HC SEMI PRIVATE

## 2019-05-15 PROCEDURE — 36415 COLL VENOUS BLD VENIPUNCTURE: CPT

## 2019-05-15 PROCEDURE — A9502 TC99M TETROFOSMIN: HCPCS | Performed by: FAMILY MEDICINE

## 2019-05-15 PROCEDURE — 85730 THROMBOPLASTIN TIME PARTIAL: CPT

## 2019-05-15 RX ORDER — NITROGLYCERIN 0.4 MG/1
0.4 TABLET SUBLINGUAL EVERY 5 MIN PRN
Status: DISCONTINUED | OUTPATIENT
Start: 2019-05-15 | End: 2019-05-17 | Stop reason: HOSPADM

## 2019-05-15 RX ORDER — SODIUM CHLORIDE 450 MG/100ML
INJECTION, SOLUTION INTRAVENOUS CONTINUOUS
Status: DISCONTINUED | OUTPATIENT
Start: 2019-05-15 | End: 2019-05-17 | Stop reason: HOSPADM

## 2019-05-15 RX ORDER — HEPARIN SODIUM 10000 [USP'U]/100ML
5.6 INJECTION, SOLUTION INTRAVENOUS CONTINUOUS
Status: DISCONTINUED | OUTPATIENT
Start: 2019-05-15 | End: 2019-05-15

## 2019-05-15 RX ORDER — METOPROLOL SUCCINATE 50 MG/1
50 TABLET, EXTENDED RELEASE ORAL DAILY
Status: DISCONTINUED | OUTPATIENT
Start: 2019-05-15 | End: 2019-05-15

## 2019-05-15 RX ORDER — HEPARIN SODIUM 1000 [USP'U]/ML
2800 INJECTION, SOLUTION INTRAVENOUS; SUBCUTANEOUS ONCE
Status: DISCONTINUED | OUTPATIENT
Start: 2019-05-15 | End: 2019-05-15

## 2019-05-15 RX ORDER — AMLODIPINE BESYLATE 5 MG/1
5 TABLET ORAL DAILY
Status: DISCONTINUED | OUTPATIENT
Start: 2019-05-15 | End: 2019-05-16

## 2019-05-15 RX ORDER — HEPARIN SODIUM 1000 [USP'U]/ML
60 INJECTION, SOLUTION INTRAVENOUS; SUBCUTANEOUS PRN
Status: DISCONTINUED | OUTPATIENT
Start: 2019-05-15 | End: 2019-05-15

## 2019-05-15 RX ORDER — DILTIAZEM HYDROCHLORIDE 120 MG/1
120 CAPSULE, COATED, EXTENDED RELEASE ORAL DAILY
Qty: 30 CAPSULE | Refills: 3 | Status: SHIPPED | OUTPATIENT
Start: 2019-05-16 | End: 2019-06-20 | Stop reason: ALTCHOICE

## 2019-05-15 RX ORDER — HEPARIN SODIUM 1000 [USP'U]/ML
30 INJECTION, SOLUTION INTRAVENOUS; SUBCUTANEOUS PRN
Status: DISCONTINUED | OUTPATIENT
Start: 2019-05-15 | End: 2019-05-15

## 2019-05-15 RX ORDER — ASPIRIN 81 MG/1
81 TABLET ORAL DAILY
Status: DISCONTINUED | OUTPATIENT
Start: 2019-05-15 | End: 2019-05-17 | Stop reason: HOSPADM

## 2019-05-15 RX ORDER — METOPROLOL SUCCINATE 25 MG/1
25 TABLET, EXTENDED RELEASE ORAL DAILY
Status: DISCONTINUED | OUTPATIENT
Start: 2019-05-15 | End: 2019-05-17 | Stop reason: HOSPADM

## 2019-05-15 RX ORDER — ISOSORBIDE MONONITRATE 30 MG/1
30 TABLET, EXTENDED RELEASE ORAL DAILY
Status: DISCONTINUED | OUTPATIENT
Start: 2019-05-15 | End: 2019-05-17 | Stop reason: HOSPADM

## 2019-05-15 RX ORDER — PANTOPRAZOLE SODIUM 40 MG/1
40 TABLET, DELAYED RELEASE ORAL EVERY EVENING
Qty: 30 TABLET | Refills: 3 | Status: SHIPPED | OUTPATIENT
Start: 2019-05-15 | End: 2020-01-15

## 2019-05-15 RX ORDER — SODIUM CHLORIDE 9 MG/ML
INJECTION, SOLUTION INTRAVENOUS CONTINUOUS
Status: DISCONTINUED | OUTPATIENT
Start: 2019-05-15 | End: 2019-05-15

## 2019-05-15 RX ORDER — HEPARIN SODIUM 10000 [USP'U]/100ML
6.5 INJECTION, SOLUTION INTRAVENOUS CONTINUOUS
Status: DISCONTINUED | OUTPATIENT
Start: 2019-05-15 | End: 2019-05-17 | Stop reason: HOSPADM

## 2019-05-15 RX ADMIN — Medication 10 ML: at 20:58

## 2019-05-15 RX ADMIN — ACETAMINOPHEN 1000 MG: 500 TABLET ORAL at 10:24

## 2019-05-15 RX ADMIN — ACETAMINOPHEN 1000 MG: 500 TABLET ORAL at 20:54

## 2019-05-15 RX ADMIN — DILTIAZEM HYDROCHLORIDE 120 MG: 120 CAPSULE, COATED, EXTENDED RELEASE ORAL at 10:25

## 2019-05-15 RX ADMIN — Medication 10 ML: at 10:30

## 2019-05-15 RX ADMIN — OYSTER SHELL CALCIUM WITH VITAMIN D 1 TABLET: 500; 200 TABLET, FILM COATED ORAL at 10:25

## 2019-05-15 RX ADMIN — VITAMIN D, TAB 1000IU (100/BT) 2000 UNITS: 25 TAB at 10:25

## 2019-05-15 RX ADMIN — AMLODIPINE BESYLATE 5 MG: 5 TABLET ORAL at 16:48

## 2019-05-15 RX ADMIN — DOCUSATE SODIUM 100 MG: 100 CAPSULE, LIQUID FILLED ORAL at 10:25

## 2019-05-15 RX ADMIN — TETROFOSMIN 30 MILLICURIE: 1.38 INJECTION, POWDER, LYOPHILIZED, FOR SOLUTION INTRAVENOUS at 09:03

## 2019-05-15 RX ADMIN — SODIUM CHLORIDE: 4.5 INJECTION, SOLUTION INTRAVENOUS at 16:50

## 2019-05-15 RX ADMIN — HEPARIN SODIUM AND DEXTROSE 5.6 ML/HR: 10000; 5 INJECTION INTRAVENOUS at 18:32

## 2019-05-15 RX ADMIN — TETROFOSMIN 10 MILLICURIE: 1.38 INJECTION, POWDER, LYOPHILIZED, FOR SOLUTION INTRAVENOUS at 06:58

## 2019-05-15 RX ADMIN — DULOXETINE HYDROCHLORIDE 20 MG: 20 CAPSULE, DELAYED RELEASE ORAL at 10:25

## 2019-05-15 RX ADMIN — ASPIRIN 81 MG: 81 TABLET, COATED ORAL at 16:50

## 2019-05-15 RX ADMIN — ISOSORBIDE MONONITRATE 30 MG: 30 TABLET, EXTENDED RELEASE ORAL at 16:48

## 2019-05-15 RX ADMIN — HEPARIN SODIUM 5000 UNITS: 5000 INJECTION INTRAVENOUS; SUBCUTANEOUS at 06:03

## 2019-05-15 RX ADMIN — PANTOPRAZOLE SODIUM 40 MG: 40 TABLET, DELAYED RELEASE ORAL at 16:48

## 2019-05-15 RX ADMIN — HEPARIN SODIUM 5000 UNITS: 5000 INJECTION INTRAVENOUS; SUBCUTANEOUS at 13:43

## 2019-05-15 RX ADMIN — REGADENOSON 0.4 MG: 0.08 INJECTION, SOLUTION INTRAVENOUS at 08:56

## 2019-05-15 ASSESSMENT — PAIN - FUNCTIONAL ASSESSMENT: PAIN_FUNCTIONAL_ASSESSMENT: ACTIVITIES ARE NOT PREVENTED

## 2019-05-15 ASSESSMENT — PAIN SCALES - GENERAL
PAINLEVEL_OUTOF10: 0
PAINLEVEL_OUTOF10: 0
PAINLEVEL_OUTOF10: 3

## 2019-05-15 ASSESSMENT — PAIN DESCRIPTION - PAIN TYPE: TYPE: CHRONIC PAIN

## 2019-05-15 ASSESSMENT — PAIN SCALES - WONG BAKER
WONGBAKER_NUMERICALRESPONSE: 0

## 2019-05-15 ASSESSMENT — PAIN DESCRIPTION - FREQUENCY: FREQUENCY: INTERMITTENT

## 2019-05-15 ASSESSMENT — PAIN DESCRIPTION - LOCATION: LOCATION: BACK

## 2019-05-15 ASSESSMENT — PAIN DESCRIPTION - DESCRIPTORS: DESCRIPTORS: SORE;ACHING

## 2019-05-15 NOTE — PLAN OF CARE
Problem: Safety:  Goal: Free from accidental physical injury  Description  Free from accidental physical injury  Outcome: Ongoing  Note:   D: PT. Alert and oriented, calls for assist as needed, bed alarm on and call light in reach  A: encouraged to continue calling for assist as needed  R: without falls this shift

## 2019-05-15 NOTE — PROGRESS NOTES
[facility]  Nephrology   Progress Note        We are following the patient for JERRELL  82 y/o WF with h/o recent episodes of JERRELL in  and again last week when she was suspected to have ATN Cr had improved to 1.7 mg at the time of discharge on 19  She saw her PCP and labs done showed Cr had increased to 3 mg She was asked to come to the ER where Cr was 2.4 mg      SUBJECTIVE:  Slept well last night  No CP   Had stress test this AM  Voiding OK UOP  3.2 L    Family at bedside    OBJECTIVE:     PHYSICAL:    TEMPERATURE:  Current - Temp: 97.7 °F (36.5 °C);  Max - Temp  Av.9 °F (36.6 °C)  Min: 97.6 °F (36.4 °C)  Max: 98.5 °F (36.9 °C)  RESPIRATIONS RANGE: Resp  Av.2  Min: 16  Max: 17  PULSE RANGE: Pulse  Av  Min: 59  Max: 80  BLOOD PRESSURE RANGE:  Systolic (44QTY), EZK:002 , Min:137 , SDI:675   ; Diastolic (56ERY), OMV:14, Min:58, Max:76    PULSE OXIMETRY RANGE: SpO2  Av %  Min: 96 %  Max: 100 %  24HR INTAKE/OUTPUT:      Intake/Output Summary (Last 24 hours) at 5/15/2019 1210  Last data filed at 5/15/2019 9685  Gross per 24 hour   Intake 370 ml   Output 3300 ml   Net -2930 ml       CONSTITUTIONAL:  awake, alert, cooperative, no apparent distress, and appears stated age  HEENT:  Lids and lashes normal, pupils equal, round and reactive to light, extra ocular muscles intact, sclera clear, conjunctiva normal  NECK:  supple, symmetrical, trachea midline  LUNGS:  No increased work of breathing, good air exchange, clear to auscultation bilaterally, no crackles or wheezing  CARDIOVASCULAR:  Normal apical impulse, regular rate and rhythm, normal S1 and S2  ABDOMEN:  No scars, normal bowel sounds, soft, non-distended  NEUROLOGIC:  alert, oriented, normal speech, no focal findings  SKIN: no bruising or bleeding  EXTREMITIES: no edema    Medications         Data      CBC:   Recent Labs     19  0742 19  0709   WBC 5.7  --    RBC 3.17*  --    HGB 9.8* 11.1*   HCT 29.0* 33.3*   --      BMP:    Recent Labs     05/13/19  0742 05/14/19  0709 05/15/19  0701    141 143   K 4.5 4.0 4.2   * 114* 115*   CO2 17* 16* 19*   BUN 43* 45* 46*   CREATININE 1.9* 1.7* 1.6*   CALCIUM 8.8 9.5 9.9   GLUCOSE 107* 103* 104*     Phosphorus:    Recent Labs     05/14/19  0709 05/15/19  0701   PHOS 3.1 3.0     Magnesium:    Recent Labs     05/14/19  0709   MG 2.00         ASSESSMENT     Patient Active Problem List   Diagnosis    Osteoarthritis    Osteoporosis    Allergic rhinitis    Scoliosis    Hearing loss sensory, bilateral    Asymmetrical sensorineural hearing loss    Cerumen impaction    Chronic mastoiditis    Thyroid nodule    Gastritis    [facility] [address]    Acute renal failure superimposed on stage 3 chronic kidney disease (HCC)    Acute prerenal azotemia    Generalized weakness    CKD (chronic kidney disease)       PLAN    1-JERRELL with recent episodes of JERRELL suspected to be ATN on last admission  Cr was 1.7 mg on discharge on 5/6/19  Etiology of JERRELL unclear U/A benign with small LE and pyuria  CX skin shelby only No blood, US in 4/19 was unremarkable Urine Na+ 59 Urine eosinophils negative SPEP no monoclonal spike Cr improving, 1.6 mg today  Off of IVF's Good UOP suspect JERRELL related to ATN, recovering She can be discharged from renal SP Check labs on Friday and next week Follow up with Dr Baker Number in 1-2 weeks   2-HTN BP better on CardizemMonitor BP and HR   3-Esophageal spasms improved with  Protonix  4 Ch diastolic CHF compensated  5 Chest pain atypical stress test done this Am  6 Met ACidosis hyperchloremic, improving  Monitor off of IVF's       Carina Sifuentes MD, [address]

## 2019-05-15 NOTE — CONSULTS
[address]  Cardiology Consult Note        CC:     Chest pain            HPI:   This is a 81 y.o. female  with chest pain who we are asked to see because of abnormal stress test.  The patient. She was originally admitted because of abnormal renal functions. Her T was found to have creatinine of 3. This is second time in several weeks where her kidney functions have shot up to high levels. Her baseline kidney functions are normal.    The patient has a very strong family history for coronary disease. She has noticed this chest pain for the last 5 months. Lately it has occurred much more frequently and severe. 2 nights ago she was had 3 episodes of chest pain. Interestingly, these episodes occur at nighttime and are relieved with Tums. However, the patient has been on Prilosec on various doses has had an upper endoscopy which has been negative. Her functional abilities are limited because of hip pains but she does not recall having any chest pain with activities of daily living or physical stress. .  She does get the pain when she is exposed to cold weather, or has cold drinks. A brother and sister and her mother had coronary artery bypass surgery.         Past Medical History:   Diagnosis Date    Acute kidney injury ([address]     Allergic rhinitis     [facility]     GERD (gastroesophageal reflux disease)     Hearing loss sensory, bilateral     mod--severe on right, mild to severe on left, due to asymmetry will send to get MRI to r/o acoustic neuroma    Mastoiditis     on MRI oct 2014-->dr ortiz states with no clinical findings this is considered an over-sensitivity of MRI results    Osteoarthritis     L1-L5    Osteoporosis     patient was on fosamax x 10 years and drug holiday was started on 6/21/13. (this was date of last DEXA scan)    Scoliosis       Past Surgical History:   Procedure Laterality Date    CARPAL TUNNEL RELEASE      right, 2003    COLONOSCOPY 2013, dr Claude Stanley N/A 10/19/2018    COLONOSCOPY WITH BIOPSY performed by Alan Georges MD at [address] [place] [address]      December 27 2017 and feb 6 2018   Agustin Anant      2011    NASAL SINUS SURGERY      UPPER GASTROINTESTINAL ENDOSCOPY N/A 4/4/2019    EGD BIOPSY performed by Alan [facility]es MD at [facility]      Family History   Problem Relation Age of Onset    Diabetes Mother     Heart Disease Mother       Social History     Tobacco Use    Smoking status: Never Smoker    Smokeless tobacco: Never Used   Substance Use Topics    Alcohol use: No    Drug use: No     No Known Allergies   aspirin  81 mg Oral Daily    [START ON 5/16/2019] ticagrelor  90 mg Oral BID    ticagrelor  180 mg Oral Once    isosorbide mononitrate  30 mg Oral Daily    amLODIPine  5 mg Oral Daily    metoprolol succinate  25 mg Oral Daily    pantoprazole  40 mg Oral QPM    DULoxetine  20 mg Oral Daily    calcium-vitamin D  1 tablet Oral Daily    vitamin D  2,000 Units Oral Daily    sodium chloride flush  10 mL Intravenous 2 times per day    heparin (porcine)  5,000 Units Subcutaneous 3 times per day    docusate sodium  100 mg Oral BID    acetaminophen  1,000 mg Oral BID       Review of Systems -   Constitutional: Negative for weight gain/loss; malaise, fever  Respiratory: Negative for Asthma;  cough and hemoptysis  Cardiovascular: Negative for palpitations,dizziness   Gastrointestinal: Negative for abd.pain; constipation/diarrhea;    Genitourinary: Negative for stones; hematuria; frequency hesitancy  Integumentt: Negative for rash or pruritis  Hematologic/lymphatic: Negative for blood dyscrasia; leukemia/lymphoma  Musculoskeletal: Negative for Connective tissue disease  Neurological:  Negative for Seizure   Behavioral/Psych:Negative for Bipolar disorder, Schizophrenia; Dementia  Endocrine: negative for thyroid, parathyroid disease      Intake/Output Summary (Last 24 hours) at 5/15/2019 [address] [place] filed at 5/15/2019 0823  Gross per 24 hour   Intake 130 ml   Output 2800 ml   Net -2670 ml       Physical Examination:    /64   Pulse 80   Temp 97.7 °F (36.5 °C) (Oral)   Resp 16   Ht 4' 8\" (1.422 m)   Wt 102 lb 8.2 oz (46.5 kg)   SpO2 100%   BMI 22.98 kg/m²    HEENT:  Face: Atraumatic, Conjunctiva: Pink; non icteric,  Mucous Memb:  Moist, No thyromegaly or Lymphadenopathy  Respiratory:  Resp Assessment: normal, Resp Auscultation: clear   Cardiovascular: Auscultation: nl S1 & S2, Palpation:  Nl PMI;  No heaves or thrills, JVP:  normal  Abdomen: Soft, non-tender, Normal bowel sounds,  No organomegaly  Extremities: No Cyanosis or Clubbing; Edema none  Neurological: Oriented to time, place, and person, Non-anxious  Psychiatric: Normal mood and affect  Skin: Warm and dry,  No rash seen      Current Facility-Administered Medications: aspirin EC tablet 81 mg, 81 mg, Oral, Daily  [START ON 5/16/2019] ticagrelor (BRILINTA) tablet 90 mg, 90 mg, Oral, BID  ticagrelor (BRILINTA) tablet 180 mg, 180 mg, Oral, Once  isosorbide mononitrate (IMDUR) extended release tablet 30 mg, 30 mg, Oral, Daily  amLODIPine (NORVASC) tablet 5 mg, 5 mg, Oral, Daily  0.45 % sodium chloride infusion, , Intravenous, Continuous  nitroGLYCERIN (NITROSTAT) SL tablet 0.4 mg, 0.4 mg, Sublingual, Q5 Min PRN  metoprolol succinate (TOPROL XL) extended release tablet 25 mg, 25 mg, Oral, Daily  pantoprazole (PROTONIX) tablet 40 mg, 40 mg, Oral, QPM  calcium carbonate (TUMS) chewable tablet 500 mg, 500 mg, Oral, TID PRN  DULoxetine (CYMBALTA) extended release capsule 20 mg, 20 mg, Oral, Daily  cloNIDine (CATAPRES) tablet 0.1 mg, 0.1 mg, Oral, Q4H PRN  albuterol sulfate  (90 Base) MCG/ACT inhaler 2 puff, 2 puff, Inhalation, Q6H PRN  calcium-vitamin D 500-200 MG-UNIT per tablet 1 tablet, 1 tablet, Oral, Daily  vitamin D (CHOLECALCIFEROL) tablet 2,000 Units, 2,000 Units, Oral, Daily  sodium chloride flush 0.9 % injection 10 mL,

## 2019-05-15 NOTE — CARE COORDINATION
Updated Dave Bryan at [facility] that pt may potentially dc today, waitng for Cardiology  Electronically signed by Marvel Stanton RN on 5/15/2019 at 4:14 PM

## 2019-05-15 NOTE — PROGRESS NOTES
Hospital Medicine Progress Note      Admit Date: 5/11/2019         Overnight Events: none    CC: F/U for JERRELL    HPI:   This is an 81-year-old female with a history of CK 80, GERD, diastolic heart failure who presented to the ED with request from her PCP for abnormal blood tests. Patient recently admitted and discharged by myself secondary to AK I thought to be related to hypotension and ATN. Numbers improved after IV hydration and she followed up with her primary care physician. Her creatinine worsened. Nephrology is following closely. She continues to complain of some chest pains today but describes them as a burning sensation and gets relief with Tums. At her last hospitalization she was discharged on Cardizem however this had been stopped due to mild hypotension. CP consistent with GERD but ordered stress test to complete work up. Stress test showing an area of reversible ischemia. Cardiology was consulted. Plan for possible cardiac catheterization on Friday. Interval History/Subjective:   Denies fevers, chills, chest pain, shortness breath, nausea or vomiting. She did not have any reflux overnight after starting PPI. Review of Systems:     Comprehensive ROS negative except as mentioned above.     Past Medical History:        Diagnosis Date    Acute kidney injury ([address]     Allergic rhinitis     CHF (congestive heart failure) ([facility])     GERD (gastroesophageal reflux disease)     Hearing loss sensory, bilateral     mod--severe on right, mild to severe on left, due to asymmetry will send to get MRI to r/o acoustic neuroma    Mastoiditis     on MRI oct 2014-->dr ortiz states with no clinical findings this is considered an over-sensitivity of MRI results    Osteoarthritis     L1-L5    Osteoporosis     patient was on fosamax x 10 years and drug holiday was started on 6/21/13. (this was date of last DEXA scan)    Scoliosis        Past Surgical History:        Procedure Laterality Date    CARPAL TUNNEL RELEASE      right, 2003    COLONOSCOPY      2013, dr German Kuo    COLONOSCOPY N/A 10/19/2018    COLONOSCOPY WITH BIOPSY performed by Rae Morris MD at [facility]      December 27 2017 and feb 6 2018   [address]      2011    NASAL SINUS SURGERY      UPPER GASTROINTESTINAL ENDOSCOPY N/A 4/4/2019    EGD BIOPSY performed by Rae Morris MD at [address]:  Patient has no known allergies. Past medical and surgical history reviewed. Any changes have been noted. Scheduled and prn Medications:    Scheduled Meds:   metoprolol succinate  50 mg Oral Daily    aspirin  81 mg Oral Daily    [START ON 5/16/2019] ticagrelor  90 mg Oral BID    ticagrelor  180 mg Oral Once    isosorbide mononitrate  30 mg Oral Daily    amLODIPine  5 mg Oral Daily    pantoprazole  40 mg Oral QPM    DULoxetine  20 mg Oral Daily    calcium-vitamin D  1 tablet Oral Daily    vitamin D  2,000 Units Oral Daily    sodium chloride flush  10 mL Intravenous 2 times per day    heparin (porcine)  5,000 Units Subcutaneous 3 times per day    docusate sodium  100 mg Oral BID    acetaminophen  1,000 mg Oral BID     Continuous Infusions:   sodium chloride       PRN Meds:.calcium carbonate, cloNIDine, albuterol sulfate HFA, sodium chloride flush, ondansetron, polyethylene glycol    PHYSICAL EXAM:  /64   Pulse 80   Temp 97.7 °F (36.5 °C) (Oral)   Resp 16   Ht 4' 8\" (1.422 m)   Wt 102 lb 8.2 oz (46.5 kg)   SpO2 100%   BMI 22.98 kg/m²       Intake/Output Summary (Last 24 hours) at 5/15/2019 1612  Last data filed at 5/15/2019 0823  Gross per 24 hour   Intake 130 ml   Output 2800 ml   Net -2670 ml       General: Alert and oriented. Sitting in no apparent distress. HEENT: Normocephalic. Atraumatic. Pupils equal and reactive. EOM intact. Oral mucosa pink/moist/intact. Neck: Supple. Symmetrical. Trachea midline. Lungs: Clear to auscultation bilaterally.  Respirations even and unlabored. Chest: Exam unremarkable. Cardiac: S1/S2 noted. Regular Rhythm and rate. Abdomen/GI: Soft. Non-tender. Non-distended. BS+. Extremities: PP+. Atraumatic. No redness/cyanosis/edema noted. Brisk cap refill. Skin: Dry and intact. No lesions noted. Neuro: Grossly intact. No focal deficits noted. LABS:    Lab Results   Component Value Date    WBC 5.7 05/13/2019    HGB 11.1 (L) 05/14/2019    HCT 33.3 (L) 05/14/2019    MCV 91.2 05/13/2019     05/13/2019    LYMPHOPCT 23.7 05/13/2019    RBC 3.17 (L) 05/13/2019    MCH 30.7 05/13/2019    MCHC 33.7 05/13/2019    RDW 13.8 05/13/2019       Lab Results   Component Value Date    CREATININE 1.6 (H) 05/15/2019    BUN 46 (H) 05/15/2019     05/15/2019    K 4.2 05/15/2019     (H) 05/15/2019    CO2 19 (L) 05/15/2019       Lab Results   Component Value Date    MG 2.00 05/14/2019       Lab Results   Component Value Date    ALT 12 05/13/2019    AST 15 05/13/2019    ALKPHOS 86 05/13/2019    BILITOT <0.2 05/13/2019        No flowsheet data found. Imaging:  NM MYOCARDIAL SPECT REST EXERCISE OR RX   Final Result          Assessment & Plan:      JERRELL on Chronic Kidney Disease stage 3  - Avoid nephrotoxic medications as able. Renally dose medications. - thought to be related to ATN with multiple insults over the past few weeks  - Nephrology following closely  - now with metabolic acidosis, related to NS vs worsening function      Asymptomatic bacteruria   - pyuria noted on intial UA, no fevers and no leukocytosis and no sx     Chronic Diastolic Heart Failure with preserved EF of 55-60%  - Daily weights  - Strict intake/output     Atypical chest pain, thought to be related to Gastritis with GERD  - initial thought to be related to esophageal spasms, started on CCB but bp became low with this  - states that tums helps \"every time\"  - most recent EGD with possible barrets and gastritis  - needs PPI but would wait until kidney function improved.   - cont tums and prn gi cocktail ordered   - Stress test positive with an area of possible ischemia  - Cardiology consulted  - Medications adjusted, started on heparin drip and plan for possible angiogram on Friday     Osteoporosis  - Vit D/Calcium      Body mass index is 22.98 kg/m². The patient and / or the family were informed of the results of any tests, a time was given to answer questions, a plan was proposed and they agreed with plan.     DVT prophylaxis: [] Lovenox  [] SQ Heparin  [] SCDs because of  [] warfarin/oral direct thrombin inhibitor [] Encourage ambulation    GI prophylaxis: [] PPI/[id number]  [] not indicated    Probiotic if on abx: [] Yes [] No [] Not Indicated    Diet: DIET RENAL;    Consults:  IP CONSULT TO NEPHROLOGY  IP CONSULT TO DIETITIAN  IP CONSULT TO CARDIOLOGY  IP CONSULT TO HOME CARE NEEDS    Disposition:  [] Home  [] Home with home health [] Rehab [] Psych [] SNF  [] LTAC  [] Long term nursing home or group home [] Transfer to ICU  [] Transfer to PCU [] Other:    Code Status: DNR-CCA    ELOS: shahzad Sawyer, APRN - CNP  05/15/19

## 2019-05-15 NOTE — DISCHARGE INSTR - COC
Continuity of Care Form    Patient Name: Karina Zhao   :  1935  MRN:  [id number]    Admit date:  2019  Discharge date:  ***    Code Status Order: DNR-CCA   Advance Directives:   [facility] Documentation     Date/Time Healthcare Directive Type of Healthcare Directive Copy in [address] Agent's Name Healthcare Agent's Phone Number    19  Yes, patient has an advance directive for healthcare treatment  Durable power of  for health care;Living will  Yes, copy in chart  Adult Children  --  --          Admitting Physician:  Sarah Tse MD  PCP: Fab Sanchez MD    Discharging Nurse: [facility] Unit/Room#: [id number]  Discharging Unit Phone Number: ***    Emergency Contact:   Extended Emergency Contact Information  Primary Emergency Contact: [address] Phone: [phone]  Relation: Child  Secondary Emergency Contact: Yanna Green  Address: (DAUGHTER IN LAW)   [address] Phone: [phone]  Relation: Other   needed?  No    Past Surgical History:  Past Surgical History:   Procedure Laterality Date    CARPAL TUNNEL RELEASE      right,     COLONOSCOPY      , dr Lizbeth Saab N/A 10/19/2018    COLONOSCOPY WITH BIOPSY performed by Cash Purdy MD at [address] [place] [address]      2017 and 2018   [address]          NASAL SINUS SURGERY      UPPER GASTROINTESTINAL ENDOSCOPY N/A 2019    EGD BIOPSY performed by Cash Purdy MD at [place] [address]       Immunization History:   Immunization History   Administered Date(s) Administered    Influenza Vaccine, unspecified formulation 10/08/2015    Influenza, High Dose (Fluzone 65 yrs and older) 10/25/2016, 10/12/2017    Influenza, Orvis Akiko, Recombinant, IM PF (Flublok 18 yrs and older) 10/03/2018    Pneumococcal 13-valent Conjugate Angie Moran) 10/05/2014    Pneumococcal Polysaccharide ([id number]) 01/01/2009    Tdap (Boostrix, Adacel) 10/08/2015    Zoster Subunit (Shingrix) 01/18/2019, 03/28/2019       Active Problems:  Patient Active Problem List   Diagnosis Code    Osteoarthritis M19.90    Osteoporosis M81.0    Allergic rhinitis J30.9    Scoliosis M41.9    Hearing loss sensory, bilateral H90.3    Asymmetrical sensorineural hearing loss H90.5    Cerumen impaction H61.20    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    Thyroid nodule E04.1    Gastritis K29.70    JERRELL (acute kidney injury) ([facility] N17.9    Acute renal failure superimposed on stage 3 chronic kidney disease (HCC) N17.9, N18.3    Acute prerenal azotemia R79.89    Generalized weakness R53.1    CKD (chronic kidney disease) N18.9       Isolation/Infection:   Isolation          No Isolation            Nurse Assessment:  Last Vital Signs: /64   Pulse 80   Temp 97.7 °F (36.5 °C) (Oral)   Resp 16   Ht 4' 8\" (1.422 m)   Wt 102 lb 8.2 oz (46.5 kg)   SpO2 100%   BMI 22.98 kg/m²     Last documented pain score (0-10 scale): Pain Level: 0  Last Weight:   Wt Readings from Last 1 Encounters:   05/15/19 102 lb 8.2 oz (46.5 kg)     Mental Status:  oriented and alert    IV Access:  - Peripheral IV - site  R Basilic, insertion date: ***    Nursing Mobility/ADLs:  Walking   Independent  Transfer  Independent  Bathing  Independent  Dressing  Independent  Toileting  Independent  Feeding  Independent  Med Admin  Independent  Med Delivery   whole    Wound Care Documentation and Therapy:        Elimination:  Continence:   · Bowel: Yes  · Bladder: Yes  Urinary Catheter: None   Colostomy/Ileostomy/Ileal Conduit: No       Date of Last BM: 5/16/2019      Intake/Output Summary (Last 24 hours) at 5/15/2019 1605  Last data filed at 5/15/2019 0823  Gross per 24 hour   Intake 130 ml   Output 2800 ml   Net -2670 ml     I/O last 3 completed shifts: In: 130 [P.O.:120;  I.V.:10]  Out: 2800 [Urine:2800]    Safety Concerns: At Risk for Falls    Impairments/Disabilities:      Vision    Nutrition Therapy:  Current Nutrition Therapy:   - Oral Diet:  General    Routes of Feeding: Oral  Liquids: No Restrictions  Daily Fluid Restriction: no  Last Modified Barium Swallow with Video (Video Swallowing Test): not done    Treatments at the Time of Hospital Discharge:   Respiratory Treatments:   Oxygen Therapy:  is not on home oxygen therapy. Ventilator:    - No ventilator support    Rehab Therapies:   Weight Bearing Status/Restrictions: No weight bearing restirctions  Other Medical Equipment (for information only, NOT a DME order): Other Treatments:     Patient's personal belongings (please select all that are sent with patient):  Glasses    RN SIGNATURE:  Electronically signed by [id number], RN on 5/17/19 at 3:28 PM    CASE MANAGEMENT/SOCIAL WORK SECTION    Inpatient Status Date: 5/14/2019      Discharging to Facility/ Agency   · Name: [facility]  · Address:  · Phone: [phone]  · Fax: [phone]      / signature: Electronically signed by Edel Canas RN on 5/15/19 at 4:11 PM    PHYSICIAN SECTION    Prognosis: Good    Condition at Discharge: Stable    Rehab Potential (if transferring to Rehab): Good    Recommended Labs or Other Treatments After Discharge: resume prior services    Physician Certification: I certify the above information and transfer of Yasmin Sears  is necessary for the continuing treatment of the diagnosis listed and that she requires Home Care for greater 30 days.      Update Admission H&P: No change in H&P    PHYSICIAN SIGNATURE:  Electronically signed by RIAZ Oseguera CNP on 5/15/19 at 4:08 PM/Dr. Brian Lopez

## 2019-05-16 LAB
ALBUMIN SERPL-MCNC: 3.3 G/DL (ref 3.4–5)
ANION GAP SERPL CALCULATED.3IONS-SCNC: 10 MMOL/L (ref 3–16)
APTT: 41.7 SEC (ref 26–36)
APTT: 79.4 SEC (ref 26–36)
APTT: 93.1 SEC (ref 26–36)
BUN BLDV-MCNC: 34 MG/DL (ref 7–20)
CALCIUM SERPL-MCNC: 9.1 MG/DL (ref 8.3–10.6)
CHLORIDE BLD-SCNC: 111 MMOL/L (ref 99–110)
CO2: 18 MMOL/L (ref 21–32)
CREAT SERPL-MCNC: 1.4 MG/DL (ref 0.6–1.2)
EKG ATRIAL RATE: 59 BPM
EKG DIAGNOSIS: NORMAL
EKG P AXIS: -7 DEGREES
EKG P-R INTERVAL: 116 MS
EKG Q-T INTERVAL: 468 MS
EKG QRS DURATION: 82 MS
EKG QTC CALCULATION (BAZETT): 463 MS
EKG R AXIS: 7 DEGREES
EKG T AXIS: 79 DEGREES
EKG VENTRICULAR RATE: 59 BPM
GFR AFRICAN AMERICAN: 43
GFR NON-AFRICAN AMERICAN: 36
GLUCOSE BLD-MCNC: 90 MG/DL (ref 70–99)
PHOSPHORUS: 3.3 MG/DL (ref 2.5–4.9)
POTASSIUM SERPL-SCNC: 3.5 MMOL/L (ref 3.5–5.1)
SODIUM BLD-SCNC: 139 MMOL/L (ref 136–145)

## 2019-05-16 PROCEDURE — 93010 ELECTROCARDIOGRAM REPORT: CPT | Performed by: INTERNAL MEDICINE

## 2019-05-16 PROCEDURE — 6360000002 HC RX W HCPCS: Performed by: INTERNAL MEDICINE

## 2019-05-16 PROCEDURE — 85730 THROMBOPLASTIN TIME PARTIAL: CPT

## 2019-05-16 PROCEDURE — 1200000000 HC SEMI PRIVATE

## 2019-05-16 PROCEDURE — 99233 SBSQ HOSP IP/OBS HIGH 50: CPT | Performed by: INTERNAL MEDICINE

## 2019-05-16 PROCEDURE — 6370000000 HC RX 637 (ALT 250 FOR IP): Performed by: INTERNAL MEDICINE

## 2019-05-16 PROCEDURE — 6370000000 HC RX 637 (ALT 250 FOR IP): Performed by: PHYSICIAN ASSISTANT

## 2019-05-16 PROCEDURE — 2580000003 HC RX 258: Performed by: INTERNAL MEDICINE

## 2019-05-16 PROCEDURE — 93005 ELECTROCARDIOGRAM TRACING: CPT | Performed by: INTERNAL MEDICINE

## 2019-05-16 PROCEDURE — 6370000000 HC RX 637 (ALT 250 FOR IP): Performed by: NURSE PRACTITIONER

## 2019-05-16 PROCEDURE — 36415 COLL VENOUS BLD VENIPUNCTURE: CPT

## 2019-05-16 PROCEDURE — 6360000002 HC RX W HCPCS

## 2019-05-16 PROCEDURE — 80069 RENAL FUNCTION PANEL: CPT

## 2019-05-16 RX ORDER — SODIUM CHLORIDE 0.9 % (FLUSH) 0.9 %
10 SYRINGE (ML) INJECTION PRN
Status: CANCELLED | OUTPATIENT
Start: 2019-05-16

## 2019-05-16 RX ORDER — SODIUM CHLORIDE 0.9 % (FLUSH) 0.9 %
10 SYRINGE (ML) INJECTION EVERY 12 HOURS SCHEDULED
Status: CANCELLED | OUTPATIENT
Start: 2019-05-16

## 2019-05-16 RX ORDER — DIPHENHYDRAMINE HYDROCHLORIDE 50 MG/ML
50 INJECTION INTRAMUSCULAR; INTRAVENOUS ONCE
Status: CANCELLED | OUTPATIENT
Start: 2019-05-16 | End: 2019-05-16

## 2019-05-16 RX ORDER — SODIUM CHLORIDE 9 MG/ML
INJECTION, SOLUTION INTRAVENOUS CONTINUOUS
Status: CANCELLED | OUTPATIENT
Start: 2019-05-16

## 2019-05-16 RX ORDER — ALPRAZOLAM 0.5 MG/1
0.5 TABLET ORAL
Status: CANCELLED | OUTPATIENT
Start: 2019-05-16 | End: 2019-05-16

## 2019-05-16 RX ORDER — ACETYLCYSTEINE 200 MG/ML
1200 SOLUTION ORAL; RESPIRATORY (INHALATION) 2 TIMES DAILY
Status: DISCONTINUED | OUTPATIENT
Start: 2019-05-16 | End: 2019-05-17 | Stop reason: HOSPADM

## 2019-05-16 RX ORDER — HEPARIN SODIUM 1000 [USP'U]/ML
1400 INJECTION, SOLUTION INTRAVENOUS; SUBCUTANEOUS ONCE
Status: COMPLETED | OUTPATIENT
Start: 2019-05-16 | End: 2019-05-16

## 2019-05-16 RX ADMIN — Medication 10 ML: at 20:52

## 2019-05-16 RX ADMIN — ACETYLCYSTEINE 1200 MG: 200 SOLUTION ORAL; RESPIRATORY (INHALATION) at 20:51

## 2019-05-16 RX ADMIN — ISOSORBIDE MONONITRATE 30 MG: 30 TABLET, EXTENDED RELEASE ORAL at 09:00

## 2019-05-16 RX ADMIN — SODIUM CHLORIDE: 4.5 INJECTION, SOLUTION INTRAVENOUS at 00:49

## 2019-05-16 RX ADMIN — SODIUM CHLORIDE 125 ML/HR: 4.5 INJECTION, SOLUTION INTRAVENOUS at 09:00

## 2019-05-16 RX ADMIN — DOCUSATE SODIUM 100 MG: 100 CAPSULE, LIQUID FILLED ORAL at 09:00

## 2019-05-16 RX ADMIN — ACETAMINOPHEN 1000 MG: 500 TABLET ORAL at 20:51

## 2019-05-16 RX ADMIN — HEPARIN SODIUM 1400 UNITS: 1000 INJECTION, SOLUTION INTRAVENOUS; SUBCUTANEOUS at 18:02

## 2019-05-16 RX ADMIN — ASPIRIN 81 MG: 81 TABLET, COATED ORAL at 09:01

## 2019-05-16 RX ADMIN — OYSTER SHELL CALCIUM WITH VITAMIN D 1 TABLET: 500; 200 TABLET, FILM COATED ORAL at 09:00

## 2019-05-16 RX ADMIN — ACETYLCYSTEINE 1200 MG: 200 SOLUTION ORAL; RESPIRATORY (INHALATION) at 16:11

## 2019-05-16 RX ADMIN — AMLODIPINE BESYLATE 5 MG: 5 TABLET ORAL at 09:00

## 2019-05-16 RX ADMIN — DULOXETINE HYDROCHLORIDE 20 MG: 20 CAPSULE, DELAYED RELEASE ORAL at 09:00

## 2019-05-16 RX ADMIN — METOPROLOL SUCCINATE 25 MG: 25 TABLET, EXTENDED RELEASE ORAL at 09:01

## 2019-05-16 RX ADMIN — ACETAMINOPHEN 1000 MG: 500 TABLET ORAL at 09:00

## 2019-05-16 RX ADMIN — VITAMIN D, TAB 1000IU (100/BT) 2000 UNITS: 25 TAB at 09:00

## 2019-05-16 RX ADMIN — PANTOPRAZOLE SODIUM 40 MG: 40 TABLET, DELAYED RELEASE ORAL at 17:54

## 2019-05-16 RX ADMIN — SODIUM CHLORIDE 125 ML/HR: 4.5 INJECTION, SOLUTION INTRAVENOUS at 16:42

## 2019-05-16 ASSESSMENT — PAIN DESCRIPTION - LOCATION: LOCATION: BACK

## 2019-05-16 ASSESSMENT — PAIN SCALES - GENERAL
PAINLEVEL_OUTOF10: 0
PAINLEVEL_OUTOF10: 0
PAINLEVEL_OUTOF10: 3

## 2019-05-16 ASSESSMENT — PAIN DESCRIPTION - DESCRIPTORS: DESCRIPTORS: ACHING;SORE

## 2019-05-16 ASSESSMENT — PAIN DESCRIPTION - PAIN TYPE: TYPE: CHRONIC PAIN

## 2019-05-16 NOTE — PROGRESS NOTES
Clinical Pharmacy Note  Heparin Dosing       Lab Results   Component Value Date    APTT 93.1 05/16/2019     Lab Results   Component Value Date    HGB 11.7 05/15/2019    HCT 35.1 05/15/2019     05/15/2019       Current Infusion Rate: 5.6 mL/hr    Plan:  Decrease rate: 4.7 mL/hr  Next aPTT: 0830 5/16/19    Pharmacy will continue to monitor and adjust based on aPTT results.

## 2019-05-16 NOTE — PROGRESS NOTES
Clinical Pharmacy Note  Heparin Dosing       Lab Results   Component Value Date    APTT 41.7 05/16/2019     Lab Results   Component Value Date    HGB 11.7 05/15/2019    HCT 35.1 05/15/2019     05/15/2019       Current Infusion Rate: 4.7 mL/hr    Plan:  Bolus: 1400 units  Rate: 5.6 mL/hr  Next aPTT: midnight on 5-17-19    Pharmacy will continue to monitor and adjust based on aPTT results.   Michel Capps, [facility]  5/16/2019  5:56 PM

## 2019-05-16 NOTE — PROGRESS NOTES
Hospital Medicine Progress Note      Admit Date: 5/11/2019         Overnight Events: none    CC: F/U for JERRELL    HPI:   This is an 50-year-old female with a history of CKD, GERD, diastolic heart failure who presented to the ED with request from her PCP for abnormal blood tests. Patient recently admitted and discharged by myself secondary to AK I thought to be related to hypotension and ATN. Numbers improved after IV hydration and she followed up with her primary care physician. Her creatinine worsened. Nephrology is following closely. She continues to complain of some chest pains today but describes them as a burning sensation and gets relief with Tums. At her last hospitalization she was discharged on Cardizem however this had been stopped due to mild hypotension. CP consistent with GERD but ordered stress test to complete work up. Stress test showing an area of reversible ischemia. Cardiology was consulted. Plan for possible cardiac catheterization on Friday. Interval History/Subjective:       Review of Systems:     Comprehensive ROS negative except as mentioned above.     Past Medical History:        Diagnosis Date    [facility] kidney [facility] ([facility] [address]     Allergic rhinitis     [facility]     GERD (gastroesophageal reflux disease)     Hearing loss sensory, bilateral     mod--severe on right, mild to severe on left, due to asymmetry will send to get MRI to r/o acoustic neuroma    Mastoiditis     on MRI oct 2014-->dr ortiz states with no clinical findings this is considered an over-sensitivity of MRI results    Osteoarthritis     L1-L5    Osteoporosis     patient was on fosamax x 10 years and drug holiday was started on 6/21/13. (this was date of last DEXA scan)    Scoliosis        Past Surgical History:        Procedure Laterality Date    CARPAL TUNNEL RELEASE      right, 2003    COLONOSCOPY      2013, dr Teresa Elliott    COLONOSCOPY N/A 10/19/2018    COLONOSCOPY WITH BIOPSY performed by Reyes Bond, MD at [facility]      December 27 2017 and feb 6 2018   [address]      2011    NASAL SINUS SURGERY      UPPER GASTROINTESTINAL ENDOSCOPY N/A 4/4/2019    EGD BIOPSY performed by Reyes Bond, MD at [address]:  Patient has no known allergies. Past medical and surgical history reviewed. Any changes have been noted. Scheduled and prn Medications:    Scheduled Meds:   aspirin  81 mg Oral Daily    isosorbide mononitrate  30 mg Oral Daily    metoprolol succinate  25 mg Oral Daily    pantoprazole  40 mg Oral QPM    DULoxetine  20 mg Oral Daily    calcium-vitamin D  1 tablet Oral Daily    vitamin D  2,000 Units Oral Daily    sodium chloride flush  10 mL Intravenous 2 times per day    docusate sodium  100 mg Oral BID    acetaminophen  1,000 mg Oral BID     Continuous Infusions:   sodium chloride 125 mL/hr (05/16/19 0900)    heparin (porcine) 4.7 mL/hr (05/16/19 0235)     PRN Meds:.nitroGLYCERIN, calcium carbonate, cloNIDine, albuterol sulfate HFA, sodium chloride flush, ondansetron, polyethylene glycol    PHYSICAL EXAM:  BP (!) 109/57   Pulse 64   Temp 97.9 °F (36.6 °C) (Oral)   Resp 12   Ht 4' 8\" (1.422 m)   Wt 101 lb 6.6 oz (46 kg)   SpO2 95%   BMI 22.74 kg/m²       Intake/Output Summary (Last 24 hours) at 5/16/2019 1204  Last data filed at 5/16/2019 1048  Gross per 24 hour   Intake 2306.97 ml   Output --   Net 2306.97 ml       General: Alert and oriented. Sitting in no apparent distress. HEENT: Normocephalic. Atraumatic. Pupils equal and reactive. EOM intact. Oral mucosa pink/moist/intact. Neck: Supple. Symmetrical. Trachea midline. Lungs: Clear to auscultation bilaterally. Respirations even and unlabored. Chest: Exam unremarkable. Cardiac: S1/S2 noted. Regular Rhythm and rate. Abdomen/GI: Soft. Non-tender. Non-distended. BS+. Extremities: PP+. Atraumatic. No redness/cyanosis/edema noted.  Brisk cap refill. Skin: Dry and intact. No lesions noted. Neuro: Grossly intact. No focal deficits noted. LABS:    Lab Results   Component Value Date    WBC 10.4 05/15/2019    HGB 11.7 (L) 05/15/2019    HCT 35.1 (L) 05/15/2019    MCV 89.7 05/15/2019     (H) 05/15/2019    LYMPHOPCT 23.7 05/13/2019    RBC 3.92 (L) 05/15/2019    MCH 29.8 05/15/2019    MCHC 33.2 05/15/2019    RDW 13.7 05/15/2019       Lab Results   Component Value Date    CREATININE 1.4 (H) 05/16/2019    BUN 34 (H) 05/16/2019     05/16/2019    K 3.5 05/16/2019     (H) 05/16/2019    CO2 18 (L) 05/16/2019       Lab Results   Component Value Date    MG 2.00 05/14/2019       Lab Results   Component Value Date    ALT 12 05/13/2019    AST 15 05/13/2019    ALKPHOS 86 05/13/2019    BILITOT <0.2 05/13/2019        No flowsheet data found. Imaging:  NM MYOCARDIAL SPECT REST EXERCISE OR RX   Final Result          Assessment & Plan:      Atypical chest pain, thought to be related to Gastritis with GERD  - initial thought to be related to esophageal spasms, started on CCB but bp became low with this  - states that tums helps \"every time\"  - most recent EGD with possible barrets and gastritis  - needs PPI but would wait until kidney function improved. - cont tums and prn gi cocktail ordered   - Stress test positive with an area of possible ischemia  - Cardiology consulted  - Medications adjusted, started on heparin drip and plan for possible angiogram on Friday, this was D/w Dr. Jose Alberto Campo yesterday and today with Dr. Hermosillo Mail    JERRELL on Chronic Kidney Disease stage 3  - Avoid nephrotoxic medications as able. Renally dose medications.   - thought to be related to ATN with multiple insults over the past few weeks  - Nephrology following closely  - now with metabolic acidosis -resolved      Asymptomatic bacteruria   - pyuria noted on intial UA, no fevers and no leukocytosis and no sx  - resolved     Chronic Diastolic Heart Failure with preserved EF of 55-60%  - Daily weights  - Strict intake/output  - be judicious with fluids please     Osteoporosis  - Vit D/Calcium      Body mass index is 22.74 kg/m². The patient and / or the family were informed of the results of any tests, a time was given to answer questions, a plan was proposed and they agreed with plan.     DVT prophylaxis: [] Lovenox  [] SQ Heparin  [] SCDs because of  [] warfarin/oral direct thrombin inhibitor [] Encourage ambulation    GI prophylaxis: [] PPI/V0xqephto  [] not indicated    Probiotic if on abx: [] Yes [] No [] Not Indicated    Diet: DIET GENERAL;    Consults:  IP CONSULT TO NEPHROLOGY  IP CONSULT TO DIETITIAN  IP CONSULT TO CARDIOLOGY  IP CONSULT TO HOME CARE NEEDS    Disposition:  [] Home  [] Home with home health [] Rehab [] Psych [] SNF  [] LTAC  [] Long term nursing home or group home [] Transfer to ICU  [] Transfer to PCU [] Other:    Code Status: Prior    ELOS: RIAZ Dunn - SELENA  05/16/19

## 2019-05-16 NOTE — PROGRESS NOTES
[facility]  Nephrology   Progress Note        We are following the patient for JERRELL  82 y/o WF with h/o recent episodes of JERRELL in  and again last week when she was suspected to have ATN Cr had improved to 1.7 mg at the time of discharge on 19  She saw her PCP and labs done showed Cr had increased to 3 mg She was asked to come to the ER where Cr was 2.4 mg      SUBJECTIVE:  Slept well last night  No CP   Stress test abnormal and cardiology planning C Angiogram  Started on IVF's  Cr 1.4 mg today    Family at bedside    OBJECTIVE:     PHYSICAL:    TEMPERATURE:  Current - Temp: 97.9 °F (36.6 °C);  Max - Temp  Av.8 °F (36.6 °C)  Min: 97.4 °F (36.3 °C)  Max: 98.4 °F (36.9 °C)  RESPIRATIONS RANGE: Resp  Avg: 15.8  Min: 12  Max: 18  PULSE RANGE: Pulse  Av.7  Min: 59  Max: 78  BLOOD PRESSURE RANGE:  Systolic (53EVG), RENEE:951 , Min:96 , LGK:071   ; Diastolic (65UUO), SVA:77, Min:50, Max:60    PULSE OXIMETRY RANGE: SpO2  Av %  Min: 95 %  Max: 100 %  24HR INTAKE/OUTPUT:      Intake/Output Summary (Last 24 hours) at 2019 1229  Last data filed at 2019 1048  Gross per 24 hour   Intake 2306.97 ml   Output --   Net 2306.97 ml       CONSTITUTIONAL:  awake, alert, cooperative, no apparent distress, and appears stated age  HEENT:  Lids and lashes normal, pupils equal, round and reactive to light, extra ocular muscles intact, sclera clear, conjunctiva normal  NECK:  supple, symmetrical, trachea midline  LUNGS:  No increased work of breathing, good air exchange, clear to auscultation bilaterally, no crackles or wheezing  CARDIOVASCULAR:  Normal apical impulse, regular rate and rhythm, normal S1 and S2  ABDOMEN:  No scars, normal bowel sounds, soft, non-distended  NEUROLOGIC:  alert, oriented, normal speech, no focal findings  SKIN: no bruising or bleeding  EXTREMITIES: no edema    Medications     sodium chloride 125 mL/hr (19 0900)    heparin (porcine) 4.7 mL/hr (19 2025)        Data      CBC:   Recent Labs     05/14/19  0709 05/15/19  1723   WBC  --  10.4   RBC  --  3.92*   HGB 11.1* 11.7*   HCT 33.3* 35.1*   PLT  --  456*     BMP:    Recent Labs     05/14/19  0709 05/15/19  0701 05/16/19  0842    143 139   K 4.0 4.2 3.5   * 115* 111*   CO2 16* 19* 18*   BUN 45* 46* 34*   CREATININE 1.7* 1.6* 1.4*   CALCIUM 9.5 9.9 9.1   GLUCOSE 103* 104* 90     Phosphorus:    Recent Labs     05/14/19  0709 05/15/19  0701 05/16/19  0842   PHOS 3.1 3.0 3.3     Magnesium:    Recent Labs     05/14/19  0709   MG 2.00         ASSESSMENT     Patient Active Problem List   Diagnosis    Osteoarthritis    Osteoporosis    Allergic rhinitis    Scoliosis    Hearing loss sensory, bilateral    Asymmetrical sensorineural hearing loss    Cerumen impaction    Chronic mastoiditis    Thyroid nodule    Gastritis    JERRELL (acute kidney injury) ([address]    Acute renal failure superimposed on stage 3 chronic kidney disease (HCC)    Acute prerenal azotemia    Generalized weakness    CKD (chronic kidney disease)       PLAN    1-JERRELL with recent episodes of JERRELL suspected to be ATN on last admission  Cr was 1.7 mg on discharge on 5/6/19  Etiology of JERRELL unclear U/A benign with small LE and pyuria  CX skin shelby only No blood, US in 4/19 was unremarkable Urine Na+ 59 Urine eosinophils negative SPEP no monoclonal spike Cr improving, 1.6 mg today  Off of IVF's Good UOP suspect JERRELL related to ATN, recovering Cr 1.4 mg today Plan for C Angiogram noted D/W Dr Dixon Folds yesterday Allow renal function to recover more before angiogram due to high risk of worsening renal function in the setting of recovering ATN Cr 1.4 mg today Started IVF's Start Mucomyst If Cr is better  tomorrow she can proceed with Angiogram D/W pt and family in detail They understand the risks D/W Dr Daylin Kirkot IV dye load No LV Gram   2-HTN BP better on Cardizem Monitor BP and HR   3-Esophageal spasms improved with  Protonix  4 Ch diastolic CHF compensated  5 Chest pain Plans as above  6 Met ACidosis hyperchloremic,on 1/2 NS       Basia Love MD, Christoph Nance

## 2019-05-16 NOTE — PROGRESS NOTES
[facility]  Inpatient Progress Note    CC:     Chest pain              HPI:   This is a 81 y.o. female  with chest pain who we are asked to see because of abnormal stress test.  The patient. She was originally admitted because of abnormal renal functions. Her T was found to have creatinine of 3. This is second time in several weeks where her kidney functions have shot up to high levels. Her baseline kidney functions are normal.     The patient has a very strong family history for coronary disease. She has noticed this chest pain for the last 5 months. Lately it has occurred much more frequently and severe. 2 nights ago she was had 3 episodes of chest pain. Interestingly, these episodes occur at nighttime and are relieved with Tums. However, the patient has been on Prilosec on various doses has had an upper endoscopy which has been negative. Her functional abilities are limited because of hip pains but she does not recall having any chest pain with activities of daily living or physical stress. .  She does get the pain when she is exposed to cold weather, or has cold drinks.     A brother and sister and her mother had coronary artery bypass surgery.       Interval history  No further chest pain.   Getting IV hydration        Review of Systems -   Constitutional: Negative for weight gain/loss; malaise, fever  Respiratory: Negative for Asthma;  cough and hemoptysis  Cardiovascular: Negative for palpitations,dizziness   Gastrointestinal: Negative for abd.pain; constipation/diarrhea;    Genitourinary: Negative for stones; hematuria; frequency hesitancy  Integumentt: Negative for rash or pruritis  Hematologic/lymphatic: Negative for blood dyscrasia; leukemia/lymphoma  Musculoskeletal: Negative for Connective tissue disease  Neurological:  Negative for Seizure   Behavioral/Psych:Negative for Bipolar disorder, Schizophrenia; Dementia  Endocrine: negative for thyroid, parathyroid disease      Intake/Output Summary (Last 24 hours) at 5/16/2019 1725  Last data filed at 5/16/2019 1048  Gross per 24 hour   Intake 2306.97 ml   Output --   Net 2306.97 ml         Physical Examination:    BP (!) 109/57   Pulse 64   Temp 97.9 °F (36.6 °C) (Oral)   Resp 12   Ht 4' 8\" (1.422 m)   Wt 101 lb 6.6 oz (46 kg)   SpO2 95%   BMI 22.74 kg/m²   HEENT:  Face: Atraumatic, Conjunctiva: Pink; non icteric,  Mucous Memb:  Moist, No thyromegaly or Lymphadenopathy  Respiratory:  Resp Assessment: normal, Resp Auscultation: clear   Cardiovascular: Auscultation: nl S1 & S2, Palpation:  Nl PMI;  No heaves or thrills, JVP:  normal  Abdomen: Soft, non-tender, Normal bowel sounds,  No organomegaly  Extremities: No Cyanosis or Clubbing; Edema none  Neurological: Oriented to time, place, and person, Non-anxious  Psychiatric: Normal mood and affect  Skin: Warm and dry,  No rash seen    Current Facility-Administered Medications: acetylcysteine (MUCOMYST) 20 % solution 1,200 mg, 1,200 mg, Oral, BID  aspirin EC tablet 81 mg, 81 mg, Oral, Daily  isosorbide mononitrate (IMDUR) extended release tablet 30 mg, 30 mg, Oral, Daily  0.45 % sodium chloride infusion, , Intravenous, Continuous  nitroGLYCERIN (NITROSTAT) SL tablet 0.4 mg, 0.4 mg, Sublingual, Q5 Min PRN  metoprolol succinate (TOPROL XL) extended release tablet 25 mg, 25 mg, Oral, Daily  heparin 25,000 units in dextrose 5% 250 mL infusion, 4.7 mL/hr, Intravenous, Continuous  pantoprazole (PROTONIX) tablet 40 mg, 40 mg, Oral, QPM  calcium carbonate (TUMS) chewable tablet 500 mg, 500 mg, Oral, TID PRN  DULoxetine (CYMBALTA) extended release capsule 20 mg, 20 mg, Oral, Daily  cloNIDine (CATAPRES) tablet 0.1 mg, 0.1 mg, Oral, Q4H PRN  albuterol sulfate  (90 Base) MCG/ACT inhaler 2 puff, 2 puff, Inhalation, Q6H PRN  calcium-vitamin D 500-200 MG-UNIT per tablet 1 tablet, 1 tablet, Oral, Daily  vitamin D (CHOLECALCIFEROL) tablet 2,000 Units, 2,000 Units, Oral, Daily  sodium chloride flush 0.9 % injection 10 mL, 10 mL, Intravenous, 2 times per day  sodium chloride flush 0.9 % injection 10 mL, 10 mL, Intravenous, PRN  ondansetron (ZOFRAN) injection 4 mg, 4 mg, Intravenous, Q6H PRN  polyethylene glycol (GLYCOLAX) packet 17 g, 17 g, Oral, Daily PRN  docusate sodium (COLACE) capsule 100 mg, 100 mg, Oral, BID  acetaminophen (TYLENOL) tablet 1,000 mg, 1,000 mg, Oral, BID         Labs:   Recent Labs     05/14/19  0709 05/15/19  1723   WBC  --  10.4   HGB 11.1* 11.7*   HCT 33.3* 35.1*   PLT  --  456*     Recent Labs     05/15/19  0701 05/16/19  0842    139   K 4.2 3.5   CO2 19* 18*   BUN 46* 34*   CREATININE 1.6* 1.4*   LABGLOM 31* 36*     No results for input(s): BNP in the last 72 hours. No results for input(s): PROTIME, INR in the last 72 hours. Recent Labs     05/15/19  0701 05/15/19  1038   TROPONINI <0.01 <0.01           EKG:   Normal sinus rhythm  New ECG done today shows T wave inversions in the precordial leads which are new        ECHO:   Overall left ventricular function is normal.  LVEF is 55 to 60%  Ggrade I diastolic dysfunction.   Mitral leaflets are slightly thickened.  Trivial MR   The left atrium is moderately dilated.   Mild TR. Normal RVSP   Definity was used during this study.     Stress Nuclear:   1. Technically a satisfactory study.    2. Normal pharmacological stress portion of the study.    3. Small size moderate intensity reversible defect seen distal anterior    septal wall and apex which suggests ischemia. However there is significant    breast attenuation artifact which could affect the accuracy of the study.    4. Gated Study shows normal LV size and Systolic function; EF is 70 %.          ASSESSMENT AND PLAN:        Unstable Angina  81year-old patient with chest pain and a positive stress test. They're typical and atypical features to the chest pain  The atypical features are that it occurs at night and is relieved with Tums and it is sharp;

## 2019-05-17 VITALS
HEART RATE: 62 BPM | WEIGHT: 102.07 LBS | TEMPERATURE: 97.7 F | DIASTOLIC BLOOD PRESSURE: 54 MMHG | RESPIRATION RATE: 16 BRPM | SYSTOLIC BLOOD PRESSURE: 113 MMHG | BODY MASS INDEX: 22.96 KG/M2 | OXYGEN SATURATION: 96 % | HEIGHT: 56 IN

## 2019-05-17 LAB
ALBUMIN SERPL-MCNC: 3.3 G/DL (ref 3.4–5)
ANION GAP SERPL CALCULATED.3IONS-SCNC: 13 MMOL/L (ref 3–16)
APTT: 48.4 SEC (ref 26–36)
APTT: 55.6 SEC (ref 26–36)
APTT: 62.4 SEC (ref 26–36)
BUN BLDV-MCNC: 30 MG/DL (ref 7–20)
CALCIUM SERPL-MCNC: 9.1 MG/DL (ref 8.3–10.6)
CHLORIDE BLD-SCNC: 116 MMOL/L (ref 99–110)
CO2: 15 MMOL/L (ref 21–32)
CREAT SERPL-MCNC: 1.3 MG/DL (ref 0.6–1.2)
EKG ATRIAL RATE: 68 BPM
EKG DIAGNOSIS: NORMAL
EKG P AXIS: 63 DEGREES
EKG P-R INTERVAL: 134 MS
EKG Q-T INTERVAL: 410 MS
EKG QRS DURATION: 80 MS
EKG QTC CALCULATION (BAZETT): 435 MS
EKG R AXIS: 13 DEGREES
EKG T AXIS: 74 DEGREES
EKG VENTRICULAR RATE: 68 BPM
GFR AFRICAN AMERICAN: 47
GFR NON-AFRICAN AMERICAN: 39
GLUCOSE BLD-MCNC: 93 MG/DL (ref 70–99)
MAGNESIUM: 2 MG/DL (ref 1.8–2.4)
PHOSPHORUS: 2.4 MG/DL (ref 2.5–4.9)
PLATELET # BLD: 368 K/UL (ref 135–450)
POTASSIUM SERPL-SCNC: 3.6 MMOL/L (ref 3.5–5.1)
SODIUM BLD-SCNC: 144 MMOL/L (ref 136–145)

## 2019-05-17 PROCEDURE — 93005 ELECTROCARDIOGRAM TRACING: CPT | Performed by: FAMILY MEDICINE

## 2019-05-17 PROCEDURE — 85730 THROMBOPLASTIN TIME PARTIAL: CPT

## 2019-05-17 PROCEDURE — 83735 ASSAY OF MAGNESIUM: CPT

## 2019-05-17 PROCEDURE — 80069 RENAL FUNCTION PANEL: CPT

## 2019-05-17 PROCEDURE — 6360000002 HC RX W HCPCS: Performed by: INTERNAL MEDICINE

## 2019-05-17 PROCEDURE — 94760 N-INVAS EAR/PLS OXIMETRY 1: CPT

## 2019-05-17 PROCEDURE — 99233 SBSQ HOSP IP/OBS HIGH 50: CPT | Performed by: INTERNAL MEDICINE

## 2019-05-17 PROCEDURE — 85049 AUTOMATED PLATELET COUNT: CPT

## 2019-05-17 PROCEDURE — 6370000000 HC RX 637 (ALT 250 FOR IP): Performed by: INTERNAL MEDICINE

## 2019-05-17 PROCEDURE — 6370000000 HC RX 637 (ALT 250 FOR IP): Performed by: PHYSICIAN ASSISTANT

## 2019-05-17 PROCEDURE — 36415 COLL VENOUS BLD VENIPUNCTURE: CPT

## 2019-05-17 PROCEDURE — 93010 ELECTROCARDIOGRAM REPORT: CPT | Performed by: INTERNAL MEDICINE

## 2019-05-17 PROCEDURE — 2580000003 HC RX 258: Performed by: INTERNAL MEDICINE

## 2019-05-17 PROCEDURE — 6370000000 HC RX 637 (ALT 250 FOR IP): Performed by: NURSE PRACTITIONER

## 2019-05-17 RX ORDER — ATORVASTATIN CALCIUM 10 MG/1
10 TABLET, FILM COATED ORAL NIGHTLY
Qty: 30 TABLET | Refills: 3 | Status: SHIPPED | OUTPATIENT
Start: 2019-05-17 | End: 2022-08-22 | Stop reason: SDUPTHER

## 2019-05-17 RX ORDER — NITROGLYCERIN 0.4 MG/1
TABLET SUBLINGUAL
Qty: 25 TABLET | Refills: 3 | Status: SHIPPED | OUTPATIENT
Start: 2019-05-17

## 2019-05-17 RX ORDER — ISOSORBIDE MONONITRATE 30 MG/1
30 TABLET, EXTENDED RELEASE ORAL DAILY
Qty: 30 TABLET | Refills: 3 | Status: SHIPPED | OUTPATIENT
Start: 2019-05-18 | End: 2019-06-20 | Stop reason: ALTCHOICE

## 2019-05-17 RX ORDER — ATORVASTATIN CALCIUM 10 MG/1
10 TABLET, FILM COATED ORAL NIGHTLY
Status: DISCONTINUED | OUTPATIENT
Start: 2019-05-17 | End: 2019-05-17 | Stop reason: HOSPADM

## 2019-05-17 RX ORDER — HEPARIN SODIUM 1000 [USP'U]/ML
1400 INJECTION, SOLUTION INTRAVENOUS; SUBCUTANEOUS ONCE
Status: COMPLETED | OUTPATIENT
Start: 2019-05-17 | End: 2019-05-17

## 2019-05-17 RX ORDER — METOPROLOL SUCCINATE 25 MG/1
25 TABLET, EXTENDED RELEASE ORAL DAILY
Qty: 30 TABLET | Refills: 3 | Status: SHIPPED | OUTPATIENT
Start: 2019-05-18 | End: 2022-08-22 | Stop reason: SDUPTHER

## 2019-05-17 RX ORDER — ASPIRIN 81 MG/1
81 TABLET ORAL DAILY
Qty: 30 TABLET | Refills: 3 | Status: SHIPPED | OUTPATIENT
Start: 2019-05-18

## 2019-05-17 RX ADMIN — OYSTER SHELL CALCIUM WITH VITAMIN D 1 TABLET: 500; 200 TABLET, FILM COATED ORAL at 08:07

## 2019-05-17 RX ADMIN — DOCUSATE SODIUM 100 MG: 100 CAPSULE, LIQUID FILLED ORAL at 08:06

## 2019-05-17 RX ADMIN — METOPROLOL SUCCINATE 25 MG: 25 TABLET, EXTENDED RELEASE ORAL at 08:07

## 2019-05-17 RX ADMIN — NITROGLYCERIN 0.4 MG: 0.4 TABLET, ORALLY DISINTEGRATING SUBLINGUAL at 07:08

## 2019-05-17 RX ADMIN — SODIUM CHLORIDE: 4.5 INJECTION, SOLUTION INTRAVENOUS at 10:12

## 2019-05-17 RX ADMIN — ACETYLCYSTEINE 1200 MG: 200 SOLUTION ORAL; RESPIRATORY (INHALATION) at 08:45

## 2019-05-17 RX ADMIN — ISOSORBIDE MONONITRATE 30 MG: 30 TABLET, EXTENDED RELEASE ORAL at 08:07

## 2019-05-17 RX ADMIN — SODIUM CHLORIDE: 4.5 INJECTION, SOLUTION INTRAVENOUS at 00:29

## 2019-05-17 RX ADMIN — NITROGLYCERIN 0.4 MG: 0.4 TABLET, ORALLY DISINTEGRATING SUBLINGUAL at 05:16

## 2019-05-17 RX ADMIN — VITAMIN D, TAB 1000IU (100/BT) 2000 UNITS: 25 TAB at 08:07

## 2019-05-17 RX ADMIN — DULOXETINE HYDROCHLORIDE 20 MG: 20 CAPSULE, DELAYED RELEASE ORAL at 08:07

## 2019-05-17 RX ADMIN — ASPIRIN 81 MG: 81 TABLET, COATED ORAL at 08:07

## 2019-05-17 RX ADMIN — ACETAMINOPHEN 1000 MG: 500 TABLET ORAL at 08:06

## 2019-05-17 RX ADMIN — HEPARIN SODIUM 1400 UNITS: 1000 INJECTION, SOLUTION INTRAVENOUS; SUBCUTANEOUS at 15:08

## 2019-05-17 ASSESSMENT — PAIN SCALES - GENERAL
PAINLEVEL_OUTOF10: 0

## 2019-05-17 NOTE — PROGRESS NOTES
Stat EKG complete. Pt tolerated well. Satisfied with relief of her chest pain with Nitro. Current pain level decreased to 2/10. No signs of symptoms of distress noted. Will continue to monitor.

## 2019-05-17 NOTE — PROGRESS NOTES
Hospital Medicine Discharge Summary      Patient ID: Cori Lemos      Patient's PCP: Jean Pepper MD    Admit Date: 5/11/2019     Discharge Date:   05/17/19     Admitting Physician: Tobin Wagner MD     Discharge Provider: Patience Curling, APRN - CNP     Discharge Diagnoses: Active Hospital Problems    Diagnosis Date Noted    CKD (chronic kidney disease) [N18.9] 05/11/2019       Disposition:  [] Home  [] Home with home health [] Rehab [] Psych [] SNF  [] LTAC  [] Long term nursing home or group home [] Transfer to ICU  [] Transfer to PCU [x] Other: transfer to another facility    Jean Pepper MD  [address] [place] [address] [id number] [place] [address], [address] [place] [address]  [phone]    In 2 weeks  Kidney follow up and you need to have blood work done on Friday for close follow up    Jean Pepper MD  [address] [place] [address]  [phone]    Schedule an appointment as soon as possible for a visit in 1 week  for hospital follow up       Discharge Condition: stable      Code Status:  Prior     Activity: activity as tolerated    Diet: DIET GENERAL;     Discharge Medications:     Current Discharge Medication List           Details   isosorbide mononitrate (IMDUR) 30 MG extended release tablet Take 1 tablet by mouth daily  Qty: 30 tablet, Refills: 3      nitroGLYCERIN (NITROSTAT) 0.4 MG SL tablet up to max of 3 total doses. If no relief after 1 dose, call 911.   Qty: 25 tablet, Refills: 3      aspirin 81 MG EC tablet Take 1 tablet by mouth daily  Qty: 30 tablet, Refills: 3      atorvastatin (LIPITOR) 10 MG tablet Take 1 tablet by mouth nightly  Qty: 30 tablet, Refills: 3      metoprolol succinate (TOPROL XL) 25 MG extended release tablet Take 1 tablet by mouth daily  Qty: 30 tablet, Refills: 3      pantoprazole (PROTONIX) 40 MG tablet Take 1 tablet by mouth every evening  Qty: 30 tablet, Refills: 3      diltiazem (CARDIZEM CD) 120 MG extended release capsule Take 1 capsule by mouth daily  Qty: 30 capsule, Refills: 3              Details   acetaminophen (TYLENOL) 500 MG tablet Take 1,000 mg by mouth 2 times daily       polyethylene glycol (MIRALAX) packet Take 17 g by mouth daily as needed for Constipation      DULoxetine (CYMBALTA) 20 MG extended release capsule Take 1 capsule by mouth daily  Qty: 30 capsule, Refills: 0      risedronate (ACTONEL) 30 MG tablet Take 30 mg by mouth every 7 days Saturdays      vitamin D (CHOLECALCIFEROL) 1000 units TABS tablet Take 2,000 Units by mouth daily       Docusate Calcium (STOOL SOFTENER PO) Take 100 mg by mouth 2 times daily       Calcium Citrate-Vitamin D (CALCIUM + D PO) Take 500 mg by mouth daily       albuterol sulfate  (90 Base) MCG/ACT inhaler Inhale 2 puffs into the lungs every 6 hours as needed for Wheezing (Chest tightness)  Qty: 1 Inhaler, Refills: 1             The patient was seen and examined on day of discharge and this discharge summary is in conjunction with any daily progress note from day of discharge. Hospital Course: This is an 46-year-old female with a history of CKD, GERD, diastolic heart failure who presented to the ED with request from her PCP for abnormal blood tests. She has also had ongoing intermittent bouts of chest pain. Over the past 6 weeks patient's had 3 acute kidney injuries that were thought to be explained by prerenal cause with blood pressure medications. It was later found that it was likely related to acute tubular necrosis. She was seen and evaluated by nephrology. Kidney function is now at a normal range. During her hospital stay she also complained of intermittent bouts of chest pain that were initially thought to be related to reflux however has had a full workup as an outpatient with EGD. Biopsies concern for chronic inflammation and Foley's esophagus.  She was started on PPI at night and initially did help her second however they did return. Cardiac stress test was performed showing mild acute reversible ischemia and cardiology was consulted. They recommended to have a cardiac catheterization however family and patient were very concerned regarding her kidney function and wanted to get a second opinion. Patient was transferred to [facility] at their request. She was started on a heparin drip and will continue this therapy until seen and evaluated by outlying facility. She is verbalized understanding and is in agreement with plan of care. She will need to have her blood tests followed closely for her kidney function and we'll need to schedule an appointment in 2 weeks with nephrology, Dr. Amari Brian. Exam:     BP (!) 113/54   Pulse 62   Temp 97.7 °F (36.5 °C) (Oral)   Resp 16   Ht 4' 8\" (1.422 m)   Wt 102 lb 1.2 oz (46.3 kg)   SpO2 96%   BMI 22.88 kg/m²     General: Resting in bed in no apparent distress. HEENT: Normocephalic. Atraumatic. Pupils equal and reactive. EOM intact. Oral mucosa pink/moist/intact. Neck: Supple. Symmetrical. Trachea midline. Lungs: Clear to auscultation bilaterally. Respirations even and unlabored. Chest: Exam unremarkable. Cardiac: S1/S2 noted. Regular Rhythm and rate. Abdomen/GI: Soft. Non-tender. Non-distended. BS+. Extremities: PP+. Atraumatic. No redness/cyanosis/edema noted. Brisk cap refill. Skin: Dry and intact. No lesions noted. Neuro: Grossly intact. No focal deficits noted. Consults:     IP CONSULT TO NEPHROLOGY  IP CONSULT TO DIETITIAN  IP CONSULT TO CARDIOLOGY  IP CONSULT TO HOME CARE NEEDS    Significant Diagnostic Studies:   Stress test      Labs:  For convenience and continuity at follow-up the following most recent labs are provided:    CBC:    Lab Results   Component Value Date    WBC 10.4 05/15/2019    HGB 11.7 05/15/2019    HCT 35.1 05/15/2019     05/17/2019       Renal:    Lab Results   Component Value Date     05/17/2019    K 3.6 05/17/2019    K 4.5 05/13/2019     05/17/2019    CO2 15 05/17/2019    BUN 30 05/17/2019    CREATININE 1.3 05/17/2019    CALCIUM 9.1 05/17/2019    PHOS 2.4 05/17/2019       Future Appointments   Date Time Provider Nataliia Arnold   8/8/2019  2:20 PM Mir Mcdonough MD [facility]       Time Spent on discharge is more than 30 minutes in the examination, evaluation, counseling and review of medications and discharge plan. RX monitoring reviewed    Signed:    RIAZ Lugo - CNP   5/17/2019      Thank you Margrett Olszewski, MD for the opportunity to be involved in this patient's care. If you have any questions or concerns please feel free to contact me at [phone].

## 2019-05-17 NOTE — PROGRESS NOTES
Pt complains of mid sternal chest pain after returning from the bathroom. Rates pain 8/10. Administered Nitro SL. Respiratory notified of stat EKG order. Will continue to monitor.

## 2019-05-17 NOTE — PROGRESS NOTES
[facility]  Inpatient Progress Note    CC:     Chest pain              HPI:   This is a 81 y.o. female  with chest pain who we are asked to see because of abnormal stress test.  The patient. She was originally admitted because of abnormal renal functions. Her T was found to have creatinine of 3. This is second time in several weeks where her kidney functions have shot up to high levels. Her baseline kidney functions are normal.     The patient has a very strong family history for coronary disease. She has noticed this chest pain for the last 5 months. Lately it has occurred much more frequently and severe. 2 nights ago she was had 3 episodes of chest pain. Interestingly, these episodes occur at nighttime and are relieved with Tums. However, the patient has been on Prilosec on various doses has had an upper endoscopy which has been negative. Her functional abilities are limited because of hip pains but she does not recall having any chest pain with activities of daily living or physical stress. .  She does get the pain when she is exposed to cold weather, or has cold drinks.     A brother and sister and her mother had coronary artery bypass surgery.       Interval history  Had 2 episodes of chest pain this morning.   The patient was given sublingual nitroglycerin with immediate relief of the discomfort  Getting IV hydration        Review of Systems -   Constitutional: Negative for weight gain/loss; malaise, fever  Respiratory: Negative for Asthma;  cough and hemoptysis  Cardiovascular: Negative for palpitations,dizziness   Gastrointestinal: Negative for abd.pain; constipation/diarrhea;    Genitourinary: Negative for stones; hematuria; frequency hesitancy  Integumentt: Negative for rash or pruritis  Hematologic/lymphatic: Negative for blood dyscrasia; leukemia/lymphoma  Musculoskeletal: Negative for Connective tissue disease  Neurological:  Negative for Seizure   Behavioral/Psych:Negative for Bipolar disorder, Schizophrenia; Dementia  Endocrine: negative for thyroid, parathyroid disease      Intake/Output Summary (Last 24 hours) at 5/17/2019 0758  Last data filed at 5/17/2019 0600  Gross per 24 hour   Intake 3684.34 ml   Output --   Net 3684.34 ml         Physical Examination:    /69   Pulse 72   Temp 97.5 °F (36.4 °C) (Oral)   Resp 12   Ht 4' 8\" (1.422 m)   Wt 102 lb 1.2 oz (46.3 kg)   SpO2 97%   BMI 22.88 kg/m²   HEENT:  Face: Atraumatic, Conjunctiva: Pink; non icteric,  Mucous Memb:  Moist, No thyromegaly or Lymphadenopathy  Respiratory:  Resp Assessment: normal, Resp Auscultation: clear   Cardiovascular: Auscultation: nl S1 & S2, Palpation:  Nl PMI;  No heaves or thrills, JVP:  normal  Abdomen: Soft, non-tender, Normal bowel sounds,  No organomegaly  Extremities: No Cyanosis or Clubbing; Edema none  Neurological: Oriented to time, place, and person, Non-anxious  Psychiatric: Normal mood and affect  Skin: Warm and dry,  No rash seen    Current Facility-Administered Medications: acetylcysteine (MUCOMYST) 20 % solution 1,200 mg, 1,200 mg, Oral, BID  aspirin EC tablet 81 mg, 81 mg, Oral, Daily  isosorbide mononitrate (IMDUR) extended release tablet 30 mg, 30 mg, Oral, Daily  0.45 % sodium chloride infusion, , Intravenous, Continuous  nitroGLYCERIN (NITROSTAT) SL tablet 0.4 mg, 0.4 mg, Sublingual, Q5 Min PRN  metoprolol succinate (TOPROL XL) extended release tablet 25 mg, 25 mg, Oral, Daily  heparin 25,000 units in dextrose 5% 250 mL infusion, 5.6 mL/hr, Intravenous, Continuous  pantoprazole (PROTONIX) tablet 40 mg, 40 mg, Oral, QPM  calcium carbonate (TUMS) chewable tablet 500 mg, 500 mg, Oral, TID PRN  DULoxetine (CYMBALTA) extended release capsule 20 mg, 20 mg, Oral, Daily  cloNIDine (CATAPRES) tablet 0.1 mg, 0.1 mg, Oral, Q4H PRN  albuterol sulfate  (90 Base) MCG/ACT inhaler 2 puff, 2 puff, Inhalation, Q6H PRN  calcium-vitamin D 500-200 MG-UNIT per tablet 1 tablet, 1 tablet, Oral, Daily  vitamin D (CHOLECALCIFEROL) tablet 2,000 Units, 2,000 Units, Oral, Daily  sodium chloride flush 0.9 % injection 10 mL, 10 mL, Intravenous, 2 times per day  sodium chloride flush 0.9 % injection 10 mL, 10 mL, Intravenous, PRN  ondansetron (ZOFRAN) injection 4 mg, 4 mg, Intravenous, Q6H PRN  polyethylene glycol (GLYCOLAX) packet 17 g, 17 g, Oral, Daily PRN  docusate sodium (COLACE) capsule 100 mg, 100 mg, Oral, BID  acetaminophen (TYLENOL) tablet 1,000 mg, 1,000 mg, Oral, BID         Labs:   Recent Labs     05/15/19  1723   WBC 10.4   HGB 11.7*   HCT 35.1*   *     Recent Labs     05/15/19  0701 05/16/19  0842    139   K 4.2 3.5   CO2 19* 18*   BUN 46* 34*   CREATININE 1.6* 1.4*   LABGLOM 31* 36*     No results for input(s): BNP in the last 72 hours. No results for input(s): PROTIME, INR in the last 72 hours. Recent Labs     05/15/19  0701 05/15/19  1038   TROPONINI <0.01 <0.01           EKG:   Normal sinus rhythm  New ECG done today shows T wave inversions in the precordial leads which are new        ECHO:   Overall left ventricular function is normal.  LVEF is 55 to 60%  Ggrade I diastolic dysfunction.   Mitral leaflets are slightly thickened.  Trivial MR   The left atrium is moderately dilated.   Mild TR. Normal RVSP   Definity was used during this study.     Stress Nuclear:   1. Technically a satisfactory study.    2. Normal pharmacological stress portion of the study.    3. Small size moderate intensity reversible defect seen distal anterior    septal wall and apex which suggests ischemia. However there is significant    breast attenuation artifact which could affect the accuracy of the study.    4. Gated Study shows normal LV size and Systolic function; EF is 70 %.          ASSESSMENT AND PLAN:        Unstable Angina  Patient had 2 other episodes of chest pain this morning , immediately relieved with nitroglycerin  This happened on heparin  EKG shows T-wave inversions in the precordial leads

## 2019-05-17 NOTE — PROGRESS NOTES
Called report to [phone], [address] [place] [address] report to Joni Shahid. Transport to come within an hour.

## 2019-05-17 NOTE — PLAN OF CARE
Problem: Pain:  Description  Pain management should include both nonpharmacologic and pharmacologic interventions. Pain level assessed. Pt able to rate pain on a scale of 0-10. Administered PRN analgesics per order. Reposition for comfort. Reassessed for effectiveness. Will continue to monitor. Goal: Pain level will decrease  Description  Pain level will decrease  Outcome: Ongoing  Goal: Control of acute pain  Description  Control of acute pain  Outcome: Ongoing  Goal: Control of chronic pain  Description  Control of chronic pain  Outcome: Ongoing     Problem: Falls - Risk of:  Goal: Will remain free from falls  Description  Will remain free from falls. Fall risk assessed. Precautions in place. Bed low and locked with side rails up x2. Nonskid socks on when OOB. Bed/chair alarm utilized. Call light within reach. Frequent checks performed. No falls at this time.    Outcome: Ongoing  Goal: Absence of physical injury  Description  Absence of physical injury  Outcome: Ongoing

## 2019-05-17 NOTE — PLAN OF CARE
Patient is A&Ox4, has family in room. Call light is within reach. Patient denies pain at this time both chronic and acute.

## 2019-05-17 NOTE — PROGRESS NOTES
Clinical Pharmacy Note  Heparin Dosing       Lab Results   Component Value Date    APTT 62.4 05/17/2019     Lab Results   Component Value Date    HGB 11.7 05/15/2019    HCT 35.1 05/15/2019     05/15/2019       Current Infusion Rate: 5.6 mL/hr    Plan:  No change in current rate  Rate: 5.6 mL/hr  Next aPTT: 0700 on 5-17-19    Pharmacy will continue to monitor and adjust based on aPTT results.   JOSTIN Chino [facility]  5/17/2019  4:24 AM

## 2019-05-17 NOTE — PROGRESS NOTES
Clinical Pharmacy Note  Heparin Dosing       Lab Results   Component Value Date    APTT 48.4 05/17/2019     Lab Results   Component Value Date    HGB 11.7 05/15/2019    HCT 35.1 05/15/2019     05/17/2019       Current Infusion Rate: 5.6 mL/hr    Plan:  Bolus: 1400 units  New rate: 6.5 mL/hr  Next aPTT: 05/17/19 2100    Pharmacy will continue to monitor and adjust based on aPTT results.   Patience Brody, [address]

## 2019-05-17 NOTE — PROGRESS NOTES
[facility]  Nephrology   Progress Note        We are following the patient for JERRELL  82 y/o WF with h/o recent episodes of JERRELL in  and again last week when she was suspected to have ATN Cr had improved to 1.7 mg at the time of discharge on 19  She saw her PCP and labs done showed Cr had increased to 3 mg She was asked to come to the ER where Cr was 2.4 mg      SUBJECTIVE:  Had CP this AM   Family wants to wait for C Angiogram and considering other options  Denies SOB  Cr 1.3 mg today    Family  Members at bedside    OBJECTIVE:     PHYSICAL:    TEMPERATURE:  Current - Temp: 97.9 °F (36.6 °C);  Max - Temp  Av °F (36.7 °C)  Min: 97.5 °F (36.4 °C)  Max: 98.4 °F (36.9 °C)  RESPIRATIONS RANGE: Resp  Av.2  Min: 12  Max: 18  PULSE RANGE: Pulse  Av.2  Min: 65  Max: 75  BLOOD PRESSURE RANGE:  Systolic (55ULW), HNT:116 , Min:121 , BMK:306   ; Diastolic (12IFM), ICR:69, Min:62, Max:69    PULSE OXIMETRY RANGE: SpO2  Av %  Min: 95 %  Max: 98 %  24HR INTAKE/OUTPUT:      Intake/Output Summary (Last 24 hours) at 2019 1307  Last data filed at 2019 0600  Gross per 24 hour   Intake 3444.34 ml   Output --   Net 3444.34 ml       CONSTITUTIONAL:  awake, alert, cooperative, no apparent distress, and appears stated age  HEENT:  Lids and lashes normal, pupils equal, round and reactive to light, extra ocular muscles intact, sclera clear, conjunctiva normal  NECK:  supple, symmetrical, trachea midline  LUNGS:  No increased work of breathing, good air exchange, clear to auscultation bilaterally  CARDIOVASCULAR:  Normal apical impulse, regular rate and rhythm, normal S1 and S2  ABDOMEN:  No scars, normal bowel sounds, soft, non-distended  NEUROLOGIC:  alert, oriented, normal speech, no focal findings  SKIN: no bruising or bleeding  EXTREMITIES: no edema    Medications     sodium chloride 125 mL/hr at 19 1012    heparin (porcine) 5.6 mL/hr (19 1804)        Data      CBC: Recent Labs     05/15/19  1723 05/17/19  0736   WBC 10.4  --    RBC 3.92*  --    HGB 11.7*  --    HCT 35.1*  --    * 368     BMP:    Recent Labs     05/15/19  0701 05/16/19  0842 05/17/19  0736    139 144   K 4.2 3.5 3.6   * 111* 116*   CO2 19* 18* 15*   BUN 46* 34* 30*   CREATININE 1.6* 1.4* 1.3*   CALCIUM 9.9 9.1 9.1   GLUCOSE 104* 90 93     Phosphorus:    Recent Labs     05/15/19  0701 05/16/19  0842 05/17/19  0736   PHOS 3.0 3.3 2.4*     Magnesium:    Recent Labs     05/17/19  0736   MG 2.00         ASSESSMENT     Patient Active Problem List   Diagnosis    Osteoarthritis    Osteoporosis    Allergic rhinitis    Scoliosis    Hearing loss sensory, bilateral    Asymmetrical sensorineural hearing loss    Cerumen impaction    Chronic mastoiditis    Thyroid nodule    Gastritis    JERRELL (acute kidney injury) ([facility] [address]    Acute renal failure superimposed on stage 3 chronic kidney disease (HCC)    Acute prerenal azotemia    Generalized weakness    CKD (chronic kidney disease)       PLAN    1-JERRELL with recent episodes of JERRELL suspected to be ATN on last admission  Cr was 1.7 mg on discharge on 5/6/19  Etiology of JERRELL unclear U/A benign with small LE and pyuria  CX skin shelby only No blood, US in 4/19 was unremarkable Urine Na+ 59 Urine eosinophils negative SPEP no monoclonal spike Cr improving, 1.6 mg today  Off of IVF's Good UOP suspect JERRELL related to ATN, recovering Cr continues to improve 1.3  mg today Angiogram on hold per family wishes Considering second opinion Decrease IVF rate   2-HTN BP better on Imdur  Monitor BP and HR   3-Esophageal spasms improved with  Protonix  4 Ch diastolic CHF compensated  5 Chest pain Plans as above  6 Met ACidosis hyperchloremic,on 1/2 NS Decrease IVF rate     D/W family at bedside Answered all questions  If discharged will need BMP checked in 2-3 days and follow with Dr Dell Valdez 1-2 weeks      Lolis Braxton MD, [facility]

## 2019-05-17 NOTE — PROGRESS NOTES
Pt and daughter Jamie Mcbride state that she is not to have the left heart cath procedure planned for tomorrow. They are still willing to have it performed but at a later date due to her kidney function. No consent signed. Will continue to monitor.

## 2019-05-20 LAB
EKG ATRIAL RATE: 75 BPM
EKG DIAGNOSIS: NORMAL
EKG P-R INTERVAL: 128 MS
EKG Q-T INTERVAL: 366 MS
EKG QRS DURATION: 74 MS
EKG QTC CALCULATION (BAZETT): 408 MS
EKG R AXIS: 33 DEGREES
EKG T AXIS: 55 DEGREES
EKG VENTRICULAR RATE: 75 BPM

## 2019-05-22 ENCOUNTER — TELEPHONE (OUTPATIENT)
Dept: FAMILY MEDICINE CLINIC | Age: 84
End: 2019-05-22

## 2019-05-22 NOTE — TELEPHONE ENCOUNTER
Pt is refusing PT at this time, so therapist will not be seeing her.  If need to speak with Manuel Monae, please call her back at  [phone]

## 2019-06-05 ENCOUNTER — TELEPHONE (OUTPATIENT)
Dept: FAMILY MEDICINE CLINIC | Age: 84
End: 2019-06-05

## 2019-06-05 NOTE — TELEPHONE ENCOUNTER
Pt is calling to see if she should still be taking her Actonel. She had a stint put in recently . She thought it was heart burn for the longest time and was taking Tums and they helped.   She wants to know if she can take the Actonel with the stint or if she should wait till she sees dr Lucas Watts in august.    Please advise    thanks

## 2019-06-17 ENCOUNTER — TELEPHONE (OUTPATIENT)
Dept: PHARMACY | Facility: CLINIC | Age: 84
End: 2019-06-17

## 2019-06-17 DIAGNOSIS — Z79.899 ENCOUNTER FOR MEDICATION REVIEW: Primary | ICD-10-CM

## 2019-06-17 PROCEDURE — 1111F DSCHRG MED/CURRENT MED MERGE: CPT | Performed by: FAMILY MEDICINE

## 2019-06-17 NOTE — TELEPHONE ENCOUNTER
Medication List received from [facility] via fax. Scanned into patients EMR under the Media Tab.

## 2019-06-17 NOTE — LETTER
Facility Name: [address] Fax Number: [phone]      Patient Name: Fiona Patel    YOB: 1935      To Whom It May Concern,     Our team conducts post-discharge medication reviews for patients who see [facility]) providers. We are looking for a discharge medication list for the patient listed above. A pharmacist from our team will call the patient to review medications and make sure everything is up-to-date in our system from their stay at your facility. Please fax the discharge medication list to [phone]. Please call [phone] Option #7 if you have any questions.        Thank you,    [facility] Team  Telephone [phone] Option #7   Fax [phone]

## 2019-06-17 NOTE — TELEPHONE ENCOUNTER
CLINICAL PHARMACY NOTE  Post-Discharge Transitions of Care (NAUN)    Patient appears to have been discharged from [facility] on 05/21/2019. Called facility to obtain discharge medication list.  Waiting for fax.        30 days since discharge = 06/20/2019     [address]  [phone] or [phone] (Option 7)

## 2019-06-18 NOTE — TELEPHONE ENCOUNTER
Attempted to reach patient to review medications; left message on home # asking for return call. Will continue to attempt to contact as appropriate.       Barbara Granger, PharmD, [address] [facility]  Direct: [phone]  Department, toll free: [phone], option 7

## 2019-06-19 ENCOUNTER — CARE COORDINATION (OUTPATIENT)
Dept: CASE MANAGEMENT | Age: 84
End: 2019-06-19

## 2019-06-19 NOTE — CARE COORDINATION
[facility] Transitions Follow Up Call    2019    Patient: Mac Parkre  Patient : 1935   MRN: [id number]  Reason for Admission:   Discharge Date: 19 RARS: Readmission Risk Score: 22         Spoke with: no one        Follow Up: Final attempt at "[employer]. Unable to reach patient. Left message. Informed Smita Moreno this would be the final CTN call. Encouraged her to call her PCP with any future issues or concerns.   Future Appointments   Date Time Provider Nataliia Arnold   2019  2:20 PM Agus Morrow MD [address] [place] [address], RN

## 2019-06-20 RX ORDER — CLOPIDOGREL BISULFATE 75 MG/1
75 TABLET ORAL DAILY
COMMUNITY
Start: 2019-05-21 | End: 2020-12-18

## 2019-06-20 NOTE — TELEPHONE ENCOUNTER
CLINICAL PHARMACY NOTE  Post-Discharge Transitions of Care (NAUN)    Subjective/Objective:  Outreach to review discharge medications and provide medication review and management. Spoke with patient (had left message returning my call). No Known Allergies    Discharge Medications (as per discharging medication list found per 5/17/19 [facility] Admission encounter):  Medications at Time of Discharge - documented as of this encounter  Medication Sig Dispensed Refills Start Date End Date   acetaminophen (TYLENOL) 325 mg tablet      *confirms as per [place] [address] list Take 1.5 Tabs by mouth 2 times daily. 60 Tab   0 05/21/2019     aspirin 81 mg Tablet, Delayed Release (E.C.)   Take 81 mg by mouth daily.   0       atorvastatin (LIPITOR) 10 mg Tablet   Take 1 Tab by mouth daily. 30 Tab   3 05/21/2019 06/20/2019   calcium-vitamin D 500 mg-200 unit Tablet per tablet   Take 1 Tab by mouth daily (with breakfast).   0       ergocalciferol, vitamin D2, (VITAMIN D PO)      *confirms 2000 units D3 as per  list Take 2,000 Units by mouth daily.   0       nitroglycerin (NITROSTAT) 0.4 mg SL tablet   Place 1 Tab under tongue every 5 minutes as needed for Chest pain. 25 Tab   0 05/21/2019     pantoprazole (PROTONIX) 40 mg Tablet, Delayed Release (E.C.)   Take 1 Tab by mouth daily. 30 Tab   3 05/21/2019     polyethylene glycol (MIRALAX) 17 gram/dose powder   Take 17 g by mouth daily as needed.   0       risedronate (ACTONEL) 30 mg Tablet   Take 30 mg by mouth every 7 days (once weekly).   0       albuterol (VENTOLIN/PROAIR/PROVENTIL HFA) 90 mcg/actuation HFA Aerosol Inhaler      *never used - would like removed from list Take 2 Puffs by inhalation every 6 hours as needed for Wheezing.   0   05/30/2019   clopidogrel (PLAVIX) 75 mg tablet      *confirms new/taking -added to [place] [address] list Take 1 Tab by mouth daily. 30 Tab   0 05/22/2019 06/10/2019   metoprolol succinate (TOPROL) 25 mg XL tablet   Take 1 Tab by mouth daily.  30 Tab   0 05/22/2019 06/10/2019   polyethylene glycol (GLYCOLAX) 17 gram packet   Take 1 Packet by mouth daily as needed (constipation) for up to 3 days. 14 Each   0 05/21/2019 05/24/2019     Additional Medications:  Confirms diltiazem & isosorbide MN dc'd as per [facility] documentation - [facility]  Reports not taking duloxetine - asks for removal from list    CrCl cannot be calculated (Unknown ideal weight. ). Assessment/Plan:  - Medication reconciliation completed. · Medication list updated as noted above    - Identified Potential Medication Interactions: No clinically significant interactions identified via [employer] Interaction Analysis as category D or higher.    - Renal Dosing: According to [employer], the following should have renal adjustment: continue to monitor re risedronate.     Jon Granger, PharmD, [address] [place] [address] Avenue  Direct: [phone]  Department, toll free: [phone], option 7     =======================================================   For Pharmacy Admin Tracking Only    TCM Call Made?: No  [facility] Select Patient?: Yes  Total # of Interventions Recommended: 1 -   - Updated Order #: 1  Total # Interventions Accepted: 1  Intervention Severity:   - Level 1 Intervention Present?: No   - Level 2 #: 0   - Level 3 #: 0  Outreach Status: Review Complete  Care Coordinator Outreach to Patient?: Yes  Provider Contacted?: No  Time Spent (min): 20

## 2019-06-20 NOTE — TELEPHONE ENCOUNTER
No return call; left another message asking for return call. Also noted final CTC outreach attempt yesterday (with message left).     No further outreach at this time; closing encounter.    =======================================================   For Pharmacy Admin Tracking Only    TCM Call Made?: No  [facility]) Select Patient?: Yes  Outreach Status: Patient Unreachable  Care Coordinator Outreach to Patient?: Yes  Time Spent (min): 5

## 2019-06-21 ENCOUNTER — TELEPHONE (OUTPATIENT)
Dept: FAMILY MEDICINE CLINIC | Age: 84
End: 2019-06-21

## 2019-06-21 NOTE — TELEPHONE ENCOUNTER
Tried to call patient to let her know that the doctor had left for day but the number was not in service

## 2019-06-23 RX ORDER — RISEDRONATE SODIUM 35 MG/1
TABLET, FILM COATED ORAL
Qty: 4 TABLET | Refills: 3 | Status: SHIPPED | OUTPATIENT
Start: 2019-06-23 | End: 2019-12-31

## 2019-06-24 ENCOUNTER — TELEPHONE (OUTPATIENT)
Dept: FAMILY MEDICINE CLINIC | Age: 84
End: 2019-06-24

## 2019-06-25 NOTE — TELEPHONE ENCOUNTER
Please call patient and let her know Dr. Monica Lynn wanted to check on her and see how she was doing. Route back with information.

## 2019-07-08 NOTE — PROGRESS NOTES
----- Message from Suzy Goodwin sent at 7/8/2019 10:58 AM EDT -----  Regarding: Dr. Jai Hernandez  Pt requesting a call back in regards to him having a \"Gout\" flair up.  Pt best contact number is [phone] distention  EXTREMITIES:  Lower extremities:  Trace edema right lower extremity. PSYCHIATRIC:  Appropriate affect. Alert and oriented to time, place,  and person. NEUROLOGIC:  No asterixis. No focal motor neurological deficits. LAB DATA:    CBC:   Lab Results   Component Value Date    WBC 8.9 04/26/2019    RBC 3.78 04/26/2019    HGB 11.4 04/26/2019    HCT 33.2 04/26/2019    MCV 87.9 04/26/2019    MCH 30.2 04/26/2019    MCHC 34.3 04/26/2019    RDW 12.9 04/26/2019     04/26/2019    MPV 7.2 04/26/2019     BMP:    Lab Results   Component Value Date     04/28/2019    K 3.7 04/28/2019    K 3.3 04/26/2019     04/28/2019    CO2 23 04/28/2019    BUN 18 04/28/2019    CREATININE 1.2 04/28/2019    CALCIUM 8.5 04/28/2019    GFRAA 52 04/28/2019    GFRAA >60 05/10/2012    LABGLOM 43 04/28/2019    GLUCOSE 100 04/28/2019     Magnesium:    Lab Results   Component Value Date    MG 1.80 04/28/2019     Phosphorus:    Lab Results   Component Value Date    PHOS 4.5 04/23/2019       IMPRESSION/RECOMMENDATIONS:      Active Problems:    JERRELL (acute kidney injury) ([address]  Resolved Problems:    * No resolved hospital problems. *    1. JERRELL (baseline 1-1.1 mg/dl) - likely prerenal in nature   - non oliguric  - Ultrasound and CAT scan revealed no hydronephrosis - Her fractional excretion of sodium was 1.9 while on diuretic.  - Creat 2.7--> 1.9 --> 1.4 --> 1.2  - cr near baseline    2. HTN   - BP nl to mildly high but at goal for IP status and age  - On diltiazem alone    3. Edema Right LE   - Duplex revealed no evidence of deep vein or superficial vein thrombosis   - resolved    4. Hypokalemia   - K+ 3.7 (4/28)  - Mg 1.8  - Receiving KCl supplement (may need to decrease)    No ACE/ARB or NSAIDS/Celebrex on discharge  No longitudinal nephrology follow-up required  Okay for dc from renal standpoint  Would check renal function within one month via her PCP if felt appropriate referral to nephrology can be undertaken. Geoffrey Richards MD  [facility]  [phone]

## 2019-07-29 ENCOUNTER — HOSPITAL ENCOUNTER (OUTPATIENT)
Dept: WOMENS IMAGING | Age: 84
Discharge: HOME | End: 2019-07-29
Payer: MEDICARE

## 2019-07-29 ENCOUNTER — HOSPITAL ENCOUNTER (OUTPATIENT)
Dept: ULTRASOUND IMAGING | Age: 84
Discharge: HOME | End: 2019-07-29
Payer: MEDICARE

## 2019-07-29 DIAGNOSIS — E04.1 THYROID NODULE: ICD-10-CM

## 2019-07-29 DIAGNOSIS — M81.0 AGE-RELATED OSTEOPOROSIS WITHOUT CURRENT PATHOLOGICAL FRACTURE: ICD-10-CM

## 2019-07-29 PROCEDURE — 76536 US EXAM OF HEAD AND NECK: CPT

## 2019-07-29 PROCEDURE — 77080 DXA BONE DENSITY AXIAL: CPT

## 2019-08-08 ENCOUNTER — OFFICE VISIT (OUTPATIENT)
Dept: ENDOCRINOLOGY | Age: 84
End: 2019-08-08
Payer: MEDICARE

## 2019-08-08 VITALS
SYSTOLIC BLOOD PRESSURE: 130 MMHG | DIASTOLIC BLOOD PRESSURE: 64 MMHG | HEART RATE: 64 BPM | OXYGEN SATURATION: 99 % | HEIGHT: 55 IN | BODY MASS INDEX: 24.67 KG/M2 | WEIGHT: 106.6 LBS

## 2019-08-08 DIAGNOSIS — M81.0 AGE-RELATED OSTEOPOROSIS WITHOUT CURRENT PATHOLOGICAL FRACTURE: Primary | ICD-10-CM

## 2019-08-08 PROCEDURE — 99213 OFFICE O/P EST LOW 20 MIN: CPT | Performed by: INTERNAL MEDICINE

## 2019-08-08 NOTE — PROGRESS NOTES
hip is measured at 0.80 g/cm2   corresponding to a T-score of -1.2 and a Z-score of 1.0.  This is within the   osteopenia range by [facility] criteria.       The bone mineral density of the femoral neck is measured at 0.55 g/cm2   corresponding to a T-score of -2.7 and a Z-score of -0.3.  This is within the   osteoporosis range by WHO criteria. 6/13 Dexa    Left Femur -2.5  L1-4  0.3    She was seeing Dr. Gladys Roland    History of fractures : none    Pharmacological therapy for Osteoprosis:   Fosamax 10 years took till 2013   Actonel since 10/17     FH of Osteoporosis: none   FH of hip fracture: none    Secondary causes of osteoporosis: None    Calcium: No supplement   Diet Ca : 1200mg   Vitamin D:42 on 8/17  No supplement  Exercise: none  Follows up regularly with a dentist. No major dental procedures planned. 3 inch height loss    She would like me to evaluate the thyroid nodules  For years    2014 USG  The thyroid gland is normal in size and echogenicity. The right   lobe measures 4.1 x 1.1 x 1.3 cm and the left lobe measures 3.7 x   1.3 x 1.4 cm.  The isthmus measures 2 mm.        The majority of the bilateral subcentimeter benign appearing   nodules have not significantly changed. The 2 largest septated   cysts in the upper pole of the left gland have decreased in size   and become one cyst measuring 1.2 x 1.5 x 1.2 cm, previously   measuring 1.6 x 2.6 x 1.5 cm. The central echogenicity within the   cyst is unchanged measuring 1.0 cm.      TSH normal    7/19 USG  Mid right 1.2cm spongiform  Left mid 1.4cm solid cystic  Inferior 1.1cm spongiform and 1.4cm cystic    Past Medical History:   Diagnosis Date    Acute kidney injury ([address]     Allergic rhinitis     CHF (congestive heart failure) (HCC)     GERD (gastroesophageal reflux disease)     Hearing loss sensory, bilateral     mod--severe on right, mild to severe on left, due to asymmetry will send to get MRI to r/o acoustic neuroma    Mastoiditis     on MRI oct medications for this visit. Review of Systems  Please see scanned     Objective:    /64 (Site: Left Upper Arm, Position: Sitting, Cuff Size: Small Adult)   Pulse 64   Ht 4' 5.94\" (1.37 m)   Wt 106 lb 9.6 oz (48.4 kg)   SpO2 99%   BMI 25.76 kg/m²   Wt Readings from Last 3 Encounters:   08/08/19 106 lb 9.6 oz (48.4 kg)   05/17/19 102 lb 1.2 oz (46.3 kg)   05/10/19 100 lb (45.4 kg)       Constitutional: Well-developed, appears stated age, cooperative, in no acute distress  H/E/N/M/T:atraumatic, normocephalic, external ears, nose, lips normal without lesions  Eyes: Lids, lashes, conjunctivae and sclerae normal, No proptosis, no redness  Neck: supple, symmetrical, no swelling  Skin: No obvious rashes or lesions present. Skin and hair texture normal  Psychiatric: Judgement and Insight:  judgement and insight appear normal  Neuro: Normal without focal findings, speech is normal normal, speech is spontaneous  Chest: No labored breathing, no chest deformity, no stridor  Musculoskeletal: No joint deformity, swelling    Scoliosis    Lab Review  Lab Results   Component Value Date    TSH 2.63 05/10/2012     No results found for: FREET4      Assessment:     1. Osteoporosis : History for many years, she was on treatment, then on drug holiday since 2013. last Dexa, given low bone mineral density, recommended pharmacological treatment. She had been on fosamax for years,advised Prolia but insurance did not cover, started on Actonel per insurance preference. Optimize Ca and D. Ruled out secondary causes. Repeat Dexa 9/19,BMD stable, GFR 36, will continue. If decline, switch to Prolia  2. Thyroid Nodules : Sees Dr. Agustin Guallpa, she would like us to evaluate. She had multiple sub centimeter nodules. Left upper cystic nodule. Repeat USG shows left mid 1.4cm solid-cystic. Rest dominant nodule are spongiform or cystic    Plan:     Post menopausal osteoporosis . No fragility fracture.   Discussed management of osteoporosis in detail. Discussed treatment options with her. Told her that given high risk of fracture and osteoporosis, I would recommend  therapy for her. On actonel since 10/17  Discussed the side effects including Osteonecrosis of the jaw and Atypical fractures. Patient understands that the risk is low. She is willing to take the medication. In general advise to avoid falls, use night lights at night.    Calcium 500mg daily supplement and Vitamin D 3 1000 IU daily

## 2019-09-24 ENCOUNTER — HOSPITAL ENCOUNTER (OUTPATIENT)
Dept: WOMENS IMAGING | Age: 84
Discharge: HOME | End: 2019-09-24
Payer: MEDICARE

## 2019-09-24 DIAGNOSIS — Z12.39 BREAST CANCER SCREENING: ICD-10-CM

## 2019-09-24 PROCEDURE — 77067 SCR MAMMO BI INCL CAD: CPT

## 2019-09-24 PROCEDURE — 77063 BREAST TOMOSYNTHESIS BI: CPT

## 2019-12-05 ENCOUNTER — OFFICE VISIT (OUTPATIENT)
Dept: ENDOCRINOLOGY | Age: 84
End: 2019-12-05
Payer: MEDICARE

## 2019-12-05 VITALS
BODY MASS INDEX: 24.39 KG/M2 | DIASTOLIC BLOOD PRESSURE: 70 MMHG | SYSTOLIC BLOOD PRESSURE: 120 MMHG | OXYGEN SATURATION: 96 % | HEART RATE: 73 BPM | HEIGHT: 55 IN | WEIGHT: 105.4 LBS

## 2019-12-05 DIAGNOSIS — M81.0 AGE-RELATED OSTEOPOROSIS WITHOUT CURRENT PATHOLOGICAL FRACTURE: Primary | ICD-10-CM

## 2019-12-05 PROCEDURE — 99213 OFFICE O/P EST LOW 20 MIN: CPT | Performed by: INTERNAL MEDICINE

## 2019-12-12 ENCOUNTER — OFFICE VISIT (OUTPATIENT)
Dept: FAMILY MEDICINE CLINIC | Age: 84
End: 2019-12-12
Payer: MEDICARE

## 2019-12-12 VITALS
DIASTOLIC BLOOD PRESSURE: 69 MMHG | SYSTOLIC BLOOD PRESSURE: 148 MMHG | RESPIRATION RATE: 18 BRPM | BODY MASS INDEX: 24.35 KG/M2 | HEIGHT: 55 IN | HEART RATE: 64 BPM | WEIGHT: 105.2 LBS

## 2019-12-12 DIAGNOSIS — I25.119 CORONARY ARTERY DISEASE INVOLVING NATIVE CORONARY ARTERY OF NATIVE HEART WITH ANGINA PECTORIS (HCC): ICD-10-CM

## 2019-12-12 DIAGNOSIS — N18.9 CHRONIC KIDNEY DISEASE, UNSPECIFIED CKD STAGE: ICD-10-CM

## 2019-12-12 DIAGNOSIS — Z00.00 ROUTINE GENERAL MEDICAL EXAMINATION AT A HEALTH CARE FACILITY: Primary | ICD-10-CM

## 2019-12-12 PROBLEM — I25.10 CAD (CORONARY ARTERY DISEASE): Status: ACTIVE | Noted: 2019-12-12

## 2019-12-12 PROCEDURE — G0439 PPPS, SUBSEQ VISIT: HCPCS | Performed by: NURSE PRACTITIONER

## 2019-12-12 ASSESSMENT — PATIENT HEALTH QUESTIONNAIRE - PHQ9
SUM OF ALL RESPONSES TO PHQ QUESTIONS 1-9: 0
SUM OF ALL RESPONSES TO PHQ QUESTIONS 1-9: 0

## 2019-12-12 ASSESSMENT — LIFESTYLE VARIABLES: HOW OFTEN DO YOU HAVE A DRINK CONTAINING ALCOHOL: 0

## 2019-12-31 RX ORDER — RISEDRONATE SODIUM 35 MG/1
TABLET, FILM COATED ORAL
Qty: 4 TABLET | Refills: 3 | Status: SHIPPED | OUTPATIENT
Start: 2019-12-31 | End: 2020-04-20

## 2020-01-14 PROBLEM — N19 ACUTE PRERENAL AZOTEMIA: Status: RESOLVED | Noted: 2019-05-03 | Resolved: 2020-01-14

## 2020-01-14 PROBLEM — N18.30 ACUTE RENAL FAILURE SUPERIMPOSED ON STAGE 3 CHRONIC KIDNEY DISEASE (HCC): Status: RESOLVED | Noted: 2019-05-03 | Resolved: 2020-01-14

## 2020-01-14 PROBLEM — N17.9 ACUTE RENAL FAILURE SUPERIMPOSED ON STAGE 3 CHRONIC KIDNEY DISEASE (HCC): Status: RESOLVED | Noted: 2019-05-03 | Resolved: 2020-01-14

## 2020-01-15 ENCOUNTER — OFFICE VISIT (OUTPATIENT)
Dept: FAMILY MEDICINE CLINIC | Age: 85
End: 2020-01-15
Payer: MEDICARE

## 2020-01-15 VITALS
WEIGHT: 105 LBS | BODY MASS INDEX: 24.3 KG/M2 | SYSTOLIC BLOOD PRESSURE: 139 MMHG | DIASTOLIC BLOOD PRESSURE: 68 MMHG | HEART RATE: 63 BPM | HEIGHT: 55 IN

## 2020-01-15 PROCEDURE — 99214 OFFICE O/P EST MOD 30 MIN: CPT | Performed by: FAMILY MEDICINE

## 2020-01-15 PROCEDURE — G8510 SCR DEP NEG, NO PLAN REQD: HCPCS | Performed by: FAMILY MEDICINE

## 2020-01-15 ASSESSMENT — PATIENT HEALTH QUESTIONNAIRE - PHQ9
SUM OF ALL RESPONSES TO PHQ QUESTIONS 1-9: 0
2. FEELING DOWN, DEPRESSED OR HOPELESS: 0
1. LITTLE INTEREST OR PLEASURE IN DOING THINGS: 0
SUM OF ALL RESPONSES TO PHQ9 QUESTIONS 1 & 2: 0
SUM OF ALL RESPONSES TO PHQ QUESTIONS 1-9: 0

## 2020-01-15 NOTE — PROGRESS NOTES
PROGRESS NOTE     Krysta Luna MD  [facility]  [address] [place] [address]  [phone] office  [phone] fax    Date of Service:  1/15/2020    Subjective:      Patient ID: Joaquina Francis is a 81 y.o. female      CC: CAD s/p stent, stage 3 CKD, Osteoporosis, thyroid nodule, constipation    HPI    CAD s/p stent May 2020  Reviewed last note from cardiology by Dr Dylan Troncoso from 10/14/19: He recommended continuing dual anti-platelet therapy, statin and bblocker     Patient states she has not had any chest pain since she had a stent placed in May 2019. She also denies dyspnea on exertion, shortness of breath at rest, palpitations, orthopnea, paroxysmal nocturnal dyspnea, lightheadedness, or lower extremity edema. Cath 5/20/19  IMPRESSION:  1. Severe stenosis proximal left anterior descending artery successfully treated with a 3.0 x 16 [place] III study drug-eluting stent (blinded). 2. Nonobstructive disease in the right coronary artery. 3. Normal ejection fraction on recent echocardiogram.  4. Normal left ventricular end-diastolic pressure. 5. Class III unstable angina. 6. Abnormal stress test.      Lab Results   Component Value Date    CHOL 131 05/18/2019    TRIG 80 05/18/2019    HDL 56 05/18/2019    LDLCALC 59 05/18/2019     Lab Results   Component Value Date    ALT 12 05/13/2019    AST 15 05/13/2019            Stage 3 CKD  Last saw Dr Wendy Gagnon, nephrology on 9/24/19. He recommends avoiding NSAIDs, ACE, ARB, fleet enema, and repeated her BMP. Patient states she has been following his recommendations.     RENAL PROFILE (09/18/2019 11:25 AM EDT)  RENAL PROFILE (09/18/2019 11:25 AM EDT)   Component Value Ref Range   Sodium 142 135 - 146 mmol/L   Potassium 4.7 3.5 - 5.1 mmol/L   Chloride 107 98 - 110 mmol/L   CO2 27 22 - 29 mmol/L   Anion Gap 8 5 - 13 mmol/L   BUN 22 7 - 25 mg/dL   Creatinine 1.25 (H) 0.50 - 1.20 mg/dL   Glucose 115 (H) 70 - 99 mg/dL   Calcium 10.0 8.4 500 MG tablet Take 1,000 mg by mouth 2 times daily       polyethylene glycol (MIRALAX) packet Take 17 g by mouth daily as needed for Constipation      vitamin D (CHOLECALCIFEROL) 1000 units TABS tablet Take 2,000 Units by mouth daily       Docusate Calcium (STOOL SOFTENER PO) Take 100 mg by mouth 2 times daily       Calcium Citrate-Vitamin D (CALCIUM + D PO) Take 500 mg by mouth daily          Past Medical History:   Diagnosis Date    [facility] [address]     Allergic rhinitis     [facility]     GERD (gastroesophageal reflux disease)     Hearing loss sensory, bilateral     mod--severe on right, mild to severe on left, due to asymmetry will send to get MRI to r/o acoustic neuroma    Mastoiditis     on MRI oct 2014-->dr ortiz states with no clinical findings this is considered an over-sensitivity of MRI results    Osteoarthritis     L1-L5    Osteoporosis     patient was on fosamax x 10 years and drug holiday was started on 6/21/13. (this was date of last DEXA scan)    Scoliosis        Past Surgical History:   Procedure Laterality Date    CARPAL TUNNEL RELEASE      right, 2003    COLONOSCOPY      2013, dr Heriberto Raza    COLONOSCOPY N/A 10/19/2018    COLONOSCOPY WITH BIOPSY performed by Elier Polanco MD at [address] [place] [address]      December 27 2017 and feb 6 2018   [facility]      2011    NASAL SINUS SURGERY      UPPER GASTROINTESTINAL ENDOSCOPY N/A 4/4/2019    EGD BIOPSY performed by Elier Polanco MD at [address] [place] [address] History     Tobacco Use    Smoking status: Never Smoker    Smokeless tobacco: Never Used   Substance Use Topics    Alcohol use: No       Family History   Problem Relation Age of Onset    Diabetes Mother     Heart Disease Mother            Review of Systems  Constitutional:  Negative for activity or appetite change, fever or fatigue  HENT:  Negative for congestion,sinus pressure, or rhinorrhea  Eyes:  Negative for eye recent consult note. Patient to avoid NSAIDs, ACE inhibitor, ARB and hydrochlorothiazide due to acute on chronic kidney failure earlier this year. Checking the following  - Comprehensive Metabolic Panel; Future    4. Thyroid nodule  Needs repeat ultrasound in July 2020    5. Chronic idiopathic constipation  Recommend starting MiraLAX and titrating to effectiveness.       HM:  Normal mammogram 9/24/19    Colonoscopy done 10/19/18: repeat 5 years if pt wants to get repeat    UTD with all vaccines

## 2020-01-16 NOTE — TELEPHONE ENCOUNTER
Seen yesterday. States instructed to take miralax. States first thing on package states not to use if having kidney issues. States she is under the care of a urologist and is wanting to know if she is okay to take. Please advise.  Please call Verónica Abel back at [phone]

## 2020-01-21 ENCOUNTER — NURSE ONLY (OUTPATIENT)
Dept: FAMILY MEDICINE CLINIC | Age: 85
End: 2020-01-21
Payer: MEDICARE

## 2020-01-21 VITALS — HEART RATE: 55 BPM | SYSTOLIC BLOOD PRESSURE: 149 MMHG | DIASTOLIC BLOOD PRESSURE: 67 MMHG

## 2020-01-21 LAB
A/G RATIO: 1.8 (ref 1.1–2.2)
ALBUMIN SERPL-MCNC: 4.6 G/DL (ref 3.4–5)
ALP BLD-CCNC: 90 U/L (ref 40–129)
ALT SERPL-CCNC: 20 U/L (ref 10–40)
ANION GAP SERPL CALCULATED.3IONS-SCNC: 13 MMOL/L (ref 3–16)
AST SERPL-CCNC: 21 U/L (ref 15–37)
BILIRUB SERPL-MCNC: 0.4 MG/DL (ref 0–1)
BUN BLDV-MCNC: 22 MG/DL (ref 7–20)
CALCIUM SERPL-MCNC: 10.5 MG/DL (ref 8.3–10.6)
CHLORIDE BLD-SCNC: 103 MMOL/L (ref 99–110)
CHOLESTEROL, TOTAL: 169 MG/DL (ref 0–199)
CO2: 26 MMOL/L (ref 21–32)
CREAT SERPL-MCNC: 1.1 MG/DL (ref 0.6–1.2)
GFR AFRICAN AMERICAN: 57
GFR NON-AFRICAN AMERICAN: 47
GLOBULIN: 2.6 G/DL
GLUCOSE BLD-MCNC: 96 MG/DL (ref 70–99)
HDLC SERPL-MCNC: 69 MG/DL (ref 40–60)
LDL CHOLESTEROL CALCULATED: 73 MG/DL
POTASSIUM SERPL-SCNC: 4.6 MMOL/L (ref 3.5–5.1)
SODIUM BLD-SCNC: 142 MMOL/L (ref 136–145)
TOTAL PROTEIN: 7.2 G/DL (ref 6.4–8.2)
TRIGL SERPL-MCNC: 133 MG/DL (ref 0–150)
VLDLC SERPL CALC-MCNC: 27 MG/DL

## 2020-01-21 PROCEDURE — 36415 COLL VENOUS BLD VENIPUNCTURE: CPT | Performed by: FAMILY MEDICINE

## 2020-01-22 ENCOUNTER — TELEPHONE (OUTPATIENT)
Dept: FAMILY MEDICINE CLINIC | Age: 85
End: 2020-01-22

## 2020-02-19 ENCOUNTER — TELEPHONE (OUTPATIENT)
Dept: FAMILY MEDICINE CLINIC | Age: 85
End: 2020-02-19

## 2020-02-19 NOTE — TELEPHONE ENCOUNTER
PT wanting to let PCP know she has been Using Miralax for roughly a month and wants to know if it's okay to keep using along with her current medications.

## 2020-04-20 RX ORDER — RISEDRONATE SODIUM 35 MG/1
TABLET, FILM COATED ORAL
Qty: 4 TABLET | Refills: 2 | Status: SHIPPED | OUTPATIENT
Start: 2020-04-20 | End: 2020-06-18 | Stop reason: SDUPTHER

## 2020-06-18 ENCOUNTER — VIRTUAL VISIT (OUTPATIENT)
Dept: ENDOCRINOLOGY | Age: 85
End: 2020-06-18
Payer: MEDICARE

## 2020-06-18 PROCEDURE — 99441 PR PHYS/QHP TELEPHONE EVALUATION 5-10 MIN: CPT | Performed by: INTERNAL MEDICINE

## 2020-06-18 RX ORDER — RISEDRONATE SODIUM 35 MG/1
TABLET, FILM COATED ORAL
Qty: 4 TABLET | Refills: 5 | Status: SHIPPED | OUTPATIENT
Start: 2020-06-18 | End: 2020-12-08 | Stop reason: SDUPTHER

## 2020-06-18 NOTE — PROGRESS NOTES
LEFT HIP:       The bone mineral density in the total hip is measured at 0.81 g/cm2   corresponding to a T-score of -1.1 and a Z-score of 1.0.  This is within the   osteopenia range by [facility] criteria.       The bone mineral density of the femoral neck is measured at 0.52 g/cm2   corresponding to a T-score of -3.0 and a Z-score of -0.7.  This is within the   osteoporosis range by WHO criteria.       RIGHT HIP:       The bone mineral density in the total hip is measured at 0.80 g/cm2   corresponding to a T-score of -1.2 and a Z-score of 1.0.  This is within the   osteopenia range by [facility] criteria.       The bone mineral density of the femoral neck is measured at 0.55 g/cm2   corresponding to a T-score of -2.7 and a Z-score of -0.3.  This is within the   osteoporosis range by WHO criteria. 6/13 Dexa    Left Femur -2.5  L1-4  0.3    She was seeing Dr. Ellie Hernandez    History of fractures : none    Pharmacological therapy for Osteoprosis:   Fosamax 10 years took till 2013   Actonel since 10/17     FH of Osteoporosis: none   FH of hip fracture: none    Secondary causes of osteoporosis: None    Calcium: No supplement   Diet Ca : 1200mg   Vitamin D:42 on 8/17  No supplement  Exercise: none  Follows up regularly with a dentist. No major dental procedures planned. 3 inch height loss    She would like me to evaluate the thyroid nodules  For years    2014 USG  The thyroid gland is normal in size and echogenicity. The right   lobe measures 4.1 x 1.1 x 1.3 cm and the left lobe measures 3.7 x   1.3 x 1.4 cm.  The isthmus measures 2 mm.        The majority of the bilateral subcentimeter benign appearing   nodules have not significantly changed. The 2 largest septated   cysts in the upper pole of the left gland have decreased in size   and become one cyst measuring 1.2 x 1.5 x 1.2 cm, previously   measuring 1.6 x 2.6 x 1.5 cm. The central echogenicity within the   cyst is unchanged measuring 1.0 cm.      TSH normal    7/19 USG  Mid right 1.2cm spongiform  Left mid 1.4cm solid cystic  Inferior 1.1cm spongiform and 1.4cm cystic    Past Medical History:   Diagnosis Date    Acute kidney injury ([address]     Allergic rhinitis     [facility]     GERD (gastroesophageal reflux disease)     Hearing loss sensory, bilateral     mod--severe on right, mild to severe on left, due to asymmetry will send to get MRI to r/o acoustic neuroma    Mastoiditis     on MRI oct 2014-->dr ortiz states with no clinical findings this is considered an over-sensitivity of MRI results    Osteoarthritis     L1-L5    Osteoporosis     patient was on fosamax x 10 years and drug holiday was started on 6/21/13. (this was date of last DEXA scan)    Scoliosis      Past Surgical History:   Procedure Laterality Date    CARPAL TUNNEL RELEASE      right, 2003    COLONOSCOPY      2013, dr [facility]    COLONOSCOPY N/A 10/19/2018    COLONOSCOPY WITH BIOPSY performed by Hussain Velasquez MD at [address] [place] [address]      December 27 2017 and feb 6 2018   [address]      2011    NASAL SINUS SURGERY      UPPER GASTROINTESTINAL ENDOSCOPY N/A 4/4/2019    [facility]GD BIOPSY [facility]ain [facility] at [facility]COPY     Current Outpatient Medications   Medication Sig Dispense Refill    risedronate (ACTONEL) 35 MG tablet TAKE ONE TABLET BY MOUTH ONCE WEEKLY IN THE MORNING WITH A FULL GLASS OF WATER ON AN EMPTY STOMACH 4 tablet 2    clopidogrel (PLAVIX) 75 MG tablet Take 75 mg by mouth daily      nitroGLYCERIN (NITROSTAT) 0.4 MG SL tablet up to max of 3 total doses.  If no relief after 1 dose, call 911. 25 tablet 3    aspirin 81 MG EC tablet Take 1 tablet by mouth daily 30 tablet 3    atorvastatin (LIPITOR) 10 MG tablet Take 1 tablet by mouth nightly 30 tablet 3    metoprolol succinate (TOPROL XL) 25 MG extended release tablet Take 1 tablet by mouth daily 30 tablet 3    acetaminophen (TYLENOL) 500 MG tablet Take 1,000 mg by mouth 2 times

## 2020-10-13 ENCOUNTER — HOSPITAL ENCOUNTER (OUTPATIENT)
Dept: WOMENS IMAGING | Age: 85
Discharge: HOME | End: 2020-10-13
Payer: MEDICARE

## 2020-10-13 PROCEDURE — 77067 SCR MAMMO BI INCL CAD: CPT

## 2020-10-28 ENCOUNTER — TELEPHONE (OUTPATIENT)
Dept: PHARMACY | Facility: CLINIC | Age: 85
End: 2020-10-28

## 2020-10-28 NOTE — TELEPHONE ENCOUNTER
Monitoring #: 1  - New Therapy Lab Monitoring #: 1  Recommended intervention potential cost savings: 1  Total Interventions Accepted: 1  Time Spent (min): 15

## 2020-11-09 ENCOUNTER — TELEPHONE (OUTPATIENT)
Dept: ADMINISTRATIVE | Age: 85
End: 2020-11-09

## 2020-12-08 ENCOUNTER — VIRTUAL VISIT (OUTPATIENT)
Dept: ENDOCRINOLOGY | Age: 85
End: 2020-12-08
Payer: MEDICARE

## 2020-12-08 PROCEDURE — 99441 PR PHYS/QHP TELEPHONE EVALUATION 5-10 MIN: CPT | Performed by: INTERNAL MEDICINE

## 2020-12-08 RX ORDER — RISEDRONATE SODIUM 35 MG/1
TABLET, FILM COATED ORAL
Qty: 4 TABLET | Refills: 5 | Status: SHIPPED | OUTPATIENT
Start: 2020-12-08 | End: 2021-06-14

## 2020-12-08 NOTE — PROGRESS NOTES
Subjective:        Pursuant to the emergency declaration under the [facility] waiver authority and the [employer] Act this Telephone Visit was insisted, with patient's consent, to reduce the patient's risk of exposure to COVID-19 and provide continuity of care for an established patient. Services were provided through a synchronous discussion over a telephone to substitute for in-person clinic visit. Julio Ye is a  who is here for f/u evaluation of osteoporosis. Interim:    No fall , fracture  Last GFR 47  Tolerating actonel    She was diagnosed with Osteoporsis - years ago    Dexa 7/19  LUMBAR SPINE:       The bone mineral density in the lumbar spine including the L3-L4 levels is   measured at 0.96 g/cm2, which corresponds to a T-score of -1.3 and a Z-score   of 1.7.  This is within the osteopenia range by WHO criteria.       LEFT HIP:       The bone mineral density in the total hip is measured at 0.79 g/cm2   corresponding to a T-score of -1.3 and a Z-score of 1.0.  This is within the   osteopenia range by [facility] criteria.       The bone mineral density of the femoral neck is measured at 0.53 g/cm2   corresponding to a T-score of -2.9 and a Z-score of -0.5.  This is within the   osteoporosis range by WHO criteria.       No great change from the comparison.       RIGHT FOREARM:       The BMD of the middle third of the radius of the distal forearm equals 0.64   g/cm2. The T- and Z-scores are -0.7 and 2.9 standard deviations from the   mean.  This is within the normal range by WHO criteria.       No great change from the comparison. DEXA scan 8/17     FINDINGS:   RIGHT FOREARM:       The BMD of the middle third of the radius of the distal forearm equals 0.62   g/cm2.  The T- and Z-scores are -1.1 and 2.2  standard deviations from the   mean.  This is within the osteopenia range by WHO criteria.     LEFT HIP:       The bone mineral density in the total hip is measured at 0.81 g/cm2   corresponding to a T-score of -1.1 and a Z-score of 1.0.  This is within the   osteopenia range by [facility] criteria.       The bone mineral density of the femoral neck is measured at 0.52 g/cm2   corresponding to a T-score of -3.0 and a Z-score of -0.7.  This is within the   osteoporosis range by WHO criteria.       RIGHT HIP:       The bone mineral density in the total hip is measured at 0.80 g/cm2   corresponding to a T-score of -1.2 and a Z-score of 1.0.  This is within the   osteopenia range by [facility] criteria.       The bone mineral density of the femoral neck is measured at 0.55 g/cm2   corresponding to a T-score of -2.7 and a Z-score of -0.3.  This is within the   osteoporosis range by WHO criteria. 6/13 Dexa    Left Femur -2.5  L1-4  0.3    She was seeing Dr. Arvind León    History of fractures : none    Pharmacological therapy for Osteoprosis:   Fosamax 10 years took till 2013   Actonel since 10/17     FH of Osteoporosis: none   FH of hip fracture: none    Secondary causes of osteoporosis: None    Calcium: No supplement   Diet Ca : 1200mg   Vitamin D:42 on 8/17 2000IU  Exercise: none  Follows up regularly with a dentist. No major dental procedures planned. 3 inch height loss    She would like me to evaluate the thyroid nodules  For years    2014 USG  The thyroid gland is normal in size and echogenicity. The right   lobe measures 4.1 x 1.1 x 1.3 cm and the left lobe measures 3.7 x   1.3 x 1.4 cm.  The isthmus measures 2 mm.        The majority of the bilateral subcentimeter benign appearing   nodules have not significantly changed. The 2 largest septated   cysts in the upper pole of the left gland have decreased in size   and become one cyst measuring 1.2 x 1.5 x 1.2 cm, previously   measuring 1.6 x 2.6 x 1.5 cm. The central echogenicity within the   cyst is unchanged measuring 1.0 cm.      TSH normal    7/19 USG  Mid right 1.2cm spongiform  Left mid 1.4cm solid cystic  Inferior 1.1cm spongiform and 1.4cm cystic    Past Medical History:   Diagnosis Date    Acute kidney injury ([address]     Allergic rhinitis     [facility]     GERD (gastroesophageal reflux disease)     Hearing loss sensory, bilateral     mod--severe on right, mild to severe on left, due to asymmetry will send to get MRI to r/o acoustic neuroma    Mastoiditis     on MRI oct 2014-->dr ortiz states with no clinical findings this is considered an over-sensitivity of MRI results    Osteoarthritis     L1-L5    Osteoporosis     patient was on fosamax x 10 years and drug holiday was started on 6/21/13. (this was date of last DEXA scan)    Scoliosis      Past Surgical History:   Procedure Laterality Date    CARPAL TUNNEL RELEASE      right, 2003    COLONOSCOPY      2013, dr Ronak Roman    COLONOSCOPY N/A 10/19/2018    COLONOSCOPY WITH BIOPSY performed by Laura Laboy MD at [address] [place] [address]      December 27 2017 and feb 6 2018   [address]    NASAL SINUS SURGERY      UPPER GASTROINTESTINAL ENDOSCOPY N/A 4/4/2019    EGD BIOPSY [facility] at [facility] ENDOSCOPY     Current Outpatient Medications   Medication Sig Dispense Refill    risedronate (ACTONEL) 35 MG tablet One tab weekly 4 tablet 5    clopidogrel (PLAVIX) 75 MG tablet Take 75 mg by mouth daily      nitroGLYCERIN (NITROSTAT) 0.4 MG SL tablet up to max of 3 total doses.  If no relief after 1 dose, call 911. 25 tablet 3    aspirin 81 MG EC tablet Take 1 tablet by mouth daily 30 tablet 3    atorvastatin (LIPITOR) 10 MG tablet Take 1 tablet by mouth nightly 30 tablet 3    metoprolol succinate (TOPROL XL) 25 MG extended release tablet Take 1 tablet by mouth daily (Patient taking differently: Take 50 mg by mouth daily ) 30 tablet 3    acetaminophen (TYLENOL) 500 MG tablet Take 1,000 mg by mouth 2 times daily       polyethylene glycol [facility]) packet Take 17 g by mouth daily as needed for Constipation      vitamin D (CHOLECALCIFEROL) 1000 units TABS tablet Take 2,000 Units by mouth daily       Docusate Calcium (STOOL SOFTENER PO) Take 100 mg by mouth 2 times daily       Calcium Citrate-Vitamin D (CALCIUM + D PO) Take 500 mg by mouth daily        No current facility-administered medications for this visit. Constitutional: Negative for weight loss   Gastrointestinal: Negative for nausea, vomiting and abdominal pain. Musculoskeletal  Endo/Heme/Allergies: see HPI      Scoliosis    Lab Review  Lab Results   Component Value Date    TSH 2.63 05/10/2012     No results found for: FREET4      Assessment:     1. Osteoporosis : History for many years, she was on treatment, then on drug holiday since 2013. 8/17 Dexa, given low bone mineral density, recommended pharmacological treatment. She had been on fosamax for years,advised Prolia but insurance did not cover, started on Actonel per insurance preference. Optimize Ca and D. Ruled out secondary causes. Repeat Dexa 9/19,BMD stable, GFR 44, will continue. If decline, switch to Prolia  2. Thyroid Nodules : Sees Dr. Sobia Cramer, she would like us to evaluate. She had multiple sub centimeter nodules. Left upper cystic nodule. Repeat USG 7/19 shows left mid 1.4cm solid-cystic. Rest dominant nodule are spongiform or cystic  USG before next visit    Plan:     Post menopausal osteoporosis . No fragility fracture. Discussed management of osteoporosis in detail. Discussed treatment options with her. Told her that given high risk of fracture and osteoporosis, I would recommend  therapy for her. On actonel since 10/17  Discussed the side effects including Osteonecrosis of the jaw and Atypical fractures. Patient understands that the risk is low. She is willing to take the medication. In general advise to avoid falls, use night lights at night.    Calcium 500mg daily supplement and Vitamin D 3 1000 IU daily  F/u 6 months  Dexa 7.21      Total 7 minutes spent during the encounter for discussing medical care.

## 2020-12-18 ENCOUNTER — OFFICE VISIT (OUTPATIENT)
Dept: FAMILY MEDICINE CLINIC | Age: 85
End: 2020-12-18
Payer: MEDICARE

## 2020-12-18 VITALS
HEART RATE: 76 BPM | HEIGHT: 55 IN | SYSTOLIC BLOOD PRESSURE: 136 MMHG | BODY MASS INDEX: 23.84 KG/M2 | WEIGHT: 103 LBS | TEMPERATURE: 96.8 F | DIASTOLIC BLOOD PRESSURE: 82 MMHG

## 2020-12-18 PROBLEM — N17.9 AKI (ACUTE KIDNEY INJURY) (HCC): Status: RESOLVED | Noted: 2019-04-23 | Resolved: 2020-12-18

## 2020-12-18 PROCEDURE — G0439 PPPS, SUBSEQ VISIT: HCPCS | Performed by: FAMILY MEDICINE

## 2020-12-18 ASSESSMENT — PATIENT HEALTH QUESTIONNAIRE - PHQ9
SUM OF ALL RESPONSES TO PHQ9 QUESTIONS 1 & 2: 2
SUM OF ALL RESPONSES TO PHQ QUESTIONS 1-9: 2
2. FEELING DOWN, DEPRESSED OR HOPELESS: 1
SUM OF ALL RESPONSES TO PHQ QUESTIONS 1-9: 2
1. LITTLE INTEREST OR PLEASURE IN DOING THINGS: 1
SUM OF ALL RESPONSES TO PHQ QUESTIONS 1-9: 2

## 2020-12-18 ASSESSMENT — LIFESTYLE VARIABLES: HOW OFTEN DO YOU HAVE A DRINK CONTAINING ALCOHOL: 0

## 2020-12-18 NOTE — PROGRESS NOTES
Medicare Annual Wellness Visit  Name: Ej Epps Date: 2020   MRN: [id number] Sex: Female   Age: 81 y.o. Ethnicity: Non-/Non    : 1935 Race: Staci Beltre is here for Medicare AWV    Screenings for behavioral, psychosocial and functional/safety risks, and cognitive dysfunction are all negative except as indicated below. These results, as well as other patient data from the [address] form, are documented in Flowsheets linked to this Encounter. Allergies   Allergen Reactions    Ace Inhibitors      Caused ATN and hospitalization    Hctz [Hydrochlorothiazide]      Caused ATN and hospitalization         Prior to Visit Medications    Medication Sig Taking? Authorizing Provider   risedronate (ACTONEL) 35 MG tablet One tab weekly Yes Salo Livingston MD   nitroGLYCERIN (NITROSTAT) 0.4 MG SL tablet up to max of 3 total doses. If no relief after 1 dose, call 911.  Yes RIAZ Rashid CNP   aspirin 81 MG EC tablet Take 1 tablet by mouth daily Yes RIAZ Rashid CNP   atorvastatin (LIPITOR) 10 MG tablet Take 1 tablet by mouth nightly Yes RIAZ Rashid CNP   metoprolol succinate (TOPROL XL) 25 MG extended release tablet Take 1 tablet by mouth daily  Patient taking differently: Take 50 mg by mouth daily  Yes RIAZ Rashid CNP   acetaminophen (TYLENOL) 500 MG tablet Take 1,000 mg by mouth 2 times daily  Yes Historical Provider, MD   polyethylene glycol (MIRALAX) packet Take 17 g by mouth daily as needed for Constipation Yes Historical Provider, MD   vitamin D (CHOLECALCIFEROL) 1000 units TABS tablet Take 2,000 Units by mouth daily  Yes Historical Provider, MD   Docusate Calcium (STOOL SOFTENER PO) Take 100 mg by mouth 2 times daily  Yes Historical Provider, MD   Calcium Citrate-Vitamin D (CALCIUM + D PO) Take 500 mg by mouth daily  Yes Historical Provider, MD         Past Medical History:   Diagnosis Date    Acute kidney injury ([address]     Allergic rhinitis     [facility]     GERD (gastroesophageal reflux disease)     Hearing loss sensory, bilateral     mod--severe on right, mild to severe on left, due to asymmetry will send to get MRI to r/o acoustic neuroma    Mastoiditis     on MRI oct 2014-->dr ortiz states with no clinical findings this is considered an over-sensitivity of MRI results    Osteoarthritis     L1-L5    Osteoporosis     patient was on fosamax x 10 years and drug holiday was started on 6/21/13. (this was date of last DEXA scan)    Scoliosis        Past Surgical History:   Procedure Laterality Date    CARPAL TUNNEL RELEASE      right, 2003    COLONOSCOPY      2013, dr Jazzmine Campbell   Georgine Graft N/A 10/19/2018    COLONOSCOPY WITH BIOPSY performed by Alyssa Anderson MD at [address] [place] [address]      December 27 2017 and feb 6 2018   [address]      2011    NASAL SINUS SURGERY      UPPER GASTROINTESTINAL ENDOSCOPY N/A 4/4/2019    EGD BIOPSY performed by Alyssa Anderson MD at [facility]         Family History   Problem Relation Age of Onset    Diabetes Mother     Heart Disease Mother        CareTeam (Including outside providers/suppliers regularly involved in providing care):   Patient Care Team:  Vy An MD as PCP - General (Family Medicine)  Vy An MD as PCP - [facility] Empaneled Provider  Jose Aguilar III (Orthopedic Surgery)  Ted Stafford MD as Consulting Physician (Obstetrics & Gynecology)    [facility] Readings from Last 3 Encounters:   12/18/20 103 lb (46.7 kg)   01/15/20 105 lb (47.6 kg)   12/12/19 105 lb 3.2 oz (47.7 kg)     Vitals:    12/18/20 1050 12/18/20 1141   BP: (!) 168/83 136/82   Pulse: 76    Temp: 96.8 °F (36 °C)    TempSrc: Infrared    Weight: 103 lb (46.7 kg)    Height: 4' 4.39\" (1.331 m)      Body mass index is 26.38 kg/m².     Based upon direct observation of the patient, evaluation of cognition reveals recent and remote memory intact. Patient's complete Health Risk Assessment and screening values have been reviewed and are found in Flowsheets. The following problems were reviewed today and where indicated follow up appointments were made and/or referrals ordered. Positive Risk Factor Screenings with Interventions:            General Health and ACP:  General  In general, how would you say your health is?: Very Good  In the past 7 days, have you experienced any of the following?  New or Increased Pain, New or Increased Fatigue, Loneliness, Social Isolation, Stress or Anger?: (!) Stress  Do you get the social and emotional support that you need?: Yes  Do you have a Living Will?: Yes  Advance Directives     Power of  Living Will ACP-Advance Directive ACP-Power of     Not on File Filed on 04/04/19 Filed Not on File      General Health Risk Interventions:  · Stress: patient declines any further evaluation/treatment for this issue     Hearing/Vision:  No exam data present  Hearing/Vision  Do you or your family notice any trouble with your hearing?: (!) Yes  Do you have difficulty driving, watching TV, or doing any of your daily activities because of your eyesight?: No  Have you had an eye exam within the past year?: Yes  Hearing/Vision Interventions:  · Hearing concerns:  has hearing aids      Personalized Preventive Plan   Current Health Maintenance Status  Immunization History   Administered Date(s) Administered    Influenza Vaccine, unspecified formulation 10/08/2015    Influenza, High Dose (Fluzone 65 yrs and older) 10/25/2016, 10/12/2017, 09/18/2019, 09/03/2020    Influenza, Kenzie Hurt, Recombinant, IM PF (Flublok 18 yrs and older) 10/03/2018    Pneumococcal Conjugate 13-valent ([id number]) 10/05/2014    Pneumococcal Polysaccharide ([id number]) 01/01/2009    Tdap (Boostrix, Adacel) 10/08/2015    Zoster Recombinant (Shingrix) 01/18/2019, 03/28/2019        Health Maintenance   Topic Date Due    Annual

## 2020-12-18 NOTE — PATIENT INSTRUCTIONS
Call central scheduling for DEXA bone scan and thyroid ultrasound:   [phone]        Personalized Preventive Plan for Fabi Thompson - 12/18/2020  Medicare offers a range of preventive health benefits. Some of the tests and screenings are paid in full while other may be subject to a deductible, co-insurance, and/or copay. Some of these benefits include a comprehensive review of your medical history including lifestyle, illnesses that may run in your family, and various assessments and screenings as appropriate. After reviewing your medical record and screening and assessments performed today your provider may have ordered immunizations, labs, imaging, and/or referrals for you. A list of these orders (if applicable) as well as your Preventive Care list are included within your After Visit Summary for your review. Other Preventive Recommendations:    · A preventive eye exam performed by an eye specialist is recommended every 1-2 years to screen for glaucoma; cataracts, macular degeneration, and other eye disorders. · A preventive dental visit is recommended every 6 months. · Try to get at least 150 minutes of exercise per week or 10,000 steps per day on a pedometer . · Order or download the FREE \"Exercise & Physical Activity: Your Everyday Guide\" from [employer]. Call 0-232.134.8174 or search [employer] online. · You need 4054-1117 mg of calcium and 8615-3853 IU of vitamin D per day. It is possible to meet your calcium requirement with diet alone, but a vitamin D supplement is usually necessary to meet this goal.  · When exposed to the sun, use a sunscreen that protects against both UVA and UVB radiation with an SPF of 30 or greater. Reapply every 2 to 3 hours or after sweating, drying off with a towel, or swimming. · Always wear a seat belt when traveling in a car. Always wear a helmet when riding a bicycle or motorcycle.

## 2021-01-21 ENCOUNTER — VIRTUAL VISIT (OUTPATIENT)
Dept: FAMILY MEDICINE CLINIC | Age: 86
End: 2021-01-21
Payer: MEDICARE

## 2021-01-21 DIAGNOSIS — M54.50 ACUTE LEFT-SIDED LOW BACK PAIN WITHOUT SCIATICA: Primary | ICD-10-CM

## 2021-01-21 PROCEDURE — G2012 BRIEF CHECK IN BY MD/QHP: HCPCS | Performed by: NURSE PRACTITIONER

## 2021-01-21 NOTE — PROGRESS NOTES
2021    TELEHEALTH EVALUATION -- Audio/Visual (During OZEKB-26 public health emergency)    HPI:    Felisha Martines (:  1935) has requested an audio/video evaluation for the following concern(s):    Back pain    Back Pain:  Patient complains of a 1 week(s) history of left lower back pain. Pain is sharp in nature, without radiation. Pain is intermittent, typically moderate in intensity, and is exacerbated by extension, lifting and changing positions. Associated symptoms: none. Patient reports the following CPR Red Flags: none. Precipitating factors: no known injury. Patient's history includes recurrent self limited episodes of low back pain in the past.  Previous treatment: ice, heat, which has been  ineffective. Diagnostic testing: none. Symptoms are worsening over time. Patient states she has had back pain in the past but not for many years. She did have steroid injections but did not feel they were helpful. Review of Systems    Prior to Visit Medications    Medication Sig Taking? Authorizing Provider   risedronate (ACTONEL) 35 MG tablet One tab weekly  Familia Montana MD   nitroGLYCERIN (NITROSTAT) 0.4 MG SL tablet up to max of 3 total doses. If no relief after 1 dose, call 911.   RIAZ Krause CNP   aspirin 81 MG EC tablet Take 1 tablet by mouth daily  RIAZ Krause CNP   atorvastatin (LIPITOR) 10 MG tablet Take 1 tablet by mouth nightly  RIAZ Krause CNP   metoprolol succinate (TOPROL XL) 25 MG extended release tablet Take 1 tablet by mouth daily  Patient taking differently: Take 50 mg by mouth daily   RIAZ Krause CNP   acetaminophen (TYLENOL) 500 MG tablet Take 1,000 mg by mouth 2 times daily   Historical Provider, MD   polyethylene glycol (MIRALAX) packet Take 17 g by mouth daily as needed for Constipation  Historical Provider, MD   vitamin D (CHOLECALCIFEROL) 1000 units TABS tablet Take 2,000 Units by mouth daily   Historical Provider, MD Docusate Calcium (STOOL SOFTENER PO) Take 100 mg by mouth 2 times daily   Historical Provider, MD   Calcium Citrate-Vitamin D (CALCIUM + D PO) Take 500 mg by mouth daily   Historical Provider, MD       Social History     Tobacco Use    Smoking status: Never Smoker    Smokeless tobacco: Never Used   Substance Use Topics    Alcohol use: No    Drug use: No            PHYSICAL EXAMINATION:  [ INSTRUCTIONS:  \"[x]\" Indicates a positive item  \"[]\" Indicates a negative item  -- DELETE ALL ITEMS NOT EXAMINED]  Vital Signs: (As obtained by patient/caregiver or practitioner observation)    Blood pressure-  Heart rate-    Respiratory rate-    Temperature-  Pulse oximetry-     Mental status  [x] Alert and awake  [x] Oriented to person/place/time [x]Able to follow commands          Musculoskeletal:  Limited exam as this was a telephone visit. Patient reported pain during flexion, rotation to the left and left lateral bending. No pain with extension, right lateral bending or right rotation. Patient also reported tenderness to mid back spine. Psychiatric:       [x] Normal Affect [x] No Hallucinations        [] Abnormal-     Other pertinent observable physical exam findings-     ASSESSMENT/PLAN:  1. Acute left-sided low back pain without sciatica  Patient denies any injury but has 1 week history of low-mid left back pain. Exam was limited as visit was done over the telephone. Will start with xrays as patient does report tenderness to spine. Discussed other conservative treatments including physical therapy and medrol dose pack for pain. Patient was rushed at end of visit as she was being picked up for a dentist appt and declined physical therapy and steroids at this time. Patient already takes tylenol daily and NSAIDS should be avoided since she is taking aspirin. Will await xrays to determine next plan. Told to notify office if pain worsens or fails to improve over the next couple days.      - XR THORACIC

## 2021-01-22 ENCOUNTER — HOSPITAL ENCOUNTER (OUTPATIENT)
Age: 86
Discharge: HOME | End: 2021-01-22
Payer: MEDICARE

## 2021-01-22 ENCOUNTER — TELEPHONE (OUTPATIENT)
Dept: FAMILY MEDICINE CLINIC | Age: 86
End: 2021-01-22

## 2021-01-22 ENCOUNTER — HOSPITAL ENCOUNTER (OUTPATIENT)
Dept: GENERAL RADIOLOGY | Age: 86
Discharge: HOME | End: 2021-01-22
Payer: MEDICARE

## 2021-01-22 DIAGNOSIS — M54.50 ACUTE LEFT-SIDED LOW BACK PAIN WITHOUT SCIATICA: ICD-10-CM

## 2021-01-22 PROCEDURE — 72072 X-RAY EXAM THORAC SPINE 3VWS: CPT

## 2021-01-22 PROCEDURE — 72100 X-RAY EXAM L-S SPINE 2/3 VWS: CPT

## 2021-01-22 RX ORDER — METHYLPREDNISOLONE 4 MG/1
TABLET ORAL
Qty: 1 KIT | Refills: 0 | Status: SHIPPED | OUTPATIENT
Start: 2021-01-22 | End: 2021-01-28

## 2021-01-22 NOTE — TELEPHONE ENCOUNTER
PT called in regarding her xray results. Informed PT of Dr. Carisa May note. PT wanted to know if something can be called in for pain. Please send to the [address] in [place].

## 2021-01-26 ENCOUNTER — TELEPHONE (OUTPATIENT)
Dept: FAMILY MEDICINE CLINIC | Age: 86
End: 2021-01-26

## 2021-01-26 DIAGNOSIS — M41.25 OTHER IDIOPATHIC SCOLIOSIS, THORACOLUMBAR REGION: Primary | ICD-10-CM

## 2021-01-26 DIAGNOSIS — M54.50 ACUTE LEFT-SIDED LOW BACK PAIN WITHOUT SCIATICA: ICD-10-CM

## 2021-01-26 NOTE — TELEPHONE ENCOUNTER
PT called in stating that she has an appointment tomorrow with [address] for her back and that she needs a referral and the results of her xray faxed to them at [phone]. Please call PT back once this has been completed.      Best call back number: [phone]

## 2021-03-05 DIAGNOSIS — N18.31 STAGE 3A CHRONIC KIDNEY DISEASE (HCC): ICD-10-CM

## 2021-03-05 DIAGNOSIS — E78.5 HYPERLIPIDEMIA, UNSPECIFIED HYPERLIPIDEMIA TYPE: ICD-10-CM

## 2021-03-05 DIAGNOSIS — E55.9 VITAMIN D DEFICIENCY: ICD-10-CM

## 2021-03-05 DIAGNOSIS — I10 ESSENTIAL HYPERTENSION: ICD-10-CM

## 2021-03-05 PROBLEM — K29.70 GASTRITIS: Status: RESOLVED | Noted: 2019-04-04 | Resolved: 2021-03-05

## 2021-03-05 LAB
A/G RATIO: 1.4 (ref 1.1–2.2)
ALBUMIN SERPL-MCNC: 4.3 G/DL (ref 3.4–5)
ALP BLD-CCNC: 94 U/L (ref 40–129)
ALT SERPL-CCNC: 15 U/L (ref 10–40)
ANION GAP SERPL CALCULATED.3IONS-SCNC: 13 MMOL/L (ref 3–16)
AST SERPL-CCNC: 29 U/L (ref 15–37)
BASOPHILS ABSOLUTE: 0 K/UL (ref 0–0.2)
BASOPHILS RELATIVE PERCENT: 0.5 %
BILIRUB SERPL-MCNC: 0.4 MG/DL (ref 0–1)
BUN BLDV-MCNC: 24 MG/DL (ref 7–20)
CALCIUM SERPL-MCNC: 10 MG/DL (ref 8.3–10.6)
CHLORIDE BLD-SCNC: 103 MMOL/L (ref 99–110)
CHOLESTEROL, TOTAL: 163 MG/DL (ref 0–199)
CO2: 24 MMOL/L (ref 21–32)
CREAT SERPL-MCNC: 1.1 MG/DL (ref 0.6–1.2)
EOSINOPHILS ABSOLUTE: 0.1 K/UL (ref 0–0.6)
EOSINOPHILS RELATIVE PERCENT: 0.6 %
GFR AFRICAN AMERICAN: 57
GFR NON-AFRICAN AMERICAN: 47
GLOBULIN: 3.1 G/DL
GLUCOSE BLD-MCNC: 97 MG/DL (ref 70–99)
HCT VFR BLD CALC: 42.6 % (ref 36–48)
HDLC SERPL-MCNC: 58 MG/DL (ref 40–60)
HEMOGLOBIN: 14.1 G/DL (ref 12–16)
LDL CHOLESTEROL CALCULATED: 79 MG/DL
LYMPHOCYTES ABSOLUTE: 1.5 K/UL (ref 1–5.1)
LYMPHOCYTES RELATIVE PERCENT: 14.6 %
MCH RBC QN AUTO: 31.4 PG (ref 26–34)
MCHC RBC AUTO-ENTMCNC: 33.2 G/DL (ref 31–36)
MCV RBC AUTO: 94.6 FL (ref 80–100)
MONOCYTES ABSOLUTE: 0.6 K/UL (ref 0–1.3)
MONOCYTES RELATIVE PERCENT: 6.1 %
NEUTROPHILS ABSOLUTE: 8.2 K/UL (ref 1.7–7.7)
NEUTROPHILS RELATIVE PERCENT: 78.2 %
PDW BLD-RTO: 13.4 % (ref 12.4–15.4)
PLATELET # BLD: 252 K/UL (ref 135–450)
PMV BLD AUTO: 8 FL (ref 5–10.5)
POTASSIUM SERPL-SCNC: 4.9 MMOL/L (ref 3.5–5.1)
RBC # BLD: 4.5 M/UL (ref 4–5.2)
SODIUM BLD-SCNC: 140 MMOL/L (ref 136–145)
TOTAL PROTEIN: 7.4 G/DL (ref 6.4–8.2)
TRIGL SERPL-MCNC: 130 MG/DL (ref 0–150)
VITAMIN D 25-HYDROXY: 60.4 NG/ML
VLDLC SERPL CALC-MCNC: 26 MG/DL
WBC # BLD: 10.4 K/UL (ref 4–11)

## 2021-06-14 RX ORDER — RISEDRONATE SODIUM 35 MG/1
TABLET, FILM COATED ORAL
Qty: 4 TABLET | Refills: 4 | Status: SHIPPED | OUTPATIENT
Start: 2021-06-14 | End: 2021-10-08

## 2021-06-14 NOTE — TELEPHONE ENCOUNTER
Medication:   Requested Prescriptions     Pending Prescriptions Disp Refills    risedronate (ACTONEL) 35 MG tablet [Pharmacy Med Name: RISEDRONATE SODIUM 35 MG TAB DSPK] 4 tablet 4     Sig: TAKE ONE TABLET BY MOUTH ONCE WEEKLY       Last Filled:      Patient Phone Number: [phone] (home)     Last appt: 12/8/2020   Next appt :08/26/2021  Last Labs DM: No results found for: LABA1C  Last Lipid:   Lab Results   Component Value Date    CHOL 163 03/05/2021    TRIG 130 03/05/2021    HDL 58 03/05/2021    HDL 76 05/10/2012    LDLCALC 79 03/05/2021     Last PSA: No results found for: PSA  Last Thyroid:   Lab Results   Component Value Date    TSH 2.63 05/10/2012    T4FREE 0.98 05/04/2012

## 2021-07-30 ENCOUNTER — HOSPITAL ENCOUNTER (OUTPATIENT)
Dept: WOMENS IMAGING | Age: 86
Discharge: HOME | End: 2021-07-30
Payer: MEDICARE

## 2021-07-30 ENCOUNTER — HOSPITAL ENCOUNTER (OUTPATIENT)
Dept: ULTRASOUND IMAGING | Age: 86
Discharge: HOME | End: 2021-07-30
Payer: MEDICARE

## 2021-07-30 DIAGNOSIS — M81.0 AGE-RELATED OSTEOPOROSIS WITHOUT CURRENT PATHOLOGICAL FRACTURE: ICD-10-CM

## 2021-07-30 DIAGNOSIS — E04.2 MULTIPLE THYROID NODULES: ICD-10-CM

## 2021-07-30 PROCEDURE — 77080 DXA BONE DENSITY AXIAL: CPT

## 2021-07-30 PROCEDURE — 76536 US EXAM OF HEAD AND NECK: CPT

## 2021-08-22 NOTE — TELEPHONE ENCOUNTER
Please call and inform patient that all blood work is normal.    Please document call and then close encounter.   thanks None Known

## 2021-08-26 ENCOUNTER — OFFICE VISIT (OUTPATIENT)
Dept: ENDOCRINOLOGY | Age: 86
End: 2021-08-26
Payer: MEDICARE

## 2021-08-26 VITALS
DIASTOLIC BLOOD PRESSURE: 62 MMHG | HEIGHT: 55 IN | WEIGHT: 106.2 LBS | BODY MASS INDEX: 24.58 KG/M2 | OXYGEN SATURATION: 95 % | SYSTOLIC BLOOD PRESSURE: 100 MMHG | HEART RATE: 69 BPM

## 2021-08-26 DIAGNOSIS — E04.2 MULTIPLE THYROID NODULES: ICD-10-CM

## 2021-08-26 DIAGNOSIS — M81.0 AGE-RELATED OSTEOPOROSIS WITHOUT CURRENT PATHOLOGICAL FRACTURE: Primary | ICD-10-CM

## 2021-08-26 PROCEDURE — 99214 OFFICE O/P EST MOD 30 MIN: CPT | Performed by: INTERNAL MEDICINE

## 2021-08-26 NOTE — PROGRESS NOTES
Subjective: Sandi Buckley is a  who is here for f/u evaluation of osteoporosis.     Interim:    +back pain  No fall , fracture    Last GFR 47  Tolerating actonel    She was diagnosed with Osteoporsis - years ago    Dexa 1/21    LUMBAR SPINE:       The bone mineral density in the lumbar spine including the L2-L4 levels is   measured at 0.98 g/cm2, which corresponds to a T-score of -0.9 and a Z-score   of 2.1.  This is within the normal range by WHO criteria.       LEFT HIP:       The bone mineral density in the total hip is measured at 0.71 g/cm2   corresponding to a T-score of -1.9 and a Z-score of 0.4.  This is within the   osteopenia range by [facility] criteria.       The bone mineral density of the femoral neck is measured at 0.52 g/cm2   corresponding to a T-score of -2.9 and a Z-score of -0.4.  This is within the   osteoporosis range by WHO criteria.       RIGHT HIP:       The bone mineral density in the total hip is measured at 0.69 g/cm2   corresponding to a T-score of -2.1 and a Z-score of 0.3.  This is within the   osteopenia range by [facility] criteria.       The bone mineral density of the femoral neck is measured at 0.51 g/cm2   corresponding to a T-score of -3.1 and a Z-score of -0.5.  This is within the   osteoporosis range by [facility] criteria.       Right       FOREARM:       The BMD of the middle third of the radius of the distal forearm equals 0.60   g/cm2 corresponding to a T-score of -1.6 and Z-score of 2.1.  This is within   the osteopenia range by WHO criteria.           Dexa 7/19  LUMBAR SPINE:       The bone mineral density in the lumbar spine including the L3-L4 levels is   measured at 0.96 g/cm2, which corresponds to a T-score of -1.3 and a Z-score   of 1.7.  This is within the osteopenia range by WHO criteria.       LEFT HIP:       The bone mineral density in the total hip is measured at 0.79 g/cm2   corresponding to a T-score of -1.3 and a Z-score of 1.0.  This is within the   osteopenia range by WHO criteria.       The bone mineral density of the femoral neck is measured at 0.53 g/cm2   corresponding to a T-score of -2.9 and a Z-score of -0.5.  This is within the   osteoporosis range by WHO criteria.       No great change from the comparison.       RIGHT FOREARM:       The BMD of the middle third of the radius of the distal forearm equals 0.64   g/cm2. The T- and Z-scores are -0.7 and 2.9 standard deviations from the   mean.  This is within the normal range by WHO criteria.       No great change from the comparison. DEXA scan 8/17     FINDINGS:   RIGHT FOREARM:       The BMD of the middle third of the radius of the distal forearm equals 0.62   g/cm2. The T- and Z-scores are -1.1 and 2.2  standard deviations from the   mean.  This is within the osteopenia range by WHO criteria.       LEFT HIP:       The bone mineral density in the total hip is measured at 0.81 g/cm2   corresponding to a T-score of -1.1 and a Z-score of 1.0.  This is within the   osteopenia range by [facility] criteria.       The bone mineral density of the femoral neck is measured at 0.52 g/cm2   corresponding to a T-score of -3.0 and a Z-score of -0.7.  This is within the   osteoporosis range by WHO criteria.       RIGHT HIP:       The bone mineral density in the total hip is measured at 0.80 g/cm2   corresponding to a T-score of -1.2 and a Z-score of 1.0.  This is within the   osteopenia range by [facility] criteria.       The bone mineral density of the femoral neck is measured at 0.55 g/cm2   corresponding to a T-score of -2.7 and a Z-score of -0.3.  This is within the   osteoporosis range by WHO criteria.      6/13 Dexa    Left Femur -2.5  L1-4  0.3    She was seeing Dr. Mamie Almaraz    History of fractures : none    Pharmacological therapy for Osteoprosis:   Fosamax 10 years took till 2013   Actonel 10/17 to present     [place] of Osteoporosis: none   FH of hip fracture: none    Secondary causes of osteoporosis: None    Calcium: No supplement   Diet Ca Jessica Inch 1200mg   Vitamin D:42 on 8/17 2000IU  Exercise: none  Follows up regularly with a dentist. No major dental procedures planned. 3 inch height loss    She would like me to evaluate the thyroid nodules  For years    2014 USG  The thyroid gland is normal in size and echogenicity. The right   lobe measures 4.1 x 1.1 x 1.3 cm and the left lobe measures 3.7 x   1.3 x 1.4 cm.  The isthmus measures 2 mm.        The majority of the bilateral subcentimeter benign appearing   nodules have not significantly changed. The 2 largest septated   cysts in the upper pole of the left gland have decreased in size   and become one cyst measuring 1.2 x 1.5 x 1.2 cm, previously   measuring 1.6 x 2.6 x 1.5 cm. The central echogenicity within the   cyst is unchanged measuring 1.0 cm.      TSH normal    7/19 USG  Mid right 1.2cm spongiform  Left mid 1.4cm solid cystic  Inferior 1.1cm spongiform and 1.4cm cystic    Past Medical History:   Diagnosis Date    Acute kidney injury ([address]     Allergic rhinitis     [facility]     GERD (gastroesophageal reflux disease)     Hearing loss sensory, bilateral     mod--severe on right, mild to severe on left, due to asymmetry will send to get MRI to r/o acoustic neuroma    Mastoiditis     on MRI oct 2014-->dr ortiz states with no clinical findings this is considered an over-sensitivity of MRI results    Osteoarthritis     L1-L5    Osteoporosis     patient was on fosamax x 10 years and drug holiday was started on 6/21/13. (this was date of last DEXA scan)    Scoliosis      Past Surgical History:   Procedure Laterality Date    CARPAL TUNNEL RELEASE      right, 2003    COLONOSCOPY      2013, dr Kev Lama    COLONOSCOPY N/A 10/19/2018    COLONOSCOPY WITH BIOPSY performed by Alyssa Asencio MD at [address] [place] [address]      December 27 2017 and feb 6 2018   [address]      2011    NASAL SINUS SURGERY      UPPER GASTROINTESTINAL ENDOSCOPY N/A 4/4/2019 EGD BIOPSY performed by Fercho Dsouza MD at [facility]     Current Outpatient Medications   Medication Sig Dispense Refill    tiZANidine (ZANAFLEX) 2 MG capsule TAKE ONE CAPSULE BY MOUTH EVERY NIGHT AS NEEDED FOR MUSCLE SPASMS 30 capsule 0    risedronate (ACTONEL) 35 MG tablet TAKE ONE TABLET BY MOUTH ONCE WEEKLY 4 tablet 4    Handicap Placard MISC by Does not apply route Expires 3/5/2026 1 each 0    nitroGLYCERIN (NITROSTAT) 0.4 MG SL tablet up to max of 3 total doses. If no relief after 1 dose, call 911. 25 tablet 3    aspirin 81 MG EC tablet Take 1 tablet by mouth daily 30 tablet 3    atorvastatin (LIPITOR) 10 MG tablet Take 1 tablet by mouth nightly 30 tablet 3    metoprolol succinate (TOPROL XL) 25 MG extended release tablet Take 1 tablet by mouth daily (Patient taking differently: Take 50 mg by mouth daily ) 30 tablet 3    acetaminophen (TYLENOL) 500 MG tablet Take 1,000 mg by mouth 2 times daily       polyethylene glycol (MIRALAX) packet Take 17 g by mouth daily as needed for Constipation      vitamin D (CHOLECALCIFEROL) 1000 units TABS tablet Take 2,000 Units by mouth daily       Docusate Calcium (STOOL SOFTENER PO) Take 100 mg by mouth 2 times daily       Calcium Citrate-Vitamin D (CALCIUM + D PO) Take 500 mg by mouth daily        No current facility-administered medications for this visit. Constitutional: Negative for weight loss   Gastrointestinal: Negative for nausea, vomiting and abdominal pain. Musculoskeletal  Endo/Heme/Allergies: see HPI      Scoliosis    Lab Review  Lab Results   Component Value Date    TSH 2.63 05/10/2012     No results found for: FREET4      Assessment:     1. Osteoporosis : History for many years, she was on treatment, then on drug holiday since 2013. 8/17 Dexa, given low bone mineral density, recommended pharmacological treatment.  She had been on fosamax for years,advised Prolia but insurance did not cover, started on Actonel per insurance preference. Optimize Ca and D. Ruled out secondary causes. Repeat Dexa 9/19,BMD stable, GFR 44, will continue. If decline, switch to Prolia  Dexa 7/21 no comparison as may be different machine. Dexa shows osteoporosis. Will continue treatment    2. Thyroid Nodules : Sees Dr. David Sanchez, she would like us to evaluate. She had multiple sub centimeter nodules. Left upper cystic nodule. Repeat USG 7/19 shows left mid 1.4cm solid-cystic. Rest dominant nodule are spongiform or cystic  USG 7/21 shows mid left 1.1cm nodule, rest are stable    3. Back Pain:  No fractures on imaging, advised pain not due to osteoporosis, likely due to DJD. Seeing PCP    Plan:     Post menopausal osteoporosis . No fragility fracture. Discussed management of osteoporosis in detail. Discussed treatment options with her. Told her that given high risk of fracture and osteoporosis, I would recommend  therapy for her. On actonel since 10/17  Discussed the side effects including Osteonecrosis of the jaw and Atypical fractures. Patient understands that the risk is low. She is willing to take the medication. In general advise to avoid falls, use night lights at night. Calcium 500mg daily supplement and Vitamin D 3 1000 IU daily  F/u in a year    .

## 2021-09-15 ENCOUNTER — NURSE ONLY (OUTPATIENT)
Dept: PRIMARY CARE CLINIC | Age: 86
End: 2021-09-15

## 2021-09-15 DIAGNOSIS — Z11.52 ENCOUNTER FOR SCREENING FOR SEVERE ACUTE RESPIRATORY SYNDROME CORONAVIRUS 2 (SARS-COV-2) INFECTION: Primary | ICD-10-CM

## 2021-09-15 NOTE — PROGRESS NOTES
Susan Garcia received a viral test for COVID-19. They were educated on isolation and quarantine as appropriate. For any symptoms, they were directed to seek care from their PCP, given contact information to establish with a doctor, directed to an urgent care or the emergency room.

## 2021-09-16 LAB — SARS-COV-2: NOT DETECTED

## 2021-10-08 RX ORDER — RISEDRONATE SODIUM 35 MG/1
TABLET, FILM COATED ORAL
Qty: 4 TABLET | Refills: 4 | Status: SHIPPED | OUTPATIENT
Start: 2021-10-08 | End: 2022-03-22

## 2021-10-25 ENCOUNTER — HOSPITAL ENCOUNTER (OUTPATIENT)
Dept: WOMENS IMAGING | Age: 86
Discharge: HOME | End: 2021-10-25
Payer: MEDICARE

## 2021-10-25 DIAGNOSIS — Z12.31 VISIT FOR SCREENING MAMMOGRAM: ICD-10-CM

## 2021-10-25 PROCEDURE — 77063 BREAST TOMOSYNTHESIS BI: CPT

## 2022-03-22 RX ORDER — RISEDRONATE SODIUM 35 MG/1
TABLET, FILM COATED ORAL
Qty: 4 TABLET | Refills: 4 | Status: SHIPPED | OUTPATIENT
Start: 2022-03-22 | End: 2022-08-10

## 2022-03-22 NOTE — TELEPHONE ENCOUNTER
Medication:   Requested Prescriptions     Pending Prescriptions Disp Refills    risedronate (ACTONEL) 35 MG tablet [Pharmacy Med Name: RISEDRONATE SODIUM 35 MG TAB DSPK] 4 tablet 4     Sig: TAKE ONE TABLET BY MOUTH ONCE WEEKLY       Last Filled:      Patient Phone Number: [phone] (home)     Last appt: 8/26/2021   Next appt: 08/25/2022  Last Labs DM: No results found for: LABA1C  Last Lipid:   Lab Results   Component Value Date    CHOL 163 03/05/2021    TRIG 130 03/05/2021    HDL 58 03/05/2021    HDL 76 05/10/2012    LDLCALC 79 03/05/2021     Last PSA: No results found for: PSA  Last Thyroid:   Lab Results   Component Value Date    TSH 2.63 05/10/2012    T4FREE 0.98 05/04/2012

## 2022-04-20 PROBLEM — I25.119 CORONARY ARTERY DISEASE INVOLVING NATIVE CORONARY ARTERY OF NATIVE HEART WITH ANGINA PECTORIS (HCC): Status: ACTIVE | Noted: 2022-04-20

## 2022-08-10 RX ORDER — RISEDRONATE SODIUM 35 MG/1
TABLET, FILM COATED ORAL
Qty: 4 TABLET | Refills: 4 | Status: SHIPPED | OUTPATIENT
Start: 2022-08-10 | End: 2022-08-25 | Stop reason: SDUPTHER

## 2022-08-10 NOTE — TELEPHONE ENCOUNTER
Medication:   Requested Prescriptions     Pending Prescriptions Disp Refills    risedronate (ACTONEL) 35 MG tablet [Pharmacy Med Name: RISEDRONATE SODIUM 35 MG TAB DSPK] 4 tablet 4     Sig: TAKE ONE TABLET BY MOUTH ONCE WEEKLY        Last Filled:      Patient Phone Number: [phone] (home)     Last appt: 8/26/2021   Next appt: 8/25/22    Last OARRS: No flowsheet data found.

## 2022-08-22 PROBLEM — N18.30 CHRONIC RENAL DISEASE, STAGE III (HCC): Status: ACTIVE | Noted: 2022-08-22

## 2022-08-25 ENCOUNTER — OFFICE VISIT (OUTPATIENT)
Dept: ENDOCRINOLOGY | Age: 87
End: 2022-08-25
Payer: MEDICARE

## 2022-08-25 VITALS
BODY MASS INDEX: 23.88 KG/M2 | HEIGHT: 55 IN | OXYGEN SATURATION: 97 % | HEART RATE: 87 BPM | DIASTOLIC BLOOD PRESSURE: 74 MMHG | SYSTOLIC BLOOD PRESSURE: 130 MMHG | WEIGHT: 103.2 LBS

## 2022-08-25 DIAGNOSIS — M81.0 AGE-RELATED OSTEOPOROSIS WITHOUT CURRENT PATHOLOGICAL FRACTURE: Primary | ICD-10-CM

## 2022-08-25 PROCEDURE — 1123F ACP DISCUSS/DSCN MKR DOCD: CPT | Performed by: INTERNAL MEDICINE

## 2022-08-25 PROCEDURE — 99214 OFFICE O/P EST MOD 30 MIN: CPT | Performed by: INTERNAL MEDICINE

## 2022-08-25 RX ORDER — RISEDRONATE SODIUM 35 MG/1
TABLET, FILM COATED ORAL
Qty: 4 TABLET | Refills: 11 | Status: SHIPPED | OUTPATIENT
Start: 2022-08-25

## 2022-08-25 NOTE — PROGRESS NOTES
Subjective: Georgina Patel is a  who is here for f/u evaluation of osteoporosis. Interim:    +back pain  Following at [employer]  No fall , fracture    Last GFR 47  Tolerating actonel    She was diagnosed with Osteoporsis - years ago    Dexa 1/21    LUMBAR SPINE:       The bone mineral density in the lumbar spine including the L2-L4 levels is   measured at 0.98 g/cm2, which corresponds to a T-score of -0.9 and a Z-score   of 2.1. This is within the normal range by WHO criteria. LEFT HIP:       The bone mineral density in the total hip is measured at 0.71 g/cm2   corresponding to a T-score of -1.9 and a Z-score of 0.4. This is within the   osteopenia range by WHO criteria. The bone mineral density of the femoral neck is measured at 0.52 g/cm2   corresponding to a T-score of -2.9 and a Z-score of -0.4. This is within the   osteoporosis range by WHO criteria. RIGHT HIP:       The bone mineral density in the total hip is measured at 0.69 g/cm2   corresponding to a T-score of -2.1 and a Z-score of 0.3. This is within the   osteopenia range by WHO criteria. The bone mineral density of the femoral neck is measured at 0.51 g/cm2   corresponding to a T-score of -3.1 and a Z-score of -0.5. This is within the   osteoporosis range by WHO criteria. Right       FOREARM:       The BMD of the middle third of the radius of the distal forearm equals 0.60   g/cm2 corresponding to a T-score of -1.6 and Z-score of 2.1. This is within   the osteopenia range by WHO criteria. Dexa 7/19  LUMBAR SPINE:       The bone mineral density in the lumbar spine including the L3-L4 levels is   measured at 0.96 g/cm2, which corresponds to a T-score of -1.3 and a Z-score   of 1.7. This is within the osteopenia range by WHO criteria. LEFT HIP:       The bone mineral density in the total hip is measured at 0.79 g/cm2   corresponding to a T-score of -1.3 and a Z-score of 1.0.   This is within the osteopenia range by WHO criteria. The bone mineral density of the femoral neck is measured at 0.53 g/cm2   corresponding to a T-score of -2.9 and a Z-score of -0.5. This is within the   osteoporosis range by WHO criteria. No great change from the comparison. RIGHT FOREARM:       The BMD of the middle third of the radius of the distal forearm equals 0.64   g/cm2. The T- and Z-scores are -0.7 and 2.9 standard deviations from the   mean. This is within the normal range by WHO criteria. No great change from the comparison. DEXA scan 8/17     FINDINGS:   RIGHT FOREARM:       The BMD of the middle third of the radius of the distal forearm equals 0.62   g/cm2. The T- and Z-scores are -1.1 and 2.2  standard deviations from the   mean. This is within the osteopenia range by WHO criteria. LEFT HIP:       The bone mineral density in the total hip is measured at 0.81 g/cm2   corresponding to a T-score of -1.1 and a Z-score of 1.0. This is within the   osteopenia range by WHO criteria. The bone mineral density of the femoral neck is measured at 0.52 g/cm2   corresponding to a T-score of -3.0 and a Z-score of -0.7. This is within the   osteoporosis range by WHO criteria. RIGHT HIP:       The bone mineral density in the total hip is measured at 0.80 g/cm2   corresponding to a T-score of -1.2 and a Z-score of 1.0. This is within the   osteopenia range by WHO criteria. The bone mineral density of the femoral neck is measured at 0.55 g/cm2   corresponding to a T-score of -2.7 and a Z-score of -0.3. This is within the   osteoporosis range by WHO criteria.      6/13 Dexa    Left Femur -2.5  L1-4  0.3    She was seeing Dr. Chinyere Pate    History of fractures : none    Pharmacological therapy for Osteoprosis:   Fosamax 10 years took till 2013   Actonel 10/17 to present     [place] of Osteoporosis: none   FH of hip fracture: none    Secondary causes of osteoporosis: None    Calcium: No supplement   Diet Ca : 1200mg   Vitamin D:42 on 8/17 2000IU  Exercise: none  Follows up regularly with a dentist. No major dental procedures planned. 3 inch height loss    She would like me to evaluate the thyroid nodules  For years    2014 USG  The thyroid gland is normal in size and echogenicity. The right   lobe measures 4.1 x 1.1 x 1.3 cm and the left lobe measures 3.7 x   1.3 x 1.4 cm. The isthmus measures 2 mm. The majority of the bilateral subcentimeter benign appearing   nodules have not significantly changed. The 2 largest septated   cysts in the upper pole of the left gland have decreased in size   and become one cyst measuring 1.2 x 1.5 x 1.2 cm, previously   measuring 1.6 x 2.6 x 1.5 cm. The central echogenicity within the   cyst is unchanged measuring 1.0 cm.      TSH normal    7/19 USG  Mid right 1.2cm spongiform  Left mid 1.4cm solid cystic  Inferior 1.1cm spongiform and 1.4cm cystic    Past Medical History:   Diagnosis Date    Acute kidney injury ([address]     Allergic rhinitis     CHF (congestive heart failure) ([facility])     GERD (gastroesophageal reflux disease)     Hearing loss sensory, bilateral     mod--severe on right, mild to severe on left, due to asymmetry will send to get MRI to r/o acoustic neuroma    Mastoiditis     on MRI oct 2014-->dr ortiz states with no clinical findings this is considered an over-sensitivity of MRI results    Osteoarthritis     L1-L5    Osteoporosis     patient was on fosamax x 10 years and drug holiday was started on 6/21/13. (this was date of last DEXA scan)    Scoliosis      Past Surgical History:   Procedure Laterality Date    CARPAL TUNNEL RELEASE      right, 2003    COLONOSCOPY      2013, dr contreras    COLONOSCOPY N/A 10/19/2018    COLONOSCOPY WITH BIOPSY performed by Angela Escalante MD at [address]      December 27 2017 and feb 6 2018    HERNIA REPAIR      2011    NASAL SINUS SURGERY      UPPER GASTROINTESTINAL ENDOSCOPY N/A 4/4/2019    EGD BIOPSY performed by Codie Wilkerson MD at [facility] ENDOSCOPY     Current Outpatient Medications   Medication Sig Dispense Refill    traMADol (ULTRAM) 50 MG tablet Take 1 tablet by mouth in the morning and 1 tablet in the evening. Do all this for 30 days. Intended supply: 7 days. Take lowest dose possible to manage pain. 60 tablet 0    metoprolol succinate (TOPROL XL) 25 MG extended release tablet Take 1 tablet by mouth daily 90 tablet 3    atorvastatin (LIPITOR) 10 MG tablet Take 1 tablet by mouth nightly 90 tablet 3    risedronate (ACTONEL) 35 MG tablet TAKE ONE TABLET BY MOUTH ONCE WEEKLY 4 tablet 4    tiZANidine (ZANAFLEX) 2 MG tablet TAKE ONE TABLET BY MOUTH EVERY MORNING AND TAKE ONE TABLET BY MOUTH EVERY NIGHT AT BEDTIME 60 tablet 0    Handicap Placard MISC by Does not apply route Expires 3/5/2026 1 each 0    nitroGLYCERIN (NITROSTAT) 0.4 MG SL tablet up to max of 3 total doses. If no relief after 1 dose, call 911. 25 tablet 3    aspirin 81 MG EC tablet Take 1 tablet by mouth daily 30 tablet 3    acetaminophen (TYLENOL) 500 MG tablet Take 1,000 mg by mouth 2 times daily       polyethylene glycol (GLYCOLAX) 17 g packet Take 17 g by mouth daily as needed for Constipation      vitamin D (CHOLECALCIFEROL) 1000 units TABS tablet Take 2,000 Units by mouth daily       Docusate Calcium (STOOL SOFTENER PO) Take 100 mg by mouth 2 times daily      Calcium Citrate-Vitamin D (CALCIUM + D PO) Take 500 mg by mouth daily        No current facility-administered medications for this visit. Constitutional: Negative for weight loss   Gastrointestinal: Negative for nausea, vomiting and abdominal pain. Musculoskeletal  Endo/Heme/Allergies: see HPI      Scoliosis    Lab Review  Lab Results   Component Value Date/Time    TSH 2.63 05/10/2012 04:30 PM     No results found for: FREET4      Assessment:     1. Osteoporosis : History for many years, she was on treatment, then on drug holiday since 2013.  8/17 Dexa, given low bone mineral density, recommended pharmacological treatment. She had been on fosamax for years,advised Prolia but insurance did not cover, started on Actonel per insurance preference. Optimize Ca and D. Ruled out secondary causes. Repeat Dexa 9/19,BMD stable, GFR 44, will continue. If decline, switch to Prolia  Dexa 7/21 no comparison as may be different machine. Dexa shows osteoporosis. Will continue treatment    2. Thyroid Nodules : Sees Dr. Palmer Gagnon, she would like us to evaluate. She had multiple sub centimeter nodules. Left upper cystic nodule. Repeat USG 7/19 shows left mid 1.4cm solid-cystic. Rest dominant nodule are spongiform or cystic  USG 7/21 shows mid left 1.1cm nodule, rest are stable    3. Back Pain:  No fractures on imaging, advised pain not due to osteoporosis, likely due to DJD. Seeing PCP    Plan:     Post menopausal osteoporosis . No fragility fracture. Discussed management of osteoporosis in detail. Discussed treatment options with her. Told her that given high risk of fracture and osteoporosis, I would recommend  therapy for her. On actonel since 10/17  Discussed the side effects including Osteonecrosis of the jaw and Atypical fractures. Patient understands that the risk is low. She is willing to take the medication. In general advise to avoid falls, use night lights at night. Calcium 500mg daily supplement and Vitamin D 3 1000 IU daily  F/u in a year    .

## 2022-11-18 ENCOUNTER — HOSPITAL ENCOUNTER (OUTPATIENT)
Dept: WOMENS IMAGING | Age: 87
Discharge: HOME | End: 2022-11-18
Payer: MEDICARE

## 2022-11-18 DIAGNOSIS — Z12.31 BREAST CANCER SCREENING BY MAMMOGRAM: ICD-10-CM

## 2022-11-18 PROCEDURE — 77063 BREAST TOMOSYNTHESIS BI: CPT

## 2022-12-27 NOTE — TELEPHONE ENCOUNTER
Medication:   Requested Prescriptions     Pending Prescriptions Disp Refills    risedronate (ACTONEL) 35 MG tablet [Pharmacy Med Name: RISEDRONATE SODIUM 35 MG TAB DSPK] 4 tablet 11     Sig: TAKE ONE TABLET BY MOUTH ONCE WEEKLY        Last Filled:      Patient Phone Number: [phone] (home)     Last appt: 8/25/2022   Next appt: 8/24/23    Last OARRS: No flowsheet data found.

## 2022-12-28 RX ORDER — RISEDRONATE SODIUM 35 MG/1
TABLET, FILM COATED ORAL
Qty: 4 TABLET | Refills: 11 | Status: SHIPPED | OUTPATIENT
Start: 2022-12-28

## 2023-04-04 ENCOUNTER — CARE COORDINATION (OUTPATIENT)
Dept: CASE MANAGEMENT | Age: 88
End: 2023-04-04

## 2023-04-04 ENCOUNTER — CARE COORDINATION (OUTPATIENT)
Dept: CARE COORDINATION | Age: 88
End: 2023-04-04

## 2023-04-04 NOTE — CARE COORDINATION
[facility] Discharge Call    2023    Patient: Aurelio Green Patient : 1935   MRN: [id number]  Reason for Admission:   Discharge Date: 19 RARS: No data recorded       Pt now at [facility]. Pt was at [facility] from 3/3 to 4/3 for primary arthritis of Right shoulder. Pt admitted from [facility]  to 3/3.     Care Transitions Post Acute Facility Transition      Do you have all of your prescriptions and are they filled?: Yes         Care Transitions Interventions         Future Appointments   Date Time Provider Nataliia Arnold   2023  3:30 PM Janie Parra MD [facility] - [facility]   2023 10:30 AM [facility] Coon [facility]       CURRY RiveraN, RN   [address] [facility] Transition Nurse  [phone]

## 2023-04-04 NOTE — CARE COORDINATION
Confirmed patient is now at [facility] and requested updated med list to be faxed. Will notify CTN at this time.

## 2023-04-05 ENCOUNTER — CARE COORDINATION (OUTPATIENT)
Dept: CASE MANAGEMENT | Age: 88
End: 2023-04-05

## 2023-04-05 DIAGNOSIS — K76.82 HEPATIC ENCEPHALOPATHY (HCC): Primary | ICD-10-CM

## 2023-04-05 NOTE — CARE COORDINATION
[facility] Discharge Call    2023    Patient:  Vinnie Green Patient : 1935   MRN: [id number]  Reason for Admission: hepatic enceph at [facility]   Discharge Date: 19 RARS: No data recorded       Entered Medlist into Media from [facility] [place] at [address] Transition      Do you have all of your prescriptions and are they filled?: Yes         Care Transitions Interventions         Future Appointments   Date Time Provider Nataliia Arnold   2023  3:30 PM Teresa Ochoa MD [facility] [place] - [facility]   2023 10:30 AM Krista Hernandez MD [address] Endo & Di ADONIS Fernandez RN

## 2023-04-14 ENCOUNTER — HOSPITAL ENCOUNTER (OUTPATIENT)
Dept: WOUND CARE | Age: 88
Discharge: HOME | End: 2023-04-14

## 2023-07-06 PROBLEM — F33.1 MAJOR DEPRESSIVE DISORDER, RECURRENT, MODERATE (HCC): Status: ACTIVE | Noted: 2023-07-06

## 2023-07-06 PROBLEM — F33.9 MAJOR DEPRESSIVE DISORDER, RECURRENT, UNSPECIFIED (HCC): Status: ACTIVE | Noted: 2023-07-06

## 2023-07-06 PROBLEM — F33.0 MAJOR DEPRESSIVE DISORDER, RECURRENT, MILD (HCC): Status: ACTIVE | Noted: 2023-07-06

## 2023-07-13 RX ORDER — RISEDRONATE SODIUM 35 MG/1
TABLET, FILM COATED ORAL
Qty: 4 TABLET | Refills: 11 | Status: SHIPPED | OUTPATIENT
Start: 2023-07-13

## 2023-07-13 NOTE — TELEPHONE ENCOUNTER
Medication:   Requested Prescriptions     Pending Prescriptions Disp Refills    risedronate (ACTONEL) 35 MG tablet 4 tablet 11     Sig: TAKE ONE TABLET BY MOUTH ONCE WEEKLY       Last Filled:      Patient Phone Number: [phone] (home)     Last appt: 8/25/2022   Next appt: 8/24/2023    Last Labs DM: No results found for: LABA1C  Last Lipid:   Lab Results   Component Value Date/Time    CHOL 148 07/06/2023 10:55 AM    TRIG 96 07/06/2023 10:55 AM    HDL 74 07/06/2023 10:55 AM    HDL 76 05/10/2012 04:30 PM    LDLCALC 55 07/06/2023 10:55 AM     Last PSA: No results found for: PSA  Last Thyroid:   Lab Results   Component Value Date/Time    TSH 2.63 05/10/2012 04:30 PM    T4FREE 0.98 05/04/2012 07:05 AM

## 2023-08-10 ENCOUNTER — TELEPHONE (OUTPATIENT)
Dept: ENDOCRINOLOGY | Age: 88
End: 2023-08-10

## 2023-08-10 NOTE — TELEPHONE ENCOUNTER
LMOM for patient's son to call office. Patient is due for a Dexa scan and there is an active order in the system.

## 2023-08-10 NOTE — TELEPHONE ENCOUNTER
Spoke to patient's son. He is going to schedule Dexa and if he cannot get it done before the 24th he will call back and reschedule for a virtual visit after the scan is completed. Crescentic Advancement Flap Text: The defect edges were debeveled with a #15 scalpel blade.  Given the location of the defect and the proximity to free margins a crescentic advancement flap was deemed most appropriate.  Using a sterile surgical marker, the appropriate advancement flap was drawn incorporating the defect and placing the expected incisions within the relaxed skin tension lines where possible.    The area thus outlined was incised deep to adipose tissue with a #15 scalpel blade.  The skin margins were undermined to an appropriate distance in all directions utilizing iris scissors.

## 2023-08-10 NOTE — TELEPHONE ENCOUNTER
Pt's son Liset Jefferson calling and asking if the pt needs to get her Dexa done before the upcoming appt which is on 8/24/23?   He is asking because she is in assisted living now and its harder to get her scheduled for test    CB# [address]

## 2023-08-21 ENCOUNTER — HOSPITAL ENCOUNTER (OUTPATIENT)
Dept: GENERAL RADIOLOGY | Age: 88
Discharge: HOME | End: 2023-08-21
Payer: MEDICARE

## 2023-08-21 DIAGNOSIS — M81.0 AGE-RELATED OSTEOPOROSIS WITHOUT CURRENT PATHOLOGICAL FRACTURE: ICD-10-CM

## 2023-08-21 PROCEDURE — 77080 DXA BONE DENSITY AXIAL: CPT

## 2023-08-24 ENCOUNTER — OFFICE VISIT (OUTPATIENT)
Dept: ENDOCRINOLOGY | Age: 88
End: 2023-08-24
Payer: MEDICARE

## 2023-08-24 VITALS
SYSTOLIC BLOOD PRESSURE: 120 MMHG | RESPIRATION RATE: 15 BRPM | HEART RATE: 63 BPM | DIASTOLIC BLOOD PRESSURE: 78 MMHG | HEIGHT: 55 IN | WEIGHT: 101.8 LBS | BODY MASS INDEX: 23.56 KG/M2 | TEMPERATURE: 98 F | OXYGEN SATURATION: 93 %

## 2023-08-24 DIAGNOSIS — M81.0 AGE-RELATED OSTEOPOROSIS WITHOUT CURRENT PATHOLOGICAL FRACTURE: Primary | ICD-10-CM

## 2023-08-24 PROCEDURE — 1123F ACP DISCUSS/DSCN MKR DOCD: CPT | Performed by: INTERNAL MEDICINE

## 2023-08-24 PROCEDURE — 99214 OFFICE O/P EST MOD 30 MIN: CPT | Performed by: INTERNAL MEDICINE

## 2023-08-24 NOTE — PROGRESS NOTES
for weight loss   Gastrointestinal: Negative for nausea, vomiting and abdominal pain. Musculoskeletal  Endo/Heme/Allergies: see HPI      Scoliosis    Lab Review  Lab Results   Component Value Date/Time    TSH 2.63 05/10/2012 04:30 PM     No results found for: FREET4      Assessment:     1. Osteoporosis : History for many years, she was on treatment, then on drug holiday since 2013. 8/17 Dexa, given low bone mineral density, recommended pharmacological treatment. She had been on fosamax for years,advised Prolia but insurance did not cover, started on Actonel per insurance preference. Optimize Ca and D. Ruled out secondary causes. Repeat Dexa 9/19,BMD stable, GFR 40, will continue. If decline, switch to Prolia  Dexa 7/21 no comparison as may be different machine. Dexa shows osteoporosis. Will continue treatment  Dexa 8/23 shows osteoporosis, no comparison as different machine. Advised given significant osteoporosis, will continue treatment    2. Thyroid Nodules : Sees Dr. Torey Powell, she would like us to evaluate. She had multiple sub centimeter nodules. Left upper cystic nodule. Repeat USG 7/19 shows left mid 1.4cm solid-cystic. Rest dominant nodule are spongiform or cystic  USG 7/21 shows mid left 1.1cm nodule, rest are stable    3. Back Pain:  No fractures on imaging, advised pain not due to osteoporosis, likely due to DJD. Seeing PCP    Plan:     Actonel 35mg weekly  Post menopausal osteoporosis . No fragility fracture. Discussed management of osteoporosis in detail. Discussed treatment options with her. Told her that given high risk of fracture and osteoporosis, I would recommend  therapy for her. On actonel since 10/17  Discussed the side effects including Osteonecrosis of the jaw and Atypical fractures. Patient understands that the risk is low. She is willing to take the medication. In general advise to avoid falls, use night lights at night.    Calcium 500mg daily supplement and Vitamin D 3 1000 IU daily  F/u in a

## 2023-10-02 RX ORDER — RISEDRONATE SODIUM 35 MG/1
TABLET, FILM COATED ORAL
Qty: 4 TABLET | Refills: 11 | Status: SHIPPED | OUTPATIENT
Start: 2023-10-02

## 2023-10-02 NOTE — TELEPHONE ENCOUNTER
Medication:   Requested Prescriptions     Pending Prescriptions Disp Refills    risedronate (ACTONEL) 35 MG tablet [Pharmacy Med Name: RISEDRONATE SODIUM 35 MG TAB DSPK] 4 tablet 11     Sig: TAKE ONE TABLET BY MOUTH ONCE WEEKLY       Last Filled:      Patient Phone Number: [phone] (home)     Last appt: 8/24/2023   Next appt: Visit date not found    Last Labs DM: No results found for: \"LABA1C\"  Last Lipid:   Lab Results   Component Value Date/Time    CHOL 148 07/06/2023 10:55 AM    TRIG 96 07/06/2023 10:55 AM    HDL 74 07/06/2023 10:55 AM    HDL 76 05/10/2012 04:30 PM    LDLCALC 55 07/06/2023 10:55 AM     Last PSA: No results found for: \"PSA\"  Last Thyroid:   Lab Results   Component Value Date/Time    TSH 2.63 05/10/2012 04:30 PM    T4FREE 0.98 05/04/2012 07:05 AM

## 2023-12-13 PROBLEM — U07.1 COVID: Status: ACTIVE | Noted: 2023-12-13

## 2023-12-20 PROBLEM — U07.1 COVID: Status: RESOLVED | Noted: 2023-12-13 | Resolved: 2023-12-20

## 2024-01-16 ENCOUNTER — CARE COORDINATION (OUTPATIENT)
Dept: CASE MANAGEMENT | Age: 89
End: 2024-01-16

## 2024-01-16 NOTE — CARE COORDINATION
Called Gil GALAVIZ and requested medlist from FN Meseret Judge, BSN, RN   [facility] Transition Nurse  [phone]

## 2024-01-17 ENCOUNTER — CARE COORDINATION (OUTPATIENT)
Dept: CASE MANAGEMENT | Age: 89
End: 2024-01-17

## 2024-01-17 DIAGNOSIS — U07.1 COVID: Primary | ICD-10-CM

## 2024-01-17 PROCEDURE — 1111F DSCHRG MED/CURRENT MED MERGE: CPT | Performed by: STUDENT IN AN ORGANIZED HEALTH CARE EDUCATION/TRAINING PROGRAM

## 2024-01-17 NOTE — CARE COORDINATION
Care Transitions Post-Acute Facility Discharge Call    2024    Patient: Petra Green Patient : 1935   MRN: [id number]  Reason for Admission: CoVID19  Discharge Date: 23 RARS: Readmission Risk Score: 12.4    Care Transitions Follow Up Call    Needs to be reviewed by the provider   Additional needs identified to be addressed with provider: No  medlist    FYI the medlist was added to Media from [place]                      Care Transitions Post Acute Facility Transition      Do you have all of your prescriptions and are they filled?: Yes   Post Acute Services: Home Health            Care Transitions Interventions         Future Appointments   Date Time Provider Department Center   2024 11:15 AM Walter [facility] MD [facility] CURRY JonesN, RN   [facility] Care Transition Nurse  [phone]

## 2024-01-29 ENCOUNTER — HOSPITAL ENCOUNTER (INPATIENT)
Age: 89
LOS: 11 days | Discharge: NURSING, REHAB OR LONG-TERM CARE | End: 2024-02-09
Attending: STUDENT IN AN ORGANIZED HEALTH CARE EDUCATION/TRAINING PROGRAM | Admitting: STUDENT IN AN ORGANIZED HEALTH CARE EDUCATION/TRAINING PROGRAM
Payer: MEDICARE

## 2024-01-29 PROBLEM — N39.0 UTI (URINARY TRACT INFECTION): Status: ACTIVE | Noted: 2024-01-29

## 2024-01-29 PROBLEM — I95.9 HYPOTENSION: Status: ACTIVE | Noted: 2024-01-29

## 2024-01-29 LAB
ALBUMIN SERPL-MCNC: 4 G/DL (ref 3.4–5)
ALBUMIN/GLOB SERPL: 1.6 {RATIO} (ref 1.1–2.2)
ALP SERPL-CCNC: 107 U/L (ref 40–129)
ALT SERPL-CCNC: 15 U/L (ref 10–40)
ANION GAP SERPL CALCULATED.3IONS-SCNC: 12 MMOL/L (ref 3–16)
AST SERPL-CCNC: 16 U/L (ref 15–37)
BASOPHILS # BLD: 0.1 K/UL (ref 0–0.2)
BASOPHILS NFR BLD: 0.5 %
BILIRUB SERPL-MCNC: 0.5 MG/DL (ref 0–1)
BUN SERPL-MCNC: 20 MG/DL (ref 7–20)
CALCIUM SERPL-MCNC: 9.2 MG/DL (ref 8.3–10.6)
CHLORIDE SERPL-SCNC: 97 MMOL/L (ref 99–110)
CO2 SERPL-SCNC: 21 MMOL/L (ref 21–32)
CREAT SERPL-MCNC: 0.9 MG/DL (ref 0.6–1.2)
DEPRECATED RDW RBC AUTO: 14.8 % (ref 12.4–15.4)
EOSINOPHIL # BLD: 0.1 K/UL (ref 0–0.6)
EOSINOPHIL NFR BLD: 0.6 %
GFR SERPLBLD CREATININE-BSD FMLA CKD-EPI: >60 ML/MIN/{1.73_M2}
GLUCOSE SERPL-MCNC: 98 MG/DL (ref 70–99)
HCT VFR BLD AUTO: 38.2 % (ref 36–48)
HGB BLD-MCNC: 12.8 G/DL (ref 12–16)
LACTATE BLDV-SCNC: 2.1 MMOL/L (ref 0.4–2)
LYMPHOCYTES # BLD: 1.6 K/UL (ref 1–5.1)
LYMPHOCYTES NFR BLD: 13.3 %
MCH RBC QN AUTO: 32.2 PG (ref 26–34)
MCHC RBC AUTO-ENTMCNC: 33.7 G/DL (ref 31–36)
MCV RBC AUTO: 95.6 FL (ref 80–100)
MONOCYTES # BLD: 1.4 K/UL (ref 0–1.3)
MONOCYTES NFR BLD: 12 %
NEUTROPHILS # BLD: 8.8 K/UL (ref 1.7–7.7)
NEUTROPHILS NFR BLD: 73.6 %
PLATELET # BLD AUTO: 262 K/UL (ref 135–450)
PMV BLD AUTO: 7.1 FL (ref 5–10.5)
POTASSIUM SERPL-SCNC: 4.3 MMOL/L (ref 3.5–5.1)
PROT SERPL-MCNC: 6.5 G/DL (ref 6.4–8.2)
RBC # BLD AUTO: 3.99 M/UL (ref 4–5.2)
SODIUM SERPL-SCNC: 130 MMOL/L (ref 136–145)
WBC # BLD AUTO: 11.9 K/UL (ref 4–11)

## 2024-01-29 PROCEDURE — 81003 URINALYSIS AUTO W/O SCOPE: CPT

## 2024-01-29 PROCEDURE — 36415 COLL VENOUS BLD VENIPUNCTURE: CPT

## 2024-01-29 PROCEDURE — 99223 1ST HOSP IP/OBS HIGH 75: CPT | Performed by: STUDENT IN AN ORGANIZED HEALTH CARE EDUCATION/TRAINING PROGRAM

## 2024-01-29 PROCEDURE — 87040 BLOOD CULTURE FOR BACTERIA: CPT

## 2024-01-29 PROCEDURE — 1200000000 HC SEMI PRIVATE

## 2024-01-29 PROCEDURE — 87077 CULTURE AEROBIC IDENTIFY: CPT

## 2024-01-29 PROCEDURE — 87186 SC STD MICRODIL/AGAR DIL: CPT

## 2024-01-29 PROCEDURE — 2580000003 HC RX 258: Performed by: STUDENT IN AN ORGANIZED HEALTH CARE EDUCATION/TRAINING PROGRAM

## 2024-01-29 PROCEDURE — 6370000000 HC RX 637 (ALT 250 FOR IP): Performed by: STUDENT IN AN ORGANIZED HEALTH CARE EDUCATION/TRAINING PROGRAM

## 2024-01-29 PROCEDURE — 83605 ASSAY OF LACTIC ACID: CPT

## 2024-01-29 PROCEDURE — 6360000002 HC RX W HCPCS: Performed by: STUDENT IN AN ORGANIZED HEALTH CARE EDUCATION/TRAINING PROGRAM

## 2024-01-29 PROCEDURE — 87086 URINE CULTURE/COLONY COUNT: CPT

## 2024-01-29 PROCEDURE — 2060000000 HC ICU INTERMEDIATE R&B

## 2024-01-29 PROCEDURE — 80053 COMPREHEN METABOLIC PANEL: CPT

## 2024-01-29 PROCEDURE — 85025 COMPLETE CBC W/AUTO DIFF WBC: CPT

## 2024-01-29 RX ORDER — TRAZODONE HYDROCHLORIDE 50 MG/1
50 TABLET ORAL NIGHTLY
Status: DISCONTINUED | OUTPATIENT
Start: 2024-01-29 | End: 2024-02-09 | Stop reason: HOSPADM

## 2024-01-29 RX ORDER — ONDANSETRON 2 MG/ML
4 INJECTION INTRAMUSCULAR; INTRAVENOUS EVERY 6 HOURS PRN
Status: DISCONTINUED | OUTPATIENT
Start: 2024-01-29 | End: 2024-02-09 | Stop reason: HOSPADM

## 2024-01-29 RX ORDER — SODIUM CHLORIDE 0.9 % (FLUSH) 0.9 %
5-40 SYRINGE (ML) INJECTION EVERY 12 HOURS SCHEDULED
Status: DISCONTINUED | OUTPATIENT
Start: 2024-01-29 | End: 2024-02-09 | Stop reason: HOSPADM

## 2024-01-29 RX ORDER — ACETAMINOPHEN 650 MG/1
650 SUPPOSITORY RECTAL EVERY 6 HOURS PRN
Status: DISCONTINUED | OUTPATIENT
Start: 2024-01-29 | End: 2024-02-03

## 2024-01-29 RX ORDER — ENOXAPARIN SODIUM 100 MG/ML
40 INJECTION SUBCUTANEOUS DAILY
Status: DISCONTINUED | OUTPATIENT
Start: 2024-01-30 | End: 2024-02-01

## 2024-01-29 RX ORDER — ONDANSETRON 4 MG/1
4 TABLET, ORALLY DISINTEGRATING ORAL EVERY 8 HOURS PRN
Status: DISCONTINUED | OUTPATIENT
Start: 2024-01-29 | End: 2024-02-09 | Stop reason: HOSPADM

## 2024-01-29 RX ORDER — POLYETHYLENE GLYCOL 3350 17 G/17G
17 POWDER, FOR SOLUTION ORAL DAILY PRN
Status: DISCONTINUED | OUTPATIENT
Start: 2024-01-29 | End: 2024-02-05

## 2024-01-29 RX ORDER — SODIUM CHLORIDE 9 MG/ML
INJECTION, SOLUTION INTRAVENOUS CONTINUOUS
Status: DISCONTINUED | OUTPATIENT
Start: 2024-01-29 | End: 2024-01-30

## 2024-01-29 RX ORDER — SODIUM CHLORIDE 9 MG/ML
INJECTION, SOLUTION INTRAVENOUS PRN
Status: DISCONTINUED | OUTPATIENT
Start: 2024-01-29 | End: 2024-02-09 | Stop reason: HOSPADM

## 2024-01-29 RX ORDER — ASPIRIN 81 MG/1
81 TABLET ORAL DAILY
Status: DISCONTINUED | OUTPATIENT
Start: 2024-01-30 | End: 2024-02-09 | Stop reason: HOSPADM

## 2024-01-29 RX ORDER — ATORVASTATIN CALCIUM 10 MG/1
10 TABLET, FILM COATED ORAL NIGHTLY
Status: DISCONTINUED | OUTPATIENT
Start: 2024-01-29 | End: 2024-02-09 | Stop reason: HOSPADM

## 2024-01-29 RX ORDER — SODIUM CHLORIDE 0.9 % (FLUSH) 0.9 %
5-40 SYRINGE (ML) INJECTION PRN
Status: DISCONTINUED | OUTPATIENT
Start: 2024-01-29 | End: 2024-02-09 | Stop reason: HOSPADM

## 2024-01-29 RX ORDER — ACETAMINOPHEN 325 MG/1
650 TABLET ORAL EVERY 6 HOURS PRN
Status: DISCONTINUED | OUTPATIENT
Start: 2024-01-29 | End: 2024-02-03

## 2024-01-29 RX ORDER — ESCITALOPRAM OXALATE 10 MG/1
5 TABLET ORAL DAILY
Status: DISCONTINUED | OUTPATIENT
Start: 2024-01-30 | End: 2024-02-09 | Stop reason: HOSPADM

## 2024-01-29 RX ADMIN — WATER 1000 MG: 1 INJECTION INTRAMUSCULAR; INTRAVENOUS; SUBCUTANEOUS at 18:57

## 2024-01-29 RX ADMIN — ATORVASTATIN CALCIUM 10 MG: 10 TABLET, FILM COATED ORAL at 20:41

## 2024-01-29 RX ADMIN — ACETAMINOPHEN 650 MG: 325 TABLET ORAL at 23:18

## 2024-01-29 RX ADMIN — TRAZODONE HYDROCHLORIDE 50 MG: 50 TABLET ORAL at 20:41

## 2024-01-29 RX ADMIN — SODIUM CHLORIDE: 9 INJECTION, SOLUTION INTRAVENOUS at 19:10

## 2024-01-29 ASSESSMENT — PAIN DESCRIPTION - LOCATION: LOCATION: BACK

## 2024-01-29 ASSESSMENT — PAIN DESCRIPTION - ORIENTATION: ORIENTATION: MID

## 2024-01-29 ASSESSMENT — PAIN DESCRIPTION - DESCRIPTORS: DESCRIPTORS: ACHING

## 2024-01-29 ASSESSMENT — PAIN SCALES - GENERAL: PAINLEVEL_OUTOF10: 5

## 2024-01-29 NOTE — H&P
[facility]  History and Physical    Patient's PCP: Walter Barnes MD  Code Status: Full Code  History Obtained From:  patient, family member    CC: weakness    HPI:     Petra Green is an 88-year-old lady with past medical history significant for osteoporosis, GERD, CHF who presented to clinic for evaluation of progressive weakness, dysuria, fecal incontinence and low back pain    [facility]  Since then, has had worsening leg weakness, incontinence of stool and urine and back pain.  Over the past 2 to 3 days her appetite has been diminished and she is having more dysuria.  No fever or chills.  She has not been eating and drinking as much.  Has been feeling lightheaded.  Does not report any chest pain or shortness of breath.  Urinalysis and culture was ordered but not processed at her skilled nursing facility.  She was seen in clinic today and recommended for direct admission to the hospital for evaluation of possible urinary tract infection and evaluation of leg weakness    Past Medical / Surgical History:    Past Medical History:   Diagnosis Date    Acute kidney injury (HCC)     Allergic rhinitis     CHF (congestive heart failure) (HCC)     Delirium     GERD (gastroesophageal reflux disease)     Hearing loss sensory, bilateral     mod--severe on right, mild to severe on left, due to asymmetry will send to get MRI to r/o acoustic neuroma    Mastoiditis     on MRI oct 2014-->dr ortiz states with no clinical findings this is considered an over-sensitivity of MRI results    Osteoarthritis     L1-L5    Osteoporosis     patient was on fosamax x 10 years and drug holiday was started on 6/21/13. (this was date of last DEXA scan)    Scoliosis      Past Surgical History:   Procedure Laterality Date    CARPAL TUNNEL RELEASE      right, 2003    COLONOSCOPY      2013, dr contreras    COLONOSCOPY N/A 10/19/2018    COLONOSCOPY WITH BIOPSY

## 2024-01-30 ENCOUNTER — APPOINTMENT (OUTPATIENT)
Dept: MRI IMAGING | Age: 89
End: 2024-01-30
Attending: STUDENT IN AN ORGANIZED HEALTH CARE EDUCATION/TRAINING PROGRAM
Payer: MEDICARE

## 2024-01-30 LAB
ANION GAP SERPL CALCULATED.3IONS-SCNC: 10 MMOL/L (ref 3–16)
BACTERIA URNS QL MICRO: ABNORMAL /HPF
BASOPHILS # BLD: 0.1 K/UL (ref 0–0.2)
BASOPHILS NFR BLD: 0.7 %
BILIRUB UR QL STRIP.AUTO: NEGATIVE
BUN SERPL-MCNC: 15 MG/DL (ref 7–20)
CALCIUM SERPL-MCNC: 8.2 MG/DL (ref 8.3–10.6)
CHLORIDE SERPL-SCNC: 107 MMOL/L (ref 99–110)
CLARITY UR: ABNORMAL
CO2 SERPL-SCNC: 24 MMOL/L (ref 21–32)
COLOR UR: YELLOW
CREAT SERPL-MCNC: 0.9 MG/DL (ref 0.6–1.2)
DEPRECATED RDW RBC AUTO: 15 % (ref 12.4–15.4)
EOSINOPHIL # BLD: 0.1 K/UL (ref 0–0.6)
EOSINOPHIL NFR BLD: 1.2 %
EPI CELLS #/AREA URNS AUTO: 0 /HPF (ref 0–5)
GFR SERPLBLD CREATININE-BSD FMLA CKD-EPI: >60 ML/MIN/{1.73_M2}
GLUCOSE SERPL-MCNC: 97 MG/DL (ref 70–99)
GLUCOSE UR STRIP.AUTO-MCNC: NEGATIVE MG/DL
HCT VFR BLD AUTO: 33.2 % (ref 36–48)
HGB BLD-MCNC: 11.1 G/DL (ref 12–16)
HGB UR QL STRIP.AUTO: ABNORMAL
HYALINE CASTS #/AREA URNS AUTO: 3 /LPF (ref 0–8)
KETONES UR STRIP.AUTO-MCNC: NEGATIVE MG/DL
LEUKOCYTE ESTERASE UR QL STRIP.AUTO: ABNORMAL
LYMPHOCYTES # BLD: 1.4 K/UL (ref 1–5.1)
LYMPHOCYTES NFR BLD: 16.6 %
MCH RBC QN AUTO: 31.8 PG (ref 26–34)
MCHC RBC AUTO-ENTMCNC: 33.4 G/DL (ref 31–36)
MCV RBC AUTO: 95 FL (ref 80–100)
MONOCYTES # BLD: 0.8 K/UL (ref 0–1.3)
MONOCYTES NFR BLD: 9.7 %
NEUTROPHILS # BLD: 6 K/UL (ref 1.7–7.7)
NEUTROPHILS NFR BLD: 71.8 %
NITRITE UR QL STRIP.AUTO: POSITIVE
PH UR STRIP.AUTO: 6.5 [PH] (ref 5–8)
PLATELET # BLD AUTO: 223 K/UL (ref 135–450)
PMV BLD AUTO: 7 FL (ref 5–10.5)
POTASSIUM SERPL-SCNC: 4.4 MMOL/L (ref 3.5–5.1)
PROT UR STRIP.AUTO-MCNC: 30 MG/DL
RBC # BLD AUTO: 3.5 M/UL (ref 4–5.2)
RBC CLUMPS #/AREA URNS AUTO: 2 /HPF (ref 0–4)
SODIUM SERPL-SCNC: 141 MMOL/L (ref 136–145)
SP GR UR STRIP.AUTO: 1.01 (ref 1–1.03)
UA COMPLETE W REFLEX CULTURE PNL UR: YES
UA DIPSTICK W REFLEX MICRO PNL UR: YES
URN SPEC COLLECT METH UR: ABNORMAL
UROBILINOGEN UR STRIP-ACNC: 0.2 E.U./DL
WBC # BLD AUTO: 8.3 K/UL (ref 4–11)
WBC #/AREA URNS AUTO: 889 /HPF (ref 0–5)

## 2024-01-30 PROCEDURE — 97166 OT EVAL MOD COMPLEX 45 MIN: CPT

## 2024-01-30 PROCEDURE — 6360000002 HC RX W HCPCS: Performed by: STUDENT IN AN ORGANIZED HEALTH CARE EDUCATION/TRAINING PROGRAM

## 2024-01-30 PROCEDURE — 6370000000 HC RX 637 (ALT 250 FOR IP): Performed by: STUDENT IN AN ORGANIZED HEALTH CARE EDUCATION/TRAINING PROGRAM

## 2024-01-30 PROCEDURE — 97530 THERAPEUTIC ACTIVITIES: CPT

## 2024-01-30 PROCEDURE — 97162 PT EVAL MOD COMPLEX 30 MIN: CPT

## 2024-01-30 PROCEDURE — 2060000000 HC ICU INTERMEDIATE R&B

## 2024-01-30 PROCEDURE — 85025 COMPLETE CBC W/AUTO DIFF WBC: CPT

## 2024-01-30 PROCEDURE — 72148 MRI LUMBAR SPINE W/O DYE: CPT

## 2024-01-30 PROCEDURE — 99233 SBSQ HOSP IP/OBS HIGH 50: CPT | Performed by: STUDENT IN AN ORGANIZED HEALTH CARE EDUCATION/TRAINING PROGRAM

## 2024-01-30 PROCEDURE — 80048 BASIC METABOLIC PNL TOTAL CA: CPT

## 2024-01-30 PROCEDURE — 36415 COLL VENOUS BLD VENIPUNCTURE: CPT

## 2024-01-30 PROCEDURE — 97535 SELF CARE MNGMENT TRAINING: CPT

## 2024-01-30 PROCEDURE — 2580000003 HC RX 258: Performed by: STUDENT IN AN ORGANIZED HEALTH CARE EDUCATION/TRAINING PROGRAM

## 2024-01-30 RX ADMIN — SODIUM CHLORIDE: 9 INJECTION, SOLUTION INTRAVENOUS at 02:49

## 2024-01-30 RX ADMIN — WATER 1000 MG: 1 INJECTION INTRAMUSCULAR; INTRAVENOUS; SUBCUTANEOUS at 17:43

## 2024-01-30 RX ADMIN — ASPIRIN 81 MG: 81 TABLET, COATED ORAL at 08:55

## 2024-01-30 RX ADMIN — ATORVASTATIN CALCIUM 10 MG: 10 TABLET, FILM COATED ORAL at 20:42

## 2024-01-30 RX ADMIN — ESCITALOPRAM OXALATE 5 MG: 10 TABLET ORAL at 08:55

## 2024-01-30 RX ADMIN — TRAZODONE HYDROCHLORIDE 50 MG: 50 TABLET ORAL at 20:42

## 2024-01-30 RX ADMIN — ENOXAPARIN SODIUM 40 MG: 100 INJECTION SUBCUTANEOUS at 08:57

## 2024-01-30 RX ADMIN — Medication 10 ML: at 20:42

## 2024-01-30 NOTE — ACP (ADVANCE CARE PLANNING)
Advance Care Planning     Advance Care Planning Activator (Inpatient)  Conversation Note      Date of ACP Conversation: 1/30/2024     Conversation Conducted with:  Healthcare Decision Maker: Named in Advance Directive or Healthcare Power of  (name) daughter Tia    ACP Activator: Shelly Borja RN    Health Care Decision Maker:     Current Designated Health Care Decision Maker:     Primary Decision Maker: Tia Vidal - Child - [phone]    Secondary Decision Maker: Shant Green - Child - [phone]    Today we documented Decision Maker(s) consistent with ACP documents on file.    Care Preferences    Ventilation:  \"If you were in your present state of health and suddenly became very ill and were unable to breathe on your own, what would your preference be about the use of a ventilator (breathing machine) if it were available to you?\"      Would the patient desire the use of ventilator (breathing machine)?: no    \"If your health worsens and it becomes clear that your chance of recovery is unlikely, what would your preference be about the use of a ventilator (breathing machine) if it were available to you?\"     Would the patient desire the use of ventilator (breathing machine)?: No      Resuscitation  \"CPR works best to restart the heart when there is a sudden event, like a heart attack, in someone who is otherwise healthy. Unfortunately, CPR does not typically restart the heart for people who have serious health conditions or who are very sick.\"    \"In the event your heart stopped as a result of an underlying serious health condition, would you want attempts to be made to restart your heart (answer \"yes\" for attempt to resuscitate) or would you prefer a natural death (answer \"no\" for do not attempt to resuscitate)?\" no       [] Yes   [x] No   Educated Patient / Decision Maker regarding differences between Advance Directives and portable DNR orders.    Length of ACP Conversation in minutes:  5  minutes    Conversation Outcomes:  ACP discussion completed    Follow-up plan:    [] Schedule follow-up conversation to continue planning  [] Referred individual to Provider for additional questions/concerns   [] Advised patient/agent/surrogate to review completed ACP document and update if needed with changes in condition, patient preferences or care setting    [] This note routed to one or more involved healthcare providers - not routed as PCP is the attending    Electronically signed by Shelly Borja RN Case Management on 1/30/2024 at 1:49 PM

## 2024-01-30 NOTE — PLAN OF CARE
Problem: Discharge Planning  Goal: Discharge to home or other facility with appropriate resources  Outcome: Progressing  Flowsheets (Taken 1/29/2024 2228)  Discharge to home or other facility with appropriate resources:   Identify barriers to discharge with patient and caregiver   Arrange for needed discharge resources and transportation as appropriate   Identify discharge learning needs (meds, wound care, etc)     Problem: Safety - Adult  Goal: Free from fall injury  Outcome: Progressing  Flowsheets (Taken 1/29/2024 2228)  Free From Fall Injury: Instruct family/caregiver on patient safety     Problem: Skin/Tissue Integrity  Goal: Absence of new skin breakdown  Description: 1.  Monitor for areas of redness and/or skin breakdown  2.  Assess vascular access sites hourly  3.  Every 4-6 hours minimum:  Change oxygen saturation probe site  4.  Every 4-6 hours:  If on nasal continuous positive airway pressure, respiratory therapy assess nares and determine need for appliance change or resting period.  Outcome: Progressing     Problem: ABCDS Injury Assessment  Goal: Absence of physical injury  Outcome: Progressing  Flowsheets (Taken 1/29/2024 2228)  Absence of Physical Injury: Implement safety measures based on patient assessment

## 2024-01-30 NOTE — CARE COORDINATION
Case Management Assessment  Initial Evaluation    Date/Time of Evaluation: 1/30/2024 1:47 PM  Assessment Completed by: Shelly Broja RN    If patient is discharged prior to next notation, then this note serves as note for discharge by case management.    Patient Name: Petra Green                   YOB: 1935  Diagnosis: Hypotension [I95.9]  UTI (urinary tract infection) [N39.0]                   Date / Time: 1/29/2024  5:17 PM    Patient Admission Status: Inpatient   Readmission Risk (Low < 19, Mod (19-27), High > 27): Readmission Risk Score: 16.7    Current PCP: Walter Barnes MD  PCP verified by CM? Yes    Chart Reviewed: Yes      History Provided by: Child/Family  Patient Orientation: Other (see comment) (patient off unit for MRI)    Patient Cognition: Other (see comment) (ISAI)    Hospitalization in the last 30 days (Readmission):  No    If yes, Readmission Assessment in  Navigator will be completed.    Advance Directives:      Code Status: DNR-CCA   Patient's Primary Decision Maker is: Named in Scanned ACP Document    Primary Decision Maker: MarcyTia - Child - [phone]    Secondary Decision Maker: Shant Green - Child - [phone]    Discharge Planning:    Patient lives with: Other (Comment) ([place] apartment) Type of Home: Assisted living  Primary Care Giver: Other (Comment) (staff at [facility])  Patient Support Systems include: Children, Family Members, Other (Comment) (staff at [facility] ([place]))   Current Financial resources: Medicare  Current community resources: ECF/Home Care, Assisted Living  Current services prior to admission: Durable Medical Equipment, Home Care, Other (Comment) (assisted living at [facility] & active with [facility])            Current DME: Walker, Wheelchair            Type of Home Care services:  OT, PT, Nursing Services    ADLS  Prior functional level: Assistance with the following:, Bathing, Dressing, Toileting, Feeding,  Cooking, Housework, Shopping, Mobility  Current functional level: Assistance with the following:, Bathing, Dressing, Toileting, Mobility    PT AM-PAC:   /24  OT AM-PAC:   /24    Family can provide assistance at DC: Yes  Would you like Case Management to discuss the discharge plan with any other family members/significant others, and if so, who? Yes (spoke to daughter Tia)  Plans to Return to Present Housing: Yes  Other Identified Issues/Barriers to RETURNING to current housing: generalized weakness  Potential Assistance needed at discharge: Home Care, Skilled Nursing Facility            Potential DME:    Patient expects to discharge to: Assisted living  Plan for transportation at discharge: Other (see comment) (family vs BLS)    Financial    Payor: [employer] MEDICARE / Plan: AET[employer] MEDICARE-ADVANTAGE PPO / Product Type: Medicare /     Does insurance require precert for SNF: Yes    Potential assistance Purchasing Medications: No  Meds-to-Beds request:        [facility] [id number] - [facility] [address] [facility] - P [phone] - F [phone]  [address] [place] [address]  Phone: [phone] Fax: [phone]    [facility] - [facility] [address] [facility] - P [phone] - F [phone]  [address] [facility] [address]  Phone: [phone] Fax: [phone]      Notes:    Factors facilitating achievement of predicted outcomes: Family support, Caregiver support, and Has needed Durable Medical Equipment at home    Barriers to discharge: Upper extremity weakness and Lower extremity weakness    Additional Case Management Notes: Daughter would like for patient to return to [facility] home care. Spoke to ANALY Pastrana at [facility], as long as patient remains a 1 person pivot assist to wheelchair she is able to return. Await PT/OT eval.    The Plan for Transition of Care is related to the following treatment goals of Hypotension [I95.9]  UTI (urinary tract

## 2024-01-30 NOTE — PLAN OF CARE
Problem: Discharge Planning  Goal: Discharge to home or other facility with appropriate resources  1/30/2024 1058 by Juan Serrano, RN  Outcome: Progressing     Problem: Safety - Adult  Goal: Free from fall injury  1/30/2024 1058 by Juan Serrano, RN  Outcome: Progressing     Problem: Skin/Tissue Integrity  Goal: Absence of new skin breakdown  Description: 1.  Monitor for areas of redness and/or skin breakdown  2.  Assess vascular access sites hourly  3.  Every 4-6 hours minimum:  Change oxygen saturation probe site  4.  Every 4-6 hours:  If on nasal continuous positive airway pressure, respiratory therapy assess nares and determine need for appliance change or resting period.  1/30/2024 1058 by Juan Serrano, RN  Outcome: Progressing     Problem: ABCDS Injury Assessment  Goal: Absence of physical injury  1/30/2024 1058 by Juan Serrano, RN  Outcome: Progressing

## 2024-01-30 NOTE — PROGRESS NOTES
4 Eyes Skin Assessment     NAME:  Petra Green  YOB: 1935  MEDICAL RECORD NUMBER:  [id number]    The patient is being assessed for  Admission    I agree that at least one RN has performed a thorough Head to Toe Skin Assessment on the patient. ALL assessment sites listed below have been assessed.      Areas assessed by both nurses:    Head, Face, Ears, Shoulders, Back, Chest, Arms, Elbows, Hands, Sacrum. Buttock, Coccyx, Ischium, Legs. Feet and Heels, and Under Medical Devices         Does the Patient have a Wound? No noted wound(s)       Familia Prevention initiated by RN: Yes  Wound Care Orders initiated by RN: No    Pressure Injury (Stage 3,4, Unstageable, DTI, NWPT, and Complex wounds) if present, place Wound referral order by RN under : No    New Ostomies, if present place, Ostomy referral order under : No     Nurse 1 eSignature: Electronically signed by Juan Serrano RN on 1/29/24 at 7:41 PM EST    **SHARE this note so that the co-signing nurse can place an eSignature**    Nurse 2 eSignature: Electronically signed by Shey Ba RN on 1/29/24 at 10:00 PM EST

## 2024-01-30 NOTE — FLOWSHEET NOTE
Pt admitted to 5257 as a direct admit. Vital signs listed below. Pt oriented to room and call light. 4 eyes and assessment initiated. Pt oriented to room and call light. Tele connected and CMU called and confirmed pt and rhythm. Pt has no expressed needs at this time. Care plan ongoing. Electronically signed by Juan Serrano RN on 1/29/24 at 7:39 PM EST    01/29/24 1751   Vital Signs   Temp 97.9 °F (36.6 °C)   Temp Source Axillary   Pulse 66   Heart Rate Source Monitor   Respirations 19   /63   MAP (Calculated) 84   BP Location Right upper arm   BP Method Automatic   Patient Position Semi fowlers   Oxygen Therapy   SpO2 98 %   Pulse Oximetry Type Intermittent   Pulse Oximeter Device Mode Intermittent   Pulse Oximeter Device Location Finger   O2 Device None (Room air)

## 2024-01-30 NOTE — PROGRESS NOTES
Pt has not urinated since beginning of shift. No recorded output for patient. This RN bladder scanned patient with >999ml in bladder. Message left to MD on call for Dr. Barnes. Orders for be placed.

## 2024-01-30 NOTE — DISCHARGE INSTR - COC
Continuity of Care Form    Patient Name: Petra Green   :  1935  MRN:  [id number]    Admit date:  2024  Discharge date:  24    Code Status Order: DNR-CCA   Advance Directives:     Admitting Physician:  Walter Barnes MD  PCP: Walter Barnes MD    Discharging Nurse: [facility] Unit/Room#: Y2E-8580/5257-01  Discharging Unit Phone Number: [phone]    Emergency Contact:   Extended Emergency Contact Information  Primary Emergency Contact: Tia Vidal   [facility]  Home Phone: [phone]  Relation: Child  Secondary Emergency Contact: [place] [address] [facility]  Home Phone: [phone]  Relation: Child    Past Surgical History:  Past Surgical History:   Procedure Laterality Date    CARPAL TUNNEL RELEASE      right,     COLONOSCOPY      , dr contreras    COLONOSCOPY N/A 10/19/2018    COLONOSCOPY WITH BIOPSY performed by Qasim Montana MD at [facility] ENDOSCOPY    EYE SURGERY      2017 and 2018    HERNIA REPAIR          NASAL SINUS SURGERY      UPPER GASTROINTESTINAL ENDOSCOPY N/A 2019    EGD BIOPSY performed by Qasim Montana MD at [facility] ENDOSCOPY       Immunization History:   Immunization History   Administered Date(s) Administered    COVID-19, MODERNA BLUE border, Primary or Immunocompromised, (age 12y+), IM, 100 mcg/0.5mL 2021, 2021    COVID-19, PFIZER Bivalent, DO NOT Dilute, (age 12y+), IM, 30 mcg/0.3 mL 2022    Influenza A (B5V6-88) Vaccine PF IM 10/30/2009    Influenza Vaccine, unspecified formulation 10/08/2015    Influenza, FLUAD, (age 65 y+), Adjuvanted, 0.5mL 2021, 2022, 10/13/2023    Influenza, FLUBLOK, (age 18 y+), PF, 0.5mL 10/03/2018    Influenza, High Dose (Fluzone 65 yrs and older) 10/25/2016, 10/12/2017, 2019, 2020    Pneumococcal, PCV-13, PREVNAR 13, (age 6w+), IM, 0.5mL 10/05/2014    Pneumococcal, PPSV23, PNEUMOVAX 23,  Sarah  Address:[address] [facility]  Phone:[phone]  Fax:    Discharging Home Care Agency   Name: [facility]  Address:  [address]  [address], [place] [address]   Phone:  [phone]  Fax:  [phone]      / signature: Electronically signed by Shelly Borja RN Case Management on 1/30/24 at 1:42 PM EST    PHYSICIAN SECTION    Prognosis: Guarded    Condition at Discharge: Stable    Rehab Potential (if transferring to Rehab): Guarded    Recommended Labs or Other Treatments After Discharge: ***    Physician Certification: I certify the above information and transfer of Petra Green  is necessary for the continuing treatment of the diagnosis listed and that she requires Skilled Nursing Facility for less 30 days.     Update Admission H&P: No change in H&P    PHYSICIAN SIGNATURE:  Electronically signed by Walter Barnes MD on 2/9/24 at 3:07 PM EST

## 2024-01-30 NOTE — PROGRESS NOTES
Occupational Therapy  Facility/Department: [address] PROGRESSIVE CARE  Occupational Therapy Initial Assessment    Name: Petra Green  : 1935  MRN: [id number]  Date of Service: 2024    Discharge Recommendations:  Patient would benefit from continued therapy after discharge, 3-5 sessions per week  OT Equipment Recommendations  Equipment Needed: No  Other: defer to d/c facility  Petra Green scored a  on the AM-PAC ADL Inpatient form. Current research shows that an AM-PAC score of 17 or less is typically not associated with a discharge to the patient's home setting. Based on the patient's AM-PAC score and their current ADL deficits, it is recommended that the patient have 3-5 sessions per week of Occupational Therapy at d/c to increase the patient's independence.  Please see assessment section for further patient specific details.    If patient discharges prior to next session this note will serve as a discharge summary.  Please see below for the latest assessment towards goals.       Patient Diagnosis(es): There were no encounter diagnoses.  Past Medical History:  has a past medical history of Acute kidney injury (HCC), Allergic rhinitis, CHF (congestive heart failure) (HCC), Delirium, GERD (gastroesophageal reflux disease), Hearing loss sensory, bilateral, Mastoiditis, Osteoarthritis, Osteoporosis, and Scoliosis.  Past Surgical History:  has a past surgical history that includes Carpal tunnel release; Nasal sinus surgery; hernia repair; Colonoscopy; eye surgery; Colonoscopy (N/A, 10/19/2018); and Upper gastrointestinal endoscopy (N/A, 2019).    Treatment Diagnosis: decreased fxl transfers/mobility and ADL status      Assessment   Performance deficits / Impairments: Decreased ADL status;Decreased cognition;Decreased balance;Decreased coordination;Decreased functional mobility ;Decreased ROM;Decreased endurance;Decreased strength  Assessment: PTA, acute care admit -2023 with d/c  to SNF setting ([facility]); prior pt residing in assist living setting ([place] [facility]). Admit from SNF setting. Per chart review, pt required assistance w/ bathing, dressing, and fxl mobility. Today- pt is functioning below her baseline as she required Max Ax2 for bed mobility, Mod Ax2 for fxl transfers to/from stedy w/ assist to maintain proper feet positioning, and s/u for feeding. Pt is anticipated to require up to Max A for full ADLs based on her current strength, endurance, and coordination as observed today. She is most limited by her generalized weakness and cognition. Pt will cont to benefit from being seen on acute to address her deficits and maximize her level of Ind. Rec 3-5x per week post d/c from here.  Treatment Diagnosis: decreased fxl transfers/mobility and ADL status  Prognosis: Fair  Decision Making: Medium Complexity  History: Per H&P \"Petra Green is an 88-year-old lady with past medical history significant for osteoporosis, GERD, CHF who presented to clinic for evaluation of progressive weakness, dysuria, fecal incontinence and low back pain. Patient recently admitted for [facility] discharge skilled nursing facility. Since then, has had worsening leg weakness, incontinence of stool and urine and back pain. Over the past 2 to 3 days her appetite has been diminished and she is having more dysuria.  Assistance / Modification: Celia  REQUIRES OT FOLLOW-UP: Yes  Activity Tolerance  Activity Tolerance: Treatment limited secondary to decreased cognition;Patient limited by fatigue        Plan   Occupational Therapy Plan  Times Per Week: 3-5  Current Treatment Recommendations: Strengthening, ROM, Functional mobility training, Safety education & training, Self-Care / ADL     Restrictions  Restrictions/Precautions  Restrictions/Precautions: Fall Risk  Position Activity Restriction  Other position/activity restrictions: Win    Subjective   General  Chart Reviewed: Yes  Patient assessed for

## 2024-01-30 NOTE — PROGRESS NOTES
([place].)    Cognition   Orientation  Orientation Level: Oriented to person (Pt alert to self,  and current yesr; somewhat confused to recent events.)  Cognition  Overall Cognitive Status: Exceptions  Arousal/Alertness: Delayed responses to stimuli  Following Commands: Follows one step commands with increased time;Follows one step commands with repetition  Attention Span: Attends with cues to redirect  Memory: Decreased recall of recent events  Safety Judgement: Decreased awareness of need for safety  Insights: Decreased awareness of deficits  Initiation: Requires cues for some  Sequencing: Requires cues for some     Objective        Observation/Palpation  Observation: Tight/rigidity B LEs; Increase Hip Adduct; Ankle PF/Inversion.  Gross Assessment  Strength:  (Grossly 3 3+/5; available range LEs.)                    Bed mobility  Supine to Sit: Maximum assistance;2 Person assistance (HOB elevated.)  Transfers  Sit to Stand: Moderate Assistance;2 Person Assistance (Via Stedy.)  Stand to Sit: Moderate Assistance;2 Person Assistance (Via Stedy.)  Bed to Chair: Dependent/Total (Via Stedy.)  Comment: Transfers completed via Stedy from eob, recliner : Mod assist x 2; needs ongoing cues/assist to maintain optimal foot placement on footplate of Stedy (tends to sign narrow mary alice otherwise). Attempt additional Transfer with Walker Mod assist x 2; sign flexed posture/heavy post lean.        Balance  Sitting - Static: Fair  Standing - Static: Poor             AM-PAC - Mobility    AM-PAC Basic Mobility - Inpatient   How much help is needed turning from your back to your side while in a flat bed without using bedrails?: A Lot  How much help is needed moving from lying on your back to sitting on the side of a flat bed without using bedrails?: A Lot  How much help is needed moving to and from a bed to a chair?: Total  How much help is needed standing up from a chair using your arms?: A Lot  How much help is needed walking in  hospital room?: Total  How much help is needed climbing 3-5 steps with a railing?: Total  AM-PAC Inpatient Mobility Raw Score : 9  AM-PAC Inpatient T-Scale Score : 30.55  Mobility Inpatient CMS 0-100% Score: 81.38  Mobility Inpatient CMS G-Code Modifier : CM           Goals  Short Term Goals  Time Frame for Short Term Goals: Upon d/c acute care setting.  Short Term Goal 1: Bed Mob Mod assist.  Short Term Goal 2: Transfers via Stedy Min assist.  Short Term Goal 3: Transfer with assist device Min/Mod assist.  Short Term Goal 4: Amb with assist devcie 5-10' Min/Mod assist.  Patient Goals   Patient Goals : None stated at this time.       Education  Patient Education  Education Given To: Patient  Education Provided Comments: Role of PT, POC, Need to call for assist, EOB, OOB via Stedy.  Education Method: Verbal;Demonstration  Barriers to Learning: Cognition;Hearing  Education Outcome: Continued education needed      Therapy Time   Individual Concurrent Group Co-treatment   Time In 1330         Time Out 1410         Minutes 40                 Maryan Torres, PT

## 2024-01-30 NOTE — PROGRESS NOTES
Name: Petra Green  /Age/Sex: 1935 (88 y.o. female)   MRN & CSN: [id number] & [id number]  Admission Date/Time: 2024  5:17 PM   Location: [unfilled] [id number]-3034/5257-01  Current Hospital Day: Hospital Day: 2   Principal Problem: Hypotension  HPI       Patient seen and examined.  No issues overnight.  Continues to have leg weakness.  No abdominal pain or fever or chills.    All other review of systems negative unless noted above.  VITALS    Vitals:    24 1150   BP: 108/63   Pulse: 77   Resp: 17   Temp: 98.5 °F (36.9 °C)   SpO2: 97%         General: No acute distress, hard of hearing  HEENT: PERRL, EOMI, no sinus tenderness  Cardiac: Regular rate and rhythm, no murmurs, no peripheral edema  Lungs: Clear to auscultation bilaterally  Abdomen: Soft nontender, nondistended  Musculoskeletal: Sarcopenic, moves all extremities  Neuro: No lateralizing symptoms        LABS   BMP  Recent Labs     24  1745 24  0646   * 141   K 4.3 4.4   CL 97* 107   CO2 21 24   BUN 20 15   CREATININE 0.9 0.9   CALCIUM 9.2 8.2*     CBC/COAGS  Recent Labs     24  1745 24  0646   WBC 11.9* 8.3   HCT 38.2 33.2*    223     Liver & Pancreas  Recent Labs     24  1745   AST 16   ALT 15   ALKPHOS 107          IMAGING   MRI pending   Above studies and images were personally reviewed by myself    MEDS   Scheduled Meds:   sodium chloride flush  5-40 mL IntraVENous 2 times per day    enoxaparin  40 mg SubCUTAneous Daily    cefTRIAXone (ROCEPHIN) IV  1,000 mg IntraVENous Q24H    aspirin  81 mg Oral Daily    atorvastatin  10 mg Oral Nightly    escitalopram  5 mg Oral Daily    traZODone  50 mg Oral Nightly     Continuous Infusions:   sodium chloride      sodium chloride 100 mL/hr at 24 0649     PRN Meds:sodium chloride flush, sodium chloride, ondansetron **OR** ondansetron, acetaminophen **OR** acetaminophen, polyethylene glycol     CURRENT HOSPITAL PROBLEMS   Principal Problem:

## 2024-01-31 ENCOUNTER — APPOINTMENT (OUTPATIENT)
Dept: CT IMAGING | Age: 89
End: 2024-01-31
Attending: STUDENT IN AN ORGANIZED HEALTH CARE EDUCATION/TRAINING PROGRAM
Payer: MEDICARE

## 2024-01-31 LAB
ANION GAP SERPL CALCULATED.3IONS-SCNC: 10 MMOL/L (ref 3–16)
BACTERIA UR CULT: ABNORMAL
BASOPHILS # BLD: 0 K/UL (ref 0–0.2)
BASOPHILS NFR BLD: 0.4 %
BUN SERPL-MCNC: 8 MG/DL (ref 7–20)
CALCIUM SERPL-MCNC: 7.9 MG/DL (ref 8.3–10.6)
CHLORIDE SERPL-SCNC: 105 MMOL/L (ref 99–110)
CO2 SERPL-SCNC: 24 MMOL/L (ref 21–32)
CREAT SERPL-MCNC: 0.6 MG/DL (ref 0.6–1.2)
DEPRECATED RDW RBC AUTO: 15.1 % (ref 12.4–15.4)
EOSINOPHIL # BLD: 0.2 K/UL (ref 0–0.6)
EOSINOPHIL NFR BLD: 2.7 %
GFR SERPLBLD CREATININE-BSD FMLA CKD-EPI: >60 ML/MIN/{1.73_M2}
GLUCOSE SERPL-MCNC: 95 MG/DL (ref 70–99)
HCT VFR BLD AUTO: 33.9 % (ref 36–48)
HGB BLD-MCNC: 11.3 G/DL (ref 12–16)
LYMPHOCYTES # BLD: 2.1 K/UL (ref 1–5.1)
LYMPHOCYTES NFR BLD: 26.3 %
MCH RBC QN AUTO: 31.8 PG (ref 26–34)
MCHC RBC AUTO-ENTMCNC: 33.3 G/DL (ref 31–36)
MCV RBC AUTO: 95.7 FL (ref 80–100)
MONOCYTES # BLD: 0.7 K/UL (ref 0–1.3)
MONOCYTES NFR BLD: 8.9 %
NEUTROPHILS # BLD: 5 K/UL (ref 1.7–7.7)
NEUTROPHILS NFR BLD: 61.7 %
ORGANISM: ABNORMAL
PLATELET # BLD AUTO: 272 K/UL (ref 135–450)
PMV BLD AUTO: 7.2 FL (ref 5–10.5)
POTASSIUM SERPL-SCNC: 3.7 MMOL/L (ref 3.5–5.1)
RBC # BLD AUTO: 3.54 M/UL (ref 4–5.2)
SODIUM SERPL-SCNC: 139 MMOL/L (ref 136–145)
WBC # BLD AUTO: 8 K/UL (ref 4–11)

## 2024-01-31 PROCEDURE — 94760 N-INVAS EAR/PLS OXIMETRY 1: CPT

## 2024-01-31 PROCEDURE — 6370000000 HC RX 637 (ALT 250 FOR IP): Performed by: STUDENT IN AN ORGANIZED HEALTH CARE EDUCATION/TRAINING PROGRAM

## 2024-01-31 PROCEDURE — 99233 SBSQ HOSP IP/OBS HIGH 50: CPT | Performed by: STUDENT IN AN ORGANIZED HEALTH CARE EDUCATION/TRAINING PROGRAM

## 2024-01-31 PROCEDURE — 80048 BASIC METABOLIC PNL TOTAL CA: CPT

## 2024-01-31 PROCEDURE — 97530 THERAPEUTIC ACTIVITIES: CPT | Performed by: PHYSICAL THERAPIST

## 2024-01-31 PROCEDURE — 97530 THERAPEUTIC ACTIVITIES: CPT

## 2024-01-31 PROCEDURE — 70450 CT HEAD/BRAIN W/O DYE: CPT

## 2024-01-31 PROCEDURE — 97535 SELF CARE MNGMENT TRAINING: CPT

## 2024-01-31 PROCEDURE — 6360000002 HC RX W HCPCS: Performed by: STUDENT IN AN ORGANIZED HEALTH CARE EDUCATION/TRAINING PROGRAM

## 2024-01-31 PROCEDURE — 36415 COLL VENOUS BLD VENIPUNCTURE: CPT

## 2024-01-31 PROCEDURE — 2060000000 HC ICU INTERMEDIATE R&B

## 2024-01-31 PROCEDURE — 2580000003 HC RX 258: Performed by: STUDENT IN AN ORGANIZED HEALTH CARE EDUCATION/TRAINING PROGRAM

## 2024-01-31 PROCEDURE — 85025 COMPLETE CBC W/AUTO DIFF WBC: CPT

## 2024-01-31 RX ADMIN — ESCITALOPRAM OXALATE 5 MG: 10 TABLET ORAL at 10:14

## 2024-01-31 RX ADMIN — Medication 10 ML: at 19:59

## 2024-01-31 RX ADMIN — WATER 1000 MG: 1 INJECTION INTRAMUSCULAR; INTRAVENOUS; SUBCUTANEOUS at 18:30

## 2024-01-31 RX ADMIN — Medication 10 ML: at 10:17

## 2024-01-31 RX ADMIN — ATORVASTATIN CALCIUM 10 MG: 10 TABLET, FILM COATED ORAL at 19:58

## 2024-01-31 RX ADMIN — ASPIRIN 81 MG: 81 TABLET, COATED ORAL at 10:14

## 2024-01-31 RX ADMIN — TRAZODONE HYDROCHLORIDE 50 MG: 50 TABLET ORAL at 19:59

## 2024-01-31 RX ADMIN — ENOXAPARIN SODIUM 40 MG: 100 INJECTION SUBCUTANEOUS at 10:15

## 2024-01-31 NOTE — PLAN OF CARE
Problem: Discharge Planning  Goal: Discharge to home or other facility with appropriate resources  1/30/2024 2222 by Diomedes Cheung RN  Outcome: Progressing     Problem: Safety - Adult  Goal: Free from fall injury  1/30/2024 2222 by Diomedes Cheung RN  Outcome: Progressing     Problem: Skin/Tissue Integrity  Goal: Absence of new skin breakdown  Description: 1.  Monitor for areas of redness and/or skin breakdown  2.  Assess vascular access sites hourly  3.  Every 4-6 hours minimum:  Change oxygen saturation probe site  4.  Every 4-6 hours:  If on nasal continuous positive airway pressure, respiratory therapy assess nares and determine need for appliance change or resting period.  1/30/2024 2222 by Diomedes Cheung RN  Outcome: Progressing     Problem: ABCDS Injury Assessment  Goal: Absence of physical injury  1/30/2024 2222 by Diomedes Cheung, RN  Outcome: Progressing

## 2024-01-31 NOTE — CARE COORDINATION
Spoke to daughter Tia, explained therapy is saying patient is a 2 person assist and [facility] has said she may not be able to return if requiring a 2 person assist. Daughter says her mom is never a 2 person assist and what our therapy documents is a lot different that what she (as a therapist herself) & PT/OT at [place] sees her mom being able to do. Daughter to reach out to [facility] and discuss patient returning as she doesn't feel her mom needs to go to a skilled nursing facility.    Electronically signed by Shelly Borja RN Case Management on 1/31/2024 at 10:11 AM

## 2024-01-31 NOTE — PROGRESS NOTES
Name: Petra Green  /Age/Sex: 1935 (88 y.o. female)   MRN & CSN: [id number] & [id number]  Admission Date/Time: 2024  5:17 PM   [facility] [id number]257[facility]  Current Hospital Day: Hospital Day: 3   Principal Problem: Hypotension  HPI       Patient seen and examined.  No issues overnight.  Confused today, oriented to self but not place or time.  She does not know that she is in the hospital.    All other review of systems negative unless noted above.  VITALS    Vitals:    24 1145   BP: 133/68   Pulse: 95   Resp: 20   Temp: 97.5 °F (36.4 °C)   SpO2: 95%         General: No acute distress, hard of hearing  HEENT: PERRL, EOMI, no sinus tenderness  Cardiac: Regular rate and rhythm, no murmurs, no peripheral edema  Lungs: Clear to auscultation bilaterally  Abdomen: Soft nontender, nondistended  Musculoskeletal: Sarcopenic, moves all extremities  Neuro: No lateralizing symptoms        LABS   BMP  Recent Labs     24  1745 24  0646 24  0443   * 141 139   K 4.3 4.4 3.7   CL 97* 107 105   CO2  24   BUN 20 15 8   CREATININE 0.9 0.9 0.6   CALCIUM 9.2 8.2* 7.9*     CBC/COAGS  Recent Labs     24  1745 24  0646 24  0443   WBC 11.9* 8.3 8.0   HCT 38.2 33.2* 33.9*    223 272     Liver & Pancreas  Recent Labs     24  1745   AST 16   ALT 15   ALKPHOS 107          IMAGING   MRI lumbar spine    1. Prominent levoscoliosis.  2. Right foraminal protrusion at L3-4 but prominent ligamentum flavum  hypertrophy on the left.  Canal stenosis and left lateral recess narrowing.      Above studies and images were personally reviewed by myself    MEDS   Scheduled Meds:   sodium chloride flush  5-40 mL IntraVENous 2 times per day    enoxaparin  40 mg SubCUTAneous Daily    cefTRIAXone (ROCEPHIN) IV  1,000 mg IntraVENous Q24H    aspirin  81 mg Oral Daily    atorvastatin  10 mg Oral Nightly    escitalopram  5 mg Oral Daily    traZODone  50 mg Oral Nightly

## 2024-01-31 NOTE — PROGRESS NOTES
sliding forward and pt unable to keep feet  and flat of floor; mod A of 2 with stedy but difficulty standing erect to get seat of quita under her)  Stand to Sit: Moderate Assistance;Maximum Assistance;2 Person Assistance (from stedy to recliner chair)  Bed to Chair: Dependent/Total (with carter devlin)        Balance  Comments: verried sitting balance at EOB from min A to mod A         AM-PAC - Mobility    AM-PAC Basic Mobility - Inpatient   How much help is needed turning from your back to your side while in a flat bed without using bedrails?: A Lot  How much help is needed moving from lying on your back to sitting on the side of a flat bed without using bedrails?: A Lot  How much help is needed moving to and from a bed to a chair?: Total  How much help is needed standing up from a chair using your arms?: A Lot  How much help is needed walking in hospital room?: Total  How much help is needed climbing 3-5 steps with a railing?: Total  AM-PAC Inpatient Mobility Raw Score : 9  AM-[facility] Inpatient T-Scale Score : 30.55  Mobility Inpatient CMS 0-100% Score: 81.38  Mobility Inpatient CMS G-Code Modifier : CM         Goals  Short Term Goals  Time Frame for Short Term Goals: Upon d/c acute care setting.  Short Term Goal 1: Bed Mob Mod assist.  Short Term Goal 2: Transfers via Stedy Min assist.  Short Term Goal 3: Transfer with assist device Min/Mod assist.  Short Term Goal 4: Amb with assist devcie 5-10' Min/Mod assist.  Patient Goals   Patient Goals : None stated at this time.       Education  Patient Education  Education Given To: Patient  Education Provided Comments: reviewed call light  Education Method: Verbal;Demonstration  Education Outcome: Continued education needed      Therapy Time   Individual Concurrent Group Co-treatment   Time In 1355         Time Out 1420         Minutes 25                 MYESHA LUNA PT     Electronically signed by MYESHA LUNA PT on 1/31/2024 at 2:47 PM

## 2024-01-31 NOTE — PROGRESS NOTES
Occupational Therapy  Facility/Department: [address] PROGRESSIVE CARE  Occupational Therapy Daily Treatment    Name: Petra Green  : 1935  MRN: [id number]  Date of Service: 2024    Discharge Recommendations:  Patient would benefit from continued therapy after discharge, 3-5 sessions per week  OT Equipment Recommendations  Other: defer to d/c facility  Petar Green scored a  on the AM-PAC ADL Inpatient form. Current research shows that an AM-PAC score of 17 or less is typically not associated with a discharge to the patient's home setting. Based on the patient's AM-PAC score and their current ADL deficits, it is recommended that the patient have 3-5 sessions per week of Occupational Therapy at d/c to increase the patient's independence.  Please see assessment section for further patient specific details.    If patient discharges prior to next session this note will serve as a discharge summary.  Please see below for the latest assessment towards goals.       Patient Diagnosis(es): There were no encounter diagnoses.  Past Medical History:  has a past medical history of Acute kidney injury (HCC), Allergic rhinitis, CHF (congestive heart failure) (HCC), Delirium, GERD (gastroesophageal reflux disease), Hearing loss sensory, bilateral, Mastoiditis, Osteoarthritis, Osteoporosis, and Scoliosis.  Past Surgical History:  has a past surgical history that includes Carpal tunnel release; Nasal sinus surgery; hernia repair; Colonoscopy; eye surgery; Colonoscopy (N/A, 10/19/2018); and Upper gastrointestinal endoscopy (N/A, 2019).    Treatment Diagnosis: decreased fxl transfers/mobility and ADL status      Assessment   Performance deficits / Impairments: Decreased ADL status;Decreased cognition;Decreased balance;Decreased coordination;Decreased functional mobility ;Decreased ROM;Decreased endurance;Decreased strength  Assessment: PTA, acute care admit -2023 with d/c to SNF setting ([facility]

## 2024-02-01 LAB
ALBUMIN SERPL-MCNC: 3.7 G/DL (ref 3.4–5)
ALP SERPL-CCNC: 100 U/L (ref 40–129)
ALT SERPL-CCNC: 11 U/L (ref 10–40)
ANION GAP SERPL CALCULATED.3IONS-SCNC: 10 MMOL/L (ref 3–16)
AST SERPL-CCNC: 14 U/L (ref 15–37)
BASOPHILS # BLD: 0.1 K/UL (ref 0–0.2)
BASOPHILS NFR BLD: 0.7 %
BILIRUB DIRECT SERPL-MCNC: <0.2 MG/DL (ref 0–0.3)
BILIRUB INDIRECT SERPL-MCNC: ABNORMAL MG/DL (ref 0–1)
BILIRUB SERPL-MCNC: 0.5 MG/DL (ref 0–1)
BUN SERPL-MCNC: 8 MG/DL (ref 7–20)
CALCIUM SERPL-MCNC: 8.5 MG/DL (ref 8.3–10.6)
CHLORIDE SERPL-SCNC: 106 MMOL/L (ref 99–110)
CO2 SERPL-SCNC: 24 MMOL/L (ref 21–32)
CREAT SERPL-MCNC: 0.7 MG/DL (ref 0.6–1.2)
DEPRECATED RDW RBC AUTO: 14.7 % (ref 12.4–15.4)
EOSINOPHIL # BLD: 0.3 K/UL (ref 0–0.6)
EOSINOPHIL NFR BLD: 3.4 %
FOLATE SERPL-MCNC: 9.79 NG/ML (ref 4.78–24.2)
GFR SERPLBLD CREATININE-BSD FMLA CKD-EPI: >60 ML/MIN/{1.73_M2}
GLUCOSE SERPL-MCNC: 97 MG/DL (ref 70–99)
HCT VFR BLD AUTO: 34.4 % (ref 36–48)
HGB BLD-MCNC: 11.7 G/DL (ref 12–16)
LYMPHOCYTES # BLD: 1.8 K/UL (ref 1–5.1)
LYMPHOCYTES NFR BLD: 17.5 %
MCH RBC QN AUTO: 32.1 PG (ref 26–34)
MCHC RBC AUTO-ENTMCNC: 34.1 G/DL (ref 31–36)
MCV RBC AUTO: 94.1 FL (ref 80–100)
MONOCYTES # BLD: 1.1 K/UL (ref 0–1.3)
MONOCYTES NFR BLD: 10.6 %
NEUTROPHILS # BLD: 6.8 K/UL (ref 1.7–7.7)
NEUTROPHILS NFR BLD: 67.8 %
PLATELET # BLD AUTO: 291 K/UL (ref 135–450)
PMV BLD AUTO: 7 FL (ref 5–10.5)
POTASSIUM SERPL-SCNC: 3.9 MMOL/L (ref 3.5–5.1)
PROT SERPL-MCNC: 5.8 G/DL (ref 6.4–8.2)
RBC # BLD AUTO: 3.65 M/UL (ref 4–5.2)
SODIUM SERPL-SCNC: 140 MMOL/L (ref 136–145)
TSH SERPL DL<=0.005 MIU/L-ACNC: 1.57 UIU/ML (ref 0.27–4.2)
VIT B12 SERPL-MCNC: >2000 PG/ML (ref 211–911)
WBC # BLD AUTO: 10 K/UL (ref 4–11)

## 2024-02-01 PROCEDURE — 6370000000 HC RX 637 (ALT 250 FOR IP): Performed by: STUDENT IN AN ORGANIZED HEALTH CARE EDUCATION/TRAINING PROGRAM

## 2024-02-01 PROCEDURE — 97530 THERAPEUTIC ACTIVITIES: CPT

## 2024-02-01 PROCEDURE — 82607 VITAMIN B-12: CPT

## 2024-02-01 PROCEDURE — 82746 ASSAY OF FOLIC ACID SERUM: CPT

## 2024-02-01 PROCEDURE — 84443 ASSAY THYROID STIM HORMONE: CPT

## 2024-02-01 PROCEDURE — 80076 HEPATIC FUNCTION PANEL: CPT

## 2024-02-01 PROCEDURE — 80048 BASIC METABOLIC PNL TOTAL CA: CPT

## 2024-02-01 PROCEDURE — 97110 THERAPEUTIC EXERCISES: CPT

## 2024-02-01 PROCEDURE — 94760 N-INVAS EAR/PLS OXIMETRY 1: CPT

## 2024-02-01 PROCEDURE — 6360000002 HC RX W HCPCS: Performed by: STUDENT IN AN ORGANIZED HEALTH CARE EDUCATION/TRAINING PROGRAM

## 2024-02-01 PROCEDURE — 2060000000 HC ICU INTERMEDIATE R&B

## 2024-02-01 PROCEDURE — 85025 COMPLETE CBC W/AUTO DIFF WBC: CPT

## 2024-02-01 PROCEDURE — 99232 SBSQ HOSP IP/OBS MODERATE 35: CPT | Performed by: STUDENT IN AN ORGANIZED HEALTH CARE EDUCATION/TRAINING PROGRAM

## 2024-02-01 PROCEDURE — 36415 COLL VENOUS BLD VENIPUNCTURE: CPT

## 2024-02-01 PROCEDURE — 2580000003 HC RX 258: Performed by: STUDENT IN AN ORGANIZED HEALTH CARE EDUCATION/TRAINING PROGRAM

## 2024-02-01 RX ORDER — ENOXAPARIN SODIUM 100 MG/ML
30 INJECTION SUBCUTANEOUS DAILY
Status: DISCONTINUED | OUTPATIENT
Start: 2024-02-02 | End: 2024-02-09 | Stop reason: HOSPADM

## 2024-02-01 RX ADMIN — ASPIRIN 81 MG: 81 TABLET, COATED ORAL at 09:13

## 2024-02-01 RX ADMIN — ENOXAPARIN SODIUM 40 MG: 100 INJECTION SUBCUTANEOUS at 09:13

## 2024-02-01 RX ADMIN — ESCITALOPRAM OXALATE 5 MG: 10 TABLET ORAL at 09:13

## 2024-02-01 RX ADMIN — Medication 10 ML: at 20:03

## 2024-02-01 RX ADMIN — TRAZODONE HYDROCHLORIDE 50 MG: 50 TABLET ORAL at 20:02

## 2024-02-01 RX ADMIN — ATORVASTATIN CALCIUM 10 MG: 10 TABLET, FILM COATED ORAL at 20:02

## 2024-02-01 RX ADMIN — WATER 1000 MG: 1 INJECTION INTRAMUSCULAR; INTRAVENOUS; SUBCUTANEOUS at 18:47

## 2024-02-01 RX ADMIN — Medication 10 ML: at 09:20

## 2024-02-01 NOTE — CARE COORDINATION
Met with patient & son Shant. Discussed therapy recs for skilled nursing facility (SNF). Son says family is agreeable to SNF and would like to discuss what facility they would like for their mom.   Son informs me there was confusion in wording used by either myself or [facility] when his sister thought a \"nursing home\" was needed and not a \"skilled nursing facility\" for rehab and this is why family was hesitant on DC to SNF.   SNF list given for review. Son says they may be leaning towards patient going to [facility] as she was just recently there for rehab. Await SNF choice from family.  Son also did mention needing a Palliative Care discussion depending on mom's progress and diagnosis. Would need a Pallative Care consult from MD.    The Plan for Transition of Care is related to the following treatment goals: strengthening    The patient's son Shnat was provided with a choice of provider and agrees   with the discharge plan. [x] Yes [] No    Freedom of choice list was provided with basic dialogue that supports the patient's individualized plan of care/goals, treatment preferences and shares the quality data associated with the providers. [x] Yes [] No    Electronically signed by Shelly Borja RN Case Management on 2/1/2024 at 9:28 AM        Son would like a referral to [facility]. Referral made. Back up would be [facility] & then [facility].   Pallative care eval now placed.    Electronically signed by Shelly Borja RN Case Management on 2/1/2024 at 10:47 AM        STIVEN jaquez/ DON at [facility] to see if patient is able to return as was told by NP w/ Palliative care that they could accept her back. Spoke to nursing staff at [facility] on admit (ANALY Ty?) who had said if needing more than 1 person assist patient likely couldn't return which is why I was discussing SNF placement with pt/family. Await return call from BRITTANY at [facility]. Also need to see if able to return to [facility] with a Quirino

## 2024-02-01 NOTE — PLAN OF CARE
Problem: Discharge Planning  Goal: Discharge to home or other facility with appropriate resources  2/1/2024 0102 by Lai Genao RN  Outcome: Progressing  1/31/2024 1457 by Juan Serrano RN  Outcome: Progressing     Problem: Safety - Adult  Goal: Free from fall injury  2/1/2024 0102 by Lai Genao RN  Outcome: Progressing  1/31/2024 1457 by Juan Serrano RN  Outcome: Progressing     Problem: Skin/Tissue Integrity  Goal: Absence of new skin breakdown  Description: 1.  Monitor for areas of redness and/or skin breakdown  2.  Assess vascular access sites hourly  3.  Every 4-6 hours minimum:  Change oxygen saturation probe site  4.  Every 4-6 hours:  If on nasal continuous positive airway pressure, respiratory therapy assess nares and determine need for appliance change or resting period.  2/1/2024 0102 by Lai Genao RN  Outcome: Progressing  1/31/2024 1457 by Juan Serrano RN  Outcome: Progressing     Problem: ABCDS Injury Assessment  Goal: Absence of physical injury  2/1/2024 0102 by Lai Genao RN  Outcome: Progressing  1/31/2024 1457 by Juan Serrano RN  Outcome: Progressing

## 2024-02-01 NOTE — PROGRESS NOTES
Physical Therapy  Facility/Department: [address] PROGRESSIVE CARE  Physical Therapy Treatment Note    Name: Petra Green  : 1935  MRN: [id number]  Date of Service: 2024    Discharge Recommendations:  Continue to assess pending progress, Subacute/Skilled Nursing Facility   PT Equipment Recommendations  Other: Defer to next level of care.      Petra Green scored a  on the AM-PAC short mobility form. Current research shows that an AM-PAC score of 17 or less is typically not associated with a discharge to the patient's home setting. Based on the patient's AM-PAC score and their current functional mobility deficits, it is recommended that the patient have 3-5 sessions per week of Physical Therapy at d/c to increase the patient's independence.  Please see assessment section for further patient specific details.    If patient discharges prior to next session this note will serve as a discharge summary.  Please see below for the latest assessment towards goals.      Assessment   Body Structures, Functions, Activity Limitations Requiring Skilled Therapeutic Intervention: Decreased functional mobility ;Decreased ROM;Decreased strength;Decreased safe awareness;Decreased cognition;Decreased endurance;Decreased balance  Assessment: 87 y/o female admit 2024 with UTI, Hypotension, LB Pain/LE Weakness. PMH as noted including CAD, CKD, CHF, Osteoporosis, OA.  Pt admit from Assist living facility (had been home from SNF for ~  2 weeks per daughter report). At baseline pt living in assist living ([facility]).  Currently, Pt cont somewhat confused/occass mild hallucinations although able to participate with PT Rx.  Pt requiring Max assist to eob; Transfers via Stedy Min/Mod assist today (improved with pillow placed between knees prior transfers to improve LE/Foot positioning.  Cont to note sign LE rigidity during ROM/Exs.  Recommend continued PT  at SNF to address deficits to allow pt to regain her max

## 2024-02-01 NOTE — PROGRESS NOTES
Name: Petra Green  /Age/Sex: 1935 (88 y.o. female)   MRN & CSN: [id number] & [id number]  Admission Date/Time: 2024  5:17 PM   Location: @[facility]@ [id number]  Current Hospital Day: Hospital Day: 4   Principal Problem: Hypotension  HPI       Patient seen and examined.  No issues overnight.  Oriented to person and place and time today.  Does not report any complaints.    Spoke with son at bedside    All other review of systems negative unless noted above.  VITALS    Vitals:    24 0709   BP: 138/82   Pulse: 89   Resp: 20   Temp: 98 °F (36.7 °C)   SpO2: 96%         General: No acute distress, hard of hearing  HEENT: PERRL, EOMI, no sinus tenderness  Cardiac: Regular rate and rhythm, no murmurs, no peripheral edema  Lungs: Clear to auscultation bilaterally  Abdomen: Soft nontender, nondistended  Musculoskeletal: Sarcopenic, moves all extremities  Neuro: No lateralizing symptoms        LABS   BMP  Recent Labs     24  0646 24  0443 24  0512    139 140   K 4.4 3.7 3.9    105 106   CO2    BUN 15 8 8   CREATININE 0.9 0.6 0.7   CALCIUM 8.2* 7.9* 8.5     CBC/COAGS  Recent Labs     24  0646 24  0443 24  0512   WBC 8.3 8.0 10.0   HCT 33.2* 33.9* 34.4*    272 291     Liver & Pancreas  Recent Labs     24  1745 24  0512   AST 16 14*   ALT 15 11   ALKPHOS 107 100   BILIDIR  --  <0.2          IMAGING   MRI lumbar spine    1. Prominent levoscoliosis.  2. Right foraminal protrusion at L3-4 but prominent ligamentum flavum  hypertrophy on the left.  Canal stenosis and left lateral recess narrowing.      Above studies and images were personally reviewed by myself    MEDS   Scheduled Meds:   sodium chloride flush  5-40 mL IntraVENous 2 times per day    enoxaparin  40 mg SubCUTAneous Daily    cefTRIAXone (ROCEPHIN) IV  1,000 mg IntraVENous Q24H    aspirin  81 mg Oral Daily    atorvastatin  10 mg Oral Nightly    escitalopram  5

## 2024-02-01 NOTE — PLAN OF CARE
NEUROSURGERY PLAN OF CARE NOTE    SHAKIR FLOWERS  [id number]   1935 2/1/2024    Radiological Findings:  CT HEAD WO CONTRAST  Result Date: 1/31/2024  No acute intracranial abnormality. Minimal opacification of the right mastoid air cells.     MRI LUMBAR SPINE WO CONTRAST  Result Date: 1/30/2024  1. Prominent levoscoliosis.  2. Right foraminal protrusion at L3-4 but prominent ligamentum flavum hypertrophy on the left.  Canal stenosis and left lateral recess narrowing. Minimal opacification of the right mastoid air cells.      Assessment:  Patient is a 88 y.o. y/o female w/progressive weakness, dysuria, fecal incontinence and low back pain. Patient positive for Klebsiella in urine, and treated with ceftriaxone. MRI Lumbar shows chronic issues and no etiology for stool incontinence.    Recommendations:  No neurosurgical intervention indicated given patient's age, extent of scoliosis, and no severe central stenosis seen on MRI Lumbar spine.  Would recommend placement for continued PT/OT  Patient can follow up with Dr. Torres in his office as needed       Electronically signed by: RIAZ Deluca CNP, APRN-CNP, 2/1/2024 9:14 AM  [phone]

## 2024-02-01 NOTE — PROGRESS NOTES
Comprehensive Nutrition Assessment    Type and Reason for Visit:  Initial, Positive Nutrition Screen    Nutrition Recommendations/Plan:   Continues on a regular diet  Offer Magic cup tid  Cancel Ensure clear for now since not mildly thick     Malnutrition Assessment:  Malnutrition Status:  At risk for malnutrition (Comment) (02/01/24 2776)    Context:  Acute Illness     Findings of the 6 clinical characteristics of malnutrition:  Energy Intake:  75% or less of estimated energy requirements for 7 or more days  Weight Loss:  No significant weight loss     Body Fat Loss:  Mild body fat loss Orbital   Muscle Mass Loss:  Mild muscle mass loss Temples (temporalis)  Fluid Accumulation:  Unable to assess     Strength:  Not Performed    Nutrition Assessment:    Patient admitted with hypotension.  Currently on a regular diet with mildly thick liquids.  Po intake has mostly been less than 50% meals.  Patient very confused, unable to answer questions appropriately this afternoon.  RD reoriented patient frequently, venturaetn did said yes to liking cranberry juice, RD opened container and poured in glass for patient.  Thickened Ensure clear ordered but not on tray, likely shows non compliant in our  software.  Patient did say yes to Magic cups, will order tid and monitor tolerance.    Nutrition Related Findings:    active bowel sounds; Labs and blood sugars reviewed and within normal limits on 2/1 Wound Type:  (redness to heels and buttocks)       Current Nutrition Intake & Therapies:    Average Meal Intake: 26-50%, 51-75%  Average Supplements Intake: Unable to assess  ADULT DIET; Regular; Mildly Thick (Nectar)  ADULT ORAL NUTRITION SUPPLEMENT; Breakfast, Lunch, Dinner; Clear Liquid Oral Supplement  ADULT ORAL NUTRITION SUPPLEMENT; Lunch, Dinner, Breakfast; Frozen Oral Supplement    Anthropometric Measures:  Height: 127 cm (4' 2\")  Ideal Body Weight (IBW): 50 lbs (23 kg)       Current Body Weight: 48.7 kg (107 lb  5.8 oz),   IBW. Weight Source: Bed Scale  Current BMI (kg/m2): 30.2                          BMI Categories: Obese Class 1 (BMI 30.0-34.9)    Estimated Daily Nutrient Needs:  Energy Requirements Based On: Kcal/kg  Weight Used for Energy Requirements: Current  Energy (kcal/day): 7470-8621 (35-40 kcal/48.7 kg)  Weight Used for Protein Requirements: Current  Protein (g/day): 63-73 (1.3-1.5 g/48.7 kg)  Method Used for Fluid Requirements: 1 ml/kcal  Fluid (ml/day):      Nutrition Diagnosis:   Inadequate energy intake related to  (possibly from altered cognition and current medical condition) as evidenced by intake 26-50%    Nutrition Interventions:   Food and/or Nutrient Delivery: Continue Current Diet, Start Oral Nutrition Supplement  Nutrition Education/Counseling:  (monitor need)  Coordination of Nutrition Care: Continue to monitor while inpatient       Goals:     Goals: PO intake 75% or greater       Nutrition Monitoring and Evaluation:      Food/Nutrient Intake Outcomes: Food and Nutrient Intake, Supplement Intake  Physical Signs/Symptoms Outcomes: Biochemical Data, Nutrition Focused Physical Findings    Discharge Planning:    Too soon to determine     ARIK CASTELLANOS RD, LD  Contact: Office: [phone]; Armin: [phone]

## 2024-02-01 NOTE — CONSULTS
PALLIATIVE MEDICINE CONSULTATION     Patient name:Petra Green   MRN:[id number]    :1935  Room/Bed:B4E-3287/5257-01   LOS: 3 days         Date of consult:2024    Inpatient consult to Palliative Care  Consult performed by: Paradise Macdonald APRN - CNP  Consult ordered by: Walter Barnes MD  Reason for consult: Goals of care.        ASSESSMENT/RECOMMENDATIONS     88 y.o. female with altered mental status and weakness found to have a UTI.      Symptom Management:  AMS -management per attending.  Acute UTI likely contributing, however question an underlying onset of dementia given dysphagia as well as incontinence of bowel and bladder.  UTI -antibiotic choice per attending.  Debility -MRI showing chronic changes, supportive care.  Continue PT and OT, will discharge back to assisted living with continuation of services.   Dysphagia -tolerating a regular diet with thickened liquids.  Goals of Care - See below.     Patient/Family Goals of Care :    Goals of care conversation at the bedside with the patient, her son Christoph, and her daughter Tia (via telephone).  Family had questions regarding next steps and providing the patient with adequate care.  They acknowledged that skilled nursing facility stays have been difficult for their mother both physically and cognitively.  We reviewed the aspects of change in environment with acute illness and physical decline in the aging population.  Family states the patient is more oriented when she is in her apartment, they are hoping to return, although her physical condition is a barrier.  They would like her care to be focused on quality of life with decreasing hospitalizations and changes in environment.  Family is not interested in any resuscitative measures such as intubation, ventilation, CPR, or defibrillation in the event of cardiopulmonary arrest.  CODE STATUS updated accordingly.  We discussed outpatient palliative care with continuation of  currently being treated for UTI, hypotension, and increased weakness.  Orientation comes and goes, is currently disoriented.    Palliative Medicine SymptomScreening/ROS:    Review of Systems   Unable to perform ROS: Other     Patient unable to complete full ROS due to current cognitive status.  Information that is obtained from nursing and chart.       Palliative Performance Scale:     [] 60%  Amb reduced; Sig dz. Can't do hobbies/housework; Intake normal or reduced, Occasional assist; LOC full/confusion   [] 50%  Mainly sit/lie; Extensive disease. Mainly assist, Intake normal or reduced; Occasional assist; LOC full/confusion   [x] 40%  Mainly in bed; Extensive disease; Mainly assist; Intake normal or reduced; Occasional assist; LOC full/confusion   [] 30%  Bed bound, Extensive disease; Total care; Intake reduced; LOC full/confusion   [] 20%  Bed bound; Extensive disease; Total care; Intake minimal; Drowsy/coma   [] 10%  Bed bound; Extensive disease; Total care; Mouth care only; Drowsy/coma   []  0%   Death       Home med list and hospital medications reviewed in chart as of 2/1/2024     EXAM     Vitals:    02/01/24 1210   BP: 121/78   Pulse: (!) 102   Resp:    Temp: 97.6 °F (36.4 °C)   SpO2:        Physical Exam  Cardiovascular:      Rate and Rhythm: Tachycardia present.      Pulses: Normal pulses.   Pulmonary:      Effort: Pulmonary effort is normal.   Abdominal:      Palpations: Abdomen is soft.   Neurological:      Mental Status: She is alert.      Motor: Weakness present.   Psychiatric:         Cognition and Memory: Cognition is impaired. Memory is impaired.                OBJECTIVE   /78   Pulse (!) 102   Temp 97.6 °F (36.4 °C) (Oral)   Resp 20   Ht 1.27 m (4' 2\")   Wt 48.7 kg (107 lb 5.8 oz)   LMP  (LMP Unknown)   SpO2 96%   BMI 30.19 kg/m²   I/O last 3 completed shifts:  In: 765 [P.O.:760; I.V.:5]  Out: 2775 [Urine:2775]  I/O this shift:  In: 120 [P.O.:120]  Out: -       Palliative Medicine

## 2024-02-01 NOTE — PROGRESS NOTES
Clinical Pharmacy Note  Subcutaneous Anticoagulant Adjustment     Enoxaparin has been adjusted to 30mg daily based on [facility] policy.    Recent Labs     01/31/24  0443 02/01/24  0512   CREATININE 0.6 0.7     Recent Labs     02/01/24  0512   HGB 11.7*   HCT 34.4*        Estimated Creatinine Clearance: 36 mL/min (based on SCr of 0.7 mg/dL).    Pharmacist Review of Appropriate Use and Automatic Dose Adjustment of Subcutaneous Anticoagulants (Adult)    The guidance below is to provide initial recommendations for dosing. If recommended dose does not align well with patient's current clinical picture, communications with the care team will occur to determine most appropriate medication and dose.    TABLE 1.  ENOXAPARIN ROUTINE PROPHYLAXIS DOSING (Medically ill, routine surgery)   Patient Weight (kg)     50.9 and below 51 - 100.9 101 - 150.9 151 - 174.9 175 or greater         Estimated CrCl  (ml/min) 30 or greater   30 mg SUBQ daily   40 mg SUBQ daily 30 mg SUBQ BID  40 mg SUBQ BID 60mg SUBQ BID      15-29 UFH 5000 units SUBQ BID   30 mg SUBQ daily 30 mg SUBQ daily 40 mg SUBQ daily   60 mg SUBQ daily      Less than 15 or Dialysis UFH 5000 units SUBQ BID   UFH 5000 units SUBQ TID UFH 7500 units SUBQ TID     Rob Braden [facility], 2/1/2024 1:39 PM  T

## 2024-02-01 NOTE — PROGRESS NOTES
Occupational Therapy  Facility/Department: [address] PROGRESSIVE CARE  Occupational Therapy Daily Treatment    Name: Petra Green  : 1935  MRN: [id number]  Date of Service: 2024    Discharge Recommendations:  Patient would benefit from continued therapy after discharge, 3-5 sessions per week  OT Equipment Recommendations  Other: defer to d/c facility  Petra Green scored a  on the AM-PAC ADL Inpatient form. Current research shows that an AM-PAC score of 17 or less is typically not associated with a discharge to the patient's home setting. Based on the patient's AM-PAC score and their current ADL deficits, it is recommended that the patient have 3-5 sessions per week of Occupational Therapy at d/c to increase the patient's independence.  Please see assessment section for further patient specific details.    If patient discharges prior to next session this note will serve as a discharge summary.  Please see below for the latest assessment towards goals.       Patient Diagnosis(es): There were no encounter diagnoses.  Past Medical History:  has a past medical history of Acute kidney injury (HCC), Allergic rhinitis, CHF (congestive heart failure) (HCC), Delirium, GERD (gastroesophageal reflux disease), Hearing loss sensory, bilateral, Mastoiditis, Osteoarthritis, Osteoporosis, and Scoliosis.  Past Surgical History:  has a past surgical history that includes Carpal tunnel release; Nasal sinus surgery; hernia repair; Colonoscopy; eye surgery; Colonoscopy (N/A, 10/19/2018); and Upper gastrointestinal endoscopy (N/A, 2019).    Treatment Diagnosis: decreased fxl transfers/mobility and ADL status      Assessment   Performance deficits / Impairments: Decreased ADL status;Decreased cognition;Decreased balance;Decreased coordination;Decreased functional mobility ;Decreased ROM;Decreased endurance;Decreased strength  Assessment: PTA, acute care admit -2023 with d/c to SNF setting ([facility]

## 2024-02-02 LAB
ANION GAP SERPL CALCULATED.3IONS-SCNC: 8 MMOL/L (ref 3–16)
BACTERIA BLD CULT ORG #2: NORMAL
BACTERIA BLD CULT: NORMAL
BASOPHILS # BLD: 0.1 K/UL (ref 0–0.2)
BASOPHILS NFR BLD: 0.6 %
BUN SERPL-MCNC: 13 MG/DL (ref 7–20)
CALCIUM SERPL-MCNC: 8.2 MG/DL (ref 8.3–10.6)
CHLORIDE SERPL-SCNC: 107 MMOL/L (ref 99–110)
CO2 SERPL-SCNC: 25 MMOL/L (ref 21–32)
CREAT SERPL-MCNC: 0.7 MG/DL (ref 0.6–1.2)
DEPRECATED RDW RBC AUTO: 14.6 % (ref 12.4–15.4)
EOSINOPHIL # BLD: 0.4 K/UL (ref 0–0.6)
EOSINOPHIL NFR BLD: 4.8 %
GFR SERPLBLD CREATININE-BSD FMLA CKD-EPI: >60 ML/MIN/{1.73_M2}
GLUCOSE SERPL-MCNC: 95 MG/DL (ref 70–99)
HCT VFR BLD AUTO: 32.2 % (ref 36–48)
HGB BLD-MCNC: 11.1 G/DL (ref 12–16)
LYMPHOCYTES # BLD: 1.6 K/UL (ref 1–5.1)
LYMPHOCYTES NFR BLD: 19 %
MCH RBC QN AUTO: 32.6 PG (ref 26–34)
MCHC RBC AUTO-ENTMCNC: 34.6 G/DL (ref 31–36)
MCV RBC AUTO: 94.2 FL (ref 80–100)
MONOCYTES # BLD: 1 K/UL (ref 0–1.3)
MONOCYTES NFR BLD: 12.1 %
NEUTROPHILS # BLD: 5.3 K/UL (ref 1.7–7.7)
NEUTROPHILS NFR BLD: 63.5 %
PLATELET # BLD AUTO: 276 K/UL (ref 135–450)
PMV BLD AUTO: 6.8 FL (ref 5–10.5)
POTASSIUM SERPL-SCNC: 4 MMOL/L (ref 3.5–5.1)
RBC # BLD AUTO: 3.42 M/UL (ref 4–5.2)
SODIUM SERPL-SCNC: 140 MMOL/L (ref 136–145)
WBC # BLD AUTO: 8.4 K/UL (ref 4–11)

## 2024-02-02 PROCEDURE — 6360000002 HC RX W HCPCS: Performed by: STUDENT IN AN ORGANIZED HEALTH CARE EDUCATION/TRAINING PROGRAM

## 2024-02-02 PROCEDURE — 97530 THERAPEUTIC ACTIVITIES: CPT

## 2024-02-02 PROCEDURE — 2060000000 HC ICU INTERMEDIATE R&B

## 2024-02-02 PROCEDURE — 6370000000 HC RX 637 (ALT 250 FOR IP): Performed by: STUDENT IN AN ORGANIZED HEALTH CARE EDUCATION/TRAINING PROGRAM

## 2024-02-02 PROCEDURE — 94760 N-INVAS EAR/PLS OXIMETRY 1: CPT

## 2024-02-02 PROCEDURE — 80048 BASIC METABOLIC PNL TOTAL CA: CPT

## 2024-02-02 PROCEDURE — 2580000003 HC RX 258: Performed by: STUDENT IN AN ORGANIZED HEALTH CARE EDUCATION/TRAINING PROGRAM

## 2024-02-02 PROCEDURE — 85025 COMPLETE CBC W/AUTO DIFF WBC: CPT

## 2024-02-02 PROCEDURE — 99233 SBSQ HOSP IP/OBS HIGH 50: CPT | Performed by: STUDENT IN AN ORGANIZED HEALTH CARE EDUCATION/TRAINING PROGRAM

## 2024-02-02 RX ORDER — METOPROLOL SUCCINATE 50 MG/1
50 TABLET, EXTENDED RELEASE ORAL DAILY
Status: DISCONTINUED | OUTPATIENT
Start: 2024-02-02 | End: 2024-02-09 | Stop reason: HOSPADM

## 2024-02-02 RX ADMIN — ASPIRIN 81 MG: 81 TABLET, COATED ORAL at 08:11

## 2024-02-02 RX ADMIN — Medication 10 ML: at 20:10

## 2024-02-02 RX ADMIN — WATER 1000 MG: 1 INJECTION INTRAMUSCULAR; INTRAVENOUS; SUBCUTANEOUS at 17:19

## 2024-02-02 RX ADMIN — METOPROLOL SUCCINATE 50 MG: 50 TABLET, EXTENDED RELEASE ORAL at 17:18

## 2024-02-02 RX ADMIN — ATORVASTATIN CALCIUM 10 MG: 10 TABLET, FILM COATED ORAL at 20:10

## 2024-02-02 RX ADMIN — ESCITALOPRAM OXALATE 5 MG: 10 TABLET ORAL at 08:11

## 2024-02-02 RX ADMIN — TRAZODONE HYDROCHLORIDE 50 MG: 50 TABLET ORAL at 20:10

## 2024-02-02 RX ADMIN — ENOXAPARIN SODIUM 30 MG: 100 INJECTION SUBCUTANEOUS at 08:12

## 2024-02-02 NOTE — PROGRESS NOTES
Pt still has not voided since be removal,pt has been bladder scan 185ml in bladder, pt stated she does not feel the urge to void, we tried getting on the bedpan to assist with voiding this was still unsuccessful, will reassess in 1-2 hours and continue to monitor per order

## 2024-02-02 NOTE — PROGRESS NOTES
Pt still has not voided since be removal this morning, bladder scan showed 308ml, pt still does not feel the urge to void, spoke with Dr. Barnes ok to straight cath X 2, one time right now then continue to bladder scan Q 6 hours if pt has not voided

## 2024-02-02 NOTE — PROGRESS NOTES
PALLIATIVE MEDICINE PROGRESS NOTE     Patient name:Petra Green    MRN:[id number] :1935  Room/Bed:G0N-4649/5257-01    LOS: 4 days        ASSESSMENT/RECOMMENDATIONS     88 y.o. female with altered mental status and weakness found to have a UTI.        Symptom Management:  AMS -management per attending.  Improved.   UTI -antibiotic choice per attending.  Debility -MRI showing chronic changes, supportive care.  Continue PT and OT.  Dysphagia -tolerating a regular diet with thickened liquids. Stable.   Goals of Care - See below.     Patient/Family Goals of Care :    24 - Goals of care conversation at the bedside with the patient, her son Christoph, and her daughter Tia (via telephone).  Family had questions regarding next steps and providing the patient with adequate care.  They acknowledged that skilled nursing facility stays have been difficult for their mother both physically and cognitively.  We reviewed the aspects of change in environment with acute illness and physical decline in the aging population.  Family states the patient is more oriented when she is in her apartment, they are hoping to return, although her physical condition is a barrier.  They would like her care to be focused on quality of life with decreasing hospitalizations and changes in environment.  Family is not interested in any resuscitative measures such as intubation, ventilation, CPR, or defibrillation in the event of cardiopulmonary arrest.  CODE STATUS updated accordingly.  We discussed outpatient palliative care with continuation of skilled services versus hospice care.  Family would like patient to participate with physical and occupational therapy at this time, therefore she would not qualify for hospice services.  Will refer to outpatient palliative care to follow the patient, goal would be to decrease hospitalizations.  They will assist the patient and family throughout her journey to understand when she is more

## 2024-02-02 NOTE — CARE COORDINATION
Spoke to BRITTANY Serna at [facility] assisted [facility]. Confirms patient is able to return being a max 2 person assist. Says this is her baseline. Daphne asks if patient has a be catheter as AL can't manage be catheters, explained to DON that be has been removed.    Spoke to patient's daughter Tia, agrees with return to [facility].  Patient may need stretcher transport, Tia says to speak to her brother Shant at discharge to see if he is able to transport or feels more comfortable with medical transport. Patient will likely need stretcher transport due to current level of mobility.     Plan DC back to [facility] w/ [facility]. Will need home care orders.    Electronically signed by Shelly Borja RN Case Management on 2/2/2024 at 11:03 AM

## 2024-02-02 NOTE — PROGRESS NOTES
Physician Progress Note      PATIENT:               SHAKIR FLOWERS  CSN #:                  [id number]  :                       1935  ADMIT DATE:       2024 5:17 PM  DISCH DATE:  RESPONDING  PROVIDER #:        Walter Barnes MD          QUERY TEXT:    Pt admitted with UTI.  Per  progress note \"Confused today, oriented to   self but not place or time.  She does not know that she is in the hospital.\"    If possible, please document in the progress notes and discharge summary if   you are evaluating and / or treating any of the following:    The medical record reflects the following:  Risk Factors: 89yo, UTI  Clinical Indicators: oriented to self only  Treatment: UA, urine culture, initially received IV fluids, remains on   Rocephin  Options provided:  -- Metabolic encephalopathy due to UTI  -- Other - I will add my own diagnosis  -- Disagree - Not applicable / Not valid  -- Disagree - Clinically unable to determine / Unknown  -- Refer to Clinical Documentation Reviewer    PROVIDER RESPONSE TEXT:    This patient has metabolic encephalopathy due to UTI.    Query created by: Shakir Herman on 2024 7:39 AM      Electronically signed by:  Walter Barens MD 2024 9:38 AM

## 2024-02-02 NOTE — PROGRESS NOTES
Name: Petra Green  /Age/Sex: 1935 (88 y.o. female)   MRN & CSN: [id number] & [id number]  Admission Date/Time: 2024  5:17 PM   [id number]  Current Hospital Day: Hospital Day: 5   Principal Problem: Hypotension  HPI       Patient seen and examined.  No issues overnight.  Win catheter removed.  She does not report any complaints.  Met with palliative yesterday  All other review of systems negative unless noted above.  VITALS    Vitals:    24 0716   BP: 138/72   Pulse: 83   Resp: 18   Temp: 97.5 °F (36.4 °C)   SpO2: 100%         General: No acute distress, hard of hearing  HEENT: PERRL, EOMI, no sinus tenderness  Cardiac: Regular rate and rhythm, no murmurs, no peripheral edema  Lungs: Clear to auscultation bilaterally  Abdomen: Soft nontender, nondistended  Musculoskeletal: Sarcopenic, moves all extremities  Neuro: No lateralizing symptoms        LABS   BMP  Recent Labs     24  0443 24  0512 24  0525    140 140   K 3.7 3.9 4.0    106 107   CO2  25   BUN 8 8 13   CREATININE 0.6 0.7 0.7   CALCIUM 7.9* 8.5 8.2*     CBC/COAGS  Recent Labs     24  0443 24  0512 24  0525   WBC 8.0 10.0 8.4   HCT 33.9* 34.4* 32.2*    291 276     Liver & Pancreas  Recent Labs     24  0512   AST 14*   ALT 11   ALKPHOS 100   BILIDIR <0.2          IMAGING   No new imaging      Above studies and images were personally reviewed by myself    MEDS   Scheduled Meds:   enoxaparin  30 mg SubCUTAneous Daily    sodium chloride flush  5-40 mL IntraVENous 2 times per day    cefTRIAXone (ROCEPHIN) IV  1,000 mg IntraVENous Q24H    aspirin  81 mg Oral Daily    atorvastatin  10 mg Oral Nightly    escitalopram  5 mg Oral Daily    traZODone  50 mg Oral Nightly     Continuous Infusions:   sodium chloride       PRN Meds:sodium chloride flush, sodium chloride, ondansetron **OR** ondansetron, acetaminophen **OR** acetaminophen, polyethylene

## 2024-02-02 NOTE — PROGRESS NOTES
Physical Therapy  Facility/Department: [address] PROGRESSIVE CARE  Physical Therapy treatment session    Name: Petra Green  : 1935  MRN: [id number]  Date of Service: 2024    Discharge Recommendations:  Patient would benefit from continued therapy after discharge (3-5x/wk)   PT Equipment Recommendations  Equipment Needed: No  Other: Defer to next level of care.    Petra Green scored a  on the AM-PAC short mobility form. Current research shows that an AM-PAC score of 17 or less is typically not associated with a discharge to the patient's home setting. Based on the patient's AM-PAC score and their current functional mobility deficits, it is recommended that the patient have 3-5 sessions per week of Physical Therapy at d/c to increase the patient's independence.  Please see assessment section for further patient specific details.    If patient discharges prior to next session this note will serve as a discharge summary.  Please see below for the latest assessment towards goals.        Patient Diagnosis(es): There were no encounter diagnoses.  Past Medical History:  has a past medical history of Acute kidney injury (HCC), Allergic rhinitis, CHF (congestive heart failure) (HCC), Delirium, GERD (gastroesophageal reflux disease), Hearing loss sensory, bilateral, Mastoiditis, Osteoarthritis, Osteoporosis, and Scoliosis.  Past Surgical History:  has a past surgical history that includes Carpal tunnel release; Nasal sinus surgery; hernia repair; Colonoscopy; eye surgery; Colonoscopy (N/A, 10/19/2018); and Upper gastrointestinal endoscopy (N/A, 2019).    Assessment   Body Structures, Functions, Activity Limitations Requiring Skilled Therapeutic Intervention: Decreased functional mobility ;Decreased ROM;Decreased strength;Decreased safe awareness;Decreased cognition;Decreased endurance;Decreased balance  Assessment: 87 y/o female admit 2024 with UTI, Hypotension, LB Pain/LE Weakness. PMH as

## 2024-02-02 NOTE — PROGRESS NOTES
Pt has been straight cath only got out 150 ml due to catheter falling out, will continue to monitor

## 2024-02-03 PROBLEM — R33.9 BLADDER RETENTION OF URINE: Status: ACTIVE | Noted: 2024-02-03

## 2024-02-03 LAB
ANION GAP SERPL CALCULATED.3IONS-SCNC: 9 MMOL/L (ref 3–16)
BASOPHILS # BLD: 0.1 K/UL (ref 0–0.2)
BASOPHILS NFR BLD: 0.5 %
BUN SERPL-MCNC: 13 MG/DL (ref 7–20)
CALCIUM SERPL-MCNC: 8.1 MG/DL (ref 8.3–10.6)
CHLORIDE SERPL-SCNC: 103 MMOL/L (ref 99–110)
CO2 SERPL-SCNC: 22 MMOL/L (ref 21–32)
CREAT SERPL-MCNC: 0.6 MG/DL (ref 0.6–1.2)
DEPRECATED RDW RBC AUTO: 14.4 % (ref 12.4–15.4)
EOSINOPHIL # BLD: 0.1 K/UL (ref 0–0.6)
EOSINOPHIL NFR BLD: 1.2 %
GFR SERPLBLD CREATININE-BSD FMLA CKD-EPI: >60 ML/MIN/{1.73_M2}
GLUCOSE SERPL-MCNC: 122 MG/DL (ref 70–99)
HCT VFR BLD AUTO: 33.7 % (ref 36–48)
HGB BLD-MCNC: 11.4 G/DL (ref 12–16)
LYMPHOCYTES # BLD: 1.3 K/UL (ref 1–5.1)
LYMPHOCYTES NFR BLD: 12.6 %
MCH RBC QN AUTO: 31.9 PG (ref 26–34)
MCHC RBC AUTO-ENTMCNC: 33.8 G/DL (ref 31–36)
MCV RBC AUTO: 94.3 FL (ref 80–100)
MONOCYTES # BLD: 1.5 K/UL (ref 0–1.3)
MONOCYTES NFR BLD: 15 %
NEUTROPHILS # BLD: 7.3 K/UL (ref 1.7–7.7)
NEUTROPHILS NFR BLD: 70.7 %
PLATELET # BLD AUTO: 281 K/UL (ref 135–450)
PMV BLD AUTO: 6.8 FL (ref 5–10.5)
POTASSIUM SERPL-SCNC: 3.6 MMOL/L (ref 3.5–5.1)
RBC # BLD AUTO: 3.58 M/UL (ref 4–5.2)
SODIUM SERPL-SCNC: 134 MMOL/L (ref 136–145)
WBC # BLD AUTO: 10.3 K/UL (ref 4–11)

## 2024-02-03 PROCEDURE — 80048 BASIC METABOLIC PNL TOTAL CA: CPT

## 2024-02-03 PROCEDURE — 51701 INSERT BLADDER CATHETER: CPT

## 2024-02-03 PROCEDURE — 6370000000 HC RX 637 (ALT 250 FOR IP): Performed by: STUDENT IN AN ORGANIZED HEALTH CARE EDUCATION/TRAINING PROGRAM

## 2024-02-03 PROCEDURE — 2580000003 HC RX 258: Performed by: STUDENT IN AN ORGANIZED HEALTH CARE EDUCATION/TRAINING PROGRAM

## 2024-02-03 PROCEDURE — 36415 COLL VENOUS BLD VENIPUNCTURE: CPT

## 2024-02-03 PROCEDURE — 51798 US URINE CAPACITY MEASURE: CPT

## 2024-02-03 PROCEDURE — 6360000002 HC RX W HCPCS: Performed by: STUDENT IN AN ORGANIZED HEALTH CARE EDUCATION/TRAINING PROGRAM

## 2024-02-03 PROCEDURE — 85025 COMPLETE CBC W/AUTO DIFF WBC: CPT

## 2024-02-03 PROCEDURE — 6370000000 HC RX 637 (ALT 250 FOR IP): Performed by: INTERNAL MEDICINE

## 2024-02-03 PROCEDURE — 2060000000 HC ICU INTERMEDIATE R&B

## 2024-02-03 PROCEDURE — 99232 SBSQ HOSP IP/OBS MODERATE 35: CPT | Performed by: INTERNAL MEDICINE

## 2024-02-03 PROCEDURE — 94760 N-INVAS EAR/PLS OXIMETRY 1: CPT

## 2024-02-03 RX ORDER — ACETAMINOPHEN 500 MG
1000 TABLET ORAL EVERY 8 HOURS SCHEDULED
Status: DISCONTINUED | OUTPATIENT
Start: 2024-02-03 | End: 2024-02-09 | Stop reason: HOSPADM

## 2024-02-03 RX ORDER — TAMSULOSIN HYDROCHLORIDE 0.4 MG/1
0.4 CAPSULE ORAL DAILY
Status: DISCONTINUED | OUTPATIENT
Start: 2024-02-03 | End: 2024-02-09 | Stop reason: HOSPADM

## 2024-02-03 RX ADMIN — ATORVASTATIN CALCIUM 10 MG: 10 TABLET, FILM COATED ORAL at 20:45

## 2024-02-03 RX ADMIN — TRAZODONE HYDROCHLORIDE 50 MG: 50 TABLET ORAL at 20:45

## 2024-02-03 RX ADMIN — TAMSULOSIN HYDROCHLORIDE 0.4 MG: 0.4 CAPSULE ORAL at 10:07

## 2024-02-03 RX ADMIN — ASPIRIN 81 MG: 81 TABLET, COATED ORAL at 10:07

## 2024-02-03 RX ADMIN — METOPROLOL SUCCINATE 50 MG: 50 TABLET, EXTENDED RELEASE ORAL at 10:07

## 2024-02-03 RX ADMIN — ACETAMINOPHEN 1000 MG: 500 TABLET ORAL at 15:23

## 2024-02-03 RX ADMIN — ESCITALOPRAM OXALATE 5 MG: 10 TABLET ORAL at 10:07

## 2024-02-03 RX ADMIN — ENOXAPARIN SODIUM 30 MG: 100 INJECTION SUBCUTANEOUS at 10:08

## 2024-02-03 RX ADMIN — Medication 10 ML: at 20:54

## 2024-02-03 RX ADMIN — ACETAMINOPHEN 1000 MG: 500 TABLET ORAL at 20:45

## 2024-02-03 RX ADMIN — Medication 10 ML: at 10:10

## 2024-02-03 ASSESSMENT — PAIN SCALES - GENERAL
PAINLEVEL_OUTOF10: 5
PAINLEVEL_OUTOF10: 3

## 2024-02-03 ASSESSMENT — PAIN DESCRIPTION - LOCATION
LOCATION: BACK
LOCATION: BACK;LEG

## 2024-02-03 ASSESSMENT — PAIN DESCRIPTION - DESCRIPTORS
DESCRIPTORS: DISCOMFORT
DESCRIPTORS: ACHING

## 2024-02-03 ASSESSMENT — PAIN - FUNCTIONAL ASSESSMENT: PAIN_FUNCTIONAL_ASSESSMENT: PREVENTS OR INTERFERES SOME ACTIVE ACTIVITIES AND ADLS

## 2024-02-03 NOTE — PLAN OF CARE
Problem: Discharge Planning  Goal: Discharge to home or other facility with appropriate resources  2/3/2024 1236 by Nathaly Martinez RN  Outcome: Progressing     Problem: Safety - Adult  Goal: Free from fall injury  2/3/2024 1236 by Nathaly Martinez RN  Outcome: Progressing     Problem: Skin/Tissue Integrity  Goal: Absence of new skin breakdown  Description: 1.  Monitor for areas of redness and/or skin breakdown  2.  Assess vascular access sites hourly  3.  Every 4-6 hours minimum:  Change oxygen saturation probe site  4.  Every 4-6 hours:  If on nasal continuous positive airway pressure, respiratory therapy assess nares and determine need for appliance change or resting period.  2/3/2024 1236 by Nathaly Martinez RN  Outcome: Progressing     Problem: ABCDS Injury Assessment  Goal: Absence of physical injury  2/3/2024 1236 by Nathaly Martinez RN  Outcome: Progressing

## 2024-02-03 NOTE — PROGRESS NOTES
[facility] Note      Chief Complaint: I feel ok    Subjective/Interval History:    Patient sitting up in bed, son at the bedside.  Patient ate some breakfast, denies any abdominal pain or nausea.  She just is not that hungry.  Patient continues to have difficulty with urine retention, has been straight cathed twice in the last 24 hours.  No other new problems noted    Discussed with nursing at the bedside, no new concerns.  Otherwise    No chest pain or shortness breath. No cough or sputum. No nausea, vomiting, diarrhea. No abdominal pain. No dysuria.  The remainder of the review of systems is negative.     PMH, PSH, FH/SH reviewed and unchanged as documented in the H&P dated 24    Medication list reviewed    Objective:    BP (!) 144/74   Pulse 89   Temp 97.8 °F (36.6 °C) (Oral)   Resp 16   Ht 1.27 m (4' 2\")   Wt 41.3 kg (91 lb 0.8 oz)   LMP  (LMP Unknown)   SpO2 92%   BMI 25.61 kg/m²   Temp  Av.9 °F (36.6 °C)  Min: 97.8 °F (36.6 °C)  Max: 98 °F (36.7 °C)    RRR  Chest-respirations are easy.  The chest is clear throughout  Abd-BS+, soft, NTND  Ext-no edema    The Following Labs Were Reviewed Today:     Recent Results (from the past 24 hour(s))   Basic Metabolic Panel    Collection Time: 24  4:46 AM   Result Value Ref Range    Sodium 134 (L) 136 - 145 mmol/L    Potassium 3.6 3.5 - 5.1 mmol/L    Chloride 103 99 - 110 mmol/L    CO2 22 21 - 32 mmol/L    Anion Gap 9 3 - 16    Glucose 122 (H) 70 - 99 mg/dL    BUN 13 7 - 20 mg/dL    Creatinine 0.6 0.6 - 1.2 mg/dL    Est, Glom Filt Rate >60 >60    Calcium 8.1 (L) 8.3 - 10.6 mg/dL   CBC with Auto Differential    Collection Time: 24  4:46 AM   Result Value Ref Range    WBC 10.3 4.0 - 11.0 K/uL    RBC 3.58 (L) 4.00 - 5.20 M/uL    Hemoglobin 11.4 (L) 12.0 - 16.0 g/dL    Hematocrit 33.7 (L) 36.0 - 48.0 %    MCV 94.3 80.0 - 100.0 fL    MCH 31.9 26.0 - 34.0 pg    MCHC 33.8 31.0 - 36.0 g/dL    RDW 14.4 12.4 - 15.4 %    Platelets 281 135

## 2024-02-03 NOTE — PROGRESS NOTES
Pt still has not been able to void. Bladder scan completed per order. Highest volume found was 251mL. Bladder scan to be completed again in 6 hours.

## 2024-02-03 NOTE — PROGRESS NOTES
0600: pt bladder scanned per order; largest volume shown was 418mL. Pt continues to deny urge to urinate.   0630:pt straight cathed per order. 400mL out.

## 2024-02-04 LAB
ANION GAP SERPL CALCULATED.3IONS-SCNC: 7 MMOL/L (ref 3–16)
BASOPHILS # BLD: 0 K/UL (ref 0–0.2)
BASOPHILS NFR BLD: 0.4 %
BUN SERPL-MCNC: 22 MG/DL (ref 7–20)
CALCIUM SERPL-MCNC: 8.6 MG/DL (ref 8.3–10.6)
CHLORIDE SERPL-SCNC: 105 MMOL/L (ref 99–110)
CO2 SERPL-SCNC: 24 MMOL/L (ref 21–32)
CREAT SERPL-MCNC: 0.9 MG/DL (ref 0.6–1.2)
DEPRECATED RDW RBC AUTO: 14.5 % (ref 12.4–15.4)
EOSINOPHIL # BLD: 0.1 K/UL (ref 0–0.6)
EOSINOPHIL NFR BLD: 1.4 %
GFR SERPLBLD CREATININE-BSD FMLA CKD-EPI: >60 ML/MIN/{1.73_M2}
GLUCOSE SERPL-MCNC: 121 MG/DL (ref 70–99)
HCT VFR BLD AUTO: 28.8 % (ref 36–48)
HGB BLD-MCNC: 9.9 G/DL (ref 12–16)
LYMPHOCYTES # BLD: 1.4 K/UL (ref 1–5.1)
LYMPHOCYTES NFR BLD: 14.3 %
MCH RBC QN AUTO: 32.3 PG (ref 26–34)
MCHC RBC AUTO-ENTMCNC: 34.3 G/DL (ref 31–36)
MCV RBC AUTO: 94.3 FL (ref 80–100)
MONOCYTES # BLD: 1.5 K/UL (ref 0–1.3)
MONOCYTES NFR BLD: 15.7 %
NEUTROPHILS # BLD: 6.5 K/UL (ref 1.7–7.7)
NEUTROPHILS NFR BLD: 68.2 %
PLATELET # BLD AUTO: 272 K/UL (ref 135–450)
PMV BLD AUTO: 7.1 FL (ref 5–10.5)
POTASSIUM SERPL-SCNC: 4.1 MMOL/L (ref 3.5–5.1)
RBC # BLD AUTO: 3.06 M/UL (ref 4–5.2)
SODIUM SERPL-SCNC: 136 MMOL/L (ref 136–145)
WBC # BLD AUTO: 9.6 K/UL (ref 4–11)

## 2024-02-04 PROCEDURE — 80048 BASIC METABOLIC PNL TOTAL CA: CPT

## 2024-02-04 PROCEDURE — 2580000003 HC RX 258: Performed by: INTERNAL MEDICINE

## 2024-02-04 PROCEDURE — 51798 US URINE CAPACITY MEASURE: CPT

## 2024-02-04 PROCEDURE — 6370000000 HC RX 637 (ALT 250 FOR IP): Performed by: INTERNAL MEDICINE

## 2024-02-04 PROCEDURE — 99233 SBSQ HOSP IP/OBS HIGH 50: CPT | Performed by: INTERNAL MEDICINE

## 2024-02-04 PROCEDURE — 6370000000 HC RX 637 (ALT 250 FOR IP): Performed by: STUDENT IN AN ORGANIZED HEALTH CARE EDUCATION/TRAINING PROGRAM

## 2024-02-04 PROCEDURE — 2060000000 HC ICU INTERMEDIATE R&B

## 2024-02-04 PROCEDURE — 36415 COLL VENOUS BLD VENIPUNCTURE: CPT

## 2024-02-04 PROCEDURE — 51701 INSERT BLADDER CATHETER: CPT

## 2024-02-04 PROCEDURE — 85025 COMPLETE CBC W/AUTO DIFF WBC: CPT

## 2024-02-04 PROCEDURE — 2580000003 HC RX 258: Performed by: STUDENT IN AN ORGANIZED HEALTH CARE EDUCATION/TRAINING PROGRAM

## 2024-02-04 PROCEDURE — 6360000002 HC RX W HCPCS: Performed by: STUDENT IN AN ORGANIZED HEALTH CARE EDUCATION/TRAINING PROGRAM

## 2024-02-04 RX ORDER — 0.9 % SODIUM CHLORIDE 0.9 %
500 INTRAVENOUS SOLUTION INTRAVENOUS ONCE
Status: COMPLETED | OUTPATIENT
Start: 2024-02-04 | End: 2024-02-04

## 2024-02-04 RX ADMIN — ACETAMINOPHEN 1000 MG: 500 TABLET ORAL at 20:39

## 2024-02-04 RX ADMIN — TRAZODONE HYDROCHLORIDE 50 MG: 50 TABLET ORAL at 20:39

## 2024-02-04 RX ADMIN — Medication 10 ML: at 20:48

## 2024-02-04 RX ADMIN — ACETAMINOPHEN 1000 MG: 500 TABLET ORAL at 14:49

## 2024-02-04 RX ADMIN — ATORVASTATIN CALCIUM 10 MG: 10 TABLET, FILM COATED ORAL at 20:39

## 2024-02-04 RX ADMIN — ESCITALOPRAM OXALATE 5 MG: 10 TABLET ORAL at 10:19

## 2024-02-04 RX ADMIN — Medication 10 ML: at 10:20

## 2024-02-04 RX ADMIN — TAMSULOSIN HYDROCHLORIDE 0.4 MG: 0.4 CAPSULE ORAL at 10:19

## 2024-02-04 RX ADMIN — SODIUM CHLORIDE 500 ML: 9 INJECTION, SOLUTION INTRAVENOUS at 00:12

## 2024-02-04 RX ADMIN — ENOXAPARIN SODIUM 30 MG: 100 INJECTION SUBCUTANEOUS at 10:20

## 2024-02-04 RX ADMIN — ASPIRIN 81 MG: 81 TABLET, COATED ORAL at 10:19

## 2024-02-04 RX ADMIN — ACETAMINOPHEN 1000 MG: 500 TABLET ORAL at 04:57

## 2024-02-04 RX ADMIN — METOPROLOL SUCCINATE 50 MG: 50 TABLET, EXTENDED RELEASE ORAL at 10:19

## 2024-02-04 ASSESSMENT — PAIN DESCRIPTION - LOCATION
LOCATION: BACK
LOCATION: BACK;FOOT

## 2024-02-04 ASSESSMENT — PAIN DESCRIPTION - DESCRIPTORS: DESCRIPTORS: DISCOMFORT

## 2024-02-04 ASSESSMENT — PAIN - FUNCTIONAL ASSESSMENT: PAIN_FUNCTIONAL_ASSESSMENT: PREVENTS OR INTERFERES SOME ACTIVE ACTIVITIES AND ADLS

## 2024-02-04 ASSESSMENT — PAIN SCALES - GENERAL: PAINLEVEL_OUTOF10: 4

## 2024-02-04 NOTE — PROGRESS NOTES
Bladder scanned patient and it showed greater than 500 mL. Patient moved to bed from chair. Straight cathed patient per order and removed 500 mL. Patient tolerated well. Patient linens and gown changed. She is now resting in bed.

## 2024-02-04 NOTE — PLAN OF CARE
Problem: Discharge Planning  Goal: Discharge to home or other facility with appropriate resources  2/3/2024 2127 by Diomedes Cheung, RN  Outcome: Progressing  Flowsheets (Taken 2/3/2024 2040)  Discharge to home or other facility with appropriate resources:   Identify barriers to discharge with patient and caregiver   Arrange for needed discharge resources and transportation as appropriate   Identify discharge learning needs (meds, wound care, etc)   Arrange for interpreters to assist at discharge as needed   Refer to discharge planning if patient needs post-hospital services based on physician order or complex needs related to functional status, cognitive ability or social support system     Problem: Safety - Adult  Goal: Free from fall injury  2/3/2024 2127 by Diomedes Cheung, RN  Outcome: Progressing     Problem: Skin/Tissue Integrity  Goal: Absence of new skin breakdown  Description: 1.  Monitor for areas of redness and/or skin breakdown  2.  Assess vascular access sites hourly  3.  Every 4-6 hours minimum:  Change oxygen saturation probe site  4.  Every 4-6 hours:  If on nasal continuous positive airway pressure, respiratory therapy assess nares and determine need for appliance change or resting period.  2/3/2024 2127 by Diomedes Cheung, RN  Outcome: Progressing     Problem: ABCDS Injury Assessment  Goal: Absence of physical injury  2/3/2024 2127 by Diomedes Cheung, RN  Outcome: Progressing

## 2024-02-04 NOTE — PROGRESS NOTES
Midnight BP was 88/48 manually. Called on-call provider. Dr. Valenzuela. orders placed for 500ml Normal saline bolus.

## 2024-02-04 NOTE — PROGRESS NOTES
[facility] Note      Chief Complaint: I feel ok    Subjective/Interval History:    Patient sitting up in bed, son at the bedside.  Patient ate some breakfast, denies any abdominal pain or nausea.  She just is not that hungry. Eating and drinking very little throughout the day  Patient continues to have difficulty with urine retention, has been straight cathed twice in the last 24 hours. Last night nurse got 250ml straight cath and no urination at all recorded through the day  No other new problems noted    Discussed with nursing at the bedside, no new concerns.  Otherwise    No chest pain or shortness breath. No cough or sputum. No nausea, vomiting, diarrhea. No abdominal pain. No dysuria.  The remainder of the review of systems is negative.     PMH, PSH, FH/SH reviewed and unchanged as documented in the H&P dated 24    Medication list reviewed    Objective:    /71   Pulse 77   Temp 97.8 °F (36.6 °C) (Axillary)   Resp 16   Ht 1.27 m (4' 2\")   Wt 41.5 kg (91 lb 7.9 oz)   LMP  (LMP Unknown)   SpO2 98%   BMI 25.73 kg/m²   Temp  Av.1 °F (36.7 °C)  Min: 97.6 °F (36.4 °C)  Max: 98.8 °F (37.1 °C)    RRR  Chest-respirations are easy.  The chest is clear throughout  Abd-BS+, soft, NTND  Ext-no edema  ? Parkinsonian appearance  The Following Labs Were Reviewed Today:     Recent Results (from the past 24 hour(s))   Basic Metabolic Panel    Collection Time: 24  6:06 AM   Result Value Ref Range    Sodium 136 136 - 145 mmol/L    Potassium 4.1 3.5 - 5.1 mmol/L    Chloride 105 99 - 110 mmol/L    CO2 24 21 - 32 mmol/L    Anion Gap 7 3 - 16    Glucose 121 (H) 70 - 99 mg/dL    BUN 22 (H) 7 - 20 mg/dL    Creatinine 0.9 0.6 - 1.2 mg/dL    Est, Glom Filt Rate >60 >60    Calcium 8.6 8.3 - 10.6 mg/dL   CBC with Auto Differential    Collection Time: 24  6:06 AM   Result Value Ref Range    WBC 9.6 4.0 - 11.0 K/uL    RBC 3.06 (L) 4.00 - 5.20 M/uL    Hemoglobin 9.9 (L) 12.0 - 16.0 g/dL     Hematocrit 28.8 (L) 36.0 - 48.0 %    MCV 94.3 80.0 - 100.0 fL    MCH 32.3 26.0 - 34.0 pg    MCHC 34.3 31.0 - 36.0 g/dL    RDW 14.5 12.4 - 15.4 %    Platelets 272 135 - 450 K/uL    MPV 7.1 5.0 - 10.5 fL    Neutrophils % 68.2 %    Lymphocytes % 14.3 %    Monocytes % 15.7 %    Eosinophils % 1.4 %    Basophils % 0.4 %    Neutrophils Absolute 6.5 1.7 - 7.7 K/uL    Lymphocytes Absolute 1.4 1.0 - 5.1 K/uL    Monocytes Absolute 1.5 (H) 0.0 - 1.3 K/uL    Eosinophils Absolute 0.1 0.0 - 0.6 K/uL    Basophils Absolute 0.0 0.0 - 0.2 K/uL       ASSESSMENT/PLAN:      Principal Problem:    UTI (urinary tract infection)-patient has completed 5 days of Rocephin.  Culture grew low CFU Klebsiella.  This should be adequate  Active Problems:    Bladder retention of urine-continue straight cath every shift, add Flomax    Coronary artery disease involving native coronary artery of native heart with angina pectoris -asymptomatic on aspirin, atorvastatin, metoprolol    Chronic renal disease, stage III -creatinine is okay, current GFR is above 60.    Hypotension-this has mostly resolved.  Continue to monitor.  Continue to watch blood pressure now that tamsulosin has been added  Last night ran low again responded to fluid bolus  Drop in H/H may be due to saline bolus, no other changes noted, will recheck in a.m.    Disposition-patient ideally would be going back to [place] in assisted living.  We need to have her urinary situation sorted out prior to discharge.  They will be unable to perform ISC at that facility.    OLMAN LUNA MD, FACP  10:50 AM  2/4/2024

## 2024-02-05 LAB
ANION GAP SERPL CALCULATED.3IONS-SCNC: 8 MMOL/L (ref 3–16)
BASOPHILS # BLD: 0 K/UL (ref 0–0.2)
BASOPHILS NFR BLD: 0.5 %
BUN SERPL-MCNC: 18 MG/DL (ref 7–20)
CALCIUM SERPL-MCNC: 8.4 MG/DL (ref 8.3–10.6)
CHLORIDE SERPL-SCNC: 105 MMOL/L (ref 99–110)
CO2 SERPL-SCNC: 27 MMOL/L (ref 21–32)
CREAT SERPL-MCNC: 0.7 MG/DL (ref 0.6–1.2)
DEPRECATED RDW RBC AUTO: 14.1 % (ref 12.4–15.4)
EOSINOPHIL # BLD: 0.2 K/UL (ref 0–0.6)
EOSINOPHIL NFR BLD: 2.7 %
GFR SERPLBLD CREATININE-BSD FMLA CKD-EPI: >60 ML/MIN/{1.73_M2}
GLUCOSE SERPL-MCNC: 92 MG/DL (ref 70–99)
HCT VFR BLD AUTO: 31.5 % (ref 36–48)
HGB BLD-MCNC: 10.7 G/DL (ref 12–16)
LYMPHOCYTES # BLD: 1.9 K/UL (ref 1–5.1)
LYMPHOCYTES NFR BLD: 22.7 %
MCH RBC QN AUTO: 31.7 PG (ref 26–34)
MCHC RBC AUTO-ENTMCNC: 33.8 G/DL (ref 31–36)
MCV RBC AUTO: 93.6 FL (ref 80–100)
MONOCYTES # BLD: 0.9 K/UL (ref 0–1.3)
MONOCYTES NFR BLD: 10.3 %
NEUTROPHILS # BLD: 5.3 K/UL (ref 1.7–7.7)
NEUTROPHILS NFR BLD: 63.8 %
PLATELET # BLD AUTO: 332 K/UL (ref 135–450)
PMV BLD AUTO: 7.1 FL (ref 5–10.5)
POTASSIUM SERPL-SCNC: 4.3 MMOL/L (ref 3.5–5.1)
RBC # BLD AUTO: 3.36 M/UL (ref 4–5.2)
SODIUM SERPL-SCNC: 140 MMOL/L (ref 136–145)
WBC # BLD AUTO: 8.3 K/UL (ref 4–11)

## 2024-02-05 PROCEDURE — 51701 INSERT BLADDER CATHETER: CPT

## 2024-02-05 PROCEDURE — 97530 THERAPEUTIC ACTIVITIES: CPT

## 2024-02-05 PROCEDURE — 99233 SBSQ HOSP IP/OBS HIGH 50: CPT | Performed by: STUDENT IN AN ORGANIZED HEALTH CARE EDUCATION/TRAINING PROGRAM

## 2024-02-05 PROCEDURE — 6370000000 HC RX 637 (ALT 250 FOR IP): Performed by: STUDENT IN AN ORGANIZED HEALTH CARE EDUCATION/TRAINING PROGRAM

## 2024-02-05 PROCEDURE — 36415 COLL VENOUS BLD VENIPUNCTURE: CPT

## 2024-02-05 PROCEDURE — 6360000002 HC RX W HCPCS: Performed by: STUDENT IN AN ORGANIZED HEALTH CARE EDUCATION/TRAINING PROGRAM

## 2024-02-05 PROCEDURE — 80048 BASIC METABOLIC PNL TOTAL CA: CPT

## 2024-02-05 PROCEDURE — 6370000000 HC RX 637 (ALT 250 FOR IP)

## 2024-02-05 PROCEDURE — 85025 COMPLETE CBC W/AUTO DIFF WBC: CPT

## 2024-02-05 PROCEDURE — 9990000010 HC NO CHARGE VISIT

## 2024-02-05 PROCEDURE — 51798 US URINE CAPACITY MEASURE: CPT

## 2024-02-05 PROCEDURE — 97535 SELF CARE MNGMENT TRAINING: CPT

## 2024-02-05 PROCEDURE — 6370000000 HC RX 637 (ALT 250 FOR IP): Performed by: INTERNAL MEDICINE

## 2024-02-05 PROCEDURE — 2060000000 HC ICU INTERMEDIATE R&B

## 2024-02-05 PROCEDURE — 2580000003 HC RX 258: Performed by: STUDENT IN AN ORGANIZED HEALTH CARE EDUCATION/TRAINING PROGRAM

## 2024-02-05 PROCEDURE — 94760 N-INVAS EAR/PLS OXIMETRY 1: CPT

## 2024-02-05 RX ORDER — POLYETHYLENE GLYCOL 3350 17 G/17G
17 POWDER, FOR SOLUTION ORAL DAILY
Status: DISCONTINUED | OUTPATIENT
Start: 2024-02-05 | End: 2024-02-09 | Stop reason: HOSPADM

## 2024-02-05 RX ORDER — DOCUSATE SODIUM 100 MG/1
100 CAPSULE, LIQUID FILLED ORAL DAILY
Status: DISCONTINUED | OUTPATIENT
Start: 2024-02-05 | End: 2024-02-09 | Stop reason: HOSPADM

## 2024-02-05 RX ADMIN — TRAZODONE HYDROCHLORIDE 50 MG: 50 TABLET ORAL at 20:57

## 2024-02-05 RX ADMIN — ESCITALOPRAM OXALATE 5 MG: 10 TABLET ORAL at 09:13

## 2024-02-05 RX ADMIN — ACETAMINOPHEN 1000 MG: 500 TABLET ORAL at 05:29

## 2024-02-05 RX ADMIN — TAMSULOSIN HYDROCHLORIDE 0.4 MG: 0.4 CAPSULE ORAL at 09:13

## 2024-02-05 RX ADMIN — ACETAMINOPHEN 1000 MG: 500 TABLET ORAL at 14:45

## 2024-02-05 RX ADMIN — ENOXAPARIN SODIUM 30 MG: 100 INJECTION SUBCUTANEOUS at 09:14

## 2024-02-05 RX ADMIN — Medication 10 ML: at 09:17

## 2024-02-05 RX ADMIN — ATORVASTATIN CALCIUM 10 MG: 10 TABLET, FILM COATED ORAL at 20:57

## 2024-02-05 RX ADMIN — ACETAMINOPHEN 1000 MG: 500 TABLET ORAL at 20:57

## 2024-02-05 RX ADMIN — Medication 10 ML: at 21:02

## 2024-02-05 RX ADMIN — ASPIRIN 81 MG: 81 TABLET, COATED ORAL at 09:13

## 2024-02-05 RX ADMIN — POLYETHYLENE GLYCOL 3350 17 G: 17 POWDER, FOR SOLUTION ORAL at 11:20

## 2024-02-05 RX ADMIN — METOPROLOL SUCCINATE 50 MG: 50 TABLET, EXTENDED RELEASE ORAL at 09:14

## 2024-02-05 RX ADMIN — DOCUSATE SODIUM 100 MG: 100 CAPSULE, LIQUID FILLED ORAL at 11:21

## 2024-02-05 NOTE — PROGRESS NOTES
Patient noted having a distended abdomen. Bladder scan showed greater than 1000 mL. Patient was straight cathed and removed 1025 mL's of clear, yellow urine. Patient was repositioned in bed, bundled, and lights dimmed.

## 2024-02-05 NOTE — PROGRESS NOTES
Occupational Therapy  Facility/Department: [address] PROGRESSIVE CARE  Occupational Therapy Daily Treatment    Name: Petra Green  : 1935  MRN: [id number]  Date of Service: 2024    Discharge Recommendations:  Patient would benefit from continued therapy after discharge, 3-5 sessions per week  OT Equipment Recommendations  Other: defer to d/c facility  Petra Green scored a  on the AM-PAC ADL Inpatient form. Current research shows that an AM-PAC score of 17 or less is typically not associated with a discharge to the patient's home setting. Based on the patient's AM-PAC score and their current ADL deficits, it is recommended that the patient have 3-5 sessions per week of Occupational Therapy at d/c to increase the patient's independence.  Please see assessment section for further patient specific details.    If patient discharges prior to next session this note will serve as a discharge summary.  Please see below for the latest assessment towards goals.       Patient Diagnosis(es): There were no encounter diagnoses.  Past Medical History:  has a past medical history of Acute kidney injury (HCC), Allergic rhinitis, CHF (congestive heart failure) (HCC), Delirium, GERD (gastroesophageal reflux disease), Hearing loss sensory, bilateral, Mastoiditis, Osteoarthritis, Osteoporosis, and Scoliosis.  Past Surgical History:  has a past surgical history that includes Carpal tunnel release; Nasal sinus surgery; hernia repair; Colonoscopy; eye surgery; Colonoscopy (N/A, 10/19/2018); and Upper gastrointestinal endoscopy (N/A, 2019).    Treatment Diagnosis: decreased fxl transfers/mobility and ADL status      Assessment   Performance deficits / Impairments: Decreased ADL status;Decreased cognition;Decreased balance;Decreased coordination;Decreased functional mobility ;Decreased ROM;Decreased endurance;Decreased strength  Assessment: PTA, acute care admit -2023 with d/c to SNF setting ([facility]

## 2024-02-05 NOTE — PROGRESS NOTES
Occupational Therapy Attempt  Petra Green  2/5/2024  [id number]    Pt chart reviewed, spoke w/ RN. Pt is being transported off the unit for procedure, will follow up as therapy schedule allows.     Electronically signed by TWYLA Dolan/L [id number] on 2/5/24 at 1:10 PM EST

## 2024-02-05 NOTE — CONSULTS
Consulting Physician: Walter Barnes MD     Reason for Consult: urinary retention    History of Present Illness: Petra Green is a 88 y.o. female PMH CHF, GERD, osteoporosis, osteoarthritis, scoliosis, dysphagia, and recent COVID-19. Presented to the ED 1/29/24 after being seen by her PCP for increased weakness. Patient currently admitted for management of klebsiella UTI, lower extremity weakness, some hypotension. Urology has been consulted for urinary retention. Per chart, patient has been straight catheterized since admission for volumes 250ml-1025ml. She has not had BM documented since admission. She has been taking flomax.  Patient resides in assisted living facility that will not be able to continue ISC. Patient is confused, son provides medical history. He reports she has never had issues with retention in the past but she was incontinent at the nursing facility.    Past Medical History:   Past Medical History:   Diagnosis Date    Acute kidney injury (HCC)     Allergic rhinitis     CHF (congestive heart failure) (HCC)     Delirium     GERD (gastroesophageal reflux disease)     Hearing loss sensory, bilateral     mod--severe on right, mild to severe on left, due to asymmetry will send to get MRI to r/o acoustic neuroma    Mastoiditis     on MRI oct 2014-->dr ortiz states with no clinical findings this is considered an over-sensitivity of MRI results    Osteoarthritis     L1-L5    Osteoporosis     patient was on fosamax x 10 years and drug holiday was started on 6/21/13. (this was date of last DEXA scan)    Scoliosis        Past Surgical History:  Past Surgical History:   Procedure Laterality Date    CARPAL TUNNEL RELEASE      right, 2003    COLONOSCOPY      2013, dr contreras    COLONOSCOPY N/A 10/19/2018    COLONOSCOPY WITH BIOPSY performed by Qasim Montana MD at [facility] ENDOSCOPY    EYE SURGERY      December 27 2017 and feb 6 2018    HERNIA REPAIR      2011    NASAL SINUS SURGERY      UPPER  day  metoprolol succinate (TOPROL XL) extended release tablet 50 mg, 50 mg, Oral, Daily  enoxaparin Sodium (LOVENOX) injection 30 mg, 30 mg, SubCUTAneous, Daily  sodium chloride flush 0.9 % injection 5-40 mL, 5-40 mL, IntraVENous, 2 times per day  sodium chloride flush 0.9 % injection 5-40 mL, 5-40 mL, IntraVENous, PRN  0.9 % sodium chloride infusion, , IntraVENous, PRN  ondansetron (ZOFRAN-ODT) disintegrating tablet 4 mg, 4 mg, Oral, Q8H PRN **OR** ondansetron (ZOFRAN) injection 4 mg, 4 mg, IntraVENous, Q6H PRN  polyethylene glycol (GLYCOLAX) packet 17 g, 17 g, Oral, Daily PRN  aspirin EC tablet 81 mg, 81 mg, Oral, Daily  atorvastatin (LIPITOR) tablet 10 mg, 10 mg, Oral, Nightly  escitalopram (LEXAPRO) tablet 5 mg, 5 mg, Oral, Daily  traZODone (DESYREL) tablet 50 mg, 50 mg, Oral, Nightly    Vitals:  /68   Pulse 72   Temp 97.4 °F (36.3 °C) (Oral)   Resp 18   Ht 1.27 m (4' 2\")   Wt 42.8 kg (94 lb 5.7 oz)   LMP  (LMP Unknown)   SpO2 98%   BMI 26.54 kg/m²     Intake/Output Summary (Last 24 hours) at 2/5/2024 0913  Last data filed at 2/5/2024 0411  Gross per 24 hour   Intake 1180 ml   Output 2025 ml   Net -845 ml       Review of Systems:  10 Systems were reviewed and negative except as in HPI      Physical Exam:  General Appearance: confused, cooperative, no distress, appears stated age  Head: Normocephalic, without obvious abnormality, atraumatic  Back: no CVA tenderness  Abdomen: Soft, non-tender, non-distended, no masses  Skin: Skin color, texture, turgor normal, no rashes or lesions  Neurologic: no gross deficits  Female :   Bladder is non tender  No CVA tenderness  Straight catheterization  Pelvic Exam Not Indicated    Labs:  CBC   Lab Results   Component Value Date/Time    WBC 8.3 02/05/2024 04:39 AM    RBC 3.36 02/05/2024 04:39 AM    HGB 10.7 02/05/2024 04:39 AM    HCT 31.5 02/05/2024 04:39 AM    MCV 93.6 02/05/2024 04:39 AM    MCH 31.7 02/05/2024 04:39 AM    MCHC 33.8 02/05/2024 04:39 AM    RDW

## 2024-02-05 NOTE — PROGRESS NOTES
Patient bladder scanned. Volume showed 528 mL. Patient was straight Cathed and 500 mL of clear, yellow urine was drained. Patient's linens were changed, patient was bundled and lights were dimmed.

## 2024-02-05 NOTE — PLAN OF CARE
Problem: Discharge Planning  Goal: Discharge to home or other facility with appropriate resources  2/4/2024 2212 by Diomedes Cheung, RN  Outcome: Progressing  Flowsheets (Taken 2/4/2024 1933)  Discharge to home or other facility with appropriate resources:   Identify barriers to discharge with patient and caregiver   Arrange for needed discharge resources and transportation as appropriate   Identify discharge learning needs (meds, wound care, etc)   Arrange for interpreters to assist at discharge as needed   Refer to discharge planning if patient needs post-hospital services based on physician order or complex needs related to functional status, cognitive ability or social support system     Problem: Safety - Adult  Goal: Free from fall injury  2/4/2024 2212 by Diomedes Cheung, RN  Outcome: Progressing     Problem: Skin/Tissue Integrity  Goal: Absence of new skin breakdown  Description: 1.  Monitor for areas of redness and/or skin breakdown  2.  Assess vascular access sites hourly  3.  Every 4-6 hours minimum:  Change oxygen saturation probe site  4.  Every 4-6 hours:  If on nasal continuous positive airway pressure, respiratory therapy assess nares and determine need for appliance change or resting period.  2/4/2024 2212 by Diomedes Cheung, RN  Outcome: Progressing     Problem: ABCDS Injury Assessment  Goal: Absence of physical injury  2/4/2024 2212 by Diomedes Cheung, RN  Outcome: Progressing

## 2024-02-05 NOTE — PROGRESS NOTES
Name: Petra Green  /Age/Sex: 1935 (88 y.o. female)   MRN & CSN: [id number] & [id number]  Admission Date/Time: 2024  5:17 PM   [id number]  Current Hospital Day: Hospital Day: 8   Principal Problem: UTI (urinary tract infection)  HPI       Patient seen and examined.  No issues overnight.  Eating breakfast today.  Retaining urine.    Discussed care with son at bedside  All other review of systems negative unless noted above.  VITALS    Vitals:    24 1553   BP: 96/60   Pulse: 79   Resp:    Temp: 97.8 °F (36.6 °C)   SpO2: 97%         General: No acute distress, hard of hearing  HEENT: PERRL, EOMI, no sinus tenderness  Cardiac: Regular rate and rhythm, no murmurs, no peripheral edema  Lungs: Clear to auscultation bilaterally  Abdomen: Soft nontender, nondistended  Musculoskeletal: Sarcopenic, moves all extremities  Neuro: No lateralizing symptoms        LABS   BMP  Recent Labs     24  0446 24  0606 24  0439   * 136 140   K 3.6 4.1 4.3    105 105   CO2 22 24 27   BUN 13 22* 18   CREATININE 0.6 0.9 0.7   CALCIUM 8.1* 8.6 8.4     CBC/COAGS  Recent Labs     246 24  0606 24  0439   WBC 10.3 9.6 8.3   HCT 33.7* 28.8* 31.5*    272 332     Liver & Pancreas  No results for input(s): \"AST\", \"ALT\", \"ALKPHOS\", \"BILI\", \"BILIDIR\", \"ALBUMIN\", \"AMYLASE\", \"LIPASE\" in the last 72 hours.         IMAGING   No new imaging      Above studies and images were personally reviewed by myself    MEDS   Scheduled Meds:   polyethylene glycol  17 g Oral Daily    docusate sodium  100 mg Oral Daily    tamsulosin  0.4 mg Oral Daily    acetaminophen  1,000 mg Oral 3 times per day    metoprolol succinate  50 mg Oral Daily    enoxaparin  30 mg SubCUTAneous Daily    sodium chloride flush  5-40 mL IntraVENous 2 times per day    aspirin  81 mg Oral Daily    atorvastatin  10 mg Oral Nightly    escitalopram  5 mg Oral Daily    traZODone  50 mg

## 2024-02-05 NOTE — PROGRESS NOTES
Physical Therapy  Spoke with nursing; pt leaving floor for procedure at this time.  Will cont current POC.  Maryan Torres, PT

## 2024-02-05 NOTE — PROGRESS NOTES
Physical Therapy  Facility/Department: [address] PROGRESSIVE CARE  Co Treat with OT  Daily Treatment Note    This note serves as patient discharge summary if pt discharges prior to next PT visit      Name: Petra Green  : 1935  MRN: [id number]  Date of Service: 2024    Discharge Recommendations:  Patient would benefit from continued therapy after discharge (3-5x/wk)     Petra Green scored a  on the AM-PAC short mobility form. Current research shows that an AM-PAC score of 17 or less is typically not associated with a discharge to the patient's home setting. Based on the patient's AM-PAC score and their current functional mobility deficits, it is recommended that the patient have 3-5 sessions per week of Physical Therapy at d/c to increase the patient's independence.  Please see assessment section for further patient specific details.    PT Equipment Recommendations  Equipment Needed: No  Other: Defer to next level of care.      Patient Diagnosis(es): There were no encounter diagnoses.  Past Medical History:  has a past medical history of Acute kidney injury (HCC), Allergic rhinitis, CHF (congestive heart failure) (HCC), Delirium, GERD (gastroesophageal reflux disease), Hearing loss sensory, bilateral, Mastoiditis, Osteoarthritis, Osteoporosis, and Scoliosis.  Past Surgical History:  has a past surgical history that includes Carpal tunnel release; Nasal sinus surgery; hernia repair; Colonoscopy; eye surgery; Colonoscopy (N/A, 10/19/2018); and Upper gastrointestinal endoscopy (N/A, 2019).    Assessment   Body Structures, Functions, Activity Limitations Requiring Skilled Therapeutic Intervention: Decreased functional mobility ;Decreased ROM;Decreased strength;Decreased safe awareness;Decreased cognition;Decreased endurance;Decreased balance  Assessment: 87 y/o female admit 2024 with UTI, Hypotension, LB Pain/LE Weakness. PMH as noted including CAD, CKD, CHF, Osteoporosis, OA.  Pt admit  Learning: Cognition;Hearing  Education Outcome: Continued education needed      Therapy Time   Individual Concurrent Group Co-treatment   Time In       1330   Time Out       1354   Minutes       24      TA 24    Marco A Acuna, PT  Electronically signed by MARCO A ACUNA, PT [id number] [phone])  on 2/5/2024 at 2:11 PM

## 2024-02-05 NOTE — CARE COORDINATION
Son has a meeting with [facility] today to discuss patient returning with be catheter.   Spoke to Daphne SOTO at [facility] on Friday who told me patient wasn't able to return with be catheter as patient's need to be able to manage catheter themselves which patient is unable to do.  Spoke to Janet at [facility], she will review patient and see if able to accept. Pre cert is required for SNF.  Patient was recently at this facility and family would like this again if unable to return to [facility].   Patient currently off unit for a procedure. Will need updated PT/OT notes for pre cert.    Electronically signed by Shelly Borja RN Case Management on 2/5/2024 at 1:39 PM      [facility] is able to accept & requested they submit for pre cert once updated therapy notes are received.   If anything changes & patient is able to return to [facility] with be, then will plan for home care with [facility].    Electronically signed by Shelly Borja RN Case Management on 2/5/2024 at 1:49 PM

## 2024-02-05 NOTE — PROGRESS NOTES
Comprehensive Nutrition Assessment    Type and Reason for Visit:  Reassess    Nutrition Recommendations/Plan:   Continue regular diet with mildly thickened liquids  Continue Magic Cup tid     Malnutrition Assessment:  Malnutrition Status:  At risk for malnutrition (Comment) (02/01/24 1591)    Context:  Acute Illness     Findings of the 6 clinical characteristics of malnutrition:  Energy Intake:  75% or less of estimated energy requirements for 7 or more days  Weight Loss:  No significant weight loss     Body Fat Loss:  Mild body fat loss Orbital   Muscle Mass Loss:  Mild muscle mass loss Temples (temporalis)  Fluid Accumulation:  Unable to assess     Strength:  Not Performed    Nutrition Assessment:    Follow-up. Pt tolerating Regular diet with nectar thick liquids. When asked about po intake, pt reported \"I do as much as I can.\" Pt accepting Magic Cup offered tid as well, per feedback. Noted wt down since adm. Likely loss r/t fluid shifts as well as decreased po. Will monitor trend. Will continue to monitor progress.    Nutrition Related Findings:    Labs reviewed. Noted BM on 2/4. Noted no edema. Wound Type: None       Current Nutrition Intake & Therapies:    Average Meal Intake: 26-50%, %  Average Supplements Intake: 51-75%  ADULT ORAL NUTRITION SUPPLEMENT; Lunch, Dinner, Breakfast; Frozen Oral Supplement  ADULT DIET; Regular; Mildly Thick (Nectar)    Anthropometric Measures:  Height: 127 cm (4' 2\")  Ideal Body Weight (IBW): 50 lbs (23 kg)    Admission Body Weight: 45.4 kg (100 lb)  Current Body Weight: 42.6 kg (94 lb),   IBW. Weight Source: Bed Scale  Current BMI (kg/m2): 26.4                          BMI Categories: Overweight (BMI 25.0-29.9)    Estimated Daily Nutrient Needs:  Energy Requirements Based On: Kcal/kg  Weight Used for Energy Requirements: Current  Energy (kcal/day): 4555-1394 (35-40 kcal/48.7 kg)  Weight Used for Protein Requirements: Current  Protein (g/day): 34-60 (.8-1.4 x ABW 43 kg

## 2024-02-06 PROCEDURE — 2580000003 HC RX 258: Performed by: STUDENT IN AN ORGANIZED HEALTH CARE EDUCATION/TRAINING PROGRAM

## 2024-02-06 PROCEDURE — 99232 SBSQ HOSP IP/OBS MODERATE 35: CPT | Performed by: STUDENT IN AN ORGANIZED HEALTH CARE EDUCATION/TRAINING PROGRAM

## 2024-02-06 PROCEDURE — 2060000000 HC ICU INTERMEDIATE R&B

## 2024-02-06 PROCEDURE — 6370000000 HC RX 637 (ALT 250 FOR IP)

## 2024-02-06 PROCEDURE — 6370000000 HC RX 637 (ALT 250 FOR IP): Performed by: STUDENT IN AN ORGANIZED HEALTH CARE EDUCATION/TRAINING PROGRAM

## 2024-02-06 PROCEDURE — 6370000000 HC RX 637 (ALT 250 FOR IP): Performed by: INTERNAL MEDICINE

## 2024-02-06 PROCEDURE — 94760 N-INVAS EAR/PLS OXIMETRY 1: CPT

## 2024-02-06 PROCEDURE — 6360000002 HC RX W HCPCS: Performed by: STUDENT IN AN ORGANIZED HEALTH CARE EDUCATION/TRAINING PROGRAM

## 2024-02-06 RX ADMIN — ENOXAPARIN SODIUM 30 MG: 100 INJECTION SUBCUTANEOUS at 09:25

## 2024-02-06 RX ADMIN — METOPROLOL SUCCINATE 50 MG: 50 TABLET, EXTENDED RELEASE ORAL at 09:25

## 2024-02-06 RX ADMIN — TRAZODONE HYDROCHLORIDE 50 MG: 50 TABLET ORAL at 21:07

## 2024-02-06 RX ADMIN — ESCITALOPRAM OXALATE 5 MG: 10 TABLET ORAL at 09:25

## 2024-02-06 RX ADMIN — ACETAMINOPHEN 1000 MG: 500 TABLET ORAL at 21:07

## 2024-02-06 RX ADMIN — TAMSULOSIN HYDROCHLORIDE 0.4 MG: 0.4 CAPSULE ORAL at 09:25

## 2024-02-06 RX ADMIN — ACETAMINOPHEN 1000 MG: 500 TABLET ORAL at 15:04

## 2024-02-06 RX ADMIN — ATORVASTATIN CALCIUM 10 MG: 10 TABLET, FILM COATED ORAL at 21:07

## 2024-02-06 RX ADMIN — POLYETHYLENE GLYCOL 3350 17 G: 17 POWDER, FOR SOLUTION ORAL at 09:25

## 2024-02-06 RX ADMIN — DOCUSATE SODIUM 100 MG: 100 CAPSULE, LIQUID FILLED ORAL at 09:25

## 2024-02-06 RX ADMIN — Medication 10 ML: at 09:25

## 2024-02-06 RX ADMIN — Medication 10 ML: at 21:07

## 2024-02-06 RX ADMIN — ACETAMINOPHEN 1000 MG: 500 TABLET ORAL at 04:31

## 2024-02-06 RX ADMIN — ASPIRIN 81 MG: 81 TABLET, COATED ORAL at 09:31

## 2024-02-06 ASSESSMENT — PAIN SCALES - GENERAL: PAINLEVEL_OUTOF10: 1

## 2024-02-06 ASSESSMENT — PAIN DESCRIPTION - LOCATION: LOCATION: HEAD

## 2024-02-06 ASSESSMENT — PAIN DESCRIPTION - ORIENTATION: ORIENTATION: RIGHT;LOWER;MID

## 2024-02-06 ASSESSMENT — PAIN DESCRIPTION - DESCRIPTORS: DESCRIPTORS: ACHING

## 2024-02-06 NOTE — PROGRESS NOTES
Name: Petra Green  /Age/Sex: 1935 (88 y.o. female)   MRN & CSN: [id number] & [id number]  Admission Date/Time: 2024  5:17 PM   [id number]  Current Hospital Day: Hospital Day: 9   Principal Problem: UTI (urinary tract infection)  HPI       Patient seen and examined.  No issues overnight.  She does not report any complaints today.  No abdominal pain.  Appetite has been okay.  Hypertensive this morning    Discussed care with son at bedside  All other review of systems negative unless noted above.  VITALS    Vitals:    24 1115   BP: 106/63   Pulse: 79   Resp: 22   Temp: 97.9 °F (36.6 °C)   SpO2: 96%         General: No acute distress, hard of hearing  HEENT: PERRL, EOMI, no sinus tenderness  Cardiac: Regular rate and rhythm, no murmurs, no peripheral edema  Lungs: Clear to auscultation bilaterally  Abdomen: Soft nontender, nondistended  Musculoskeletal: Sarcopenic, moves all extremities  Neuro: No lateralizing symptoms        LABS   BMP  Recent Labs     24  0606 24  0439    140   K 4.1 4.3    105   CO2 24 27   BUN 22* 18   CREATININE 0.9 0.7   CALCIUM 8.6 8.4     CBC/COAGS  Recent Labs     24  0606 24  0439   WBC 9.6 8.3   HCT 28.8* 31.5*    332     Liver & Pancreas  No results for input(s): \"AST\", \"ALT\", \"ALKPHOS\", \"BILI\", \"BILIDIR\", \"ALBUMIN\", \"AMYLASE\", \"LIPASE\" in the last 72 hours.         IMAGING   No new imaging      Above studies and images were personally reviewed by myself    MEDS   Scheduled Meds:   polyethylene glycol  17 g Oral Daily    docusate sodium  100 mg Oral Daily    tamsulosin  0.4 mg Oral Daily    acetaminophen  1,000 mg Oral 3 times per day    metoprolol succinate  50 mg Oral Daily    enoxaparin  30 mg SubCUTAneous Daily    sodium chloride flush  5-40 mL IntraVENous 2 times per day    aspirin  81 mg Oral Daily    atorvastatin  10 mg Oral Nightly    escitalopram  5 mg Oral Daily    traZODone  50

## 2024-02-06 NOTE — PLAN OF CARE
Problem: Safety - Adult  Goal: Free from fall injury  2/5/2024 2219 by Diomedes Cheung, RN  Outcome: Progressing     Problem: Skin/Tissue Integrity  Goal: Absence of new skin breakdown  Description: 1.  Monitor for areas of redness and/or skin breakdown  2.  Assess vascular access sites hourly  3.  Every 4-6 hours minimum:  Change oxygen saturation probe site  4.  Every 4-6 hours:  If on nasal continuous positive airway pressure, respiratory therapy assess nares and determine need for appliance change or resting period.  2/5/2024 2219 by Diomedes Cheung, RN  Outcome: Progressing

## 2024-02-06 NOTE — PROGRESS NOTES
Spoke to Taylor MELLO, Urology would prefer to keep F/C in at this time until pt has a regular bowel regimen. So keep with plan that was in their last note at this time.

## 2024-02-06 NOTE — CARE COORDINATION
Met wit patient & son Shant. Per son patient is able to return to [facility] with a be catheter as long as they can arrange the proper care needed to maintain catheter. Son says he has looked into some aide services but awaiting to see if patient will truly be discharged with be. Son says he doesn't want his mom to discharge with a catheter and he wants to discuss this with Dr Barnes & Urology.   Private duty brochures and information given to son for review.  Also informed son that [facility] is able to accept and has submitted for pre cert should they want patient to go to SNF until things are figured out with catheter. Son voiced understanding.    Electronically signed by Shelly Borja RN Case Management on 2/6/2024 at 10:02 AM

## 2024-02-06 NOTE — PROGRESS NOTES
Gave Pt tap water enema, pt had medium sized BM. Call urology per pts son to let them know and ask if f/c can come out now that she has had a BM and see if she can void on her own. Waiting call back.

## 2024-02-07 PROCEDURE — 99232 SBSQ HOSP IP/OBS MODERATE 35: CPT | Performed by: STUDENT IN AN ORGANIZED HEALTH CARE EDUCATION/TRAINING PROGRAM

## 2024-02-07 PROCEDURE — 6370000000 HC RX 637 (ALT 250 FOR IP): Performed by: INTERNAL MEDICINE

## 2024-02-07 PROCEDURE — 97535 SELF CARE MNGMENT TRAINING: CPT

## 2024-02-07 PROCEDURE — 6370000000 HC RX 637 (ALT 250 FOR IP)

## 2024-02-07 PROCEDURE — 51798 US URINE CAPACITY MEASURE: CPT

## 2024-02-07 PROCEDURE — 6360000002 HC RX W HCPCS: Performed by: STUDENT IN AN ORGANIZED HEALTH CARE EDUCATION/TRAINING PROGRAM

## 2024-02-07 PROCEDURE — 6370000000 HC RX 637 (ALT 250 FOR IP): Performed by: STUDENT IN AN ORGANIZED HEALTH CARE EDUCATION/TRAINING PROGRAM

## 2024-02-07 PROCEDURE — 97530 THERAPEUTIC ACTIVITIES: CPT

## 2024-02-07 PROCEDURE — 97110 THERAPEUTIC EXERCISES: CPT

## 2024-02-07 PROCEDURE — 2580000003 HC RX 258: Performed by: STUDENT IN AN ORGANIZED HEALTH CARE EDUCATION/TRAINING PROGRAM

## 2024-02-07 PROCEDURE — 94760 N-INVAS EAR/PLS OXIMETRY 1: CPT

## 2024-02-07 PROCEDURE — 2060000000 HC ICU INTERMEDIATE R&B

## 2024-02-07 RX ADMIN — Medication 10 ML: at 08:42

## 2024-02-07 RX ADMIN — ESCITALOPRAM OXALATE 5 MG: 10 TABLET ORAL at 08:42

## 2024-02-07 RX ADMIN — Medication 10 ML: at 21:24

## 2024-02-07 RX ADMIN — POLYETHYLENE GLYCOL 3350 17 G: 17 POWDER, FOR SOLUTION ORAL at 08:42

## 2024-02-07 RX ADMIN — ENOXAPARIN SODIUM 30 MG: 100 INJECTION SUBCUTANEOUS at 08:42

## 2024-02-07 RX ADMIN — METOPROLOL SUCCINATE 50 MG: 50 TABLET, EXTENDED RELEASE ORAL at 08:42

## 2024-02-07 RX ADMIN — TAMSULOSIN HYDROCHLORIDE 0.4 MG: 0.4 CAPSULE ORAL at 08:42

## 2024-02-07 RX ADMIN — ACETAMINOPHEN 1000 MG: 500 TABLET ORAL at 21:24

## 2024-02-07 RX ADMIN — ACETAMINOPHEN 1000 MG: 500 TABLET ORAL at 06:19

## 2024-02-07 RX ADMIN — ATORVASTATIN CALCIUM 10 MG: 10 TABLET, FILM COATED ORAL at 21:24

## 2024-02-07 RX ADMIN — ACETAMINOPHEN 1000 MG: 500 TABLET ORAL at 14:46

## 2024-02-07 RX ADMIN — ASPIRIN 81 MG: 81 TABLET, COATED ORAL at 08:42

## 2024-02-07 RX ADMIN — TRAZODONE HYDROCHLORIDE 50 MG: 50 TABLET ORAL at 21:24

## 2024-02-07 RX ADMIN — DOCUSATE SODIUM 100 MG: 100 CAPSULE, LIQUID FILLED ORAL at 08:42

## 2024-02-07 ASSESSMENT — PAIN SCALES - GENERAL: PAINLEVEL_OUTOF10: 3

## 2024-02-07 ASSESSMENT — PAIN DESCRIPTION - ORIENTATION: ORIENTATION: RIGHT;LEFT

## 2024-02-07 NOTE — PLAN OF CARE
Problem: Safety - Adult  Goal: Free from fall injury  2/7/2024 1010 by Mei Arechiga RN  Outcome: Progressing  2/7/2024 0025 by Abeba Hoffman RN  Outcome: Progressing  2/7/2024 0025 by Abeba Hoffman RN  Outcome: Progressing     Problem: Skin/Tissue Integrity  Goal: Absence of new skin breakdown  Description: 1.  Monitor for areas of redness and/or skin breakdown  2.  Assess vascular access sites hourly  3.  Every 4-6 hours minimum:  Change oxygen saturation probe site  4.  Every 4-6 hours:  If on nasal continuous positive airway pressure, respiratory therapy assess nares and determine need for appliance change or resting period.  2/7/2024 1010 by Mei Arechiga RN  Outcome: Progressing  2/7/2024 0025 by Abeba Hoffman RN  Outcome: Progressing  2/7/2024 0025 by Abeba Hoffman RN  Outcome: Progressing

## 2024-02-07 NOTE — PROGRESS NOTES
Occupational Therapy  Facility/Department: [address] PROGRESSIVE CARE  Occupational Therapy Daily Note    Name: Petra Green  : 1935  MRN: [id number]  Date of Service: 2024    Discharge Recommendations:  Patient would benefit from continued therapy after discharge, 3-5 sessions per week   Petra Green scored a  on the AM-PAC ADL Inpatient form. Current research shows that an AM-PAC score of 17 or less is typically not associated with a discharge to the patient's home setting. Based on the patient's AM-PAC score and their current ADL deficits, it is recommended that the patient have 3-5 sessions per week of Occupational Therapy at d/c to increase the patient's independence.  Please see assessment section for further patient specific details.    If patient discharges prior to next session this note will serve as a discharge summary.  Please see below for the latest assessment towards goals.         Patient Diagnosis(es): There were no encounter diagnoses.  Past Medical History:  has a past medical history of Acute kidney injury (HCC), Allergic rhinitis, CHF (congestive heart failure) (HCC), Delirium, GERD (gastroesophageal reflux disease), Hearing loss sensory, bilateral, Mastoiditis, Osteoarthritis, Osteoporosis, and Scoliosis.  Past Surgical History:  has a past surgical history that includes Carpal tunnel release; Nasal sinus surgery; hernia repair; Colonoscopy; eye surgery; Colonoscopy (N/A, 10/19/2018); and Upper gastrointestinal endoscopy (N/A, 2019).    Treatment Diagnosis: decreased fxl transfers/mobility and ADL status      Assessment   Performance deficits / Impairments: Decreased ADL status;Decreased cognition;Decreased balance;Decreased coordination;Decreased functional mobility ;Decreased ROM;Decreased endurance;Decreased strength  Assessment: Discussed with OTR: Pt tolerated tx fair, limited by the above deficits, including decreased cognition(alert, oriented to person, place,  disoriented to place, time and confused to events. No hallucinating this tx), balance, ROM, strength, endurance. Pt required Mod A x2 for initial sit><stands using carter stedy lift and difficulty with upright standing, needing assistance to stabilize feet,LE's (at knees) in place to keep feet from sliding forward.(standing on her toes; had shoes on). Pt dependent for transfers between recliner and BR commode (BSC over toilet). Pt needing Mod A x2 for sit><stands at the commode and was dependent for toileting needs (max A x1 for hygiene and assist of another-Max, in standing for balance (voided urine, BM-RN notified).(anticipte pt needing overall Max/dep for ADL's).  Pt sit>stand to walker 2x, with Mod A x2 and able to amb short distance, recliner to bed with Mod A x2 (see PT note). Pt returned to bed at end of tx, Max A x2 (for nursing to complete bladder scan).  Cont poc and recommendation for further therapy at low-mod frequency at d/c.  Treatment Diagnosis: decreased fxl transfers/mobility and ADL status  Prognosis: Fair  History: Per H&P \"Petra Green is an 88-year-old lady with past medical history significant for osteoporosis, GERD, CHF who presented to clinic for evaluation of progressive weakness, dysuria, fecal incontinence and low back pain. Patient recently admitted for COVID-19 discharge skilled nursing facility. Since then, has had worsening leg weakness, incontinence of stool and urine and back pain. Over the past 2 to 3 days her appetite has been diminished and she is having more dysuria.  REQUIRES OT FOLLOW-UP: Yes  Activity Tolerance  Activity Tolerance: Treatment limited secondary to decreased cognition;Patient limited by fatigue        Plan   Occupational Therapy Plan  Times Per Week: 3-5  Current Treatment Recommendations: Strengthening, ROM, Functional mobility training, Safety education & training, Self-Care / ADL     Restrictions  Restrictions/Precautions  Restrictions/Precautions:

## 2024-02-07 NOTE — CARE COORDINATION
Spoke to Anneliese at [facility], facility has pre cert for skilled if physician feels patient can discharge to SNF with be and they can work on voiding trials at SNF for eventual return to [place].  Informed ANALY Hurley of the above.     Electronically signed by Shelly Borja RN Case Management on 2/7/2024 at 8:23 AM

## 2024-02-07 NOTE — CARE COORDINATION
Notified by skin care team of possible PI to heel. Pt currently on lacy low air loss mattress. Heels non-reddened. No PI present. Pt with blanchable redness to buttocks. High risk for further skin breakdown. Recommend triad barrier. Continue with prevention measures.  -moisture barrier to buttocks  -turn and reposition every 2 hours  -chair cushion, encourage to reposition self frequently while in chair  -elevate heels, apply liquid barrier film twice daily  Electronically signed by Abeba Carnes RN CWOCN on 2/7/2024 at 11:14 AM

## 2024-02-07 NOTE — PROGRESS NOTES
Name: Petra Green  /Age/Sex: 1935 (88 y.o. female)   MRN & CSN: [id number] & [id number]  Admission Date/Time: 2024  5:17 PM   [id number]  Current Hospital Day: Hospital Day: 10   Principal Problem: UTI (urinary tract infection)  HPI       Patient seen and examined.  No issues overnight.  She says she is having difficulty keeping track of the days and time.  No abdominal pain.  Appetite is good this morning.    All other review of systems negative unless noted above.  VITALS    Vitals:    24 1532   BP: 132/74   Pulse: 72   Resp: 18   Temp: 97.6 °F (36.4 °C)   SpO2: 98%         General: No acute distress, hard of hearing  HEENT: PERRL, EOMI, no sinus tenderness  Cardiac: Regular rate and rhythm, no murmurs, no peripheral edema  Lungs: Clear to auscultation bilaterally  Abdomen: Soft nontender, nondistended  Musculoskeletal: Sarcopenic, moves all extremities  Neuro: No lateralizing symptoms        LABS   BMP  Recent Labs     24  0439      K 4.3      CO2 27   BUN 18   CREATININE 0.7   CALCIUM 8.4     CBC/COAGS  Recent Labs     24  0439   WBC 8.3   HCT 31.5*        Liver & Pancreas  No results for input(s): \"AST\", \"ALT\", \"ALKPHOS\", \"BILI\", \"BILIDIR\", \"ALBUMIN\", \"AMYLASE\", \"LIPASE\" in the last 72 hours.         IMAGING   No new imaging      Above studies and images were personally reviewed by myself    MEDS   Scheduled Meds:   polyethylene glycol  17 g Oral Daily    docusate sodium  100 mg Oral Daily    tamsulosin  0.4 mg Oral Daily    acetaminophen  1,000 mg Oral 3 times per day    metoprolol succinate  50 mg Oral Daily    enoxaparin  30 mg SubCUTAneous Daily    sodium chloride flush  5-40 mL IntraVENous 2 times per day    aspirin  81 mg Oral Daily    atorvastatin  10 mg Oral Nightly    escitalopram  5 mg Oral Daily    traZODone  50 mg Oral Nightly     Continuous Infusions:   sodium chloride       PRN Meds:sodium chloride flush,  sodium chloride, ondansetron **OR** ondansetron     CURRENT HOSPITAL PROBLEMS   Principal Problem:    UTI (urinary tract infection)  Active Problems:    Coronary artery disease involving native coronary artery of native heart with angina pectoris (HCC)    Chronic renal disease, stage III (HCC) [381337]    CAD (coronary artery disease)    Bladder retention of urine  Resolved Problems:    * No resolved hospital problems. *          Hypotension, resolved  Leukocytosis  Concern for UTI  Metabolic encephalopathy  Lactic acidosis  Urinary retention  - 25,000 CFU Klebsiella, finished course of ceftriaxone  -Appreciate urology consultation for urinary retention, Win catheter in place  - Palliative care to follow outpatient  -TOV     Low back pain  History of lumbar degenerative disc disease  Osteoporosis  Stool incontinence  - MRI with no infection, appears to be chronic changes, continue PT/OT  - No surgical intervention, appreciate neurosurgery consultation    CHF  CAD  - Resume metoprolol today, continue aspirin and statin           F/E/N: Regular diet, thickened liquids  PPx: Lovenox  Dispo: Trial of void, may need to discharge to SNF before returning to [place]  Walter Barnes MD 2/7/2024 4:27 PM

## 2024-02-07 NOTE — PLAN OF CARE
Problem: Discharge Planning  Goal: Discharge to home or other facility with appropriate resources  Outcome: Progressing     Problem: Safety - Adult  Goal: Free from fall injury  Outcome: Progressing     Problem: Skin/Tissue Integrity  Goal: Absence of new skin breakdown  Description: 1.  Monitor for areas of redness and/or skin breakdown  2.  Assess vascular access sites hourly  3.  Every 4-6 hours minimum:  Change oxygen saturation probe site  4.  Every 4-6 hours:  If on nasal continuous positive airway pressure, respiratory therapy assess nares and determine need for appliance change or resting period.  Outcome: Progressing     Problem: ABCDS Injury Assessment  Goal: Absence of physical injury  Outcome: Progressing     Problem: Nutrition Deficit:  Goal: Optimize nutritional status  Outcome: Progressing     Problem: Neurosensory - Adult  Goal: Achieves stable or improved neurological status  Outcome: Progressing     Problem: Respiratory - Adult  Goal: Achieves optimal ventilation and oxygenation  Outcome: Progressing     Problem: Skin/Tissue Integrity - Adult  Goal: Skin integrity remains intact  Outcome: Progressing     Problem: Musculoskeletal - Adult  Goal: Return mobility to safest level of function  Outcome: Progressing     Problem: Gastrointestinal - Adult  Goal: Maintains or returns to baseline bowel function  Outcome: Progressing     Problem: Genitourinary - Adult  Goal: Urinary catheter remains patent  Outcome: Progressing     Problem: Metabolic/Fluid and Electrolytes - Adult  Goal: Electrolytes maintained within normal limits  Outcome: Progressing

## 2024-02-07 NOTE — PROGRESS NOTES
Pt Name: Petra Green  Medical Record Number: [id number]  Date of Birth 1935   Today's Date: 2/7/2024      Subjective:  Patient confused, resting in bed. Family requesting cathter to be removed.    ROS: Constitutional: No fever    Vitals:  Vitals:    02/06/24 2030 02/06/24 2339 02/07/24 0430 02/07/24 0717   BP: 98/72 120/62 130/75 126/63   Pulse:  71 69 64   Resp:  16 16 16   Temp: 98.2 °F (36.8 °C) 97.7 °F (36.5 °C) 97.7 °F (36.5 °C) 97.9 °F (36.6 °C)   TempSrc: Oral Oral Oral Oral   SpO2: 98% 95% 97% 98%   Weight:   41.7 kg (91 lb 14.9 oz)    Height:         I/O last 3 completed shifts:  In: 1380 [P.O.:1380]  Out: 2325 [Urine:2325]    Exam:  General: confused, no acute distress  Respiratory: Nonlabored breathing  Abdomen: Soft, non-tender, non-distended, no masses  : remove be  Skin: Skin color, texture, turgor normal, no rashes or lesions  Neurologic: no gross deficits    CURRENT MEDICATIONS   Scheduled Meds:   polyethylene glycol  17 g Oral Daily    docusate sodium  100 mg Oral Daily    tamsulosin  0.4 mg Oral Daily    acetaminophen  1,000 mg Oral 3 times per day    metoprolol succinate  50 mg Oral Daily    enoxaparin  30 mg SubCUTAneous Daily    sodium chloride flush  5-40 mL IntraVENous 2 times per day    aspirin  81 mg Oral Daily    atorvastatin  10 mg Oral Nightly    escitalopram  5 mg Oral Daily    traZODone  50 mg Oral Nightly     Continuous Infusions:   sodium chloride       PRN Meds:.sodium chloride flush, sodium chloride, ondansetron **OR** ondansetron    LABS     Recent Labs     02/05/24  0439   WBC 8.3   HGB 10.7*   HCT 31.5*         K 4.3      CO2 27   BUN 18   CREATININE 0.7   CALCIUM 8.4           ASSESSMENT   Hospital day # 9  89yo female w/ urinary retention-- likely multifactorial due to age, constipation. Suspect overflow incontinence due to previous incontinence   Urine culture positive for Klebsiella  Had 1 BM yesterday  PLAN   Continue flomax  Continue

## 2024-02-07 NOTE — PROGRESS NOTES
Physical Therapy  Facility/Department: [address] PROGRESSIVE CARE  Physical Therapy Treatment Note    Name: Petra Green  : 1935  MRN: [id number]  Date of Service: 2024    Discharge Recommendations:  Continue to assess pending progress, Subacute/Skilled Nursing Facility   PT Equipment Recommendations  Other: Defer to next level of care.  Petra Green scored a 10/24 on the AM-PAC short mobility form. Current research shows that an AM-PAC score of 17 or less is typically not associated with a discharge to the patient's home setting. Based on the patient's AM-PAC score and their current functional mobility deficits, it is recommended that the patient have 3-5 sessions per week of Physical Therapy at d/c to increase the patient's independence.  Please see assessment section for further patient specific details.    If patient discharges prior to next session this note will serve as a discharge summary.  Please see below for the latest assessment towards goals.            Assessment   Body Structures, Functions, Activity Limitations Requiring Skilled Therapeutic Intervention: Decreased functional mobility ;Decreased ROM;Decreased strength;Decreased safe awareness;Decreased cognition;Decreased endurance;Decreased balance  Assessment: 87 y/o female admit 2024 with UTI, Hypotension, LB Pain/LE Weakness. PMH as noted including CAD, CKD, CHF, Osteoporosis, OA.  [facility] (had been home from SNF for ~  2 weeks per daughter report). At baseline pt living in assist living ([place]).  Currently, pt continues to be pleasantly confused and able to follow simple commands alittle delayed. Pt completed several transfers via [place] with Mod assist x 2; depend toilet transfer and care.  Attempt Transfer and few steps Amb with Walker Mod assist x 2 with Kyphotic posture, flexed hips and knees and narrow mary alice. Max assist return to bed from supine.    Pt chalo gentle B LE stretch exs prior

## 2024-02-08 PROCEDURE — 2060000000 HC ICU INTERMEDIATE R&B

## 2024-02-08 PROCEDURE — 2580000003 HC RX 258: Performed by: STUDENT IN AN ORGANIZED HEALTH CARE EDUCATION/TRAINING PROGRAM

## 2024-02-08 PROCEDURE — 97535 SELF CARE MNGMENT TRAINING: CPT

## 2024-02-08 PROCEDURE — 51798 US URINE CAPACITY MEASURE: CPT

## 2024-02-08 PROCEDURE — 97530 THERAPEUTIC ACTIVITIES: CPT

## 2024-02-08 PROCEDURE — 6370000000 HC RX 637 (ALT 250 FOR IP): Performed by: INTERNAL MEDICINE

## 2024-02-08 PROCEDURE — 94760 N-INVAS EAR/PLS OXIMETRY 1: CPT

## 2024-02-08 PROCEDURE — 97110 THERAPEUTIC EXERCISES: CPT

## 2024-02-08 PROCEDURE — 51702 INSERT TEMP BLADDER CATH: CPT

## 2024-02-08 PROCEDURE — 99232 SBSQ HOSP IP/OBS MODERATE 35: CPT | Performed by: STUDENT IN AN ORGANIZED HEALTH CARE EDUCATION/TRAINING PROGRAM

## 2024-02-08 PROCEDURE — 6360000002 HC RX W HCPCS: Performed by: STUDENT IN AN ORGANIZED HEALTH CARE EDUCATION/TRAINING PROGRAM

## 2024-02-08 PROCEDURE — 6370000000 HC RX 637 (ALT 250 FOR IP)

## 2024-02-08 PROCEDURE — 6370000000 HC RX 637 (ALT 250 FOR IP): Performed by: STUDENT IN AN ORGANIZED HEALTH CARE EDUCATION/TRAINING PROGRAM

## 2024-02-08 RX ADMIN — ATORVASTATIN CALCIUM 10 MG: 10 TABLET, FILM COATED ORAL at 20:57

## 2024-02-08 RX ADMIN — ENOXAPARIN SODIUM 30 MG: 100 INJECTION SUBCUTANEOUS at 10:58

## 2024-02-08 RX ADMIN — POLYETHYLENE GLYCOL 3350 17 G: 17 POWDER, FOR SOLUTION ORAL at 10:58

## 2024-02-08 RX ADMIN — TAMSULOSIN HYDROCHLORIDE 0.4 MG: 0.4 CAPSULE ORAL at 10:58

## 2024-02-08 RX ADMIN — Medication 10 ML: at 11:01

## 2024-02-08 RX ADMIN — ACETAMINOPHEN 1000 MG: 500 TABLET ORAL at 20:58

## 2024-02-08 RX ADMIN — ESCITALOPRAM OXALATE 5 MG: 10 TABLET ORAL at 11:01

## 2024-02-08 RX ADMIN — TRAZODONE HYDROCHLORIDE 50 MG: 50 TABLET ORAL at 20:57

## 2024-02-08 RX ADMIN — ASPIRIN 81 MG: 81 TABLET, COATED ORAL at 10:58

## 2024-02-08 RX ADMIN — ACETAMINOPHEN 1000 MG: 500 TABLET ORAL at 05:50

## 2024-02-08 RX ADMIN — ACETAMINOPHEN 1000 MG: 500 TABLET ORAL at 15:00

## 2024-02-08 RX ADMIN — DOCUSATE SODIUM 100 MG: 100 CAPSULE, LIQUID FILLED ORAL at 10:58

## 2024-02-08 RX ADMIN — Medication 10 ML: at 20:57

## 2024-02-08 RX ADMIN — METOPROLOL SUCCINATE 50 MG: 50 TABLET, EXTENDED RELEASE ORAL at 10:58

## 2024-02-08 ASSESSMENT — PAIN SCALES - GENERAL
PAINLEVEL_OUTOF10: 3
PAINLEVEL_OUTOF10: 3

## 2024-02-08 ASSESSMENT — PAIN DESCRIPTION - ORIENTATION: ORIENTATION: MID

## 2024-02-08 ASSESSMENT — PAIN DESCRIPTION - LOCATION: LOCATION: HEAD

## 2024-02-08 NOTE — PROGRESS NOTES
Name: Petra Green  /Age/Sex: 1935 (88 y.o. female)   MRN & CSN: [id number] & [id number]  Admission Date/Time: 2024  5:17 PM   [id number]  Current Hospital Day: Hospital Day: 11   Principal Problem: UTI (urinary tract infection)  HPI       Patient seen and examined.  No issues overnight.  Was not able to void spontaneously.  Now has Win catheter back in place.  Did have a bowel movement.  She again is reporting difficulty keeping track of the days.    All other review of systems negative unless noted above.  VITALS    Vitals:    24 0923   BP:    Pulse:    Resp: 18   Temp:    SpO2: 96%         General: No acute distress, hard of hearing  HEENT: PERRL, EOMI, no sinus tenderness  Cardiac: Regular rate and rhythm, no murmurs, no peripheral edema  Lungs: Clear to auscultation bilaterally  Abdomen: Soft nontender, nondistended  Musculoskeletal: Sarcopenic, moves all extremities  Neuro: No lateralizing symptoms        LABS   BMP  No results for input(s): \"NA\", \"K\", \"CL\", \"CO2\", \"BUN\", \"CREATININE\", \"GLU\", \"CALCIUM\", \"PHOS\", \"MG\" in the last 72 hours.    CBC/COAGS  No results for input(s): \"WBC\", \"HEMOGLOBIN\", \"HCT\", \"PLT\", \"INR\", \"PROTIME\", \"PTT\" in the last 72 hours.    Liver & Pancreas  No results for input(s): \"AST\", \"ALT\", \"ALKPHOS\", \"BILI\", \"BILIDIR\", \"ALBUMIN\", \"AMYLASE\", \"LIPASE\" in the last 72 hours.         IMAGING   No new imaging      Above studies and images were personally reviewed by myself    MEDS   Scheduled Meds:   polyethylene glycol  17 g Oral Daily    docusate sodium  100 mg Oral Daily    tamsulosin  0.4 mg Oral Daily    acetaminophen  1,000 mg Oral 3 times per day    metoprolol succinate  50 mg Oral Daily    enoxaparin  30 mg SubCUTAneous Daily    sodium chloride flush  5-40 mL IntraVENous 2 times per day    aspirin  81 mg Oral Daily    atorvastatin  10 mg Oral Nightly    escitalopram  5 mg Oral Daily    traZODone  50 mg Oral Nightly

## 2024-02-08 NOTE — CARE COORDINATION
Spoke to Janet at [facility], says pre cert is only good through today for skilled nursing.   Patient did have be catheter replaced due to urinary retention. So unable to return to [facility] unless family able to arrange private duty care to maintain be at AL.   LM w/ daughter Tia to discuss, await return call.    Electronically signed by Shelly Borja RN Case Management on 2/8/2024 at 1:59 PM      Daughter Tia returned call. Daughter says she is upset that be catheter was reinserted. Explains she feels nursing has not given her mom a proper voiding trial as she needs to go and actually sit on toilet q2h to void rather than trying to go to the bathroom in bed. Daughter wants to see if be can be removed tomorrow morning and if she voids after removed would then like her mom to be discharged to [facility]. Family is paying for q2h assist at AL to help mom to bathroom, daughter going to confirm this is still going to occur. Daughter says they will also have her f/u with Urologist outpatient. Will relay this information and plan to nursing/MD.    Electronically signed by Shelly Borja RN Case Management on 2/8/2024 at 3:04 PM      Spoke to daughter Micheline, family considering returning to [facility] with be catheter and arranging private duty care along with family assist to maintain be vs be removal and monitoring urine output.   Will reach out to DON at [facility] in the morning to discuss what their staff can and can't do in regards to catheter and verify patient is able to return.   In the event patient is not able to return, family open to [facility]. Will need new pre cert for SNF.  Will f/u with family & [facility] tomorrow.    Electronically signed by Shelly Borja RN Case Management on 2/8/2024 at 4:55 PM

## 2024-02-08 NOTE — PLAN OF CARE
Problem: Discharge Planning  Goal: Discharge to home or other facility with appropriate resources  Outcome: Progressing     Problem: Safety - Adult  Goal: Free from fall injury  Outcome: Progressing     Problem: Skin/Tissue Integrity  Goal: Absence of new skin breakdown  Description: 1.  Monitor for areas of redness and/or skin breakdown  2.  Assess vascular access sites hourly  3.  Every 4-6 hours minimum:  Change oxygen saturation probe site  4.  Every 4-6 hours:  If on nasal continuous positive airway pressure, respiratory therapy assess nares and determine need for appliance change or resting period.  Outcome: Progressing     Problem: ABCDS Injury Assessment  Goal: Absence of physical injury  Outcome: Progressing     Problem: Nutrition Deficit:  Goal: Optimize nutritional status  Outcome: Progressing     Problem: Neurosensory - Adult  Goal: Achieves stable or improved neurological status  Outcome: Progressing     Problem: Respiratory - Adult  Goal: Achieves optimal ventilation and oxygenation  Outcome: Progressing     Problem: Skin/Tissue Integrity - Adult  Goal: Skin integrity remains intact  Outcome: Progressing     Problem: Musculoskeletal - Adult  Goal: Return mobility to safest level of function  Outcome: Progressing     Problem: Gastrointestinal - Adult  Goal: Maintains or returns to baseline bowel function  Outcome: Progressing     Problem: Genitourinary - Adult  Goal: Urinary catheter remains patent  Outcome: Progressing     Problem: Metabolic/Fluid and Electrolytes - Adult  Goal: Electrolytes maintained within normal limits  Outcome: Progressing     Problem: Chronic Conditions and Co-morbidities  Goal: Patient's chronic conditions and co-morbidity symptoms are monitored and maintained or improved  Outcome: Progressing

## 2024-02-08 NOTE — PROGRESS NOTES
Pt. Unable to void. Bladder scanned pt around 1030pm. Was retaining 356ml. Took pt to bathroom and sit on toilet for a while to try and urinate. Pt. Urinated <100ml On own. Pt. Requested to go back to bed because she felt that she would not be able to void after being on toilet for a while. Checked on pt. Later around on and noticed pt. Urinated medium amount in bed. Re- bladder scanned pt, retaining 753ml. Pt. Had medium, solid, brown BM while changing pt. Win placed due to rentention. Asked pt. If she was able to feel herself have a BM and urinate pt. Said she was unsure.

## 2024-02-08 NOTE — PROGRESS NOTES
Occupational Therapy  Facility/Department: [address] PROGRESSIVE CARE  Occupational Therapy Daily Note  Name: Petra Green  : 1935  MRN: [id number]  Date of Service: 2024    Discharge Recommendations:  Patient would benefit from continued therapy after discharge, 3-5 sessions per week      Petra Green scored a 12 on the AM-PAC ADL Inpatient form. Current research shows that an AM-PAC score of 17 or less is typically not associated with a discharge to the patient's home setting. Based on the patient's AM-PAC score and their current ADL deficits, it is recommended that the patient have 3-5 sessions per week of Occupational Therapy at d/c to increase the patient's independence.  Please see assessment section for further patient specific details.    If patient discharges prior to next session this note will serve as a discharge summary.  Please see below for the latest assessment towards goals.      Patient Diagnosis(es): There were no encounter diagnoses.  Past Medical History:  has a past medical history of Acute kidney injury (HCC), Allergic rhinitis, CHF (congestive heart failure) (HCC), Delirium, GERD (gastroesophageal reflux disease), Hearing loss sensory, bilateral, Mastoiditis, Osteoarthritis, Osteoporosis, and Scoliosis.  Past Surgical History:  has a past surgical history that includes Carpal tunnel release; Nasal sinus surgery; hernia repair; Colonoscopy; eye surgery; Colonoscopy (N/A, 10/19/2018); and Upper gastrointestinal endoscopy (N/A, 2019).    Treatment Diagnosis: decreased fxl transfers/mobility and ADL status      Assessment   Performance deficits / Impairments: Decreased ADL status;Decreased cognition;Decreased balance;Decreased coordination;Decreased functional mobility ;Decreased ROM;Decreased endurance;Decreased strength  Assessment: Discussed with OTR: Pt tolerated tx fairly well.Pt more oriented this date, yet remains confused to events. Pt required Max A for rolling  Training;Orientation  Education Method: Demonstration;Verbal  Barriers to Learning: Cognition  Education Outcome: Verbalized understanding;Continued education needed;Demonstrated understanding               AM-PAC - ADL  AM-PAC Daily Activity - Inpatient   How much help is needed for putting on and taking off regular lower body clothing?: A Lot  How much help is needed for bathing (which includes washing, rinsing, drying)?: A Lot  How much help is needed for toileting (which includes using toilet, bedpan, or urinal)?: Total  How much help is needed for putting on and taking off regular upper body clothing?: A Lot  How much help is needed for taking care of personal grooming?: A Lot  How much help for eating meals?: A Little  AM-PAC Inpatient Daily Activity Raw Score: 12  AM-PAC Inpatient ADL T-Scale Score : 30.6  ADL Inpatient CMS 0-100% Score: 66.57  ADL Inpatient CMS G-Code Modifier : CL    Goals  Short Term Goals  Time Frame for Short Term Goals: prior to d/c. Status:  goals not met, ongoing  Short Term Goal 1: pt will complete toileting w/ Min A  Short Term Goal 2: pt will complete LB dressing w/ Min A  Short Term Goal 3: pt will complete bathing w/ Min A  Short Term Goal 4: Pt will complete functional transfers with AD with Min A x1  Long Term Goals  Time Frame for Long Term Goals : LTG=STG  Patient Goals   Patient goals : pt did not state goals       Therapy Time   Individual Concurrent Group Co-treatment   Time In 0935         Time Out 1033         Minutes 58                 Kenyon DONOVAN/L,515    OTR was consulted with this patients treatment/intervention plan.   ]

## 2024-02-09 VITALS
TEMPERATURE: 97.6 F | HEIGHT: 55 IN | SYSTOLIC BLOOD PRESSURE: 115 MMHG | BODY MASS INDEX: 20.87 KG/M2 | WEIGHT: 90.17 LBS | DIASTOLIC BLOOD PRESSURE: 54 MMHG | HEART RATE: 87 BPM | RESPIRATION RATE: 18 BRPM | OXYGEN SATURATION: 98 %

## 2024-02-09 LAB
ANION GAP SERPL CALCULATED.3IONS-SCNC: 8 MMOL/L (ref 3–16)
BUN SERPL-MCNC: 15 MG/DL (ref 7–20)
CALCIUM SERPL-MCNC: 8.5 MG/DL (ref 8.3–10.6)
CHLORIDE SERPL-SCNC: 101 MMOL/L (ref 99–110)
CO2 SERPL-SCNC: 25 MMOL/L (ref 21–32)
CREAT SERPL-MCNC: 0.7 MG/DL (ref 0.6–1.2)
DEPRECATED RDW RBC AUTO: 14.6 % (ref 12.4–15.4)
GFR SERPLBLD CREATININE-BSD FMLA CKD-EPI: >60 ML/MIN/{1.73_M2}
GLUCOSE SERPL-MCNC: 89 MG/DL (ref 70–99)
HCT VFR BLD AUTO: 30.1 % (ref 36–48)
HGB BLD-MCNC: 10.5 G/DL (ref 12–16)
MCH RBC QN AUTO: 32.8 PG (ref 26–34)
MCHC RBC AUTO-ENTMCNC: 34.9 G/DL (ref 31–36)
MCV RBC AUTO: 94 FL (ref 80–100)
PLATELET # BLD AUTO: 369 K/UL (ref 135–450)
PMV BLD AUTO: 6.6 FL (ref 5–10.5)
POTASSIUM SERPL-SCNC: 4.3 MMOL/L (ref 3.5–5.1)
RBC # BLD AUTO: 3.2 M/UL (ref 4–5.2)
SODIUM SERPL-SCNC: 134 MMOL/L (ref 136–145)
WBC # BLD AUTO: 10.7 K/UL (ref 4–11)

## 2024-02-09 PROCEDURE — 36415 COLL VENOUS BLD VENIPUNCTURE: CPT

## 2024-02-09 PROCEDURE — 6370000000 HC RX 637 (ALT 250 FOR IP): Performed by: STUDENT IN AN ORGANIZED HEALTH CARE EDUCATION/TRAINING PROGRAM

## 2024-02-09 PROCEDURE — 6370000000 HC RX 637 (ALT 250 FOR IP): Performed by: INTERNAL MEDICINE

## 2024-02-09 PROCEDURE — 99232 SBSQ HOSP IP/OBS MODERATE 35: CPT | Performed by: STUDENT IN AN ORGANIZED HEALTH CARE EDUCATION/TRAINING PROGRAM

## 2024-02-09 PROCEDURE — 97530 THERAPEUTIC ACTIVITIES: CPT

## 2024-02-09 PROCEDURE — 97116 GAIT TRAINING THERAPY: CPT

## 2024-02-09 PROCEDURE — 97110 THERAPEUTIC EXERCISES: CPT

## 2024-02-09 PROCEDURE — 80048 BASIC METABOLIC PNL TOTAL CA: CPT

## 2024-02-09 PROCEDURE — 2580000003 HC RX 258: Performed by: STUDENT IN AN ORGANIZED HEALTH CARE EDUCATION/TRAINING PROGRAM

## 2024-02-09 PROCEDURE — 85027 COMPLETE CBC AUTOMATED: CPT

## 2024-02-09 PROCEDURE — 94760 N-INVAS EAR/PLS OXIMETRY 1: CPT

## 2024-02-09 PROCEDURE — 6360000002 HC RX W HCPCS: Performed by: STUDENT IN AN ORGANIZED HEALTH CARE EDUCATION/TRAINING PROGRAM

## 2024-02-09 PROCEDURE — 97535 SELF CARE MNGMENT TRAINING: CPT

## 2024-02-09 PROCEDURE — 51702 INSERT TEMP BLADDER CATH: CPT

## 2024-02-09 PROCEDURE — 6370000000 HC RX 637 (ALT 250 FOR IP)

## 2024-02-09 RX ORDER — TAMSULOSIN HYDROCHLORIDE 0.4 MG/1
0.4 CAPSULE ORAL DAILY
Qty: 30 CAPSULE | Refills: 3 | Status: SHIPPED | OUTPATIENT
Start: 2024-02-10 | End: 2024-02-09

## 2024-02-09 RX ORDER — TAMSULOSIN HYDROCHLORIDE 0.4 MG/1
0.4 CAPSULE ORAL DAILY
Qty: 30 CAPSULE | Refills: 3 | Status: SHIPPED | OUTPATIENT
Start: 2024-02-10

## 2024-02-09 RX ADMIN — ASPIRIN 81 MG: 81 TABLET, COATED ORAL at 08:55

## 2024-02-09 RX ADMIN — DOCUSATE SODIUM 100 MG: 100 CAPSULE, LIQUID FILLED ORAL at 08:55

## 2024-02-09 RX ADMIN — ACETAMINOPHEN 1000 MG: 500 TABLET ORAL at 14:35

## 2024-02-09 RX ADMIN — Medication 10 ML: at 08:58

## 2024-02-09 RX ADMIN — ESCITALOPRAM OXALATE 5 MG: 10 TABLET ORAL at 08:55

## 2024-02-09 RX ADMIN — POLYETHYLENE GLYCOL 3350 17 G: 17 POWDER, FOR SOLUTION ORAL at 08:55

## 2024-02-09 RX ADMIN — ACETAMINOPHEN 1000 MG: 500 TABLET ORAL at 06:27

## 2024-02-09 RX ADMIN — TAMSULOSIN HYDROCHLORIDE 0.4 MG: 0.4 CAPSULE ORAL at 08:55

## 2024-02-09 RX ADMIN — ENOXAPARIN SODIUM 30 MG: 100 INJECTION SUBCUTANEOUS at 08:55

## 2024-02-09 RX ADMIN — METOPROLOL SUCCINATE 50 MG: 50 TABLET, EXTENDED RELEASE ORAL at 08:55

## 2024-02-09 ASSESSMENT — PAIN SCALES - WONG BAKER: WONGBAKER_NUMERICALRESPONSE: 2

## 2024-02-09 ASSESSMENT — PAIN - FUNCTIONAL ASSESSMENT: PAIN_FUNCTIONAL_ASSESSMENT: PREVENTS OR INTERFERES SOME ACTIVE ACTIVITIES AND ADLS

## 2024-02-09 ASSESSMENT — PAIN SCALES - GENERAL: PAINLEVEL_OUTOF10: 3

## 2024-02-09 ASSESSMENT — PAIN DESCRIPTION - ORIENTATION: ORIENTATION: MID

## 2024-02-09 ASSESSMENT — PAIN DESCRIPTION - LOCATION: LOCATION: HEAD

## 2024-02-09 NOTE — PLAN OF CARE
Problem: Discharge Planning  Goal: Discharge to home or other facility with appropriate resources  2/9/2024 1034 by Cuca Obrien RN  Outcome: Progressing  2/8/2024 2257 by Abeba Hoffman RN  Outcome: Progressing     Problem: Safety - Adult  Goal: Free from fall injury  2/9/2024 1034 by Cuca Obrien RN  Outcome: Progressing  2/8/2024 2257 by Abeba Hofmfan RN  Outcome: Progressing     Problem: Skin/Tissue Integrity  Goal: Absence of new skin breakdown  Description: 1.  Monitor for areas of redness and/or skin breakdown  2.  Assess vascular access sites hourly  3.  Every 4-6 hours minimum:  Change oxygen saturation probe site  4.  Every 4-6 hours:  If on nasal continuous positive airway pressure, respiratory therapy assess nares and determine need for appliance change or resting period.  2/9/2024 1034 by Cuca Obrien RN  Outcome: Progressing  2/8/2024 2257 by Abeba Hoffman RN  Outcome: Progressing     Problem: ABCDS Injury Assessment  Goal: Absence of physical injury  2/9/2024 1034 by Cuca Obrien RN  Outcome: Progressing  2/8/2024 2257 by Abeba Hoffman RN  Outcome: Progressing     Problem: Nutrition Deficit:  Goal: Optimize nutritional status  2/9/2024 1034 by Cuca Obrien RN  Outcome: Progressing  2/8/2024 2257 by Abeba Hoffman RN  Outcome: Progressing     Problem: Neurosensory - Adult  Goal: Achieves stable or improved neurological status  2/9/2024 1034 by Cuca Obrien RN  Outcome: Progressing  2/8/2024 2257 by Abeba Hoffman RN  Outcome: Progressing     Problem: Respiratory - Adult  Goal: Achieves optimal ventilation and oxygenation  2/9/2024 1034 by Cuca Obrien RN  Outcome: Progressing  2/8/2024 2257 by Abeba Hoffman RN  Outcome: Progressing     Problem: Skin/Tissue Integrity - Adult  Goal: Skin integrity remains intact  2/9/2024 1034 by Cuca Obrien RN  Outcome: Progressing  Flowsheets (Taken 2/9/2024 1007)  Skin Integrity Remains Intact: Monitor for areas  of redness and/or skin breakdown  2/8/2024 2257 by Abeba Hoffman RN  Outcome: Progressing  Flowsheets (Taken 2/8/2024 1550 by Sandie Cramer RN)  Skin Integrity Remains Intact: Monitor for areas of redness and/or skin breakdown     Problem: Musculoskeletal - Adult  Goal: Return mobility to safest level of function  2/9/2024 1034 by Cuca Obrien RN  Outcome: Progressing  2/8/2024 2257 by Abeba Hoffman RN  Outcome: Progressing     Problem: Gastrointestinal - Adult  Goal: Maintains or returns to baseline bowel function  2/9/2024 1034 by Cuca Obrien RN  Outcome: Progressing  2/8/2024 2257 by Abeba Hoffman RN  Outcome: Progressing     Problem: Genitourinary - Adult  Goal: Urinary catheter remains patent  2/9/2024 1034 by Cuca Obrien RN  Outcome: Progressing  Flowsheets (Taken 2/9/2024 1007)  Urinary catheter remains patent: Assess patency of urinary catheter  2/8/2024 2257 by Abeba Hoffman RN  Outcome: Progressing     Problem: Metabolic/Fluid and Electrolytes - Adult  Goal: Electrolytes maintained within normal limits  2/9/2024 1034 by Cuca Obrien RN  Outcome: Progressing  2/8/2024 2257 by Abeba Hoffman RN  Outcome: Progressing     Problem: Chronic Conditions and Co-morbidities  Goal: Patient's chronic conditions and co-morbidity symptoms are monitored and maintained or improved  2/9/2024 1034 by Cuca Obrien RN  Outcome: Progressing  2/8/2024 2257 by Abeba Hoffman RN  Outcome: Progressing

## 2024-02-09 NOTE — PROGRESS NOTES
Name: Petra Green  /Age/Sex: 1935 (88 y.o. female)   MRN & CSN: [id number] & [id number]  Admission Date/Time: 2024  5:17 PM   [id number]  Current Hospital Day: Hospital Day: 12   Principal Problem: UTI (urinary tract infection)  HPI       Patient seen and examined.  No issues overnight.  With Win catheter.  She states she having a hard time keeping track of the days.  No chest pain or shortness of breath.  Is    All other review of systems negative unless noted above.  VITALS    Vitals:    24 0704   BP: 112/67   Pulse: 61   Resp: 19   Temp: 98 °F (36.7 °C)   SpO2: 98%         General: No acute distress, hard of hearing  HEENT: PERRL, EOMI, no sinus tenderness  Cardiac: Regular rate and rhythm, no murmurs, no peripheral edema  Lungs: Clear to auscultation bilaterally  Abdomen: Soft nontender, nondistended  Musculoskeletal: Sarcopenic, moves all extremities  Neuro: No lateralizing symptoms        LABS   BMP  Recent Labs     24  0522   *   K 4.3      CO2 25   BUN 15   CREATININE 0.7   CALCIUM 8.5       CBC/COAGS  Recent Labs     24  0522   WBC 10.7   HCT 30.1*          Liver & Pancreas  No results for input(s): \"AST\", \"ALT\", \"ALKPHOS\", \"BILI\", \"BILIDIR\", \"ALBUMIN\", \"AMYLASE\", \"LIPASE\" in the last 72 hours.         IMAGING   No new imaging      Above studies and images were personally reviewed by myself    MEDS   Scheduled Meds:   polyethylene glycol  17 g Oral Daily    docusate sodium  100 mg Oral Daily    tamsulosin  0.4 mg Oral Daily    acetaminophen  1,000 mg Oral 3 times per day    metoprolol succinate  50 mg Oral Daily    enoxaparin  30 mg SubCUTAneous Daily    sodium chloride flush  5-40 mL IntraVENous 2 times per day    aspirin  81 mg Oral Daily    atorvastatin  10 mg Oral Nightly    escitalopram  5 mg Oral Daily    traZODone  50 mg Oral Nightly     Continuous Infusions:   sodium chloride       PRN Meds:sodium chloride

## 2024-02-09 NOTE — PROGRESS NOTES
Physical Therapy  Facility/Department: [address] PROGRESSIVE CARE  Physical Therapy Treatment Note    Name: Petra Green  : 1935  MRN: [id number]  Date of Service: 2024    Discharge Recommendations:  Continue to assess pending progress, Subacute/Skilled Nursing Facility   PT Equipment Recommendations  Other: Defer to next level of care.  Petra Green scored a  on the AM-PAC short mobility form. Current research shows that an AM-PAC score of 17 or less is typically not associated with a discharge to the patient's home setting. Based on the patient's AM-PAC score and their current functional mobility deficits, it is recommended that the patient have 3-5 sessions per week of Physical Therapy at d/c to increase the patient's independence.  Please see assessment section for further patient specific details.    If patient discharges prior to next session this note will serve as a discharge summary.  Please see below for the latest assessment towards goals.            Assessment   Body Structures, Functions, Activity Limitations Requiring Skilled Therapeutic Intervention: Decreased functional mobility ;Decreased ROM;Decreased strength;Decreased safe awareness;Decreased cognition;Decreased endurance;Decreased balance  Assessment: 89 y/o female admit 2024 with UTI, Hypotension, LB Pain/LE Weakness. PMH as noted including CAD, CKD, CHF, Osteoporosis, OA.  [facility] (had been home from SNF for ~  2 weeks per daughter report). At baseline pt living in assist living ([place]).  Currently, pt continues to be pleasantly confused and able to follow simple commands alittle delayed. Pt requiring Max assist to/from eob; Transfer with Min assist x 2  and Amb  few steps eob to/from recliner Amb with Walker Mod assist x 2 with Kyphotic posture, flexed hips and knees and narrow mary alice.   Pt chalo gentle B LE stretch exs following oob activities.  At this time, recommend cont PT (3-5)  upon d/c.  Will cont to monitor pt's progress.  Therapy Prognosis: Fair  History: 89 y/o female admit 1/29/2024 with UTI, Hypotension, LB Pain/LE Weakness. PMH as noted including CAD, CKD, CHF, Osteoporosis, OA.  Exam: See above.  Clinical Presentation: See above.  Barriers to Learning: Cognitive, [place].  Requires PT Follow-Up: Yes  Activity Tolerance  Activity Tolerance: Patient tolerated treatment well     Plan   Physical Therapy Plan  General Plan: 3-5 times per week  Current Treatment Recommendations: Strengthening, ROM, Therapeutic activities, Functional mobility training, Transfer training, Gait training, Safety education & training, Patient/Caregiver education & training  Safety Devices  Type of Devices: Call light within reach, Gait belt, Left in bed (PCA at bedside for care.)  Restraints  Restraints Initially in Place: No     Restrictions  Restrictions/Precautions  Restrictions/Precautions: Fall Risk  Position Activity Restriction  Other position/activity restrictions: Win     Subjective   General  Chart Reviewed: Yes  Patient assessed for rehabilitation services?: Yes  Additional Pertinent Hx: 89 y/o female admit 1/29/2024 with UTI, Hypotension, LB Pain/LE Weakness.  CT Head : negative acute findings.   PMH as noted including CAD, CKD, CHF, Osteoporosis, OA.  Response To Previous Treatment: Patient with no complaints from previous session.  Family / Caregiver Present: No  Referring Practitioner: Dr. Barnes.  Other (Comment): Pt follows simple commands; cues/increase time.  Subjective  Subjective: Pt agreeable to PT Rx.  No c/o pain.         Social/Functional History  Social/Functional History  Type of Home: Facility  Bathroom Shower/Tub: Walk-in shower  Bathroom Equipment: Grab bars in shower, Shower chair  Bathroom Accessibility: Accessible  Home Equipment: Hospital bed, Walker, rolling  ADL Assistance:  (Assist with bathing/dressing.)  Ambulation Assistance: Needs assistance (With Walker.)  Transfer

## 2024-02-09 NOTE — PLAN OF CARE
Problem: Discharge Planning  Goal: Discharge to home or other facility with appropriate resources  2/8/2024 2257 by Abeba Hoffman RN  Outcome: Progressing  2/8/2024 1549 by Sandie Cramer RN  Outcome: Progressing     Problem: Safety - Adult  Goal: Free from fall injury  2/8/2024 2257 by Abeba Hoffman RN  Outcome: Progressing  2/8/2024 1549 by Sandie Cramer RN  Outcome: Progressing     Problem: Skin/Tissue Integrity  Goal: Absence of new skin breakdown  Description: 1.  Monitor for areas of redness and/or skin breakdown  2.  Assess vascular access sites hourly  3.  Every 4-6 hours minimum:  Change oxygen saturation probe site  4.  Every 4-6 hours:  If on nasal continuous positive airway pressure, respiratory therapy assess nares and determine need for appliance change or resting period.  2/8/2024 2257 by Abeba Hoffman RN  Outcome: Progressing  2/8/2024 1549 by Sandie Cramer RN  Outcome: Progressing     Problem: ABCDS Injury Assessment  Goal: Absence of physical injury  2/8/2024 2257 by Abeba Hoffman RN  Outcome: Progressing  2/8/2024 1549 by Sandie Cramer RN  Outcome: Progressing     Problem: Nutrition Deficit:  Goal: Optimize nutritional status  2/8/2024 2257 by Abeba Hoffman RN  Outcome: Progressing  2/8/2024 1549 by Sanide Cramer RN  Outcome: Progressing     Problem: Neurosensory - Adult  Goal: Achieves stable or improved neurological status  2/8/2024 2257 by Abeba Hoffman RN  Outcome: Progressing  2/8/2024 1549 by Sandie Cramer RN  Outcome: Progressing     Problem: Respiratory - Adult  Goal: Achieves optimal ventilation and oxygenation  2/8/2024 2257 by Abeba Hoffman RN  Outcome: Progressing  2/8/2024 1549 by Sandie Cramer, RN  Outcome: Progressing     Problem: Skin/Tissue Integrity - Adult  Goal: Skin integrity remains intact  2/8/2024 2257 by Abeba Hoffman RN  Outcome: Progressing  Flowsheets (Taken 2/8/2024 1550 by Sandie Cramer, RN)  Skin Integrity Remains Intact: Monitor for areas of redness  on unit

## 2024-02-09 NOTE — CARE COORDINATION
Spoke to BRITTANY Serna at [facility], explained goal was for patient to return with catheter and family to arrange private duty in morning & evening. Daphne was quick to say since she already has another catheter in the building that was supposed to be short term but turned into long term, she unfortunately can't accept patient back with catheter at this time. Says private duty would have to be around the clock for patient to return to [facility] as staff unable to assist with be catheter.  Spoke to erick Tobias, explained the above. Daughter says she will discuss with her family but is okay with [facility] at discharge. Family aware a new pre cert is needed for SNF.  Spoke to Maryan PT requesting an eval this morning so we can submit for new pre cert.  Spoke to Janet at [facility], facility is able to accept and will submit for pre cert once updated therapy notes are in place.    Electronically signed by Shelly Borja RN Case Management on 2/9/2024 at 9:18 AM        Spoke to Janet at [facility], she is going to submit for pre cert today.    Electronically signed by Shelly Borja RN Case Management on 2/9/2024 at 12:46 PM          Spoke to Janet at [facility], pre cert was able to be extended through the weekend. LM w/ erick Tobias to inform and discuss discharge. Per MD notes, patient is medically stable for discharge.    Electronically signed by Shelly Borja RN Case Management on 2/9/2024 at 2:32 PM          Spoke to erick Weber, says family has discussed pt returning to [facility] again with BRITTANY Serna and says patient can return to [facility].   I didn't have any missed call but there was a voicemail from Daphne at [facility] informing me after she spoke to family patient is able to return with be. LM w/ Daphne to discuss patient returning today with be cath & home care, await return call.    Electronically signed by Shelly Borja RN Case  Management on 2/9/2024 at 3:15 PM      Spoke to Daphne SOTO at [facility], says patient is able to return and she along with family have worked out details of catheter. Patient is able to return today. Spoke to daughter Tia agrees with dc today back to [facility].    Electronically signed by Shelly Borja RN Case Management on 2/9/2024 at 3:28 PM

## 2024-02-09 NOTE — PROGRESS NOTES
Occupational Therapy  Facility/Department: [address] PROGRESSIVE CARE  Occupational Therapy Daily Treatment    Name: Petra Green  : 1935  MRN: [id number]  Date of Service: 2024    Discharge Recommendations:  Patient would benefit from continued therapy after discharge, 3-5 sessions per week  Petra Green scored a 12 on the AM-PAC ADL Inpatient form. Current research shows that an AM-PAC score of 17 or less is typically not associated with a discharge to the patient's home setting. Based on the patient's AM-PAC score and their current ADL deficits, it is recommended that the patient have 3-5 sessions per week of Occupational Therapy at d/c to increase the patient's independence.  Please see assessment section for further patient specific details.    If patient discharges prior to next session this note will serve as a discharge summary.  Please see below for the latest assessment towards goals.          Patient Diagnosis(es): There were no encounter diagnoses.  Past Medical History:  has a past medical history of Acute kidney injury (HCC), Allergic rhinitis, CHF (congestive heart failure) (HCC), Delirium, GERD (gastroesophageal reflux disease), Hearing loss sensory, bilateral, Mastoiditis, Osteoarthritis, Osteoporosis, and Scoliosis.  Past Surgical History:  has a past surgical history that includes Carpal tunnel release; Nasal sinus surgery; hernia repair; Colonoscopy; eye surgery; Colonoscopy (N/A, 10/19/2018); and Upper gastrointestinal endoscopy (N/A, 2019).    Treatment Diagnosis: decreased fxl transfers/mobility and ADL status      Assessment   Performance deficits / Impairments: Decreased ADL status;Decreased cognition;Decreased balance;Decreased coordination;Decreased functional mobility ;Decreased ROM;Decreased endurance;Decreased strength  Assessment: Today- pt met b/s and agreeable to OT/PT cotx in tandem. Pt completed bed mob w/ Max A, DEP for LB dressing, fxl transfers to/from  stimuli  Following Commands: Follows one step commands with increased time;Follows one step commands with repetition  Attention Span: Attends with cues to redirect  Memory: Decreased recall of recent events  Safety Judgement: Decreased awareness of need for safety;Decreased awareness of need for assistance  Insights: Decreased awareness of deficits  Initiation: Requires cues for some  Sequencing: Requires cues for some  Orientation  Overall Orientation Status: Impaired  Orientation Level: Oriented to person;Oriented to place;Disoriented to time;Disoriented to situation                  Education Given To: Patient  Education Provided: Role of Therapy;Fall Prevention Strategies;Plan of Care;ADL Adaptive Strategies;Transfer Training;Orientation  Education Method: Demonstration;Verbal  Barriers to Learning: Cognition  Education Outcome: Verbalized understanding;Continued education needed;Demonstrated understanding    AM-PAC - ADL  AM-PAC Daily Activity - Inpatient   How much help is needed for putting on and taking off regular lower body clothing?: A Lot  How much help is needed for bathing (which includes washing, rinsing, drying)?: A Lot  How much help is needed for toileting (which includes using toilet, bedpan, or urinal)?: Total  How much help is needed for putting on and taking off regular upper body clothing?: A Lot  How much help is needed for taking care of personal grooming?: A Lot  How much help for eating meals?: A Little  AM-[facility] Inpatient Daily Activity Raw Score: 12  AM-PAC Inpatient ADL T-Scale Score : 30.6  ADL Inpatient CMS 0-100% Score: 66.57  ADL Inpatient CMS G-Code Modifier : CL    Goals  Short Term Goals  Time Frame for Short Term Goals: prior to d/c. Status:  goals not met, ongoing  Short Term Goal 1: pt will complete toileting w/ Min A  Short Term Goal 2: pt will complete LB dressing w/ Min A  Short Term Goal 3: pt will complete bathing w/ Min A  Short Term Goal 4: Pt will complete functional

## 2024-02-09 NOTE — PROGRESS NOTES
Pt is being transferred to [place] via EMS. Family at bedside. Facility was called to give report. No answer from nursing, only reception.

## 2024-02-09 NOTE — CARE COORDINATION
Case Management Discharge Note          Date / Time of Note: 2/9/2024 3:28 PM                  Patient Name: Petra Green   YOB: 1935  Diagnosis: Hypotension [I95.9]  UTI (urinary tract infection) [N39.0]   Date / Time: 1/29/2024  5:17 PM    Financial:  Payor: [id number] MEDICARE / Plan: AET MEDICARE-ADVANTAGE PPO / Product Type: Medicare /      Pharmacy:    [facility] [id number] - [facility] [address] - P [phone] - F [phone]  [address]  [facility] [address]  Phone: [phone] Fax: [phone]    [facility] - [facility] [address] - P [phone] - F [phone] [address]  [facility] [address]  Phone: [phone] Fax: [phone]      Assistance purchasing medications?: Potential Assistance Purchasing Medications: No  Assistance provided by Case Management: None at this time    DISCHARGE Disposition: Assisted Living Facility: [facility] Phone: [phone] Fax: n/a    RN can call report to [facility] at [phone].     Home Care:  Home Care ordered at discharge: Yes  Home Care Agency: [facility]   Phone: [phone]  Fax: [phone]  Orders faxed: Yes    Transportation:  Transportation PLAN for discharge: EMS transportation   Mode of Transport: Ambulance stretcher - BLS  Reason for medical transport: Other: altered mental status, UTI, decreased balance & coordination, decreased functional mobility due to confusion, patient requires medical supervision for progressive weakness & inability to safely ambulate/transfer without max assistance   Name of Transport Company: [facility]  Phone: [phone]  Time of Transport: 5pm    Transport form completed: Yes    IMM Completed:   Yes, Case management has presented and reviewed IMM letter #2.       IMM Letter date given:: 02/09/24  IMM Letter time given:: 1520.   Patient and/or family/POA verbalized understanding of their medicare rights and appeal

## 2024-02-12 ENCOUNTER — CARE COORDINATION (OUTPATIENT)
Dept: CASE MANAGEMENT | Age: 89
End: 2024-02-12

## 2024-02-12 NOTE — CARE COORDINATION
Care Transitions Initial Follow Up Call    Call within 2 business days of discharge: Yes      Patient: Petra Green Patient : 1935   MRN: [id number]  Reason for Admission: Weakness - direct admit from PCP  Discharge Date: 24 RARS: Readmission Risk Score: 16.5      Last Discharge Facility       Date Complaint Diagnosis Description Type Department Provider    24   Admission (Discharged) Walter Diaz MD            Was this an external facility discharge? No Discharge Facility: [facility]    Challenges to be reviewed by the provider   Additional needs identified to be addressed with provider: No                 Method of communication with provider: none.    Initial attempt at CT discharge phone call. Unable to reach patient and/or family. Left message. Contact info provided. Requested return call to CTN.    Call placed to [facility]. Spoke with Annabelle. She states they have the resumption of care order and care was resumed yesterday (2024).               Follow Up  No future appointments.    Tyesha Mahoney RN BSN  Care Transition Nurse  [phone]

## 2024-02-13 ENCOUNTER — CARE COORDINATION (OUTPATIENT)
Dept: CASE MANAGEMENT | Age: 89
End: 2024-02-13

## 2024-02-13 NOTE — CARE COORDINATION
Care Transitions Initial Follow Up Call    Call within 2 business days of discharge: Yes      Patient: Petra Green Patient : 1935   MRN: [id number]  Reason for Admission: Weakness - direct admit from PCP  Discharge Date: 24 RARS: Readmission Risk Score: 16.5      Last Discharge Facility       Date Complaint Diagnosis Description Type Department Provider    24   Admission (Discharged) Walter Diaz MD            Was this an external facility discharge? No Discharge Facility: [facility]    Challenges to be reviewed by the provider   Additional needs identified to be addressed with provider: No                 Method of communication with provider: none.    2nd and final attempt at CT discharge phone call. Unable to reach patient and/or family. Left message. Informed them that this would be the final CTN outreach.    Per chart review it is noted that the facility has been in contact with the PCP office.                 Follow Up  No future appointments.     Tyesha Mahoney RN BSN  Care Transition Nurse  [phone]

## 2024-03-04 PROBLEM — N39.0 UTI (URINARY TRACT INFECTION): Status: RESOLVED | Noted: 2024-01-29 | Resolved: 2024-03-04

## 2024-04-05 ENCOUNTER — HOSPITAL ENCOUNTER (OUTPATIENT)
Age: 89
Discharge: HOME | End: 2024-04-05
Payer: MEDICARE

## 2024-04-05 ENCOUNTER — HOSPITAL ENCOUNTER (OUTPATIENT)
Dept: GENERAL RADIOLOGY | Age: 89
Discharge: HOME | End: 2024-04-05
Attending: STUDENT IN AN ORGANIZED HEALTH CARE EDUCATION/TRAINING PROGRAM
Payer: MEDICARE

## 2024-04-05 DIAGNOSIS — R06.2 WHEEZING: ICD-10-CM

## 2024-04-05 PROCEDURE — 71046 X-RAY EXAM CHEST 2 VIEWS: CPT

## 2024-05-21 ENCOUNTER — APPOINTMENT (OUTPATIENT)
Dept: CT IMAGING | Age: 89
End: 2024-05-21
Payer: MEDICARE

## 2024-05-21 ENCOUNTER — APPOINTMENT (OUTPATIENT)
Dept: GENERAL RADIOLOGY | Age: 89
End: 2024-05-21
Payer: MEDICARE

## 2024-05-21 ENCOUNTER — HOSPITAL ENCOUNTER (INPATIENT)
Age: 89
LOS: 3 days | Discharge: OTHER INSTITUTION | End: 2024-05-24
Attending: STUDENT IN AN ORGANIZED HEALTH CARE EDUCATION/TRAINING PROGRAM | Admitting: INTERNAL MEDICINE
Payer: MEDICARE

## 2024-05-21 DIAGNOSIS — R55 SYNCOPE AND COLLAPSE: Primary | ICD-10-CM

## 2024-05-21 DIAGNOSIS — R79.89 ELEVATED TROPONIN: ICD-10-CM

## 2024-05-21 DIAGNOSIS — R55 SYNCOPE, UNSPECIFIED SYNCOPE TYPE: ICD-10-CM

## 2024-05-21 DIAGNOSIS — R41.82 ALTERED MENTAL STATUS, UNSPECIFIED ALTERED MENTAL STATUS TYPE: ICD-10-CM

## 2024-05-21 LAB
ALBUMIN SERPL-MCNC: 3.5 G/DL (ref 3.4–5)
ALBUMIN/GLOB SERPL: 1.2 {RATIO} (ref 1.1–2.2)
ALP SERPL-CCNC: 86 U/L (ref 40–129)
ALT SERPL-CCNC: <5 U/L (ref 10–40)
ANION GAP SERPL CALCULATED.3IONS-SCNC: 12 MMOL/L (ref 3–16)
AST SERPL-CCNC: 13 U/L (ref 15–37)
BASOPHILS # BLD: 0.1 K/UL (ref 0–0.2)
BASOPHILS NFR BLD: 1 %
BILIRUB SERPL-MCNC: 0.6 MG/DL (ref 0–1)
BILIRUB UR QL STRIP.AUTO: NEGATIVE
BUN SERPL-MCNC: 15 MG/DL (ref 7–20)
CALCIUM SERPL-MCNC: 9.2 MG/DL (ref 8.3–10.6)
CHLORIDE SERPL-SCNC: 100 MMOL/L (ref 99–110)
CLARITY UR: CLEAR
CO2 SERPL-SCNC: 23 MMOL/L (ref 21–32)
COLOR UR: YELLOW
CREAT SERPL-MCNC: 0.9 MG/DL (ref 0.6–1.2)
DEPRECATED RDW RBC AUTO: 14.6 % (ref 12.4–15.4)
EOSINOPHIL # BLD: 0.1 K/UL (ref 0–0.6)
EOSINOPHIL NFR BLD: 1.2 %
GFR SERPLBLD CREATININE-BSD FMLA CKD-EPI: 62 ML/MIN/{1.73_M2}
GLUCOSE SERPL-MCNC: 103 MG/DL (ref 70–99)
GLUCOSE UR STRIP.AUTO-MCNC: NEGATIVE MG/DL
HCT VFR BLD AUTO: 35.2 % (ref 36–48)
HGB BLD-MCNC: 11.8 G/DL (ref 12–16)
HGB UR QL STRIP.AUTO: NEGATIVE
KETONES UR STRIP.AUTO-MCNC: NEGATIVE MG/DL
LACTATE BLDV-SCNC: 1.4 MMOL/L (ref 0.4–1.9)
LEUKOCYTE ESTERASE UR QL STRIP.AUTO: NEGATIVE
LYMPHOCYTES # BLD: 1.9 K/UL (ref 1–5.1)
LYMPHOCYTES NFR BLD: 20.3 %
MCH RBC QN AUTO: 31.6 PG (ref 26–34)
MCHC RBC AUTO-ENTMCNC: 33.6 G/DL (ref 31–36)
MCV RBC AUTO: 94.2 FL (ref 80–100)
MONOCYTES # BLD: 0.9 K/UL (ref 0–1.3)
MONOCYTES NFR BLD: 9.3 %
NEUTROPHILS # BLD: 6.5 K/UL (ref 1.7–7.7)
NEUTROPHILS NFR BLD: 68.2 %
NITRITE UR QL STRIP.AUTO: NEGATIVE
PH UR STRIP.AUTO: 6.5 [PH] (ref 5–8)
PLATELET # BLD AUTO: 307 K/UL (ref 135–450)
PMV BLD AUTO: 7.3 FL (ref 5–10.5)
POTASSIUM SERPL-SCNC: 4 MMOL/L (ref 3.5–5.1)
PROT SERPL-MCNC: 6.5 G/DL (ref 6.4–8.2)
PROT UR STRIP.AUTO-MCNC: NEGATIVE MG/DL
RBC # BLD AUTO: 3.73 M/UL (ref 4–5.2)
SODIUM SERPL-SCNC: 135 MMOL/L (ref 136–145)
SP GR UR STRIP.AUTO: 1.01 (ref 1–1.03)
TROPONIN, HIGH SENSITIVITY: 30 NG/L (ref 0–14)
TROPONIN, HIGH SENSITIVITY: 32 NG/L (ref 0–14)
UA COMPLETE W REFLEX CULTURE PNL UR: NORMAL
UA DIPSTICK W REFLEX MICRO PNL UR: NORMAL
URN SPEC COLLECT METH UR: NORMAL
UROBILINOGEN UR STRIP-ACNC: 0.2 E.U./DL
WBC # BLD AUTO: 9.6 K/UL (ref 4–11)

## 2024-05-21 PROCEDURE — 70450 CT HEAD/BRAIN W/O DYE: CPT

## 2024-05-21 PROCEDURE — 2060000000 HC ICU INTERMEDIATE R&B

## 2024-05-21 PROCEDURE — 93005 ELECTROCARDIOGRAM TRACING: CPT | Performed by: STUDENT IN AN ORGANIZED HEALTH CARE EDUCATION/TRAINING PROGRAM

## 2024-05-21 PROCEDURE — 85025 COMPLETE CBC W/AUTO DIFF WBC: CPT

## 2024-05-21 PROCEDURE — 81003 URINALYSIS AUTO W/O SCOPE: CPT

## 2024-05-21 PROCEDURE — 84484 ASSAY OF TROPONIN QUANT: CPT

## 2024-05-21 PROCEDURE — 83605 ASSAY OF LACTIC ACID: CPT

## 2024-05-21 PROCEDURE — 84443 ASSAY THYROID STIM HORMONE: CPT

## 2024-05-21 PROCEDURE — 99285 EMERGENCY DEPT VISIT HI MDM: CPT

## 2024-05-21 PROCEDURE — 93005 ELECTROCARDIOGRAM TRACING: CPT | Performed by: INTERNAL MEDICINE

## 2024-05-21 PROCEDURE — 80053 COMPREHEN METABOLIC PANEL: CPT

## 2024-05-21 PROCEDURE — 71045 X-RAY EXAM CHEST 1 VIEW: CPT

## 2024-05-21 PROCEDURE — 36415 COLL VENOUS BLD VENIPUNCTURE: CPT

## 2024-05-21 RX ORDER — SODIUM CHLORIDE 9 MG/ML
INJECTION, SOLUTION INTRAVENOUS CONTINUOUS
Status: ACTIVE | OUTPATIENT
Start: 2024-05-21 | End: 2024-05-23

## 2024-05-21 RX ORDER — ACETAMINOPHEN 325 MG/1
650 TABLET ORAL EVERY 6 HOURS PRN
Status: DISCONTINUED | OUTPATIENT
Start: 2024-05-21 | End: 2024-05-24 | Stop reason: HOSPADM

## 2024-05-21 RX ORDER — ACETAMINOPHEN 650 MG/1
650 SUPPOSITORY RECTAL EVERY 6 HOURS PRN
Status: DISCONTINUED | OUTPATIENT
Start: 2024-05-21 | End: 2024-05-24 | Stop reason: HOSPADM

## 2024-05-21 RX ORDER — POTASSIUM CHLORIDE 20 MEQ/1
40 TABLET, EXTENDED RELEASE ORAL PRN
Status: DISCONTINUED | OUTPATIENT
Start: 2024-05-21 | End: 2024-05-21

## 2024-05-21 RX ORDER — LOPERAMIDE HYDROCHLORIDE 2 MG/1
2 CAPSULE ORAL 4 TIMES DAILY PRN
Status: DISCONTINUED | OUTPATIENT
Start: 2024-05-21 | End: 2024-05-24 | Stop reason: HOSPADM

## 2024-05-21 RX ORDER — ASPIRIN 81 MG/1
81 TABLET ORAL DAILY
Status: DISCONTINUED | OUTPATIENT
Start: 2024-05-22 | End: 2024-05-24 | Stop reason: HOSPADM

## 2024-05-21 RX ORDER — ONDANSETRON 2 MG/ML
4 INJECTION INTRAMUSCULAR; INTRAVENOUS EVERY 6 HOURS PRN
Status: DISCONTINUED | OUTPATIENT
Start: 2024-05-21 | End: 2024-05-24 | Stop reason: HOSPADM

## 2024-05-21 RX ORDER — TAMSULOSIN HYDROCHLORIDE 0.4 MG/1
0.4 CAPSULE ORAL DAILY
Status: DISCONTINUED | OUTPATIENT
Start: 2024-05-22 | End: 2024-05-24 | Stop reason: HOSPADM

## 2024-05-21 RX ORDER — ONDANSETRON 4 MG/1
4 TABLET, ORALLY DISINTEGRATING ORAL EVERY 8 HOURS PRN
Status: DISCONTINUED | OUTPATIENT
Start: 2024-05-21 | End: 2024-05-21 | Stop reason: SDUPTHER

## 2024-05-21 RX ORDER — SODIUM CHLORIDE 9 MG/ML
INJECTION, SOLUTION INTRAVENOUS PRN
Status: DISCONTINUED | OUTPATIENT
Start: 2024-05-21 | End: 2024-05-24 | Stop reason: HOSPADM

## 2024-05-21 RX ORDER — POLYETHYLENE GLYCOL 3350 17 G/17G
17 POWDER, FOR SOLUTION ORAL
Status: DISCONTINUED | OUTPATIENT
Start: 2024-05-22 | End: 2024-05-24 | Stop reason: HOSPADM

## 2024-05-21 RX ORDER — ONDANSETRON 2 MG/ML
4 INJECTION INTRAMUSCULAR; INTRAVENOUS EVERY 6 HOURS PRN
Status: DISCONTINUED | OUTPATIENT
Start: 2024-05-21 | End: 2024-05-21 | Stop reason: SDUPTHER

## 2024-05-21 RX ORDER — LANOLIN ALCOHOL/MO/W.PET/CERES
3 CREAM (GRAM) TOPICAL NIGHTLY PRN
Status: DISCONTINUED | OUTPATIENT
Start: 2024-05-21 | End: 2024-05-24 | Stop reason: HOSPADM

## 2024-05-21 RX ORDER — LANOLIN ALCOHOL/MO/W.PET/CERES
1000 CREAM (GRAM) TOPICAL DAILY
Status: DISCONTINUED | OUTPATIENT
Start: 2024-05-22 | End: 2024-05-24 | Stop reason: HOSPADM

## 2024-05-21 RX ORDER — ONDANSETRON 4 MG/1
4 TABLET, ORALLY DISINTEGRATING ORAL EVERY 8 HOURS PRN
Status: DISCONTINUED | OUTPATIENT
Start: 2024-05-21 | End: 2024-05-24 | Stop reason: HOSPADM

## 2024-05-21 RX ORDER — TROSPIUM CHLORIDE 20 MG/1
20 TABLET, FILM COATED ORAL
Status: DISCONTINUED | OUTPATIENT
Start: 2024-05-22 | End: 2024-05-24 | Stop reason: HOSPADM

## 2024-05-21 RX ORDER — ENOXAPARIN SODIUM 100 MG/ML
30 INJECTION SUBCUTANEOUS DAILY
Status: DISCONTINUED | OUTPATIENT
Start: 2024-05-22 | End: 2024-05-24 | Stop reason: HOSPADM

## 2024-05-21 RX ORDER — SODIUM CHLORIDE 0.9 % (FLUSH) 0.9 %
5-40 SYRINGE (ML) INJECTION EVERY 12 HOURS SCHEDULED
Status: DISCONTINUED | OUTPATIENT
Start: 2024-05-21 | End: 2024-05-24 | Stop reason: HOSPADM

## 2024-05-21 RX ORDER — METOPROLOL SUCCINATE 25 MG/1
25 TABLET, EXTENDED RELEASE ORAL DAILY
Status: ON HOLD | COMMUNITY
End: 2024-05-24 | Stop reason: HOSPADM

## 2024-05-21 RX ORDER — POTASSIUM CHLORIDE 7.45 MG/ML
10 INJECTION INTRAVENOUS PRN
Status: DISCONTINUED | OUTPATIENT
Start: 2024-05-21 | End: 2024-05-21

## 2024-05-21 RX ORDER — POLYETHYLENE GLYCOL 3350 17 G/17G
17 POWDER, FOR SOLUTION ORAL DAILY PRN
Status: DISCONTINUED | OUTPATIENT
Start: 2024-05-21 | End: 2024-05-24 | Stop reason: HOSPADM

## 2024-05-21 RX ORDER — ACETAMINOPHEN 500 MG
1000 TABLET ORAL 3 TIMES DAILY
Status: DISCONTINUED | OUTPATIENT
Start: 2024-05-21 | End: 2024-05-24 | Stop reason: HOSPADM

## 2024-05-21 RX ORDER — ATORVASTATIN CALCIUM 10 MG/1
10 TABLET, FILM COATED ORAL NIGHTLY
Status: DISCONTINUED | OUTPATIENT
Start: 2024-05-21 | End: 2024-05-24 | Stop reason: HOSPADM

## 2024-05-21 RX ORDER — ESCITALOPRAM OXALATE 10 MG/1
10 TABLET ORAL DAILY
Status: DISCONTINUED | OUTPATIENT
Start: 2024-05-22 | End: 2024-05-24 | Stop reason: HOSPADM

## 2024-05-21 RX ORDER — CRANBERRY FRUIT EXTRACT 250 MG
250 CAPSULE ORAL 2 TIMES DAILY
COMMUNITY

## 2024-05-21 RX ORDER — ENOXAPARIN SODIUM 100 MG/ML
30 INJECTION SUBCUTANEOUS DAILY
Status: DISCONTINUED | OUTPATIENT
Start: 2024-05-22 | End: 2024-05-21 | Stop reason: SDUPTHER

## 2024-05-21 RX ORDER — SODIUM CHLORIDE 0.9 % (FLUSH) 0.9 %
5-40 SYRINGE (ML) INJECTION PRN
Status: DISCONTINUED | OUTPATIENT
Start: 2024-05-21 | End: 2024-05-24 | Stop reason: HOSPADM

## 2024-05-21 ASSESSMENT — LIFESTYLE VARIABLES
HOW OFTEN DO YOU HAVE A DRINK CONTAINING ALCOHOL: NEVER
HOW MANY STANDARD DRINKS CONTAINING ALCOHOL DO YOU HAVE ON A TYPICAL DAY: PATIENT DOES NOT DRINK

## 2024-05-21 ASSESSMENT — PAIN SCALES - GENERAL: PAINLEVEL_OUTOF10: 0

## 2024-05-21 NOTE — ED TRIAGE NOTES
Patient arrived via squad.  Patient was going to the MD to be seen for confusion, when she was getting out of the car she lost her balance and fell. Patients son called squad and she was brought to the ED.  Upon arrival she is having some confusion on date and time, but knows who she is.

## 2024-05-21 NOTE — PROGRESS NOTES
Medication Reconciliation    List of medications patient is currently taking is complete.     Source of information: 1. Conversation with patient's family at bedside                                      2. EPIC records                                       3. Medication records from [facility]     Notes regarding home medications:   1. Patient received morning medications prior to arrival to the ER today.    Rojelio See RPH, PharmD, BCPS  5/21/2024 5:52 PM

## 2024-05-21 NOTE — ED PROVIDER NOTES
[facility] EMERGENCY DEPARTMENT  EMERGENCY DEPARTMENT ENCOUNTER        Pt Name: Petra Green  MRN: [id number]  Birthdate 1935  Date of evaluation: 5/21/2024  Provider: Irma Alvarez PA-C  PCP: Walter Barnes MD  Note Started: 4:20 PM EDT 5/21/24      NEGRITA. I have evaluated this patient.        CHIEF COMPLAINT       Chief Complaint   Patient presents with    Altered Mental Status     Patient was going to the MD to be seen for confusion, when she was getting out of the car she lost her balance and fell.     Fall       HISTORY OF PRESENT ILLNESS: 1 or more Elements     History From: patient, son, daughter   Limitations to history : Altered Mental Status    Petra Green is a 88 y.o. female who presents for evaluation of increasing confusion.  Patient has history of some baseline confusion and was recently diagnosed with Parkinson's disease.  Family reports that she gets more confused when she has a urinary tract infection and concern for this.  States that she gets these about once a month now.  They made an appointment for her to see her PCP today due to this increasing confusion and when he was trying to maneuver her out of the car into the wheelchair she went limp and was unresponsive for several seconds.  She did not fall, he lowered her into the wheelchair.  States that she was very slow to come back around was very confused and disoriented but is starting to become a little bit more responsive now.  No other complaints or concerns at this time.    Nursing Notes were all reviewed and agreed with or any disagreements were addressed in the HPI.    REVIEW OF SYSTEMS :      Review of Systems   Unable to perform ROS: Mental status change   Neurological:  Positive for syncope.   Psychiatric/Behavioral:  Positive for confusion.        Positives and Pertinent negatives as per HPI.     SURGICAL HISTORY     Past Surgical History:   Procedure Laterality Date    CARPAL TUNNEL RELEASE       right, 2003    COLONOSCOPY      2013, dr contreras    COLONOSCOPY N/A 10/19/2018    COLONOSCOPY WITH BIOPSY performed by Qasim Montana MD at [facility] ENDOSCOPY    EYE SURGERY      December 27 2017 and feb 6 2018    HERNIA REPAIR      2011    NASAL SINUS SURGERY      UPPER GASTROINTESTINAL ENDOSCOPY N/A 4/4/2019    EGD BIOPSY performed by Qasim Montana MD at [facility] ENDOSCOPY       CURRENTMEDICATIONS       Previous Medications    ACETAMINOPHEN (TYLENOL) 500 MG TABLET    Take 2 tablets by mouth 3 times daily    ASPIRIN 81 MG EC TABLET    Take 1 tablet by mouth daily    ATORVASTATIN (LIPITOR) 10 MG TABLET    TAKE ONE TABLET BY MOUTH ONCE NIGHTLY    CALCIUM CITRATE-VITAMIN D (CALCIUM + D PO)    Take 500 mg by mouth daily    CARBIDOPA-LEVODOPA (SINEMET)  MG PER TABLET    Take 1 tablet by mouth 3 times daily    CHOLECALCIFEROL 50 MCG (2000 UT) TABS    Take 1 tablet by mouth daily    CRANBERRY 250 MG CAPS    Take 250 mg by mouth in the morning and at bedtime    ESCITALOPRAM (LEXAPRO) 10 MG TABLET    Take 1 tablet by mouth daily    FUROSEMIDE (LASIX) 20 MG TABLET    Take 1 tablet by mouth daily    GUAIFENESIN (MUCINEX) 600 MG EXTENDED RELEASE TABLET    Take 1 tablet by mouth 2 times daily    HANDICAP PLACARD MISC    by Does not apply route Expires 3/5/2026    LOPERAMIDE (IMODIUM) 2 MG CAPSULE    Take 1 capsule by mouth 4 times daily as needed for Diarrhea    MELATONIN 3 MG TABS TABLET    TAKE ONE TABLET BY MOUTH ONCE NIGHTLY    METOPROLOL SUCCINATE (TOPROL XL) 25 MG EXTENDED RELEASE TABLET    Take 1 tablet by mouth daily    MYRBETRIQ 25 MG TB24    TAKE 1 TABLET BY MOUTH DAILY    POLYETHYLENE GLYCOL (GLYCOLAX) 17 G PACKET    Take 1 packet by mouth every 3 days    RISEDRONATE (ACTONEL) 35 MG TABLET    TAKE 1 TABLET BY MOUTH ONCE WEEKLY FOR OSTEOPOROSIS    TAMSULOSIN (FLOMAX) 0.4 MG CAPSULE    Take 1 capsule by mouth daily    TRAZODONE (DESYREL) 50 MG TABLET    TAKE ONE TABLET BY MOUTH ONCE NIGHTLY    VITAMIN B-12  None    Records Reviewed (External and Source) admission in December    CC/HPI Summary, DDx, ED Course, and Reassessment: Patient presents for evaluation of increasing confusion and syncope.  On exam, she appears drowsy but is arousable, but definitely confused and disoriented more so than baseline but able to follow commands and moving extremities without difficulty or focal neurologic deficit.  Pupils are equal round and reactive to light.  Extraocular movements are intact.  Dry mucous membranes.  Lungs are clear to auscultation bilaterally.  Abdomen is soft, nontender.  She will be reevaluated.  To see attending note for EKG interpretation.    Disposition Considerations (tests considered but not done, Admit vs D/C, Shared Decision Making, Pt Expectation of Test or Tx.): CBC CMP are relatively unremarkable.  No white count.  Urinalysis is negative.  Troponin slightly elevated at 32, this will be trended.  Lactic acid 1.4.  Chest x-ray is negative.  CT of the head with no acute intracranial abnormalities.  Incidental finding of bilateral paranasal sinus disease.  I do believe she warrants admission for further evaluation and syncope workup.  Also trending of elevated troponin and cardiac evaluation.  Hospitalist resume care of the patient at this time.  Patient and family informed and agreeable.  She is stable for admission.      I am the Primary Clinician of Record.  FINAL IMPRESSION      1. Syncope and collapse    2. Altered mental status, unspecified altered mental status type    3. Elevated troponin          DISPOSITION/PLAN     DISPOSITION Decision To Admit 05/21/2024 05:17:11 PM      PATIENT REFERRED TO:  No follow-up provider specified.    DISCHARGE MEDICATIONS:  New Prescriptions    No medications on file       DISCONTINUED MEDICATIONS:  Discontinued Medications    METOPROLOL SUCCINATE (TOPROL XL) 25 MG EXTENDED RELEASE TABLET    Take 2 tablets by mouth daily              (Please note that portions of this  note were completed with a voice recognition program.  Efforts were made to edit the dictations but occasionally words are mis-transcribed.)    Irma Alvarez PA-C (electronically signed)           Irma Alvarez PA-C  05/21/24 3630

## 2024-05-22 ENCOUNTER — APPOINTMENT (OUTPATIENT)
Age: 89
End: 2024-05-22
Attending: INTERNAL MEDICINE
Payer: MEDICARE

## 2024-05-22 LAB
ECHO AO ROOT DIAM: 3.1 CM
ECHO AO ROOT INDEX: 2.58 CM/M2
ECHO AV AREA PEAK VELOCITY: 3.2 CM2
ECHO AV AREA VTI: 2.8 CM2
ECHO AV AREA/BSA PEAK VELOCITY: 2.7 CM2/M2
ECHO AV AREA/BSA VTI: 2.3 CM2/M2
ECHO AV MEAN GRADIENT: 3 MMHG
ECHO AV MEAN VELOCITY: 0.8 M/S
ECHO AV PEAK GRADIENT: 4 MMHG
ECHO AV PEAK VELOCITY: 1 M/S
ECHO AV VELOCITY RATIO: 1.1
ECHO AV VTI: 27.1 CM
ECHO BSA: 1.25 M2
ECHO EST RA PRESSURE: 10 MMHG
ECHO LA AREA 2C: 9.7 CM2
ECHO LA AREA 4C: 14.1 CM2
ECHO LA MAJOR AXIS: 5.8 CM
ECHO LA MINOR AXIS: 3.3 CM
ECHO LA VOL MOD A2C: 24 ML (ref 22–52)
ECHO LA VOL MOD A4C: 27 ML (ref 22–52)
ECHO LA VOLUME INDEX MOD A2C: 20 ML/M2 (ref 16–34)
ECHO LA VOLUME INDEX MOD A4C: 23 ML/M2 (ref 16–34)
ECHO LV E' LATERAL VELOCITY: 10 CM/S
ECHO LV E' SEPTAL VELOCITY: 6 CM/S
ECHO LV FRACTIONAL SHORTENING: 30 % (ref 28–44)
ECHO LV INTERNAL DIMENSION DIASTOLE INDEX: 2.5 CM/M2
ECHO LV INTERNAL DIMENSION DIASTOLIC: 3 CM (ref 3.9–5.3)
ECHO LV INTERNAL DIMENSION SYSTOLIC INDEX: 1.75 CM/M2
ECHO LV INTERNAL DIMENSION SYSTOLIC: 2.1 CM
ECHO LV IVSD: 0.8 CM (ref 0.6–0.9)
ECHO LV MASS 2D: 64.5 G (ref 67–162)
ECHO LV MASS INDEX 2D: 53.8 G/M2 (ref 43–95)
ECHO LV POSTERIOR WALL DIASTOLIC: 0.9 CM (ref 0.6–0.9)
ECHO LV RELATIVE WALL THICKNESS RATIO: 0.6
ECHO LVOT AREA: 3.1 CM2
ECHO LVOT AV VTI INDEX: 0.88
ECHO LVOT DIAM: 2 CM
ECHO LVOT MEAN GRADIENT: 2 MMHG
ECHO LVOT PEAK GRADIENT: 4 MMHG
ECHO LVOT PEAK VELOCITY: 1.1 M/S
ECHO LVOT STROKE VOLUME INDEX: 62.3 ML/M2
ECHO LVOT SV: 74.7 ML
ECHO LVOT VTI: 23.8 CM
ECHO MV A VELOCITY: 0.89 M/S
ECHO MV AREA VTI: 3.2 CM2
ECHO MV E DECELERATION TIME (DT): 249 MS
ECHO MV E VELOCITY: 0.72 M/S
ECHO MV E/A RATIO: 0.81
ECHO MV E/E' LATERAL: 7.2
ECHO MV E/E' RATIO (AVERAGED): 9.6
ECHO MV E/E' SEPTAL: 12
ECHO MV LVOT VTI INDEX: 0.98
ECHO MV MAX VELOCITY: 0.9 M/S
ECHO MV MEAN GRADIENT: 1 MMHG
ECHO MV MEAN VELOCITY: 0.6 M/S
ECHO MV PEAK GRADIENT: 3 MMHG
ECHO MV VTI: 23.4 CM
ECHO PV MAX VELOCITY: 0.9 M/S
ECHO PV PEAK GRADIENT: 3 MMHG
ECHO RA AREA 4C: 9.3 CM2
ECHO RA END SYSTOLIC VOLUME APICAL 4 CHAMBER INDEX BSA: 15 ML/M2
ECHO RA VOLUME: 18 ML
ECHO RIGHT VENTRICULAR SYSTOLIC PRESSURE (RVSP): 20 MMHG
ECHO RV BASAL DIMENSION: 2.8 CM
ECHO RV FREE WALL PEAK S': 10 CM/S
ECHO RV LONGITUDINAL DIMENSION: 6.5 CM
ECHO RV MID DIMENSION: 1.9 CM
ECHO RV TAPSE: 1.7 CM (ref 1.7–?)
ECHO TV REGURGITANT MAX VELOCITY: 1.6 M/S
ECHO TV REGURGITANT PEAK GRADIENT: 10 MMHG
EKG ATRIAL RATE: 62 BPM
EKG ATRIAL RATE: 68 BPM
EKG ATRIAL RATE: 70 BPM
EKG DIAGNOSIS: NORMAL
EKG P AXIS: -69 DEGREES
EKG P AXIS: 65 DEGREES
EKG P AXIS: 74 DEGREES
EKG P-R INTERVAL: 128 MS
EKG P-R INTERVAL: 132 MS
EKG P-R INTERVAL: 132 MS
EKG Q-T INTERVAL: 400 MS
EKG Q-T INTERVAL: 418 MS
EKG Q-T INTERVAL: 420 MS
EKG QRS DURATION: 68 MS
EKG QRS DURATION: 80 MS
EKG QRS DURATION: 80 MS
EKG QTC CALCULATION (BAZETT): 424 MS
EKG QTC CALCULATION (BAZETT): 432 MS
EKG QTC CALCULATION (BAZETT): 446 MS
EKG R AXIS: -3 DEGREES
EKG R AXIS: 35 DEGREES
EKG R AXIS: 41 DEGREES
EKG T AXIS: 25 DEGREES
EKG T AXIS: 56 DEGREES
EKG T AXIS: 60 DEGREES
EKG VENTRICULAR RATE: 62 BPM
EKG VENTRICULAR RATE: 68 BPM
EKG VENTRICULAR RATE: 70 BPM
NT-PROBNP SERPL-MCNC: 427 PG/ML (ref 0–449)
TROPONIN, HIGH SENSITIVITY: 30 NG/L (ref 0–14)
TSH SERPL DL<=0.005 MIU/L-ACNC: 1.79 UIU/ML (ref 0.27–4.2)

## 2024-05-22 PROCEDURE — 6360000002 HC RX W HCPCS: Performed by: INTERNAL MEDICINE

## 2024-05-22 PROCEDURE — 2580000003 HC RX 258: Performed by: INTERNAL MEDICINE

## 2024-05-22 PROCEDURE — 97162 PT EVAL MOD COMPLEX 30 MIN: CPT | Performed by: PHYSICAL THERAPIST

## 2024-05-22 PROCEDURE — 83880 ASSAY OF NATRIURETIC PEPTIDE: CPT

## 2024-05-22 PROCEDURE — 93306 TTE W/DOPPLER COMPLETE: CPT

## 2024-05-22 PROCEDURE — 99223 1ST HOSP IP/OBS HIGH 75: CPT | Performed by: INTERNAL MEDICINE

## 2024-05-22 PROCEDURE — 93010 ELECTROCARDIOGRAM REPORT: CPT | Performed by: INTERNAL MEDICINE

## 2024-05-22 PROCEDURE — 97530 THERAPEUTIC ACTIVITIES: CPT | Performed by: PHYSICAL THERAPIST

## 2024-05-22 PROCEDURE — 99223 1ST HOSP IP/OBS HIGH 75: CPT | Performed by: STUDENT IN AN ORGANIZED HEALTH CARE EDUCATION/TRAINING PROGRAM

## 2024-05-22 PROCEDURE — 36415 COLL VENOUS BLD VENIPUNCTURE: CPT

## 2024-05-22 PROCEDURE — 93306 TTE W/DOPPLER COMPLETE: CPT | Performed by: INTERNAL MEDICINE

## 2024-05-22 PROCEDURE — 6370000000 HC RX 637 (ALT 250 FOR IP): Performed by: INTERNAL MEDICINE

## 2024-05-22 PROCEDURE — 93005 ELECTROCARDIOGRAM TRACING: CPT | Performed by: INTERNAL MEDICINE

## 2024-05-22 PROCEDURE — 94760 N-INVAS EAR/PLS OXIMETRY 1: CPT

## 2024-05-22 PROCEDURE — 2060000000 HC ICU INTERMEDIATE R&B

## 2024-05-22 RX ADMIN — CYANOCOBALAMIN TAB 1000 MCG 1000 MCG: 1000 TAB at 08:17

## 2024-05-22 RX ADMIN — ASPIRIN 81 MG: 81 TABLET, COATED ORAL at 08:16

## 2024-05-22 RX ADMIN — CARBIDOPA AND LEVODOPA 1 TABLET: 25; 100 TABLET ORAL at 21:17

## 2024-05-22 RX ADMIN — Medication 1 TABLET: at 08:16

## 2024-05-22 RX ADMIN — ENOXAPARIN SODIUM 30 MG: 100 INJECTION SUBCUTANEOUS at 08:17

## 2024-05-22 RX ADMIN — ACETAMINOPHEN 1000 MG: 500 TABLET ORAL at 08:16

## 2024-05-22 RX ADMIN — ACETAMINOPHEN 1000 MG: 500 TABLET ORAL at 21:17

## 2024-05-22 RX ADMIN — SODIUM CHLORIDE: 9 INJECTION, SOLUTION INTRAVENOUS at 00:20

## 2024-05-22 RX ADMIN — CARBIDOPA AND LEVODOPA 1 TABLET: 25; 100 TABLET ORAL at 00:23

## 2024-05-22 RX ADMIN — SODIUM CHLORIDE, PRESERVATIVE FREE 10 ML: 5 INJECTION INTRAVENOUS at 21:18

## 2024-05-22 RX ADMIN — ESCITALOPRAM OXALATE 10 MG: 10 TABLET ORAL at 08:16

## 2024-05-22 RX ADMIN — ACETAMINOPHEN 1000 MG: 500 TABLET ORAL at 13:04

## 2024-05-22 RX ADMIN — ATORVASTATIN CALCIUM 10 MG: 10 TABLET, FILM COATED ORAL at 00:23

## 2024-05-22 RX ADMIN — ACETAMINOPHEN 1000 MG: 500 TABLET ORAL at 00:23

## 2024-05-22 RX ADMIN — SODIUM CHLORIDE, PRESERVATIVE FREE 5 ML: 5 INJECTION INTRAVENOUS at 00:30

## 2024-05-22 RX ADMIN — ATORVASTATIN CALCIUM 10 MG: 10 TABLET, FILM COATED ORAL at 21:17

## 2024-05-22 RX ADMIN — CARBIDOPA AND LEVODOPA 1 TABLET: 25; 100 TABLET ORAL at 08:16

## 2024-05-22 RX ADMIN — CARBIDOPA AND LEVODOPA 1 TABLET: 25; 100 TABLET ORAL at 13:05

## 2024-05-22 RX ADMIN — TAMSULOSIN HYDROCHLORIDE 0.4 MG: 0.4 CAPSULE ORAL at 08:17

## 2024-05-22 RX ADMIN — TROSPIUM CHLORIDE 20 MG: 20 TABLET, FILM COATED ORAL at 05:31

## 2024-05-22 RX ADMIN — POLYETHYLENE GLYCOL 3350 17 G: 17 POWDER, FOR SOLUTION ORAL at 08:17

## 2024-05-22 ASSESSMENT — PAIN SCALES - GENERAL
PAINLEVEL_OUTOF10: 0
PAINLEVEL_OUTOF10: 0

## 2024-05-22 ASSESSMENT — PAIN SCALES - PAIN ASSESSMENT IN ADVANCED DEMENTIA (PAINAD)
FACIALEXPRESSION: SMILING OR INEXPRESSIVE
TOTALSCORE: 0
CONSOLABILITY: NO NEED TO CONSOLE
BREATHING: NORMAL
BODYLANGUAGE: RELAXED

## 2024-05-22 NOTE — CONSULTS
[facility]  Cardiology Consult Note        CC:     Syncope              HPI:   This is a 88 y.o. female who has been diagnosed with parkinsonism because of rigidity was going to see her neurologist yesterday.  As she is being helped out of her car she slumped in front of her son.  He never fell.  According to him just a few seconds later she came back.    Reason for consult is syncope    The patient also has confusion and memory issues and so history is not really reliable.  She lives in [facility] assisted living.  Often needs help to get up but is able to use a walker fairly well.  I did ask her about feeling dizzy and lightheadedness, and she was not sure about it.  Patient's son also mentioned that there have been some issues about blood pressure as well.      Home medications include aspirin atorvastatin Sinemet furosemide 20 mg Toprol-XL 25 tamsulosin 0.4    Past Medical History:   Diagnosis Date    Acute kidney injury (HCC)     Allergic rhinitis     CHF (congestive heart failure) (HCC)     Delirium     GERD (gastroesophageal reflux disease)     Hearing loss sensory, bilateral     mod--severe on right, mild to severe on left, due to asymmetry will send to get MRI to r/o acoustic neuroma    Mastoiditis     on MRI oct 2014-->dr ortiz states with no clinical findings this is considered an over-sensitivity of MRI results    Osteoarthritis     L1-L5    Osteoporosis     patient was on fosamax x 10 years and drug holiday was started on 6/21/13. (this was date of last DEXA scan)    Scoliosis       Past Surgical History:   Procedure Laterality Date    CARPAL TUNNEL RELEASE      right, 2003    COLONOSCOPY      2013, dr contreras    COLONOSCOPY N/A 10/19/2018    COLONOSCOPY WITH BIOPSY performed by Qasim Montana MD at [facility] ENDOSCOPY    EYE SURGERY      December 27 2017 and feb 6 2018    HERNIA REPAIR      2011    NASAL SINUS SURGERY      UPPER GASTROINTESTINAL ENDOSCOPY N/A 4/4/2019    EGD BIOPSY performed  by Qasim Montana MD at [facility] ENDOSCOPY      Family History   Problem Relation Age of Onset    Diabetes Mother     Heart Disease Mother       Social History     Tobacco Use    Smoking status: Never    Smokeless tobacco: Never   Vaping Use    Vaping Use: Never used   Substance Use Topics    Alcohol use: No    Drug use: No      Allergies   Allergen Reactions    Ace Inhibitors      Caused ATN and hospitalization    Hctz [Hydrochlorothiazide]      Caused ATN and hospitalization      acetaminophen  1,000 mg Oral TID    aspirin  81 mg Oral Daily    atorvastatin  10 mg Oral Nightly    oyster shell calcium w/D  1 tablet Oral Daily    carbidopa-levodopa  1 tablet Oral TID    escitalopram  10 mg Oral Daily    trospium  20 mg Oral QAM AC    polyethylene glycol  17 g Oral Q3 Days    tamsulosin  0.4 mg Oral Daily    vitamin B-12  1,000 mcg Oral Daily    sodium chloride flush  5-40 mL IntraVENous 2 times per day    enoxaparin  30 mg SubCUTAneous Daily       Review of Systems -   Constitutional: Negative for weight gain/loss; malaise, fever  Respiratory: Negative for Asthma;  cough and hemoptysis  Cardiovascular: Negative for palpitations,dizziness   Gastrointestinal: Negative for abd.pain; constipation/diarrhea;    Genitourinary: Negative for stones; hematuria; frequency hesitancy  Integumentt: Negative for rash or pruritis  Hematologic/lymphatic: Negative for blood dyscrasia; leukemia/lymphoma  Musculoskeletal: Negative for Connective tissue disease  Neurological:  Negative for Seizure   Behavioral/Psych:Negative for Bipolar disorder, Schizophrenia; Dementia  Endocrine: negative for thyroid, parathyroid disease    No intake or output data in the 24 hours ending 05/22/24 0937    Physical Examination:    /66   Pulse 77   Temp 98.1 °F (36.7 °C) (Oral)   Resp 20   Ht 1.27 m (4' 2\")   Wt 44.2 kg (97 lb 7.1 oz)   LMP  (LMP Unknown)   SpO2 94%   BMI 27.40 kg/m²    HEENT:  Face: Atraumatic, Conjunctiva: Pink; non

## 2024-05-22 NOTE — CARE COORDINATION
Case Management Assessment  Initial Evaluation    Date/Time of Evaluation: 5/22/2024 2:45 PM  Assessment Completed by: Pooja Graves RN    If patient is discharged prior to next notation, then this note serves as note for discharge by case management.    Patient Name: Petra Green                   YOB: 1935  Diagnosis: Syncope and collapse [R55]  Elevated troponin [R79.89]  Altered mental status, unspecified altered mental status type [R41.82]  Syncope, unspecified syncope type [R55]                   Date / Time: 5/21/2024  3:26 PM    Patient Admission Status: Inpatient   Readmission Risk (Low < 19, Mod (19-27), High > 27): Readmission Risk Score: 13.8    Current PCP: Walter Barnes MD  PCP verified by CM? Yes    Chart Reviewed: Yes      History Provided by: Patient, Medical Record, Child/Family  Patient Orientation: Alert and Oriented    Patient Cognition: Other (see comment) (confusion noted)    Hospitalization in the last 30 days (Readmission):  No    If yes, Readmission Assessment in CM Navigator will be completed.    Advance Directives:      Code Status: Full Code   Patient's Primary Decision Maker is: Named in Scanned ACP Document    Primary Decision Maker: Brook Vidalcy - Child - [phone]    Secondary Decision Maker: PeterShant - Child - [phone]    Discharge Planning:    Patient lives with: Other (Comment) ([facility]) Type of Home: Assisted living  Primary Care Giver: Other (Comment) ([facility] assisted living)  Patient Support Systems include: Children, Family Members   Current Financial resources: Medicare  Current community resources: Assisted Living  Current services prior to admission: Durable Medical Equipment            Current DME: Cane, Walker            Type of Home Care services:  PT, OT, Nursing Services    ADLS  Prior functional level: Assistance with the following:, Bathing, Dressing, Toileting, Cooking, Housework, Shopping, Mobility  Current

## 2024-05-22 NOTE — ED PROVIDER NOTES
2040:  Consult for admission sent to Hospitalist.  Patient sees Dr. Barnes.  I had Dr. Valenzuela paged and discussed with patient with her.  She will admit.      Deejay Weber MD  05/21/24 2043

## 2024-05-22 NOTE — ACP (ADVANCE CARE PLANNING)
Advance Care Planning     Advance Care Planning Activator (Inpatient)  Conversation Note      Date of ACP Conversation: 5/22/2024     Conversation Conducted with: Patient with Decision Making Capacity and Healthcare Decision Maker; medical record    ACP Activator: Pooja Graves RN    Health Care Decision Maker:     Current Designated Health Care Decision Maker:     Primary Decision Maker: Tia Vidal - Child - [phone]    Secondary Decision Maker: Shant Green - Child - [phone]    Today we documented Decision Maker(s) consistent with ACP documents on file.    Care Preferences    Ventilation:  \"If you were in your present state of health and suddenly became very ill and were unable to breathe on your own, what would your preference be about the use of a ventilator (breathing machine) if it were available to you?\"      Would the patient desire the use of ventilator (breathing machine)?: no    \"If your health worsens and it becomes clear that your chance of recovery is unlikely, what would your preference be about the use of a ventilator (breathing machine) if it were available to you?\"     Would the patient desire the use of ventilator (breathing machine)?: No      Resuscitation  \"CPR works best to restart the heart when there is a sudden event, like a heart attack, in someone who is otherwise healthy. Unfortunately, CPR does not typically restart the heart for people who have serious health conditions or who are very sick.\"    \"In the event your heart stopped as a result of an underlying serious health condition, would you want attempts to be made to restart your heart (answer \"yes\" for attempt to resuscitate) or would you prefer a natural death (answer \"no\" for do not attempt to resuscitate)?\" no       [] Yes   [] No   Educated Patient / Decision Maker regarding differences between Advance Directives and portable DNR orders.    Length of ACP Conversation in minutes:      Conversation

## 2024-05-22 NOTE — PROGRESS NOTES
Pt arrived via stretcher from ED to room 8618.  Heart monitor connected and verified with [facility]. VS, assessment, and admission complete. 4 eyes assessment complete. Pt and family oriented to unit and room. Call light and bedside table in reach. All questions answered. Pt resting in bed.    Electronically signed by Diomedes Cheung RN on 5/22/2024 at 12:39 AM

## 2024-05-22 NOTE — ED NOTES
Report called to ANALY Hercules for Rm [id number]. Questions addressed and report accepted. Transport request has been entered

## 2024-05-22 NOTE — ED NOTES
Pt provided additional warm blanket due to complaint of being too cold in her room still. Pt also provided turkey sandwich and cup of water. Pt provided her own thickener for the water, she is on thickened liquids, and family, who are more familiar with her needs, approved of the viscosity of the water. Pt and family were updated that she has a bed assigned and will be transported to the next bed as soon as report can be called.

## 2024-05-22 NOTE — PROGRESS NOTES
4 Eyes Skin Assessment     NAME:  Petra Green  YOB: 1935  MEDICAL RECORD NUMBER:  [id number]    The patient is being assessed for  Admission    I agree that at least one RN has performed a thorough Head to Toe Skin Assessment on the patient. ALL assessment sites listed below have been assessed.      Areas assessed by both nurses:    Head, Face, Ears, Shoulders, Back, Chest, Arms, Elbows, Hands, Sacrum. Buttock, Coccyx, Ischium, and Legs. Feet and Heels        Does the Patient have a Wound? Yes wound(s) were present on assessment. LDA wound assessment was Initiated and completed by RN       Faimlia Prevention initiated by RN: Yes  Wound Care Orders initiated by RN: No    Pressure Injury (Stage 3,4, Unstageable, DTI, NWPT, and Complex wounds) if present, place Wound referral order by RN under : No    New Ostomies, if present place, Ostomy referral order under : No     Nurse 1 eSignature: Electronically signed by Diomedes Cheung RN on 5/22/24 at 12:38 AM EDT    **SHARE this note so that the co-signing nurse can place an eSignature**    Nurse 2 eSignature: Electronically signed by Janelle Alford RN on 5/22/24 at 12:45 AM EDT

## 2024-05-22 NOTE — H&P
[facility]  History and Physical    Patient's PCP: Walter Barnes MD  Code Status: Full Code  History Obtained From:  patient, family member    CC: syncope    HPI:     Petra Green is an 88-year-old lady with past medical history significant for osteoporosis, GERD, CHF who presented to the emergency department after syncopal episode.    Patient was scheduled to see neurology outpatient for follow-up of Parkinson's and going from transfer to the wheelchair had a syncopal event.  Her eyes rolled back in her head for about 5 seconds and she became confused.  Family had been concerned as she had recently had recurrent UTIs.  She did not have any fever or chills.  Baseline confusion has been worse.  She does not report any chest pain or shortness of breath.  She does not report any symptomatic palpitations.  She has been adherent with home medications.    Past Medical / Surgical History:    Past Medical History:   Diagnosis Date    Acute kidney injury (HCC)     Allergic rhinitis     CHF (congestive heart failure) (HCC)     Delirium     GERD (gastroesophageal reflux disease)     Hearing loss sensory, bilateral     mod--severe on right, mild to severe on left, due to asymmetry will send to get MRI to r/o acoustic neuroma    Mastoiditis     on MRI oct 2014-->dr ortiz states with no clinical findings this is considered an over-sensitivity of MRI results    Osteoarthritis     L1-L5    Osteoporosis     patient was on fosamax x 10 years and drug holiday was started on 6/21/13. (this was date of last DEXA scan)    Scoliosis      Past Surgical History:   Procedure Laterality Date    CARPAL TUNNEL RELEASE      right, 2003    COLONOSCOPY      2013, dr contreras    COLONOSCOPY N/A 10/19/2018    COLONOSCOPY WITH BIOPSY performed by Qasim Montana MD at [facility] ENDOSCOPY    EYE SURGERY      December 27 2017 and feb 6 2018    HERNIA REPAIR      2011    NASAL SINUS SURGERY      UPPER GASTROINTESTINAL ENDOSCOPY  12/13/2023 04:02 PM    MUCUS 2+ 12/13/2023 04:02 PM    BACTERIA 1+ 01/29/2024 11:55 PM    CLARITYU Clear 05/21/2024 04:14 PM    LEUKOCYTESUR Negative 05/21/2024 04:14 PM    BLOODU Negative 05/21/2024 04:14 PM    GLUCOSEU Negative 05/21/2024 04:14 PM    AMORPHOUS 1+ 04/22/2019 10:59 PM     Urine culture pending    Assessment & Plan:    Petra Green is a 88 y.o. female who was admitted with Syncope and collapse.  Principal Problem:    Syncope  CHF  CAD  -Hold metoprolol on home Lasix given relatively low blood pressure  - Appreciate cardiology consultation  - Repeat echo pending, diastolic dysfunction in 2019 study    Parkinson's  - Possibly autonomic instability contributing cardiac systems?  - Continue home Sinemet    Osteoporosis  History of lumbar degenerative disc disease  - PT/OT    Recurrent UTIs  - Urine bland, recently treated in April  -Continue Myrbetriq    F/E/N: Regular diet, thickened liquids  PPx: Lovenox  Dispo: Syncope evaluation    Walter Barnes MD   5/22/2024 9:53 AM    Documentation was done using voice recognition dragon software.  Every effort was made to ensure accuracy; however, inadvertent unintentional computerized transcription errors may be present.

## 2024-05-22 NOTE — PROGRESS NOTES
Physical Therapy  Facility/Department: [address] PROGRESSIVE CARE  Physical Therapy Initial Assessment    Name: Petra Green  : 1935  MRN: [id number]  Date of Service: 2024    Discharge Recommendations:  Home with Home health PT, Home with assist PRN (AL facility)   PT Equipment Recommendations  Equipment Needed: No      Petra Green scored a 10/24 on the AM-PAC short mobility form. Current research shows that an AM-PAC score of 18 or greater is typically associated with a discharge to the patient's home setting. At this time, if  Gil can provide assist pt needs, then return to AL with continued therapy . If Gil not able to provide assist, then an alternate discharge disposition will be needed.  Please see assessment section for further patient specific details.    If patient discharges prior to next session this note will serve as a discharge summary.  Please see below for the latest assessment towards goals.       Patient Diagnosis(es): The primary encounter diagnosis was Syncope and collapse. Diagnoses of Altered mental status, unspecified altered mental status type, Elevated troponin, and Syncope, unspecified syncope type were also pertinent to this visit.  Past Medical History:  has a past medical history of Acute kidney injury (HCC), Allergic rhinitis, CHF (congestive heart failure) (HCC), Delirium, GERD (gastroesophageal reflux disease), Hearing loss sensory, bilateral, Mastoiditis, Osteoarthritis, Osteoporosis, and Scoliosis.  Past Surgical History:  has a past surgical history that includes Carpal tunnel release; Nasal sinus surgery; hernia repair; Colonoscopy; eye surgery; Colonoscopy (N/A, 10/19/2018); and Upper gastrointestinal endoscopy (N/A, 2019).    Assessment   Assessment: pt is an 89 yo female who had a syncope episode while transferring from car to [facility] with her son to go to see her neurologist; pt was recently diagnosed with Parkinson's disease and has frequent

## 2024-05-23 LAB
ANION GAP SERPL CALCULATED.3IONS-SCNC: 7 MMOL/L (ref 3–16)
BASOPHILS # BLD: 0.1 K/UL (ref 0–0.2)
BASOPHILS NFR BLD: 1.1 %
BUN SERPL-MCNC: 15 MG/DL (ref 7–20)
CALCIUM SERPL-MCNC: 8.3 MG/DL (ref 8.3–10.6)
CHLORIDE SERPL-SCNC: 107 MMOL/L (ref 99–110)
CO2 SERPL-SCNC: 23 MMOL/L (ref 21–32)
CREAT SERPL-MCNC: 0.8 MG/DL (ref 0.6–1.2)
DEPRECATED RDW RBC AUTO: 14.4 % (ref 12.4–15.4)
EOSINOPHIL # BLD: 0.2 K/UL (ref 0–0.6)
EOSINOPHIL NFR BLD: 2.3 %
GFR SERPLBLD CREATININE-BSD FMLA CKD-EPI: 71 ML/MIN/{1.73_M2}
GLUCOSE SERPL-MCNC: 95 MG/DL (ref 70–99)
HCT VFR BLD AUTO: 30.8 % (ref 36–48)
HGB BLD-MCNC: 10.6 G/DL (ref 12–16)
LYMPHOCYTES # BLD: 1.5 K/UL (ref 1–5.1)
LYMPHOCYTES NFR BLD: 23 %
MCH RBC QN AUTO: 32 PG (ref 26–34)
MCHC RBC AUTO-ENTMCNC: 34.3 G/DL (ref 31–36)
MCV RBC AUTO: 93.4 FL (ref 80–100)
MONOCYTES # BLD: 0.6 K/UL (ref 0–1.3)
MONOCYTES NFR BLD: 8.9 %
NEUTROPHILS # BLD: 4.3 K/UL (ref 1.7–7.7)
NEUTROPHILS NFR BLD: 64.7 %
PLATELET # BLD AUTO: 262 K/UL (ref 135–450)
PMV BLD AUTO: 7.1 FL (ref 5–10.5)
POTASSIUM SERPL-SCNC: 3.8 MMOL/L (ref 3.5–5.1)
RBC # BLD AUTO: 3.3 M/UL (ref 4–5.2)
SODIUM SERPL-SCNC: 137 MMOL/L (ref 136–145)
WBC # BLD AUTO: 6.6 K/UL (ref 4–11)

## 2024-05-23 PROCEDURE — 94760 N-INVAS EAR/PLS OXIMETRY 1: CPT

## 2024-05-23 PROCEDURE — 6370000000 HC RX 637 (ALT 250 FOR IP): Performed by: INTERNAL MEDICINE

## 2024-05-23 PROCEDURE — 6370000000 HC RX 637 (ALT 250 FOR IP): Performed by: STUDENT IN AN ORGANIZED HEALTH CARE EDUCATION/TRAINING PROGRAM

## 2024-05-23 PROCEDURE — 97166 OT EVAL MOD COMPLEX 45 MIN: CPT

## 2024-05-23 PROCEDURE — 36415 COLL VENOUS BLD VENIPUNCTURE: CPT

## 2024-05-23 PROCEDURE — 80048 BASIC METABOLIC PNL TOTAL CA: CPT

## 2024-05-23 PROCEDURE — 2060000000 HC ICU INTERMEDIATE R&B

## 2024-05-23 PROCEDURE — 99232 SBSQ HOSP IP/OBS MODERATE 35: CPT | Performed by: INTERNAL MEDICINE

## 2024-05-23 PROCEDURE — 97530 THERAPEUTIC ACTIVITIES: CPT

## 2024-05-23 PROCEDURE — 97530 THERAPEUTIC ACTIVITIES: CPT | Performed by: PHYSICAL THERAPIST

## 2024-05-23 PROCEDURE — 85025 COMPLETE CBC W/AUTO DIFF WBC: CPT

## 2024-05-23 PROCEDURE — 99232 SBSQ HOSP IP/OBS MODERATE 35: CPT | Performed by: STUDENT IN AN ORGANIZED HEALTH CARE EDUCATION/TRAINING PROGRAM

## 2024-05-23 PROCEDURE — 6360000002 HC RX W HCPCS: Performed by: INTERNAL MEDICINE

## 2024-05-23 RX ORDER — MIDODRINE HYDROCHLORIDE 5 MG/1
5 TABLET ORAL
Status: DISCONTINUED | OUTPATIENT
Start: 2024-05-23 | End: 2024-05-24

## 2024-05-23 RX ADMIN — ATORVASTATIN CALCIUM 10 MG: 10 TABLET, FILM COATED ORAL at 20:29

## 2024-05-23 RX ADMIN — CARBIDOPA AND LEVODOPA 1 TABLET: 25; 100 TABLET ORAL at 20:29

## 2024-05-23 RX ADMIN — CARBIDOPA AND LEVODOPA 1 TABLET: 25; 100 TABLET ORAL at 16:56

## 2024-05-23 RX ADMIN — CARBIDOPA AND LEVODOPA 1 TABLET: 25; 100 TABLET ORAL at 08:28

## 2024-05-23 RX ADMIN — ACETAMINOPHEN 1000 MG: 500 TABLET ORAL at 16:56

## 2024-05-23 RX ADMIN — Medication 1 TABLET: at 08:29

## 2024-05-23 RX ADMIN — CYANOCOBALAMIN TAB 1000 MCG 1000 MCG: 1000 TAB at 08:29

## 2024-05-23 RX ADMIN — ASPIRIN 81 MG: 81 TABLET, COATED ORAL at 08:29

## 2024-05-23 RX ADMIN — TAMSULOSIN HYDROCHLORIDE 0.4 MG: 0.4 CAPSULE ORAL at 08:28

## 2024-05-23 RX ADMIN — ACETAMINOPHEN 1000 MG: 500 TABLET ORAL at 20:29

## 2024-05-23 RX ADMIN — MIDODRINE HYDROCHLORIDE 5 MG: 5 TABLET ORAL at 16:55

## 2024-05-23 RX ADMIN — ESCITALOPRAM OXALATE 10 MG: 10 TABLET ORAL at 08:29

## 2024-05-23 RX ADMIN — ACETAMINOPHEN 1000 MG: 500 TABLET ORAL at 08:28

## 2024-05-23 RX ADMIN — ENOXAPARIN SODIUM 30 MG: 100 INJECTION SUBCUTANEOUS at 08:29

## 2024-05-23 ASSESSMENT — PAIN SCALES - PAIN ASSESSMENT IN ADVANCED DEMENTIA (PAINAD)
BREATHING: NORMAL
BODYLANGUAGE: RELAXED
TOTALSCORE: 0
BREATHING: NORMAL
CONSOLABILITY: NO NEED TO CONSOLE
FACIALEXPRESSION: SMILING OR INEXPRESSIVE
BODYLANGUAGE: RELAXED
CONSOLABILITY: NO NEED TO CONSOLE
FACIALEXPRESSION: SMILING OR INEXPRESSIVE
BODYLANGUAGE: RELAXED
CONSOLABILITY: NO NEED TO CONSOLE
TOTALSCORE: 0
FACIALEXPRESSION: SMILING OR INEXPRESSIVE
BREATHING: NORMAL
TOTALSCORE: 0

## 2024-05-23 ASSESSMENT — PAIN SCALES - GENERAL
PAINLEVEL_OUTOF10: 0
PAINLEVEL_OUTOF10: 0

## 2024-05-23 NOTE — PROGRESS NOTES
Occupational Therapy  Facility/Department: [address] PROGRESSIVE CARE  Occupational Therapy Initial Assessment    Name: Petra Green  : 1935  MRN: [id number]  Date of Service: 2024    Discharge Recommendations:  Home with Home health OT (anticipate return to assisted living with home OT)  OT Equipment Recommendations  Equipment Needed: No     Petra Green scored a 12/24 on the AM-PAC ADL Inpatient form. Current research shows that an AM-PAC score of 18 or greater is typically associated with a discharge to the patient's home setting. Based on the patient's AM-PAC score, and their current ADL deficits, it is recommended that the patient have 2-3 sessions per week of Occupational Therapy at d/c to increase the patient's independence.  At this time, this patient demonstrates the endurance and safety to discharge home with home therapy and a follow up treatment frequency of 2-3x/wk.   Please see assessment section for further patient specific details.    If patient discharges prior to next session this note will serve as a discharge summary.  Please see below for the latest assessment towards goals.      Patient Diagnosis(es): The primary encounter diagnosis was Syncope and collapse. Diagnoses of Altered mental status, unspecified altered mental status type, Elevated troponin, and Syncope, unspecified syncope type were also pertinent to this visit.  Past Medical History:  has a past medical history of Acute kidney injury (HCC), Allergic rhinitis, CHF (congestive heart failure) (HCC), Delirium, GERD (gastroesophageal reflux disease), Hearing loss sensory, bilateral, Mastoiditis, Osteoarthritis, Osteoporosis, and Scoliosis.  Past Surgical History:  has a past surgical history that includes Carpal tunnel release; Nasal sinus surgery; hernia repair; Colonoscopy; eye surgery; Colonoscopy (N/A, 10/19/2018); and Upper gastrointestinal endoscopy (N/A, 2019).           Assessment   Performance deficits /  falls;Gait belt           ADL  LE Dressing Skilled Clinical Factors: Son states pt does better with shoes on. Therapist donned socks/shoes  Toileting Skilled Clinical Factors: [facility]  Functional Mobility Skilled Clinical Factors: Pt unable to ambulate d/t posterior lean and narrow PABLO.  Additional Comments: Anticipate pt will require max A for LB bathing/dressing and toileting, mod A for UB bathing/dressing and grooming when seated based on balance and endurance observed.     Activity Tolerance  Activity Tolerance: Treatment limited secondary to decreased cognition  Activity Tolerance Comments: pt doesn't always understand cues given    Bed mobility  Supine to Sit: Maximum assistance  Sit to Supine:  (pt in chair at end of session)    Transfers  Sit to stand: Maximum assistance;2 Person assistance  Stand to sit: 2 Person assistance;Maximum assistance  Transfer Comments: Multiple attempts to stand from bed > RW. Pt with narrow PABLO, posterior lean, and tends to stand on balls of feet (heels in the air) and unable to correct despite verbal and tactile cuing. Pt then stood from bed > stedy with max A x 1-2 d/t posterior lean. Once in chair, therapist donned shoes since son states he does better with shoes on. Pt then stood again from chair > RW with max A x 2. Pt was able to maintain static standing ~ 30 sec with ongoing max A.    Vision  Vision: Within Functional Limits  Hearing  Hearing: Exceptions to WFL  Hearing Exceptions: Hard of hearing/hearing concerns;Bilateral hearing aid    Cognition  Overall Cognitive Status: Exceptions  Arousal/Alertness: Appears intact  Following Commands: Follows one step commands with increased time;Follows one step commands with repetition (max cues)  Attention Span: Attends with cues to redirect (pt forgetful)  Memory: Decreased recall of recent events  Safety Judgement: Impaired  Problem Solving: Impaired  Insights: Impaired  Initiation: Requires cues for some  Sequencing:  Requires cues for all  Orientation  Orientation Level: Oriented to person;Oriented to place;Oriented to time;Disoriented to situation                  Education Given To: Patient  Education Provided: Role of Therapy;Plan of Care;Transfer Training  Education Method: Demonstration;Verbal  Barriers to Learning: Cognition;Hearing  Education Outcome: Continued education needed    AM-PAC - ADL  AM-PAC Daily Activity - Inpatient   How much help is needed for putting on and taking off regular lower body clothing?: Total  How much help is needed for bathing (which includes washing, rinsing, drying)?: Total  How much help is needed for toileting (which includes using toilet, bedpan, or urinal)?: Total  How much help is needed for putting on and taking off regular upper body clothing?: A Little  How much help is needed for taking care of personal grooming?: A Little  How much help for eating meals?: A Little  AM-PAC Inpatient Daily Activity Raw Score: 12  AM-PAC Inpatient ADL T-Scale Score : 30.6  ADL Inpatient CMS 0-100% Score: 66.57  ADL Inpatient CMS G-Code Modifier : CL      Goals  Short Term Goals  Time Frame for Short Term Goals: Prior to DC:  Short Term Goal 1: Pt will complete bed mobility with mod A in prep for ADL transfer  Short Term Goal 2: Pt will complete ADL transfer with mod A  Short Term Goal 3: Pt will tolerate standing > 1 min for standing component of ADL with min A  Short Term Goal 4: Pt will complete UE exercises in all planes to inc strength/endurance for ADLs  Short Term Goal 5: Pt will complete grooming tasks with set up  Patient Goals   Patient goals : family: to get stronger and walk again       Therapy Time   Individual Concurrent Group Co-treatment   Time In 0730         Time Out 0838         Minutes 68         Timed Code Treatment Minutes: 45 Minutes     This note to serve as OT d/c summary if pt is d/c-ed prior to next therapy session.     Radha Suárez OTR/L

## 2024-05-23 NOTE — PROGRESS NOTES
w/c with her son to go to see her neurologist; pt was recently diagnosed with Parkinson's disease and has frequent UTI's (urine culture recently positive for UTI).  Pt at baseline lives at [facility] and per son's report has assist of 2 for transfers and mainly uses a w/c but can walk with therapists after standing to walker; pt has a narrow PABLO and crosses her legs with attempts at standing and when sitting which she needs assist to uncross her feet.  Pt is a high fall risk and will benefit from continued therapy at discharge; if AL facility is able to provide assist pt needs, then recommend return to [facility] with continued therapy; if [facility] unable to provide assist pt needs, than an alternate discharge disposition would be needed  Therapy Prognosis: Fair  Decision Making: Medium Complexity  Clinical Presentation: evolving  Barriers to Learning: confusion; does not always understand verbal cues  Requires PT Follow-Up: Yes  Activity Tolerance  Activity Tolerance: Treatment limited secondary to decreased cognition  Activity Tolerance Comments: pt doesn't always understand cues given     Plan   Physical Therapy Plan  General Plan: 3-5 times per week  Current Treatment Recommendations: Transfer training, Functional mobility training, Balance training, Strengthening, Endurance training, Therapeutic activities  Safety Devices  Type of Devices: Call light within reach, Chair alarm in place, Left in chair, Nurse notified, All fall risk precautions in place, Patient at risk for falls, Gait belt     Restrictions  Restrictions/Precautions  Restrictions/Precautions: Fall Risk  Position Activity Restriction  Other position/activity restrictions: Parkinson's; B feet cross when trying to stand up on stedy; L ankle supinates; purewick in place; nectar thick liquids     Subjective   General  Chart Reviewed: Yes  Patient assessed for rehabilitation services?: Yes  Additional Pertinent Hx: per Dr Butcher's note: \"Petra  Elva Green is an 88-year-old lady with past medical history significant for osteoporosis, GERD, CHF who presented to the emergency department after syncopal episode.     Patient was scheduled to see neurology outpatient for follow-up of Parkinson's and going from transfer to the wheelchair had a syncopal event.  Her eyes rolled back in her head for about 5 seconds and she became confused.  Family had been concerned as she had recently had recurrent UTIs.  She did not have any fever or chills.  Baseline confusion has been worse.  She does not report any chest pain or shortness of breath.  She does not report any symptomatic palpitations.  She has been adherent with home medications.  \"  Response To Previous Treatment: Patient with no complaints from previous session.  Family / Caregiver Present: Yes (son \"Christoph\" in room)  Referring Practitioner: Dr Barnes  Referral Date : 05/22/24  Follows Commands: Within Functional Limits  Subjective  Subjective: pt anxious during session; at times reporting she was falling; pt very pleasant and agreeable to working with therapist; states \"I'll give it a try\"         Social/Functional History  Social/Functional History  Type of Home: Assisted living ([facility])  Home Layout: One level  Home Access: Level entry, Elevator  Bathroom Shower/Tub:  (pt reports having bed baths)  Bathroom Toilet:  (pt reports using adult briefs for toileting)  Home Equipment: Walker - Rolling, Wheelchair - Manual, Hospital bed  Has the patient had two or more falls in the past year or any fall with injury in the past year?: Unknown  Receives Help From: Personal care attendant  ADL Assistance: Needs assistance  Homemaking Responsibilities: No  Ambulation Assistance: Needs assistance (son reports once the therapist at [facility] get her standing with the walker she is able to walk some but not alone)  Transfer Assistance: Needs assistance (assist of 2 for transfer to w/c)  Active :  falling I need to sit\"       Pt left in recliner chair with all needs in reach; LEs elevated with waffle cushion and pillows in place for support; pt was thirsty so provided her with thickened cranberry juice      AM-PAC - Mobility    AM-PAC Basic Mobility - Inpatient   How much help is needed turning from your back to your side while in a flat bed without using bedrails?: A Little  How much help is needed moving from lying on your back to sitting on the side of a flat bed without using bedrails?: A Lot  How much help is needed moving to and from a bed to a chair?: Total  How much help is needed standing up from a chair using your arms?: A Lot  How much help is needed walking in hospital room?: Total  How much help is needed climbing 3-5 steps with a railing?: Total  AM-PAC Inpatient Mobility Raw Score : 10  AM-PAC Inpatient T-Scale Score : 32.29  Mobility Inpatient CMS 0-100% Score: 76.75  Mobility Inpatient CMS G-Code Modifier : CL       Goals  Short Term Goals  Time Frame for Short Term Goals: by discharge  Short Term Goal 1: bed mob mod A  Short Term Goal 2: transfers mod A of 2  Short Term Goal 3: stand with walker for 3 minutes with min A  Short Term Goal 4: progress to gait when able  Patient Goals   Patient Goals : pt did not state a goal       Education  Patient Education  Education Given To: Patient  Education Provided Comments: reviewed call light and not getting up without assist  Education Method: Verbal;Demonstration  Education Outcome: Verbalized understanding;Continued education needed      Therapy Time   Individual Concurrent Group Co-treatment   Time In 0730         Time Out 0839         Minutes 69                 MYESHA LUNA PT   Electronically signed by MYESHA LUNA PT on 5/23/2024 at 8:44 AM       2nd note:  Nursing approached therapist as orthostatic BP are ordered but nursing has been using maxi move lift; therapist agreed to complete orthostatic BP and pt's daughter who is a PTA  (who  used to work here at the hospital) volunteered to assist  Pt transferred from recliner chair to stedy with min/mod A of 2 once pt's feet were firmly planted on stedy and pillow placed to prevent pt's feet from sliding forward.  Pt was transferred to supine with max A; after 2 min pt's BP taken supine: 138/55 (HR 70); pt transferred to sitting EOB with max A and after 2 min BP taken in sittin/60 (HR 75); pt then transferred to stand on stedy with mod A of 2 from EOB; pt unable to maintain standing but perched on seat of stedy and after 2 min BP taken in semi standin/71 (HR 83); Pt transferred back to recliner chair with stedy and positioned in chair with pillows placed for support and all needs in reach.  Nursing notified of BP readings; Daughter was asking if she could work with pt using stedy as she used stedy when employed here at hospital.  This therapist was unsure, so question was forwarded to acting nurse manager this date.  She did not know either and reported she would check with Director.  Discussed this with pt's daughter Tia.  Will continue to follow  Electronically signed by MYESHA LUNA PT on 2024 at 2:35 PM

## 2024-05-23 NOTE — PROGRESS NOTES
This RN called Dr. Barnes office at the request of my nurse manager to see if pt daughter eben could use the carter devlin on pt help promote legs strengthen, verses the maxi move which therapy has recommenced we  use on pt, This RN also let MD assistance know of pt positive orthostatic vitals, awaiting a call back from Dr. Barnes.

## 2024-05-23 NOTE — CONSULTS
Neurology Consult Note  Reason for Consult: parkinsons, falls    Chief complaint: no specific complaints    Walter Doyle MD asked me to see Petra Green in consultation for evaluation of parkinsons, falls    History of Present Illness:  Petra Green is a 88 y.o. female who presents with LOC.     I obtained my information via interview w/ the patient as well as her son/daughter, supplemented by chart review.     Two days ago the patient had arrived to her neurology follow up appointment.  Her son had her walker outside of the care.  She had wrapped her arms around his neck like they usually do and he stood her up out of the car.  He said that she went limp and unresponsive briefly (a few seconds) before coming to.  She was a bit confused; didn't know what was going on, where she was, etc.  No convulsive behavior.  No focal neurologic findings.  She was brought to the ED in order to be evaluated.     Initial BP here was 108/55.  CT head no acute intracranial findings.  No fevers.      Currently she is feeling OK.  She has no specific complaints.  She has had a bit of delirium and visual hallucinations here.      She was evaluated in 2023 for an acute change in mental status at an OSH.  Brain MRI, EEG, and CSF all were unrevealing.  It appears she followed up w/ [facility] at the beginning of April this year.  There was concern for parkinsonism and a trial of half tab of Sinemet TID was initiated.  This was then increased to 1 tab TID a couple of weeks later.  There had been some improvement in her mobility.      Her daughter did mention that at the nursing home her SBP can run anywhere from 130s-90s and the parameters for administering her antihypertensive medications is for a systolic > 100.      No my knowledge there have been no changes to these doses recently.     Medical History:  Past Medical History:   Diagnosis Date    Acute kidney injury (HCC)     Allergic rhinitis     CHF  WEEKLY FOR OSTEOPOROSIS  furosemide (LASIX) 20 MG tablet, Take 1 tablet by mouth daily  Cholecalciferol 50 MCG (2000 UT) TABS, Take 1 tablet by mouth daily  escitalopram (LEXAPRO) 10 MG tablet, Take 1 tablet by mouth daily  MYRBETRIQ 25 MG TB24, TAKE 1 TABLET BY MOUTH DAILY  tamsulosin (FLOMAX) 0.4 MG capsule, Take 1 capsule by mouth daily  guaiFENesin (MUCINEX) 600 MG extended release tablet, Take 1 tablet by mouth 2 times daily  polyethylene glycol (GLYCOLAX) 17 g packet, Take 1 packet by mouth every 3 days  loperamide (IMODIUM) 2 MG capsule, Take 1 capsule by mouth 4 times daily as needed for Diarrhea  traZODone (DESYREL) 50 MG tablet, TAKE ONE TABLET BY MOUTH ONCE NIGHTLY  melatonin 3 MG TABS tablet, TAKE ONE TABLET BY MOUTH ONCE NIGHTLY (Patient taking differently: Take 1 tablet by mouth nightly as needed)  atorvastatin (LIPITOR) 10 MG tablet, TAKE ONE TABLET BY MOUTH ONCE NIGHTLY  vitamin B-12 (CYANOCOBALAMIN) 1000 MCG tablet, Take 1 tablet by mouth daily  Handicap Placard MISC, by Does not apply route Expires 3/5/2026  aspirin 81 MG EC tablet, Take 1 tablet by mouth daily  acetaminophen (TYLENOL) 500 MG tablet, Take 2 tablets by mouth 3 times daily  Calcium Citrate-Vitamin D (CALCIUM + D PO), Take 500 mg by mouth daily    Allergies   Allergen Reactions    Ace Inhibitors      Caused ATN and hospitalization    Hctz [Hydrochlorothiazide]      Caused ATN and hospitalization     Family History   Problem Relation Age of Onset    Diabetes Mother     Heart Disease Mother      Social History     Tobacco Use   Smoking Status Never   Smokeless Tobacco Never     Social History     Substance and Sexual Activity   Drug Use No     Social History     Substance and Sexual Activity   Alcohol Use No     ROS  Constitutional- No weight loss or fevers  Eyes- No diplopia. No photophobia.  Ears/nose/throat- No dysphagia. No Dysarthria  Cardiovascular- No palpitations. No chest pain  Respiratory- No dyspnea. No  Cough  Gastrointestinal- No Abdominal pain. No Vomiting.  Genitourinary- No incontinence. No urinary retention  Musculoskeletal- No myalgia. No arthralgia  Skin- No rash. No easy bruising.  Psychiatric- No depression. No anxiety  Endocrine- No diabetes. No thyroid issues.  Hematologic- No bleeding difficulty. No fatigue  Neurologic- No weakness. No Headache.    Exam  Blood pressure (!) 148/85, pulse 73, temperature 98 °F (36.7 °C), temperature source Axillary, resp. rate 22, height 1.27 m (4' 2\"), weight 47.6 kg (104 lb 15 oz), SpO2 98 %, not currently breastfeeding.  Constitutional    Vital signs: BP, HR, and RR reviewed   General alert, no distress.  Subtle action tremor.    Eyes: unable to visualize the fundi  Cardiovascular: no peripheral edema.  Psychiatric: cooperative with examination, no psychotic behavior noted.  Neurologic  Mental status:   orientation to person, knows she is in the hospital.     General fund of knowledge grossly intact   Memory some degree of impairment.     Attention intact as able to attend well to the exam     Language fluent in conversation   Comprehension intact; follows simple commands  Cranial nerves:   CN2: visual fields full  CN 3,4,6: extraocular muscles intact.  Pupils are equal, round, reactive bilaterally.    CN5: facial sensation symmetric   CN7: face symmetric without dysarthria  CN8: hearing grossly intact.  Wears hearing aids.    CN11: trap full strength on shoulder shrug  CN12: tongue midline with protrusion  Strength: good strength in all 4 extremities   Sensory: light touch intact in all 4 extremities.   Cerebellar/coordination: finger nose finger normal without ataxia  Tone: + rigidity/cogwheel.   Gait: deferred for safety.      Labs  Glucose 95  Na 137  K 3.8  Cl 107  BUN 15  Cr 0.8  Ca 8.3    ALT < 5  AST 13    TSH 1.79    WBC 6.6K  Hg 10.6  Platelets 262    UA negative    Studies  CT head w/o 5/21/24, independently reviewed  No acute intracranial abnormality.

## 2024-05-23 NOTE — PROGRESS NOTES
[facility]  Cardiology Progress Note        CC:     Syncope              HPI:   This is a 88 y.o. female who has been diagnosed with parkinsonism because of rigidity was going to see her neurologist yesterday.  As she is being helped out of her car she slumped in front of her son.  He never fell.  According to him just a few seconds later she came back.    Reason for consult is syncope    The patient also has confusion and memory issues and so history is not really reliable.  She lives in [facility] assisted [facility].  Often needs help to get up but is able to use a walker fairly well.  I did ask her about feeling dizzy and lightheadedness, and she was not sure about it.  Patient's son also mentioned that there have been some issues about blood pressure as well.      Home medications include aspirin atorvastatin Sinemet furosemide 20 mg Toprol-XL 25 tamsulosin 0.4    Interval history  Patient sitting in the chair having breakfast  Being fed by nurse  No negative reports  Slept well in bed  No tacky or bradycardia arrhythmias seen    Past Medical History:   Diagnosis Date    Acute kidney injury ([facility])     Allergic rhinitis     CHF (congestive heart failure) ([facility])     Delirium     GERD (gastroesophageal reflux disease)     Hearing loss sensory, bilateral     mod--severe on right, mild to severe on left, due to asymmetry will send to get MRI to r/o acoustic neuroma    Mastoiditis     on MRI oct 2014-->dr ortiz states with no clinical findings this is considered an over-sensitivity of MRI results    Osteoarthritis     L1-L5    Osteoporosis     patient was on fosamax x 10 years and drug holiday was started on 6/21/13. (this was date of last DEXA scan)    Scoliosis       Past Surgical History:   Procedure Laterality Date    CARPAL TUNNEL RELEASE      right, 2003    COLONOSCOPY      2013, dr contreras    COLONOSCOPY N/A 10/19/2018    COLONOSCOPY WITH BIOPSY performed by Qasim Montana MD at [facility] ENDOSCOPY    EYE

## 2024-05-23 NOTE — PROGRESS NOTES
[facility]     INPATIENT PROGRESS NOTE  Name: Petra Green  /Age/Sex: 1935 (88 y.o. female)   MRN & CSN: [id number] & [id number]  Admission Date/Time: 2024  3:26 PM   [id number]  Current Hospital Day: Hospital Day: 3   Principal Problem: Syncope and collapse  HPI     Patient seen and examined.  No issues overnight.  She does not report any complaints today.  Oriented to self, not place or time this morning.  Blood pressure has mostly been controlled    All other review of systems negative unless noted above.  VITALS VITALS RANGE (24 hours)    Vitals:    24 0835   BP:    Pulse: 73   Resp: 22   Temp:    SpO2: 98%         PHYSICAL EXAM   General: No acute distress, hard of hearing  HEENT: PERRL, EOMI, no sinus tenderness  Cardiac: Regular rate and rhythm, no murmurs, no peripheral edema  Lungs: Clear to auscultation bilaterally  Abdomen: Soft nontender, nondistended  Musculoskeletal: Sarcopenic, moves all extremities  Neuro: No lateralizing symptoms    LABS   BMP  Recent Labs     24  1614 24  0534   * 137   K 4.0 3.8    107   CO2 23 23   BUN 15 15   CREATININE 0.9 0.8   CALCIUM 9.2 8.3     CBC/COAGS  Recent Labs     24  1614 24  0534   WBC 9.6 6.6   HCT 35.2* 30.8*    262     Liver & Pancreas  Recent Labs     24  1614   AST 13*   ALT <5*   ALKPHOS 86   ALBUMIN 3.5          IMAGING   Echo      Left Ventricle: Normal left ventricular systolic function with a visually estimated EF of 55 - 60%. Left ventricle size is normal. Normal wall thickness. Normal wall motion. Normal diastolic function.    Right Ventricle: Right ventricle size is normal. Normal systolic function.    Tricuspid Valve: Moderate regurgitation. The estimated RVSP is 38 mmHg.    Image quality is adequate. Technically difficult study due to low parasternal window.   Above studies and images were personally reviewed by myself    MEDS

## 2024-05-24 VITALS
TEMPERATURE: 98.2 F | SYSTOLIC BLOOD PRESSURE: 115 MMHG | BODY MASS INDEX: 23.16 KG/M2 | HEART RATE: 82 BPM | RESPIRATION RATE: 17 BRPM | HEIGHT: 55 IN | DIASTOLIC BLOOD PRESSURE: 73 MMHG | OXYGEN SATURATION: 96 % | WEIGHT: 100.09 LBS

## 2024-05-24 PROBLEM — R55 SYNCOPE AND COLLAPSE: Status: RESOLVED | Noted: 2024-05-21 | Resolved: 2024-05-24

## 2024-05-24 PROBLEM — I95.9 HYPOTENSION: Status: RESOLVED | Noted: 2024-01-29 | Resolved: 2024-05-24

## 2024-05-24 LAB
ANION GAP SERPL CALCULATED.3IONS-SCNC: 8 MMOL/L (ref 3–16)
BASOPHILS # BLD: 0.1 K/UL (ref 0–0.2)
BASOPHILS NFR BLD: 0.9 %
BUN SERPL-MCNC: 13 MG/DL (ref 7–20)
CALCIUM SERPL-MCNC: 8.5 MG/DL (ref 8.3–10.6)
CHLORIDE SERPL-SCNC: 111 MMOL/L (ref 99–110)
CO2 SERPL-SCNC: 22 MMOL/L (ref 21–32)
CREAT SERPL-MCNC: 0.7 MG/DL (ref 0.6–1.2)
DEPRECATED RDW RBC AUTO: 14.3 % (ref 12.4–15.4)
EOSINOPHIL # BLD: 0.3 K/UL (ref 0–0.6)
EOSINOPHIL NFR BLD: 4.6 %
FERRITIN SERPL IA-MCNC: 136.8 NG/ML (ref 15–150)
FOLATE SERPL-MCNC: 4.98 NG/ML (ref 4.78–24.2)
GFR SERPLBLD CREATININE-BSD FMLA CKD-EPI: 83 ML/MIN/{1.73_M2}
GLUCOSE SERPL-MCNC: 95 MG/DL (ref 70–99)
HCT VFR BLD AUTO: 30.3 % (ref 36–48)
HGB BLD-MCNC: 10.3 G/DL (ref 12–16)
IMMATURE RETIC FRACT: 0.39 (ref 0.21–0.37)
IRON SATN MFR SERPL: 23 % (ref 15–50)
IRON SERPL-MCNC: 48 UG/DL (ref 37–145)
LYMPHOCYTES # BLD: 1.7 K/UL (ref 1–5.1)
LYMPHOCYTES NFR BLD: 27.5 %
MCH RBC QN AUTO: 32.3 PG (ref 26–34)
MCHC RBC AUTO-ENTMCNC: 34 G/DL (ref 31–36)
MCV RBC AUTO: 95 FL (ref 80–100)
MONOCYTES # BLD: 0.5 K/UL (ref 0–1.3)
MONOCYTES NFR BLD: 8.6 %
NEUTROPHILS # BLD: 3.6 K/UL (ref 1.7–7.7)
NEUTROPHILS NFR BLD: 58.4 %
PLATELET # BLD AUTO: 244 K/UL (ref 135–450)
PMV BLD AUTO: 7.1 FL (ref 5–10.5)
POTASSIUM SERPL-SCNC: 3.8 MMOL/L (ref 3.5–5.1)
RBC # BLD AUTO: 3.19 M/UL (ref 4–5.2)
RETICS # AUTO: 0.04 M/UL (ref 0.02–0.1)
RETICS/RBC NFR AUTO: 1.23 % (ref 0.5–2.18)
SODIUM SERPL-SCNC: 141 MMOL/L (ref 136–145)
TIBC SERPL-MCNC: 206 UG/DL (ref 260–445)
VIT B12 SERPL-MCNC: 1232 PG/ML (ref 211–911)
WBC # BLD AUTO: 6.1 K/UL (ref 4–11)

## 2024-05-24 PROCEDURE — 82728 ASSAY OF FERRITIN: CPT

## 2024-05-24 PROCEDURE — 6370000000 HC RX 637 (ALT 250 FOR IP): Performed by: INTERNAL MEDICINE

## 2024-05-24 PROCEDURE — 82607 VITAMIN B-12: CPT

## 2024-05-24 PROCEDURE — 85045 AUTOMATED RETICULOCYTE COUNT: CPT

## 2024-05-24 PROCEDURE — 85025 COMPLETE CBC W/AUTO DIFF WBC: CPT

## 2024-05-24 PROCEDURE — 83550 IRON BINDING TEST: CPT

## 2024-05-24 PROCEDURE — 2580000003 HC RX 258: Performed by: INTERNAL MEDICINE

## 2024-05-24 PROCEDURE — 99239 HOSP IP/OBS DSCHRG MGMT >30: CPT | Performed by: STUDENT IN AN ORGANIZED HEALTH CARE EDUCATION/TRAINING PROGRAM

## 2024-05-24 PROCEDURE — 36415 COLL VENOUS BLD VENIPUNCTURE: CPT

## 2024-05-24 PROCEDURE — 82746 ASSAY OF FOLIC ACID SERUM: CPT

## 2024-05-24 PROCEDURE — 94760 N-INVAS EAR/PLS OXIMETRY 1: CPT

## 2024-05-24 PROCEDURE — 80048 BASIC METABOLIC PNL TOTAL CA: CPT

## 2024-05-24 PROCEDURE — 83540 ASSAY OF IRON: CPT

## 2024-05-24 PROCEDURE — 6360000002 HC RX W HCPCS: Performed by: INTERNAL MEDICINE

## 2024-05-24 RX ORDER — MIDODRINE HYDROCHLORIDE 2.5 MG/1
2.5 TABLET ORAL
Qty: 90 TABLET | Refills: 3 | Status: SHIPPED | OUTPATIENT
Start: 2024-05-24 | End: 2024-05-24 | Stop reason: SDUPTHER

## 2024-05-24 RX ORDER — MIDODRINE HYDROCHLORIDE 5 MG/1
2.5 TABLET ORAL
Status: DISCONTINUED | OUTPATIENT
Start: 2024-05-24 | End: 2024-05-24 | Stop reason: HOSPADM

## 2024-05-24 RX ADMIN — CYANOCOBALAMIN TAB 1000 MCG 1000 MCG: 1000 TAB at 09:24

## 2024-05-24 RX ADMIN — ACETAMINOPHEN 1000 MG: 500 TABLET ORAL at 16:00

## 2024-05-24 RX ADMIN — CARBIDOPA AND LEVODOPA 1 TABLET: 25; 100 TABLET ORAL at 16:00

## 2024-05-24 RX ADMIN — ASPIRIN 81 MG: 81 TABLET, COATED ORAL at 09:24

## 2024-05-24 RX ADMIN — TROSPIUM CHLORIDE 20 MG: 20 TABLET, FILM COATED ORAL at 05:43

## 2024-05-24 RX ADMIN — CARBIDOPA AND LEVODOPA 1 TABLET: 25; 100 TABLET ORAL at 09:24

## 2024-05-24 RX ADMIN — SODIUM CHLORIDE, PRESERVATIVE FREE 10 ML: 5 INJECTION INTRAVENOUS at 09:28

## 2024-05-24 RX ADMIN — Medication 1 TABLET: at 09:24

## 2024-05-24 RX ADMIN — TAMSULOSIN HYDROCHLORIDE 0.4 MG: 0.4 CAPSULE ORAL at 09:23

## 2024-05-24 RX ADMIN — ACETAMINOPHEN 1000 MG: 500 TABLET ORAL at 09:24

## 2024-05-24 RX ADMIN — ESCITALOPRAM OXALATE 10 MG: 10 TABLET ORAL at 09:24

## 2024-05-24 RX ADMIN — ENOXAPARIN SODIUM 30 MG: 100 INJECTION SUBCUTANEOUS at 09:25

## 2024-05-24 ASSESSMENT — PAIN SCALES - PAIN ASSESSMENT IN ADVANCED DEMENTIA (PAINAD)
FACIALEXPRESSION: SMILING OR INEXPRESSIVE
FACIALEXPRESSION: SMILING OR INEXPRESSIVE
BODYLANGUAGE: RELAXED
TOTALSCORE: 0
BODYLANGUAGE: RELAXED
CONSOLABILITY: NO NEED TO CONSOLE
CONSOLABILITY: NO NEED TO CONSOLE
BREATHING: NORMAL
TOTALSCORE: 0
BREATHING: NORMAL

## 2024-05-24 ASSESSMENT — PAIN - FUNCTIONAL ASSESSMENT: PAIN_FUNCTIONAL_ASSESSMENT: PREVENTS OR INTERFERES SOME ACTIVE ACTIVITIES AND ADLS

## 2024-05-24 NOTE — CARE COORDINATION
Case Management Discharge Note          Date / Time of Note: 5/24/2024 2:32 PM                  Patient Name: Petra Green   YOB: 1935  Diagnosis: Syncope and collapse [R55]  Elevated troponin [R79.89]  Altered mental status, unspecified altered mental status type [R41.82]  Syncope, unspecified syncope type [R55]   Date / Time: 5/21/2024  3:26 PM    Financial:  Payor: [employer] MEDICARE / Plan: [employer] MEDICARE-ADVANTAGE PPO / Product Type: Medicare /      Pharmacy:    [facility] [id number] - [place], [place] - [address] - P [phone] - F [phone]  [address] [address]  Phone: [phone] Fax: [phone]    [facility] - [facility] [address] - P [phone] - F [phone]  [address] [address]  Phone: [phone] Fax: [phone]    [facility] [place] [place] - [address] [phone] - F [phone]  [address]  [facility] [address]  Phone: [phone] Fax: [phone]    [facility] [place] - [address] [phone] - F [phone]  [address]  [facility] [address]  Phone: [phone] Fax: [phone]      Assistance purchasing medications?: Potential Assistance Purchasing Medications: No  Assistance provided by Case Management: None at this time    DISCHARGE Disposition: Home with Home Health Care    Home Care:  Home Care ordered at discharge: Yes  Home Care Agency: [facility]   Phone: [phone]  Fax: [phone]  Orders faxed: can pull from [facility]    Transportation:  Transportation PLAN for discharge: EMS  Mode of Transport: [facility] [phone]  Time of Transport: 5:30 pm    IMM Completed:   Yes, Case management has presented and reviewed IMM letter #2.       IMM Letter date given:: 05/24/24   .   Patient and/or family/POA verbalized understanding of their  medicare rights and appeal process if needed. Patient and/or family/POA has signed, initialed and placed the date and time on IMM letter #2 on the the appropriate lines. Copy of letter offered and they are aware that the original copy of IMM letter #2 is available prior to discharge from the paper chart on the unit.  Electronic documentation has been entered into epic for IMM letter #2 and original paper copy has been added to the paper chart at the nurses station.     Additional CM Notes:   DC order noted.  Transport arranged with [facility] transport at 5:30 pm.  DEVON/AVS faxed to [facility] at [phone].  Spoke to patient and son at bedside. Notified [facility] main office of patient's return to [facility]. Notified [facility] of patient's return.    The Plan for Transition of Care is related to the following treatment goals of Syncope and collapse [R55]  Elevated troponin [R79.89]  Altered mental status, unspecified altered mental status type [R41.82]  Syncope, unspecified syncope type [R55]    The Patient and/or patient representative Petra and her family were provided with a choice of provider and agrees with the discharge plan Yes    Freedom of choice list was provided with basic dialogue that supports the patient's individualized plan of care/goals and shares the quality data associated with the providers. Yes    Pooja Graves RN  [facility]   Case Management Department  Ph: [phone]

## 2024-05-24 NOTE — PROGRESS NOTES
IV and telemetry monitor removed. Discharge paperwork completed and discussed with the patient and her son. All questions answered. Patient has been made aware of follow up appointments that have been made and need to be made and patient verbalized understanding. Patient's medications have been filled with [facility] retail pharmacy.  All belongings have been packed up and sent with the patient. Patient will be driven to [place] by medical transport.

## 2024-05-24 NOTE — PROGRESS NOTES
[facility]     INPATIENT PROGRESS NOTE  Name: Petra Green  /Age/Sex: 1935 (88 y.o. female)   MRN & CSN: [id number] & [id number]  Admission Date/Time: 2024  3:26 PM   [id number]  Current Hospital Day: Hospital Day: 4   Principal Problem: Syncope and collapse  HPI     Patient seen and examined.  No issues overnight.  Oriented to self, not time today.  She does not report any complaints.  She was hypertensive this morning.    Discussed with family at bedside    All other review of systems negative unless noted above.  VITALS VITALS RANGE (24 hours)    Vitals:    24 1200   BP: 136/84   Pulse: 75   Resp: 23   Temp: 98.1 °F (36.7 °C)   SpO2: 95%         PHYSICAL EXAM   General: No acute distress, hard of hearing  HEENT: PERRL, EOMI, no sinus tenderness  Cardiac: Regular rate and rhythm, no murmurs, no peripheral edema  Lungs: Clear to auscultation bilaterally  Abdomen: Soft nontender, nondistended  Musculoskeletal: Sarcopenic, moves all extremities  Neuro: No lateralizing symptoms    LABS   BMP  Recent Labs     24  1614 24  0534 24  0429   * 137 141   K 4.0 3.8 3.8    107 111*   CO2 23 23 22   BUN 15 15 13   CREATININE 0.9 0.8 0.7   CALCIUM 9.2 8.3 8.5     CBC/COAGS  Recent Labs     24  1614 24  0534 24  0429   WBC 9.6 6.6 6.1   HCT 35.2* 30.8* 30.3*    262 244     Liver & Pancreas  Recent Labs     24  1614   AST 13*   ALT <5*   ALKPHOS 86   ALBUMIN 3.5          IMAGING   No new imaging.   Above studies and images were personally reviewed by myself    MEDS   Scheduled Meds:   midodrine  2.5 mg Oral TID WC    acetaminophen  1,000 mg Oral TID    aspirin  81 mg Oral Daily    atorvastatin  10 mg Oral Nightly    oyster shell calcium w/D  1 tablet Oral Daily    carbidopa-levodopa  1 tablet Oral TID    escitalopram  10 mg Oral Daily    trospium  20 mg Oral QAM AC    polyethylene glycol  17 g  Oral Q3 Days    tamsulosin  0.4 mg Oral Daily    vitamin B-12  1,000 mcg Oral Daily    sodium chloride flush  5-40 mL IntraVENous 2 times per day    enoxaparin  30 mg SubCUTAneous Daily     Continuous Infusions:   sodium chloride       PRN Meds:loperamide, melatonin, sodium chloride flush, sodium chloride, ondansetron **OR** ondansetron, polyethylene glycol, acetaminophen **OR** acetaminophen     CURRENT HOSPITAL PROBLEMS   Principal Problem:    Syncope and collapse  Active Problems:    Coronary artery disease involving native coronary artery of native heart with angina pectoris ([facility])    Chronic renal disease, stage III ([facility]) [766619]    Hypotension  Resolved Problems:    * No resolved hospital problems. *    Syncope  CHF  CAD  -Hold metoprolol and home Lasix given relatively low blood pressure, and will plan on discontinuing these outpatient  - Appreciate cardiology consultation  - Repeat echo largely unchanged      Sinus arrhythmia?  - Reviewed EKGs and rhythm strips, appears to be sinus arrhythmia  -Will check with cardiology before discharge, A-fib would be a new diagnosis     Parkinson's  - Possibly autonomic instability contributing to cardiac systems  .-Blood pressure high on midodrine 5 mg, reduced dose 2.5 mg 3 times daily to recheck orthostatic  -Appreciate neurology recommendations  - Continue home Sinemet     Osteoporosis  History of lumbar degenerative disc disease  - PT/OT     Recurrent UTIs  - Urine bland, recently treated in April  -Continue Myrbetriq     F/E/N: Regular diet, thickened liquids  PPx: Lovenox  Dispo: Discharge today    Walter Barnes MD 5/24/2024 1:18 PM

## 2024-05-24 NOTE — OR NURSING
Bp  lying 172/85  pulse 78    bp  sitting  171/80  pulse 84    standing with 2 people holding her  bp 129/115   pt scarred and having pain in right ankle which is not new ,,

## 2024-05-24 NOTE — PLAN OF CARE
Problem: Discharge Planning  Goal: Discharge to home or other facility with appropriate resources  Outcome: Progressing  Flowsheets (Taken 5/24/2024 0148)  Discharge to home or other facility with appropriate resources: Identify discharge learning needs (meds, wound care, etc)     Problem: Pain  Goal: Verbalizes/displays adequate comfort level or baseline comfort level  Outcome: Progressing  Flowsheets (Taken 5/24/2024 0148)  Verbalizes/displays adequate comfort level or baseline comfort level:   Assess pain using appropriate pain scale   Encourage patient to monitor pain and request assistance     Problem: Skin/Tissue Integrity  Goal: Absence of new skin breakdown  Description: 1.  Monitor for areas of redness and/or skin breakdown  2.  Assess vascular access sites hourly  3.  Every 4-6 hours minimum:  Change oxygen saturation probe site  4.  Every 4-6 hours:  If on nasal continuous positive airway pressure, respiratory therapy assess nares and determine need for appliance change or resting period.  Outcome: Progressing     Problem: Safety - Adult  Goal: Free from fall injury  Outcome: Progressing     Problem: ABCDS Injury Assessment  Goal: Absence of physical injury  Outcome: Progressing  Flowsheets (Taken 5/24/2024 0148)  Absence of Physical Injury: Implement safety measures based on patient assessment     Problem: Metabolic/Fluid and Electrolytes - Adult  Goal: Electrolytes maintained within normal limits  Outcome: Progressing     Problem: Metabolic/Fluid and Electrolytes - Adult  Goal: Hemodynamic stability and optimal renal function maintained  Outcome: Progressing

## 2024-05-24 NOTE — PROGRESS NOTES
Physician Progress Note      PATIENT:               SHAKIR FLOWERS  CSN #:                  [id number]  :                       1935  ADMIT DATE:       2024 3:26 PM  DISCH DATE:  RESPONDING  PROVIDER #:        Walter Barnes MD          QUERY TEXT:    Pt admitted with syncope and has CHF documented. If possible, please document   in progress notes and discharge summary further specificity regarding the type   and acuity of CHF:    The medical record reflects the following:  Risk Factors: CHF, CKD  Clinical Indicators:  PN -  \"Syncope  CHF  CAD\"  ECHO -  \"-  Left Ventricle: Normal left ventricular systolic function with a visually   estimated EF of 55 - 60%. Left ventricle size is normal. Normal wall   thickness. Normal wall motion. Normal diastolic function.  -  Right Ventricle: Right ventricle size is normal. Normal systolic function.  -  Tricuspid Valve: Moderate regurgitation. The estimated RVSP is 38 mmHg.  -  Image quality is adequate. Technically difficult study due to low   parasternal window\"  Treatment: Cardiology consult, repeat ECHO, serial labs, and supportive care    Thank you,  Mckayla Ivey RN, BSN, CRCR  Options provided:  -- Chronic Systolic CHF/HFrEF  -- Chronic Diastolic CHF/HFpEF  -- Chronic Systolic and Diastolic CHF  -- Other - I will add my own diagnosis  -- Disagree - Not applicable / Not valid  -- Disagree - Clinically unable to determine / Unknown  -- Refer to Clinical Documentation Reviewer    PROVIDER RESPONSE TEXT:    This patient has chronic diastolic CHF/HFpEF.    Query created by: Mckayla Ivey on 2024 1:57 PM      Electronically signed by:  Walter Barnes MD 2024 4:14 PM

## 2024-05-24 NOTE — DISCHARGE INSTR - COC
Continuity of Care Form    Patient Name: Petra Green   :  1935  MRN:  [id number]    Admit date:  2024  Discharge date:  24    Code Status Order: DNR-CCA   Advance Directives:     Admitting Physician:  Adelina Valenzuela MD  PCP: Walter Barnes MD    Discharging Nurse: Kyle Meraz  Discharging Hospital Unit/Room#: W8D-6421/5115-01  Discharging Unit Phone Number: [phone]    Emergency Contact:   Extended Emergency Contact Information  Primary Emergency Contact: Tia Vidal   [facility]  Home Phone: [phone]  Relation: Child  Secondary Emergency Contact: [place] [address] [facility]  Home Phone: [phone]  Relation: Child    Past Surgical History:  Past Surgical History:   Procedure Laterality Date    CARPAL TUNNEL RELEASE      right,     COLONOSCOPY      , dr contreras    COLONOSCOPY N/A 10/19/2018    COLONOSCOPY WITH BIOPSY performed by Qasim Montana MD at [facility] ENDOSCOPY    EYE SURGERY      2017 and 2018    HERNIA REPAIR          NASAL SINUS SURGERY      UPPER GASTROINTESTINAL ENDOSCOPY N/A 2019    EGD BIOPSY performed by Qasim Montana MD at [facility] ENDOSCOPY       Immunization History:   Immunization History   Administered Date(s) Administered    COVID-19, MODERNA BLUE border, Primary or Immunocompromised, (age 12y+), IM, 100 mcg/0.5mL 2021, 2021    COVID-19, PFIZER Bivalent, DO NOT Dilute, (age 12y+), IM, 30 mcg/0.3 mL 2022    Influenza A ([id number]) Vaccine PF IM 10/30/2009    Influenza Vaccine, unspecified formulation 10/08/2015    Influenza, FLUAD, (age 65 y+), Adjuvanted, 0.5mL 2021, 2022, 10/13/2023    Influenza, FLUBLOK, (age 18 y+), PF, 0.5mL 10/03/2018    Influenza, High Dose (Fluzone 65 yrs and older) 10/25/2016, 10/12/2017, 2019, 2020    Pneumococcal, PCV-13, PREVNAR 13, (age 6w+), IM, 0.5mL 10/05/2014    Pneumococcal, PPSV23, PNEUMOVAX  Documentation and Therapy:  Wound 05/22/24 Coccyx blanchable redness with areas of possible previous pressure injury (Active)   Dressing Status Clean;Dry;Intact 05/24/24 0916   Number of days: 2        Elimination:  Continence:   Bowel: No  Bladder: No  Urinary Catheter: None   Colostomy/Ileostomy/Ileal Conduit: No       Date of Last BM: 5/24/2024    Intake/Output Summary (Last 24 hours) at 5/24/2024 1144  Last data filed at 5/24/2024 0546  Gross per 24 hour   Intake 1051.93 ml   Output 1200 ml   Net -148.07 ml     I/O last 3 completed shifts:  In: 1051.9 [P.O.:50; I.V.:1001.9]  Out: 2000 [Urine:2000]    Safety Concerns:     At Risk for Falls    Impairments/Disabilities:      None    Nutrition Therapy:  Current Nutrition Therapy:   - Oral Diet:  General    Routes of Feeding: Oral  Liquids: Thin Liquids  Daily Fluid Restriction: no  Last Modified Barium Swallow with Video (Video Swallowing Test): not done    Treatments at the Time of Hospital Discharge:   Respiratory Treatments:   Oxygen Therapy:  is not on home oxygen therapy.  Ventilator:    - No ventilator support    Rehab Therapies: Physical Therapy and Occupational Therapy  Weight Bearing Status/Restrictions: No weight bearing restrictions  Other Medical Equipment (for information only, NOT a DME order):  hospital bed  Other Treatments:     Patient's personal belongings (please select all that are sent with patient):  None    RN SIGNATURE:  Electronically signed by Kyle Meraz RN on 5/24/24 at 3:50 PM EDT    CASE MANAGEMENT/SOCIAL WORK SECTION    Inpatient Status Date: 5/21/24    Readmission Risk Assessment Score:  Readmission Risk              Risk of Unplanned Readmission:  18         Discharging to Facility/ Agency   Name: [facility]  Address:  [address] [place] [address] [address], [place] [address]   Phone:  [phone]  Fax:  [phone]     / signature: Electronically signed by Pooja Graves RN on 5/24/24 at 11:44

## 2024-05-28 ENCOUNTER — CARE COORDINATION (OUTPATIENT)
Dept: CASE MANAGEMENT | Age: 89
End: 2024-05-28

## 2024-05-28 NOTE — CARE COORDINATION
Care Transitions Initial Follow Up Call    Call within 2 business days of discharge: Yes      Patient: Petra Green Patient : 1935   MRN: [id number]  Reason for Admission: Fall/confusion   Discharge Date: 24 RARS: Readmission Risk Score: 15.1      Last Discharge Facility       Date Complaint Diagnosis Description Type Department Provider    24 Altered Mental Status; Fall Syncope and collapse ... ED to Hosp-Admission (Discharged) (ADMITTED) [facility]W Walter Barnes MD            Was this an external facility discharge? No Discharge Facility: [facility]    Challenges to be reviewed by the provider   Additional needs identified to be addressed with provider: No                 Method of communication with provider: none.    Initial attempt at CT discharge phone call. Unable to reach patient and/or family. Left message. Contact info provided. Requested return call to [facility].    Call placed to [facility]. Spoke with reception/intake. They state they have no referral for patient. Will try to pull from [facility]. Writer provided contact for  at [facility]. [facility] will reach out to them if they can not find the referral in EPIC.              Follow Up  Future Appointments   Date Time Provider Department Center   2024  1:30 PM [facility] Barnes MD [facility] DARY Mahoney RN BSN  Care Transition Nurse  [phone]

## 2024-05-29 ENCOUNTER — CARE COORDINATION (OUTPATIENT)
Dept: CASE MANAGEMENT | Age: 89
End: 2024-05-29

## 2024-05-29 NOTE — CARE COORDINATION
Advance Care Planning:  Advance Care Planning   Healthcare Decision Maker:    Primary Decision Maker: Tia Vidal - Child - [phone]    Secondary Decision Maker: Shant Green - Child - [phone]    Petra Stevenson has a Living Will and Healthcare POA in her electronic medical record.            Medication reconciliation was performed with  no one. Petra Stevenson's medications are managed by the facility.    Was patient discharged with a pulse oximeter? no    Non-face-to-face services provided:  Obtained and reviewed discharge summary and/or continuity of care documents    Offered patient enrollment in the Remote Patient Monitoring (RPM) program for in-home monitoring: Patient is not eligible for RPM program because: resides in AL unit. .    Care Transitions 24 Hour Call    Do you have all of your prescriptions and are they filled?: Yes  Post Acute Services: Home Health  Care Transitions Interventions         Discussed follow-up appointments. If no appointment was previously scheduled, appointment scheduling offered: Yes.   Is follow up appointment scheduled within 7 days of discharge? No.Petra Stevenson has a HFU appt on 06/06/2024. Tia states she will provide her mother's transportation.    Follow Up  Future Appointments   Date Time Provider Department Center   6/6/2024  1:30 PM Walter Barnes MD [facility]       Care Transition Nurse provided contact information.  No further follow-up call indicated  - AL resident.    Tyesha Mahoney RN BSN  Care Transition Nurse  [phone]

## 2024-06-05 NOTE — PROGRESS NOTES
Physician Progress Note      PATIENT:               SHAKIR FLOWERS  CSN #:                  [id number]  :                       1935  ADMIT DATE:       2024 3:26 PM  DISCH DATE:        2024 6:32 PM  RESPONDING  PROVIDER #:        Walter Posada MD          QUERY TEXT:    Patient admitted with syncope.  Per H&P and attending progress notes \"Possibly   autonomic instability contributing cardiac systems.\"   Per cardiology \"No   obvious cardiac cause at this time of the syncope.  I think it is more to do   with her Parkinson's\" and per neurology \"Presuming some underlying   neurodegenerative disease she could plausibly have some degree of autonomic   dysfunction.\"  If possible, please document in progress notes and discharge   summary after study the etiology of the syncope:    The medical record reflects the following:  Risk Factors: Parkinson's  Clinical Indicators:  no cardiac etiology of syncope identified, also noted to    have orthostatic hypotension and visual hallucinations  Treatment: cardiology and neuro consults, Sinemet, ECHO, telemetry monitoring,   holding Toprol and Lasix  Options provided:  -- Syncope likely due to autonomic instability associated with Parkinson's  -- Other - I will add my own diagnosis  -- Disagree - Not applicable / Not valid  -- Disagree - Clinically unable to determine / Unknown  -- Refer to Clinical Documentation Reviewer    PROVIDER RESPONSE TEXT:    Syncope likely due to autonomic instability associated with Parkinson's.    Query created by: Shakir Herman on 2024 2:46 AM      Electronically signed by:  Walter Posada MD 2024 9:34 AM

## 2024-06-27 NOTE — DISCHARGE SUMMARY
[place], [place] - [address] - P [phone] - F [phone]  [address], [facility] [address]      Phone: [phone]   midodrine 2.5 MG tablet         Future Appointments   Date Time Provider Department Center   9/6/2024  1:30 PM Walter Barnes MD [facility]       Time Spent on discharge is more than 20 minutes in the examination, evaluation, counseling and review of medications and discharge plan.      Signed:  Walter Barnes MD   6/27/2024    The note was completed using EMR. Every effort was made to ensure accuracy; however, inadvertent computerized transcription errors may be present.

## 2024-12-04 NOTE — PROGRESS NOTES
[facility] Medicine Progress Note      Admit Date: 5/11/2019         Overnight Events: No    CC: F/U for JERRELL on CKD IV    HPI: The patient is an 81year old female with a hx of CKD, GERD, Diastolic CHF with an EF of 55 - 60% and grade I diastolic dysfunction, who presented to [facility] ER at the request of her PCP. The patient had labs drawn the morning of admission and was later notified by her PCP of abnormal lab results. She was instructed to go to the ER. The patient has been admitted to [address] [place] [address] multiple times for JERRELL recently. Medications have been adjusted. She patient stated that's he has been eating and drinking well. In the ER, the patient's creatinine was noted to have improved from 3 --> 2.4. She was also noted to have asymptomatic pyuria. The patient was admitted for further workup and treatment of JERRELL. Nephrology was consulted for assistance. Interval History/Subjective: No new complaints. She notes continued, intermittent CP.     Review of Systems:     _____________________________________________________________________  General ROS:  [x] N/A [] Other:  _____________________________________________________________________  HEENT ROS:  [x] N/A [] Other:  _____________________________________________________________________  Respiratory ROS:  [x] N/A [] Other:  _____________________________________________________________________  Cardiovascular ROS:  [] N/A [] Other:  _____________________________________________________________________  Gastrointestinal ROS:  [x] N/A [] Other:  _____________________________________________________________________  Genitourinary:  [x] N/A [] Other:  _____________________________________________________________________  Musculoskeletal ROS:  [x] N/A [] Other:  _____________________________________________________________________  Endocrine ROS:  [x] N/A [] Other:  _____________________________________________________________________  Neurological ROS:  [x] N/A [] Other:  _____________________________________________________________________  Psych ROS:  [x] N/A [] Other:  _____________________________________________________________________  Dermatological ROS:  [x] N/A [] Other:  _____________________________________________________________________    Comprehensive ROS negative except as mentioned above. Past Medical History:        Diagnosis Date    [facility] kidney [facility] ([facility] [address]     Allergic rhinitis     CH[facility]congestive heart failure) ([facility])     GERD (gastroesophageal reflux disease)     Hearing loss sensory, bilateral     mod--severe on right, mild to severe on left, due to asymmetry will send to get MRI to r/o acoustic neuroma    Mastoiditis     on MRI oct 2014-->dr ortiz states with no clinical findings this is considered an over-sensitivity of MRI results    Osteoarthritis     L1-L5    Osteoporosis     patient was on fosamax x 10 years and drug holiday was started on 6/21/13. (this was date of last DEXA scan)    Scoliosis        Past Surgical History:        Procedure Laterality Date    CARPAL TUNNEL RELEASE      right, 2003    COLONOSCOPY      2013, dr Talib Tillman    COLONOSCOPY N/A 10/19/2018    COLONOSCOPY WITH BIOPSY performed by Vinay Solis MD at [address] [place] [address]      December 27 2017 and feb 6 2018   [address]      2011    NASAL SINUS SURGERY      UPPER GASTROINTESTINAL ENDOSCOPY N/A 4/4/2019    EGD BIOPSY performed by Vinay Solis MD at [address] [place], [place] [address]e:  Patient has no known allergies. Past medical and surgical history reviewed. Any changes have been noted.      Scheduled and prn Medications:    Scheduled Meds:   calcium-vitamin D  1 tablet Oral Daily    vitamin D  2,000 Units Oral Daily    sodium chloride flush  10 mL Intravenous 2 times per day    heparin (porcine)  5,000 Units Subcutaneous 3 times per day    pantoprazole  40 mg Oral QAM AC    docusate sodium  100 mg Oral BID    acetaminophen Chronic Kidney Disease  [] Stage 1 with normal or high GFR (GFR > 90 mL/min)  [] Stage 2 Mild CKD (GFR = 60-89 mL/min)  [] Stage 3A Moderate CKD (GFR = 45-59 mL/min)  [] Stage 3B Moderate CKD (GFR = 30-44 mL/min)  [x] Stage 4 Severe CKD (GFR = 15-29 mL/min)  [] Stage 5 End Stage CKD (GFR <15 mL/min)  Avoid nephrotoxic medications as able. Renally dose medications. Monitor for electrolyte abnormalities   Nephrology consulted for assistance. Appreciate recs. Pyuria  Asymptomatic  Continue to monitor off of abx    Chronic Diastolic Heart Failure, compensated - no signs of exacerbation   (EF of 55 - 60%)  Continue current medical regimen  Daily weights  Strict intake/output  Monitor fluid volume status    CP 2/2 bronchospasm  Improved with PRN Clonidine  Had positive response to diltiazem but BP became too low. Osteoporosis  Vit D/Calcium    Body mass index is 24.12 kg/m². The patient and / or the family were informed of the results of any tests, a time was given to answer questions, a plan was proposed and they agreed with plan.     DVT prophylaxis: [] Lovenox  [x] SQ Heparin  [] SCDs because of  [] warfarin/oral direct thrombin inhibitor [] Encourage ambulation    GI prophylaxis: [x] PPI/[id number]  [] not indicated    Probiotic if on abx: [] Yes [] No [x] Not Indicated    Diet: DIET RENAL;    Consults:  IP CONSULT TO NEPHROLOGY    Disposition:  [] Home  [] Home with home health [] Rehab [] Psych [] SNF  [] LTAC  [] Long term nursing home or group home [] Transfer to ICU  [] Transfer to PCU [x] Other:   TBD  Code Status: DNR-CCA    ELOS: TBD      RIAZ Parisi NP  05/12/19 57

## 2025-01-10 ENCOUNTER — TELEMEDICINE (OUTPATIENT)
Dept: ENDOCRINOLOGY | Age: 89
End: 2025-01-10

## 2025-01-10 DIAGNOSIS — E04.2 MULTIPLE THYROID NODULES: ICD-10-CM

## 2025-01-10 DIAGNOSIS — M81.0 AGE-RELATED OSTEOPOROSIS WITHOUT CURRENT PATHOLOGICAL FRACTURE: Primary | ICD-10-CM

## 2025-01-10 RX ORDER — RISEDRONATE SODIUM 150 MG/1
TABLET, FILM COATED ORAL
Qty: 1 TABLET | Refills: 5 | Status: SHIPPED | OUTPATIENT
Start: 2025-01-10

## 2025-01-10 NOTE — PROGRESS NOTES
Subjective:        Patient was evaluated through a synchronous (real-time) audio-video encounter. The patient (or guardian if applicable) is aware that this is a billable service, which includes applicable co-pays. This Virtual Visit was conducted with patient's (and/or legal guardian's) consent. Patient identification was verified, and a caregiver was present when appropriate.   [place]:   [place]    STOP! Confirm you are appropriately licensed, registered, or certified to deliver care in the state where the patient is located as indicated above. If you are not or unsure, please re-schedule the visit.    Are you appropriately licensed in the patient's state?: Yes      Petra THOMAS Peter is a  who is here for f/u evaluation of osteoporosis.    Interim:    Living in assisted living  More immobile  Stress if taking actonel regularly  State nurse may not give correctly    +back pain  Following at [facility]  + fall , - fracture ?  Suspected vertebral fracture?No xray done    Last GFR 40  Tolerating actonel    She was diagnosed with Osteoporsis - years ago    Dexa 8/23    LEFT TOTAL HIP:     BMD: 0.721 g/cm2     T-score: -1.8     Z-score: 0.5     LEFT FEMORAL NECK:  BMD: 0.505 g/cm2  T-score: -3.1     Z-score: -0.6     RIGHT TOTAL HIP:  BMD: 0.695 g/cm2  T-score: -2.0  Z-score: 0.3     RIGHT FEMORAL NECK:  BMD: 0.514 g/cm2  T-score: -3.0  Z-score: -0.5     RIGHT FOREARM (RADIUS 1/3)  BMD: 0.586 g/cm2  T-score: -1.8  Z-score: Not provided.       Dexa 1/21    LUMBAR SPINE:       The bone mineral density in the lumbar spine including the L2-L4 levels is   measured at 0.98 g/cm2, which corresponds to a T-score of -0.9 and a Z-score   of 2.1.  This is within the normal range by WHO criteria.       LEFT HIP:       The bone mineral density in the total hip is measured at 0.71 g/cm2   corresponding to a T-score of -1.9 and a Z-score of 0.4.  This is within the   osteopenia range

## 2025-04-25 PROBLEM — G20.B2 PARKINSON'S DISEASE WITH DYSKINESIA AND FLUCTUATING MANIFESTATIONS (HCC): Status: ACTIVE | Noted: 2025-04-25

## 2025-08-13 ENCOUNTER — HOSPITAL ENCOUNTER (EMERGENCY)
Age: 89
Discharge: HOME | End: 2025-08-13
Attending: EMERGENCY MEDICINE
Payer: MEDICARE

## 2025-08-13 VITALS
RESPIRATION RATE: 21 BRPM | TEMPERATURE: 97.5 F | WEIGHT: 115.08 LBS | SYSTOLIC BLOOD PRESSURE: 123 MMHG | HEART RATE: 87 BPM | BODY MASS INDEX: 32.36 KG/M2 | DIASTOLIC BLOOD PRESSURE: 92 MMHG | OXYGEN SATURATION: 94 %

## 2025-08-13 DIAGNOSIS — N39.0 ACUTE UTI: ICD-10-CM

## 2025-08-13 DIAGNOSIS — R41.0 DELIRIUM: Primary | ICD-10-CM

## 2025-08-13 LAB
ALBUMIN SERPL-MCNC: 4 G/DL (ref 3.4–5)
ALBUMIN/GLOB SERPL: 1.7 {RATIO} (ref 1.1–2.2)
ALP SERPL-CCNC: 77 U/L (ref 40–129)
ALT SERPL-CCNC: <5 U/L (ref 10–40)
ANION GAP SERPL CALCULATED.3IONS-SCNC: 14 MMOL/L (ref 3–16)
AST SERPL-CCNC: 16 U/L (ref 15–37)
BACTERIA URNS QL MICRO: ABNORMAL /HPF
BASOPHILS # BLD: 0 K/UL (ref 0–0.2)
BASOPHILS NFR BLD: 0.3 %
BILIRUB SERPL-MCNC: 0.6 MG/DL (ref 0–1)
BILIRUB UR QL STRIP.AUTO: NEGATIVE
BUN SERPL-MCNC: 18 MG/DL (ref 7–20)
CALCIUM SERPL-MCNC: 9.2 MG/DL (ref 8.3–10.6)
CHARACTER UR: ABNORMAL
CHLORIDE SERPL-SCNC: 106 MMOL/L (ref 99–110)
CLARITY UR: ABNORMAL
CO2 SERPL-SCNC: 19 MMOL/L (ref 21–32)
COLOR UR: YELLOW
CREAT SERPL-MCNC: 1 MG/DL (ref 0.6–1.2)
DEPRECATED RDW RBC AUTO: 12.9 % (ref 12.4–15.4)
EOSINOPHIL # BLD: 0.1 K/UL (ref 0–0.6)
EOSINOPHIL NFR BLD: 0.4 %
EPI CELLS #/AREA URNS HPF: ABNORMAL /HPF (ref 0–5)
GFR SERPLBLD CREATININE-BSD FMLA CKD-EPI: 54 ML/MIN/{1.73_M2}
GLUCOSE BLD-MCNC: 121 MG/DL (ref 70–99)
GLUCOSE SERPL-MCNC: 120 MG/DL (ref 70–99)
GLUCOSE UR STRIP.AUTO-MCNC: NEGATIVE MG/DL
HCT VFR BLD AUTO: 39.2 % (ref 36–48)
HGB BLD-MCNC: 13.3 G/DL (ref 12–16)
HGB UR QL STRIP.AUTO: ABNORMAL
KETONES UR STRIP.AUTO-MCNC: ABNORMAL MG/DL
LEUKOCYTE ESTERASE UR QL STRIP.AUTO: ABNORMAL
LYMPHOCYTES # BLD: 0.9 K/UL (ref 1–5.1)
LYMPHOCYTES NFR BLD: 6.3 %
MCH RBC QN AUTO: 31.6 PG (ref 26–34)
MCHC RBC AUTO-ENTMCNC: 33.9 G/DL (ref 31–36)
MCV RBC AUTO: 93.2 FL (ref 80–100)
MONOCYTES # BLD: 1.1 K/UL (ref 0–1.3)
MONOCYTES NFR BLD: 7.1 %
NEUTROPHILS # BLD: 12.9 K/UL (ref 1.7–7.7)
NEUTROPHILS NFR BLD: 85.9 %
NITRITE UR QL STRIP.AUTO: NEGATIVE
PERFORMED ON: ABNORMAL
PH UR STRIP.AUTO: 5.5 [PH] (ref 5–8)
PLATELET # BLD AUTO: 229 K/UL (ref 135–450)
PMV BLD AUTO: 7.6 FL (ref 5–10.5)
POTASSIUM SERPL-SCNC: 4.3 MMOL/L (ref 3.5–5.1)
PROT SERPL-MCNC: 6.4 G/DL (ref 6.4–8.2)
PROT UR STRIP.AUTO-MCNC: 30 MG/DL
RBC # BLD AUTO: 4.2 M/UL (ref 4–5.2)
RBC #/AREA URNS HPF: ABNORMAL /HPF (ref 0–4)
RENAL EPI CELLS #/AREA UR COMP ASSIST: ABNORMAL /HPF (ref 0–1)
SODIUM SERPL-SCNC: 139 MMOL/L (ref 136–145)
SP GR UR STRIP.AUTO: 1.02 (ref 1–1.03)
UA DIPSTICK W REFLEX MICRO PNL UR: YES
URN SPEC COLLECT METH UR: ABNORMAL
UROBILINOGEN UR STRIP-ACNC: 1 E.U./DL
WBC # BLD AUTO: 15 K/UL (ref 4–11)
WBC #/AREA URNS HPF: ABNORMAL /HPF (ref 0–5)

## 2025-08-13 PROCEDURE — 85025 COMPLETE CBC W/AUTO DIFF WBC: CPT

## 2025-08-13 PROCEDURE — 6360000002 HC RX W HCPCS: Performed by: EMERGENCY MEDICINE

## 2025-08-13 PROCEDURE — 87086 URINE CULTURE/COLONY COUNT: CPT

## 2025-08-13 PROCEDURE — 87077 CULTURE AEROBIC IDENTIFY: CPT

## 2025-08-13 PROCEDURE — 81001 URINALYSIS AUTO W/SCOPE: CPT

## 2025-08-13 PROCEDURE — 80053 COMPREHEN METABOLIC PANEL: CPT

## 2025-08-13 PROCEDURE — 87186 SC STD MICRODIL/AGAR DIL: CPT

## 2025-08-13 PROCEDURE — 96374 THER/PROPH/DIAG INJ IV PUSH: CPT

## 2025-08-13 PROCEDURE — 99284 EMERGENCY DEPT VISIT MOD MDM: CPT

## 2025-08-13 PROCEDURE — 2500000003 HC RX 250 WO HCPCS: Performed by: EMERGENCY MEDICINE

## 2025-08-13 RX ORDER — CEFUROXIME AXETIL 250 MG/1
250 TABLET ORAL 2 TIMES DAILY
Qty: 14 TABLET | Refills: 0 | Status: SHIPPED | OUTPATIENT
Start: 2025-08-13 | End: 2025-08-20

## 2025-08-13 RX ADMIN — WATER 1000 MG: 1 INJECTION INTRAMUSCULAR; INTRAVENOUS; SUBCUTANEOUS at 13:23

## 2025-08-16 LAB
BACTERIA UR CULT: ABNORMAL
BACTERIA UR CULT: ABNORMAL
ORGANISM: ABNORMAL

## (undated) DEVICE — FORCEPS BX 240CM 2.4MM L NDL RAD JAW 4 M00513334